# Patient Record
Sex: MALE | Race: WHITE | NOT HISPANIC OR LATINO | Employment: OTHER | URBAN - METROPOLITAN AREA
[De-identification: names, ages, dates, MRNs, and addresses within clinical notes are randomized per-mention and may not be internally consistent; named-entity substitution may affect disease eponyms.]

---

## 2023-10-09 ENCOUNTER — APPOINTMENT (OUTPATIENT)
Dept: RADIOLOGY | Facility: MEDICAL CENTER | Age: 83
DRG: 871 | End: 2023-10-09
Attending: EMERGENCY MEDICINE
Payer: COMMERCIAL

## 2023-10-09 ENCOUNTER — HOSPITAL ENCOUNTER (INPATIENT)
Facility: MEDICAL CENTER | Age: 83
LOS: 28 days | DRG: 871 | End: 2023-11-06
Attending: EMERGENCY MEDICINE | Admitting: STUDENT IN AN ORGANIZED HEALTH CARE EDUCATION/TRAINING PROGRAM
Payer: COMMERCIAL

## 2023-10-09 DIAGNOSIS — E86.0 DEHYDRATION: ICD-10-CM

## 2023-10-09 DIAGNOSIS — E87.0 HYPERNATREMIA: ICD-10-CM

## 2023-10-09 DIAGNOSIS — N17.9 ACUTE KIDNEY INJURY (HCC): ICD-10-CM

## 2023-10-09 DIAGNOSIS — I48.91 ATRIAL FIBRILLATION WITH RVR (HCC): ICD-10-CM

## 2023-10-09 DIAGNOSIS — I48.91 ATRIAL FIBRILLATION WITH RAPID VENTRICULAR RESPONSE (HCC): ICD-10-CM

## 2023-10-09 DIAGNOSIS — J96.01 ACUTE RESPIRATORY FAILURE WITH HYPOXIA (HCC): ICD-10-CM

## 2023-10-09 DIAGNOSIS — G93.41 METABOLIC ENCEPHALOPATHY: ICD-10-CM

## 2023-10-09 DIAGNOSIS — Z71.89 ADVANCE CARE PLANNING: ICD-10-CM

## 2023-10-09 DIAGNOSIS — N19 UREMIA: ICD-10-CM

## 2023-10-09 PROBLEM — T17.908A ASPIRATION INTO AIRWAY: Status: RESOLVED | Noted: 2023-10-09 | Resolved: 2023-10-09

## 2023-10-09 PROBLEM — K02.9 DENTAL CARIES: Status: ACTIVE | Noted: 2023-10-09

## 2023-10-09 PROBLEM — R09.02 HYPOXIA: Status: ACTIVE | Noted: 2023-10-09

## 2023-10-09 PROBLEM — D75.1 ERYTHROCYTOSIS: Status: ACTIVE | Noted: 2023-10-09

## 2023-10-09 PROBLEM — R41.82 AMS (ALTERED MENTAL STATUS): Status: ACTIVE | Noted: 2023-10-09

## 2023-10-09 PROBLEM — M62.82 RHABDOMYOLYSIS: Status: ACTIVE | Noted: 2023-10-09

## 2023-10-09 PROBLEM — T17.908A ASPIRATION INTO AIRWAY: Status: ACTIVE | Noted: 2023-10-09

## 2023-10-09 LAB
ALBUMIN SERPL BCP-MCNC: 3.5 G/DL (ref 3.2–4.9)
ALBUMIN/GLOB SERPL: 1.2 G/DL
ALP SERPL-CCNC: 64 U/L (ref 30–99)
ALT SERPL-CCNC: 15 U/L (ref 2–50)
AMMONIA PLAS-SCNC: 18 UMOL/L (ref 11–45)
ANION GAP SERPL CALC-SCNC: 16 MMOL/L (ref 7–16)
APTT PPP: 32.9 SEC (ref 24.7–36)
AST SERPL-CCNC: 33 U/L (ref 12–45)
BASOPHILS # BLD AUTO: 0 % (ref 0–1.8)
BASOPHILS # BLD: 0 K/UL (ref 0–0.12)
BILIRUB SERPL-MCNC: 2.5 MG/DL (ref 0.1–1.5)
BUN SERPL-MCNC: 104 MG/DL (ref 8–22)
BURR CELLS BLD QL SMEAR: ABNORMAL
CALCIUM ALBUM COR SERPL-MCNC: 8.5 MG/DL (ref 8.5–10.5)
CALCIUM SERPL-MCNC: 8.1 MG/DL (ref 8.5–10.5)
CHLORIDE SERPL-SCNC: 112 MMOL/L (ref 96–112)
CK SERPL-CCNC: 1030 U/L (ref 0–154)
CO2 SERPL-SCNC: 23 MMOL/L (ref 20–33)
CREAT SERPL-MCNC: 1.67 MG/DL (ref 0.5–1.4)
EKG IMPRESSION: NORMAL
EKG IMPRESSION: NORMAL
EOSINOPHIL # BLD AUTO: 0 K/UL (ref 0–0.51)
EOSINOPHIL NFR BLD: 0 % (ref 0–6.9)
ERYTHROCYTE [DISTWIDTH] IN BLOOD BY AUTOMATED COUNT: 49.1 FL (ref 35.9–50)
GFR SERPLBLD CREATININE-BSD FMLA CKD-EPI: 41 ML/MIN/1.73 M 2
GLOBULIN SER CALC-MCNC: 3 G/DL (ref 1.9–3.5)
GLUCOSE BLD STRIP.AUTO-MCNC: 297 MG/DL (ref 65–99)
GLUCOSE SERPL-MCNC: 351 MG/DL (ref 65–99)
HCT VFR BLD AUTO: 58.2 % (ref 42–52)
HGB BLD-MCNC: 18.9 G/DL (ref 14–18)
INR PPP: 1.33 (ref 0.87–1.13)
LACTATE SERPL-SCNC: 2.3 MMOL/L (ref 0.5–2)
LACTATE SERPL-SCNC: 3 MMOL/L (ref 0.5–2)
LACTATE SERPL-SCNC: 3 MMOL/L (ref 0.5–2)
LIPASE SERPL-CCNC: 84 U/L (ref 11–82)
LYMPHOCYTES # BLD AUTO: 0.81 K/UL (ref 1–4.8)
LYMPHOCYTES NFR BLD: 4.4 % (ref 22–41)
MANUAL DIFF BLD: ABNORMAL
MCH RBC QN AUTO: 30.6 PG (ref 27–33)
MCHC RBC AUTO-ENTMCNC: 32.5 G/DL (ref 32.3–36.5)
MCV RBC AUTO: 94.2 FL (ref 81.4–97.8)
MICROCYTES BLD QL SMEAR: ABNORMAL
MONOCYTES # BLD AUTO: 0.95 K/UL (ref 0–0.85)
MONOCYTES NFR BLD AUTO: 5.2 % (ref 0–13.4)
MORPHOLOGY BLD-IMP: NORMAL
NEUTROPHILS # BLD AUTO: 16.54 K/UL (ref 1.82–7.42)
NEUTROPHILS NFR BLD: 90.4 % (ref 44–72)
NRBC # BLD AUTO: 0 K/UL
NRBC BLD-RTO: 0 /100 WBC (ref 0–0.2)
PLATELET # BLD AUTO: 171 K/UL (ref 164–446)
PLATELET BLD QL SMEAR: NORMAL
PMV BLD AUTO: 11 FL (ref 9–12.9)
POIKILOCYTOSIS BLD QL SMEAR: ABNORMAL
POLYCHROMASIA BLD QL SMEAR: ABNORMAL
POTASSIUM SERPL-SCNC: 4 MMOL/L (ref 3.6–5.5)
PROCALCITONIN SERPL-MCNC: 0.23 NG/ML
PROT SERPL-MCNC: 6.5 G/DL (ref 6–8.2)
PROTHROMBIN TIME: 16.7 SEC (ref 12–14.6)
RBC # BLD AUTO: 6.18 M/UL (ref 4.7–6.1)
RBC BLD AUTO: PRESENT
SODIUM SERPL-SCNC: 151 MMOL/L (ref 135–145)
TROPONIN T SERPL-MCNC: 39 NG/L (ref 6–19)
WBC # BLD AUTO: 18.3 K/UL (ref 4.8–10.8)

## 2023-10-09 PROCEDURE — 71045 X-RAY EXAM CHEST 1 VIEW: CPT

## 2023-10-09 PROCEDURE — 700102 HCHG RX REV CODE 250 W/ 637 OVERRIDE(OP)

## 2023-10-09 PROCEDURE — 93005 ELECTROCARDIOGRAM TRACING: CPT | Performed by: EMERGENCY MEDICINE

## 2023-10-09 PROCEDURE — 87040 BLOOD CULTURE FOR BACTERIA: CPT

## 2023-10-09 PROCEDURE — 83605 ASSAY OF LACTIC ACID: CPT | Mod: 91

## 2023-10-09 PROCEDURE — 96374 THER/PROPH/DIAG INJ IV PUSH: CPT

## 2023-10-09 PROCEDURE — 85610 PROTHROMBIN TIME: CPT

## 2023-10-09 PROCEDURE — 85730 THROMBOPLASTIN TIME PARTIAL: CPT

## 2023-10-09 PROCEDURE — 99223 1ST HOSP IP/OBS HIGH 75: CPT | Mod: GC,AI

## 2023-10-09 PROCEDURE — 70450 CT HEAD/BRAIN W/O DYE: CPT

## 2023-10-09 PROCEDURE — 770020 HCHG ROOM/CARE - TELE (206)

## 2023-10-09 PROCEDURE — 80053 COMPREHEN METABOLIC PANEL: CPT

## 2023-10-09 PROCEDURE — 700101 HCHG RX REV CODE 250

## 2023-10-09 PROCEDURE — 85025 COMPLETE CBC W/AUTO DIFF WBC: CPT

## 2023-10-09 PROCEDURE — 84484 ASSAY OF TROPONIN QUANT: CPT

## 2023-10-09 PROCEDURE — 94640 AIRWAY INHALATION TREATMENT: CPT

## 2023-10-09 PROCEDURE — 99285 EMERGENCY DEPT VISIT HI MDM: CPT

## 2023-10-09 PROCEDURE — 700105 HCHG RX REV CODE 258: Performed by: EMERGENCY MEDICINE

## 2023-10-09 PROCEDURE — 82140 ASSAY OF AMMONIA: CPT

## 2023-10-09 PROCEDURE — 72125 CT NECK SPINE W/O DYE: CPT

## 2023-10-09 PROCEDURE — 700111 HCHG RX REV CODE 636 W/ 250 OVERRIDE (IP): Mod: JZ

## 2023-10-09 PROCEDURE — 84145 PROCALCITONIN (PCT): CPT

## 2023-10-09 PROCEDURE — 93005 ELECTROCARDIOGRAM TRACING: CPT

## 2023-10-09 PROCEDURE — 82550 ASSAY OF CK (CPK): CPT

## 2023-10-09 PROCEDURE — 700111 HCHG RX REV CODE 636 W/ 250 OVERRIDE (IP): Mod: JZ | Performed by: EMERGENCY MEDICINE

## 2023-10-09 PROCEDURE — 85007 BL SMEAR W/DIFF WBC COUNT: CPT

## 2023-10-09 PROCEDURE — 81001 URINALYSIS AUTO W/SCOPE: CPT

## 2023-10-09 PROCEDURE — 31720 CLEARANCE OF AIRWAYS: CPT

## 2023-10-09 PROCEDURE — 96375 TX/PRO/DX INJ NEW DRUG ADDON: CPT

## 2023-10-09 PROCEDURE — 36415 COLL VENOUS BLD VENIPUNCTURE: CPT

## 2023-10-09 PROCEDURE — 82962 GLUCOSE BLOOD TEST: CPT

## 2023-10-09 PROCEDURE — 83690 ASSAY OF LIPASE: CPT

## 2023-10-09 PROCEDURE — 700105 HCHG RX REV CODE 258

## 2023-10-09 RX ORDER — SODIUM CHLORIDE, SODIUM LACTATE, POTASSIUM CHLORIDE, CALCIUM CHLORIDE 600; 310; 30; 20 MG/100ML; MG/100ML; MG/100ML; MG/100ML
INJECTION, SOLUTION INTRAVENOUS CONTINUOUS
Status: DISCONTINUED | OUTPATIENT
Start: 2023-10-09 | End: 2023-10-10

## 2023-10-09 RX ORDER — IPRATROPIUM BROMIDE AND ALBUTEROL SULFATE 2.5; .5 MG/3ML; MG/3ML
3 SOLUTION RESPIRATORY (INHALATION)
Status: DISCONTINUED | OUTPATIENT
Start: 2023-10-09 | End: 2023-10-11 | Stop reason: ALTCHOICE

## 2023-10-09 RX ORDER — DEXTROSE MONOHYDRATE 25 G/50ML
25 INJECTION, SOLUTION INTRAVENOUS
Status: DISCONTINUED | OUTPATIENT
Start: 2023-10-09 | End: 2023-10-11

## 2023-10-09 RX ORDER — SODIUM CHLORIDE 9 MG/ML
1000 INJECTION, SOLUTION INTRAVENOUS ONCE
Status: COMPLETED | OUTPATIENT
Start: 2023-10-09 | End: 2023-10-09

## 2023-10-09 RX ORDER — CEFTRIAXONE 2 G/1
2000 INJECTION, POWDER, FOR SOLUTION INTRAMUSCULAR; INTRAVENOUS ONCE
Status: COMPLETED | OUTPATIENT
Start: 2023-10-09 | End: 2023-10-09

## 2023-10-09 RX ORDER — SODIUM CHLORIDE, SODIUM LACTATE, POTASSIUM CHLORIDE, AND CALCIUM CHLORIDE .6; .31; .03; .02 G/100ML; G/100ML; G/100ML; G/100ML
1000 INJECTION, SOLUTION INTRAVENOUS ONCE
Status: ACTIVE | OUTPATIENT
Start: 2023-10-10 | End: 2023-10-11

## 2023-10-09 RX ORDER — DILTIAZEM HYDROCHLORIDE 5 MG/ML
INJECTION INTRAVENOUS
Status: DISPENSED
Start: 2023-10-09 | End: 2023-10-10

## 2023-10-09 RX ORDER — INSULIN LISPRO 100 [IU]/ML
1-6 INJECTION, SOLUTION INTRAVENOUS; SUBCUTANEOUS EVERY 6 HOURS
Status: DISCONTINUED | OUTPATIENT
Start: 2023-10-09 | End: 2023-10-11

## 2023-10-09 RX ORDER — AMOXICILLIN 250 MG
2 CAPSULE ORAL 2 TIMES DAILY
Status: DISCONTINUED | OUTPATIENT
Start: 2023-10-09 | End: 2023-10-09

## 2023-10-09 RX ORDER — BISACODYL 10 MG
10 SUPPOSITORY, RECTAL RECTAL
Status: DISCONTINUED | OUTPATIENT
Start: 2023-10-09 | End: 2023-10-09

## 2023-10-09 RX ORDER — IPRATROPIUM BROMIDE AND ALBUTEROL SULFATE 2.5; .5 MG/3ML; MG/3ML
SOLUTION RESPIRATORY (INHALATION)
Status: COMPLETED
Start: 2023-10-09 | End: 2023-10-09

## 2023-10-09 RX ORDER — DILTIAZEM HYDROCHLORIDE 5 MG/ML
10 INJECTION INTRAVENOUS ONCE
Status: COMPLETED | OUTPATIENT
Start: 2023-10-09 | End: 2023-10-09

## 2023-10-09 RX ORDER — POLYETHYLENE GLYCOL 3350 17 G/17G
1 POWDER, FOR SOLUTION ORAL
Status: DISCONTINUED | OUTPATIENT
Start: 2023-10-09 | End: 2023-10-09

## 2023-10-09 RX ORDER — AZITHROMYCIN 500 MG/5ML
500 INJECTION, POWDER, LYOPHILIZED, FOR SOLUTION INTRAVENOUS EVERY 24 HOURS
Status: DISCONTINUED | OUTPATIENT
Start: 2023-10-09 | End: 2023-10-10

## 2023-10-09 RX ADMIN — SODIUM CHLORIDE, POTASSIUM CHLORIDE, SODIUM LACTATE AND CALCIUM CHLORIDE: 600; 310; 30; 20 INJECTION, SOLUTION INTRAVENOUS at 21:41

## 2023-10-09 RX ADMIN — INSULIN LISPRO 3 UNITS: 100 INJECTION, SOLUTION INTRAVENOUS; SUBCUTANEOUS at 21:31

## 2023-10-09 RX ADMIN — CEFTRIAXONE SODIUM 2000 MG: 2 INJECTION, POWDER, FOR SOLUTION INTRAMUSCULAR; INTRAVENOUS at 18:22

## 2023-10-09 RX ADMIN — DILTIAZEM HYDROCHLORIDE 10 MG: 5 INJECTION INTRAVENOUS at 15:51

## 2023-10-09 RX ADMIN — INSULIN GLARGINE-YFGN 24 UNITS: 100 INJECTION, SOLUTION SUBCUTANEOUS at 22:06

## 2023-10-09 RX ADMIN — AZITHROMYCIN 500 MG: 500 INJECTION, POWDER, LYOPHILIZED, FOR SOLUTION INTRAVENOUS at 22:02

## 2023-10-09 RX ADMIN — IPRATROPIUM BROMIDE AND ALBUTEROL SULFATE 3 ML: 2.5; .5 SOLUTION RESPIRATORY (INHALATION) at 21:18

## 2023-10-09 RX ADMIN — SODIUM CHLORIDE 1000 ML: 9 INJECTION, SOLUTION INTRAVENOUS at 18:21

## 2023-10-09 RX ADMIN — SODIUM CHLORIDE 1000 ML: 9 INJECTION, SOLUTION INTRAVENOUS at 15:50

## 2023-10-09 ASSESSMENT — PAIN DESCRIPTION - PAIN TYPE: TYPE: ACUTE PAIN;CHRONIC PAIN

## 2023-10-09 NOTE — ED TRIAGE NOTES
Chief Complaint   Patient presents with    T-5000 GLF     Pt found down.  LKW 2130 on 10/8     Pt BIB EMS for above complaint. On arrival pt has a full brief with urine and diarrhea spilling onto pt's legs. Pt responds to verbal stimuli.  PIV placed, blood drawn and sent to lab.   Pt cleaned and new linens placed.

## 2023-10-09 NOTE — ED NOTES
Lab called, they need another blue top to run coags due to high Hct level. They will be sending phlebotomist to collect sample

## 2023-10-09 NOTE — ED PROVIDER NOTES
ED Provider Note    CHIEF COMPLAINT  Chief Complaint   Patient presents with    T-5000 GLF     Pt found down.  LKW 2130 on 10/8       HPI  Perry Marrufo is a 83 y.o. male who presents from home after being found down.  Initial story is unclear in terms of who called EMS, unclear whether the patient was at home or in an assisted living.  Patient  was found by EMS to be altered and was covered in stool.  He had a pressure ulcer on his sacrum as well as on the right side of both knees.  He complained of feeling thirsty but was not able to remember any specific information about why he fell or how long he was there.    EXTERNAL RECORDS REVIEWED  Review of last admission note to this facility was in 2015 when patient was admitted for C. difficile diarrhea and sepsis.  He had had a recent hip fracture as well.  He has a history of type 2 diabetes and vitamin D deficiency.  Also was found to be hyponatremic at that time.  [facility]  Patient has a history of basal cell carcinoma of the face, BPH, stage II chronic kidney disease, type 2 diabetes, esophageal mass, hypertension.  His last medication list appeared to have alogliptin, 25 mg once a day, cyanocobalamin, 1000 mcg tablet once a day, and tamsulosin 0.4 mg daily  ROS  Unable to obtain due to clinical condition        LIMITATION TO HISTORY   Select: Altered mental status / Confusion  OUTSIDE HISTORIAN(S):  EMS none        PAST FAM HISTORY  No family history on file.    PAST MEDICAL HISTORY   has a past medical history of Diabetes mellitus, type 2 (HCC) and Prostate disease.    SOCIAL HISTORY  Social History     Tobacco Use    Smoking status: Never    Smokeless tobacco: Not on file   Substance and Sexual Activity    Alcohol use: No    Drug use: No    Sexual activity: Not on file       SURGICAL HISTORY   has a past surgical history that includes other orthopedic surgery; pin insertion (1/2/2015); and hip cannulated screw (1/2/2015).    CURRENT  "MEDICATIONS  Home Medications       Reviewed by January Tay (Pharmacy Tech) on 10/09/23 at 1840  Med List Status: Unable to Obtain     Medication Last Dose Status   aspirin (ASA) 325 MG TABS  Active   cyanocobalamin (VITAMIN B12) 500 MCG tablet  Active   enoxaparin (LOVENOX) 40 MG/0.4ML SOLN inj  Active   famotidine (PEPCID) 20 MG TABS  Active   hydrocodone-acetaminophen (NORCO) 5-325 MG TABS per tablet  Active   insulin lispro (HUMALOG) 100 UNIT/ML SOLN  Active   lactobacillus rhamnosus, GG, (CULTURELLE) CAPS  Active   Omega-3 Fatty Acids (DOCOSAHEXANOIC ACID) 1000 MG CAPS capsule  Active   potassium chloride SA (K-DUR) 20 MEQ TBCR  Active   tamsulosin (FLOMAX) 0.4 MG capsule  Active   vitamin D (CHOLECALCIFEROL) 1000 UNIT TABS  Active                     ALLERGIES  Allergies   Allergen Reactions    Nicotine        PHYSICAL EXAM  VITAL SIGNS: /66   Pulse 98   Temp 36.4 °C (97.6 °F) (Temporal)   Resp (!) 28   Ht 1.803 m (5' 11\")   Wt 122 kg (270 lb)   SpO2 90%   BMI 37.66 kg/m²    Gen: Disheveled, unkempt, appears delirious  HEENT: Dry oral mucous membranes.  No signs of trauma, Bilateral external ears normal, Nose normal. Conjunctiva normal, Non-icteric.   Neck:  No tenderness, Supple, No masses  Lymphatic: No cervical lymphadenopathy noted.   Cardiovascular: Tachycardia with irregularly irregular rhythm,.  Capillary refill less than 3 seconds to all extremities, 2+ distal pulses.  Thorax & Lungs: Normal breath sounds, No respiratory distress, No wheezing bilateral chest rise  Abdomen: Bowel sounds normal, Soft, No tenderness, No masses, No pulsatile masses. No Guarding or rebound  Skin: Warm, Dry, No erythema, No rash noted to exposed areas.   Back: No bony tenderness, No CVA tenderness.   Extremities: Intact distal pulses, No edema  Neurologic: Alert , no facial droop, grossly normal coordination and strength  Psychiatric: Affect pleasant    INITIAL IMPRESSION  Patient arrives for evaluation " appearing extremely dehydrated and altered.  The story is quite unclear as there is nobody to corroborate what happened.  There was no indication from EMS whether the patient lives alone or who called 911.  Patient himself cannot answer.  He does open his eyes to voice and slowly look at whoever is speaking.  He mumbles incoherently.  He is markedly tachycardic and appears to be in atrial fibrillation with rapid ventricular rate.  He also was in some amount of respiratory distress and is tachypneic.  He does appear to be moving air well and does not have any abnormal breath sounds however.  He is on small amount of supplemental oxygen based on apparent hypoxia in the field.  His blood pressure appears stable and he is not febrile.  He has no significant findings to suggest trauma but does have appears to be the start of pressure ulcers to both knees and the low back/sacral region.  We will attempt to get a hold of anybody in his hometown or family that knows him.  There is no emergency contact in the system.    ED observation? No  LABS  Results for orders placed or performed during the hospital encounter of 10/09/23   LACTIC ACID   Result Value Ref Range    Lactic Acid 3.0 (H) 0.5 - 2.0 mmol/L   Prothrombin Time   Result Value Ref Range    PT 16.7 (H) 12.0 - 14.6 sec    INR 1.33 (H) 0.87 - 1.13   APTT   Result Value Ref Range    APTT 32.9 24.7 - 36.0 sec   DIFFERENTIAL MANUAL   Result Value Ref Range    Neutrophils-Polys 90.40 (H) 44.00 - 72.00 %    Lymphocytes 4.40 (L) 22.00 - 41.00 %    Monocytes 5.20 0.00 - 13.40 %    Eosinophils 0.00 0.00 - 6.90 %    Basophils 0.00 0.00 - 1.80 %    Neutrophils (Absolute) 16.54 (H) 1.82 - 7.42 K/uL    Lymphs (Absolute) 0.81 (L) 1.00 - 4.80 K/uL    Monos (Absolute) 0.95 (H) 0.00 - 0.85 K/uL    Eos (Absolute) 0.00 0.00 - 0.51 K/uL    Baso (Absolute) 0.00 0.00 - 0.12 K/uL    Nucleated RBC 0.00 0.00 - 0.20 /100 WBC    Manual Diff Status PERFORMED    PERIPHERAL SMEAR REVIEW   Result  Value Ref Range    Peripheral Smear Review see below    PLATELET ESTIMATE   Result Value Ref Range    Plt Estimation Normal    MORPHOLOGY   Result Value Ref Range    RBC Morphology Present     Polychromia 1+     Microcytosis 2+ (A)     Poikilocytosis 1+     Echinocytes 1+    CBC WITH DIFFERENTIAL   Result Value Ref Range    WBC 18.3 (H) 4.8 - 10.8 K/uL    RBC 6.18 (H) 4.70 - 6.10 M/uL    Hemoglobin 18.9 (H) 14.0 - 18.0 g/dL    Hematocrit 58.2 (H) 42.0 - 52.0 %    MCV 94.2 81.4 - 97.8 fL    MCH 30.6 27.0 - 33.0 pg    MCHC 32.5 32.3 - 36.5 g/dL    RDW 49.1 35.9 - 50.0 fL    Platelet Count 171 164 - 446 K/uL    MPV 11.0 9.0 - 12.9 fL    NRBC (Absolute) 0.00 K/uL   Comp Metabolic Panel   Result Value Ref Range    Sodium 151 (H) 135 - 145 mmol/L    Potassium 4.0 3.6 - 5.5 mmol/L    Chloride 112 96 - 112 mmol/L    Co2 23 20 - 33 mmol/L    Anion Gap 16.0 7.0 - 16.0    Glucose 351 (H) 65 - 99 mg/dL    Bun 104 (H) 8 - 22 mg/dL    Creatinine 1.67 (H) 0.50 - 1.40 mg/dL    Calcium 8.1 (L) 8.5 - 10.5 mg/dL    Correct Calcium 8.5 8.5 - 10.5 mg/dL    AST(SGOT) 33 12 - 45 U/L    ALT(SGPT) 15 2 - 50 U/L    Alkaline Phosphatase 64 30 - 99 U/L    Total Bilirubin 2.5 (H) 0.1 - 1.5 mg/dL    Albumin 3.5 3.2 - 4.9 g/dL    Total Protein 6.5 6.0 - 8.2 g/dL    Globulin 3.0 1.9 - 3.5 g/dL    A-G Ratio 1.2 g/dL   CREATINE KINASE   Result Value Ref Range    CPK Total 1030 (H) 0 - 154 U/L   LACTIC ACID   Result Value Ref Range    Lactic Acid 3.0 (H) 0.5 - 2.0 mmol/L   LIPASE   Result Value Ref Range    Lipase 84 (H) 11 - 82 U/L   TROPONIN   Result Value Ref Range    Troponin T 39 (H) 6 - 19 ng/L   ESTIMATED GFR   Result Value Ref Range    GFR (CKD-EPI) 41 (A) >60 mL/min/1.73 m 2   AMMONIA   Result Value Ref Range    Ammonia 18 11 - 45 umol/L   LACTIC ACID   Result Value Ref Range    Lactic Acid 2.3 (H) 0.5 - 2.0 mmol/L   PROCALCITONIN   Result Value Ref Range    Procalcitonin 0.23 <0.25 ng/mL   EKG (NOW)   Result Value Ref Range    Report        [facility] Emergency [facility]    Test Date:  2023-10-09  Pt Name:    LINNEA WATERS               Department: ER  MRN:        [id number]                      Room:       BL 19  Gender:     Male                         Technician:  :        1940                   Requested By:ER TRIAGE PROTOCOL  Order #:    [id number]                    Reading MD: Judith Benitez MD    Measurements  Intervals                                Axis  Rate:       143                          P:          0  RI:         0                            QRS:        -83  QRSD:       92                           T:          80  QT:         301  QTc:        464    Interpretive Statements  Atrial fibrillation with rapid V-rate  Inferior infarct, old  Anterior infarct, old  Compared to ECG 2015 19:19:50  Sinus rhythm no longer present  Myocardial infarct finding still present  Electronically Signed On 10- 18:26:37 PDT by Judith Benitez MD     EKG (NOW)   Result Value Ref Range    Report       [facility] Emergency Dept.    Test Date:  2023-10-09  Pt Name:    LINNEA WATERS               Department: ER  MRN:        [id number]                      Room:       BL 19  Gender:     Male                         Technician: [id number]  :        1940                   Requested By:JUDITH BENITEZ  Order #:    [id number]                    Reading MD: Judith Benitez MD    Measurements  Intervals                                Axis  Rate:       83                           P:          -29  RI:         170                          QRS:        -79  QRSD:       99                           T:          71  QT:         371  QTc:        436    Interpretive Statements  Sinus rhythm  Multiple ventricular premature complexes  Inferior infarct, old  Anterior infarct, old  Compared to ECG 10/09/2023 15:10:06  Ventricular premature complex(es) now present  Atrial fibrillation no longer present  Myocardial infarct finding  still present  Electronically Signed On 10- 18:26: 34 PDT by Pritesh Fuentes MD     POCT glucose device results   Result Value Ref Range    POC Glucose, Blood 297 (H) 65 - 99 mg/dL     I have independently interpreted this EKG  Atrial fibrillation with rapid ventricular rate of 143.  GA interval is unassessable.  Intervals appear to be within normal limits.  There do not appear to be any convincing ST or T wave changes to suggest ischemia.  There does not appear to be ectopy.  Most recent EKG was from 2015 and was a sinus rhythm at that time.    Second EKG performed after conversion to sinus rhythm.  Rate of 83.  Multiple PVCs noted.  There appear to be Q waves in the anterior and inferior region.  Compared to most recent EKG performed on arrival, A-fib no longer present.    I have independently interpreted the diagnostic imaging associated with this visit and am waiting the final reading from the radiologist.   My preliminary interpretation is a follows: Chest x-ray: There are no pulmonary opacities to suggest infiltrates or effusions.  Cardiomediastinal silhouette appears to be within normal limits, there are no bony abnormalities.  RADIOLOGY  CT-CSPINE WITHOUT PLUS RECONS   Final Result         1. No acute fracture from C1 through T1 is visualized.         CT-HEAD W/O   Final Result         1. No acute intracranial abnormality. No evidence of acute intracranial hemorrhage or mass lesion.                     DX-CHEST-PORTABLE (1 VIEW)   Final Result      1.  Low lung volumes without definite acute cardiopulmonary abnormality.      EC-ECHOCARDIOGRAM COMPLETE W/O CONT    (Results Pending)   [id number]    (Results Pending)         HYDRATION: Based on the patient's presentation of Acute Kidney Injury, Dehydration, Eldery ALOC, Inability to take oral fluids, and Tachycardia the patient was given IV fluids. IV Hydration was used because oral hydration was not adequate alone. Upon recheck following  hydration, the patient was slowly improving.    Critical Care Note  Upon my evaluation, this patient had high probability of imminent and life-threatening deterioration due to metabolic encephalopathy severe dehydration, hypernatremia, acute kidney injury with uremia, hemoconcentration, leukocytosis, which required my direct attention, intervention, and personal management. I personally provided 80 minutes of critical care time exclusive of time spent on separately billable procedures. Time includes review of laboratory data, radiology results, discussion with consultants, and monitoring for potential decompensation.      COURSE & MEDICAL DECISION MAKING  Pertinent Labs & Imaging studies reviewed. (See chart for details)  6:23 PM  Patient's nephew, Yuriy Marrufo, called and was able to give more information.  Apparently the patient lives alone but only about a block from Yuriy himself.  2 days ago a neighbor came over to borrow the patient's truck and he felt the patient looked a bit sick at that time.  Patient's neighbor brought back the truck today and found the patient on the floor.  The patient was able to state that he was very thirsty but no other information was given at that time.  They called EMS and EMS transported him from [place] to this facility.  Yuriy give us his phone number, [phone], and he will be the point of contact in terms of family members who can help with decision-making.    Patient reevaluated at bedside.  He is still altered but slightly more alert now.  His labs are extremely concerning for severe dehydration.  Given the possibility of sepsis, empiric antibiotics were given in the form of Rocephin after cultures were drawn.  Unclear what the source may be at this point.  Patient is not febrile and does not appear to have neck stiffness arguing against meningitis.  I suspect that he is altered due to the his extreme dehydration state and uremia and that once this resolves he  should have resolution of this encephalopathy.  The case was discussed with the hospitalist who will evaluate the patient in the emergency department for admission for his metabolic abnormalities.  At this point there does not appear to be any significant trauma and he does not have any abdominal tenderness to suggest the need for advanced imaging of the chest or abdomen.        I have discussed management of the patient with the following physicians and CARLOZ's: None    Escalation of care considered, and ultimately not performed: CT imaging of the chest abdomen pelvis    Barriers to care at this time, including but not limited to: Patient is encephalopathic.     Decision tools and Rx drugs considered including, but not limited to :Antibiotics Rocephin      Discussion of management with other QHP or appropriate source(s): None        FINAL IMPRESSION  1. Dehydration    2. Acute kidney injury (HCC)    3. Hypernatremia    4. Metabolic encephalopathy    5. Atrial fibrillation with rapid ventricular response (HCC)    6. Uremia        Electronically signed by: Pritesh Fuentes M.D., 10/9/2023 3:26 PM

## 2023-10-10 ENCOUNTER — APPOINTMENT (OUTPATIENT)
Dept: RADIOLOGY | Facility: MEDICAL CENTER | Age: 83
DRG: 871 | End: 2023-10-10
Attending: INTERNAL MEDICINE
Payer: COMMERCIAL

## 2023-10-10 ENCOUNTER — APPOINTMENT (OUTPATIENT)
Dept: RADIOLOGY | Facility: MEDICAL CENTER | Age: 83
DRG: 871 | End: 2023-10-10
Payer: COMMERCIAL

## 2023-10-10 ENCOUNTER — APPOINTMENT (OUTPATIENT)
Dept: CARDIOLOGY | Facility: MEDICAL CENTER | Age: 83
DRG: 871 | End: 2023-10-10
Payer: COMMERCIAL

## 2023-10-10 LAB
ALBUMIN SERPL BCP-MCNC: 3.1 G/DL (ref 3.2–4.9)
ALBUMIN/GLOB SERPL: 1 G/DL
ALP SERPL-CCNC: 60 U/L (ref 30–99)
ALT SERPL-CCNC: 19 U/L (ref 2–50)
AMPHET UR QL SCN: NEGATIVE
ANION GAP SERPL CALC-SCNC: 12 MMOL/L (ref 7–16)
ANION GAP SERPL CALC-SCNC: 13 MMOL/L (ref 7–16)
APPEARANCE UR: ABNORMAL
AST SERPL-CCNC: 36 U/L (ref 12–45)
B PARAP IS1001 DNA NPH QL NAA+NON-PROBE: NOT DETECTED
B PERT.PT PRMT NPH QL NAA+NON-PROBE: NOT DETECTED
BACTERIA #/AREA URNS HPF: ABNORMAL /HPF
BARBITURATES UR QL SCN: NEGATIVE
BASE EXCESS BLDA CALC-SCNC: -3 MMOL/L (ref -4–3)
BASOPHILS # BLD AUTO: 0.7 % (ref 0–1.8)
BASOPHILS # BLD: 0.12 K/UL (ref 0–0.12)
BENZODIAZ UR QL SCN: POSITIVE
BILIRUB SERPL-MCNC: 1.9 MG/DL (ref 0.1–1.5)
BILIRUB UR QL STRIP.AUTO: ABNORMAL
BODY TEMPERATURE: 36.1 CENTIGRADE
BUN SERPL-MCNC: 103 MG/DL (ref 8–22)
BUN SERPL-MCNC: 107 MG/DL (ref 8–22)
BZE UR QL SCN: NEGATIVE
C PNEUM DNA NPH QL NAA+NON-PROBE: NOT DETECTED
CALCIUM ALBUM COR SERPL-MCNC: 9 MG/DL (ref 8.5–10.5)
CALCIUM SERPL-MCNC: 8.3 MG/DL (ref 8.5–10.5)
CALCIUM SERPL-MCNC: 8.4 MG/DL (ref 8.5–10.5)
CANNABINOIDS UR QL SCN: NEGATIVE
CHLORIDE SERPL-SCNC: 116 MMOL/L (ref 96–112)
CHLORIDE SERPL-SCNC: 121 MMOL/L (ref 96–112)
CK SERPL-CCNC: 1104 U/L (ref 0–154)
CO2 SERPL-SCNC: 20 MMOL/L (ref 20–33)
CO2 SERPL-SCNC: 21 MMOL/L (ref 20–33)
COLOR UR: ABNORMAL
CREAT SERPL-MCNC: 1.4 MG/DL (ref 0.5–1.4)
CREAT SERPL-MCNC: 1.9 MG/DL (ref 0.5–1.4)
EOSINOPHIL # BLD AUTO: 0.04 K/UL (ref 0–0.51)
EOSINOPHIL NFR BLD: 0.2 % (ref 0–6.9)
EPI CELLS #/AREA URNS HPF: ABNORMAL /HPF
ERYTHROCYTE [DISTWIDTH] IN BLOOD BY AUTOMATED COUNT: 50.1 FL (ref 35.9–50)
EST. AVERAGE GLUCOSE BLD GHB EST-MCNC: 183 MG/DL
FENTANYL UR QL: NEGATIVE
FLUAV RNA NPH QL NAA+NON-PROBE: NOT DETECTED
FLUBV RNA NPH QL NAA+NON-PROBE: NOT DETECTED
GFR SERPLBLD CREATININE-BSD FMLA CKD-EPI: 35 ML/MIN/1.73 M 2
GFR SERPLBLD CREATININE-BSD FMLA CKD-EPI: 50 ML/MIN/1.73 M 2
GLOBULIN SER CALC-MCNC: 3.1 G/DL (ref 1.9–3.5)
GLUCOSE BLD STRIP.AUTO-MCNC: 242 MG/DL (ref 65–99)
GLUCOSE BLD STRIP.AUTO-MCNC: 258 MG/DL (ref 65–99)
GLUCOSE BLD STRIP.AUTO-MCNC: 266 MG/DL (ref 65–99)
GLUCOSE BLD STRIP.AUTO-MCNC: 269 MG/DL (ref 65–99)
GLUCOSE BLD STRIP.AUTO-MCNC: 293 MG/DL (ref 65–99)
GLUCOSE SERPL-MCNC: 313 MG/DL (ref 65–99)
GLUCOSE SERPL-MCNC: 342 MG/DL (ref 65–99)
GLUCOSE UR STRIP.AUTO-MCNC: NEGATIVE MG/DL
GRAN CASTS #/AREA URNS LPF: ABNORMAL /LPF
HADV DNA NPH QL NAA+NON-PROBE: NOT DETECTED
HBA1C MFR BLD: 8 % (ref 4–5.6)
HCO3 BLDA-SCNC: 20 MMOL/L (ref 17–25)
HCOV 229E RNA NPH QL NAA+NON-PROBE: NOT DETECTED
HCOV HKU1 RNA NPH QL NAA+NON-PROBE: NOT DETECTED
HCOV NL63 RNA NPH QL NAA+NON-PROBE: NOT DETECTED
HCOV OC43 RNA NPH QL NAA+NON-PROBE: NOT DETECTED
HCT VFR BLD AUTO: 55.3 % (ref 42–52)
HGB BLD-MCNC: 18.2 G/DL (ref 14–18)
HMPV RNA NPH QL NAA+NON-PROBE: NOT DETECTED
HPIV1 RNA NPH QL NAA+NON-PROBE: NOT DETECTED
HPIV2 RNA NPH QL NAA+NON-PROBE: NOT DETECTED
HPIV3 RNA NPH QL NAA+NON-PROBE: NOT DETECTED
HPIV4 RNA NPH QL NAA+NON-PROBE: NOT DETECTED
HYALINE CASTS #/AREA URNS LPF: ABNORMAL /LPF
IMM GRANULOCYTES # BLD AUTO: 0.38 K/UL (ref 0–0.11)
IMM GRANULOCYTES NFR BLD AUTO: 2.2 % (ref 0–0.9)
INHALED O2 FLOW RATE: NORMAL L/MIN
INR PPP: 1.35 (ref 0.87–1.13)
KETONES UR STRIP.AUTO-MCNC: NEGATIVE MG/DL
LACTATE SERPL-SCNC: 1.8 MMOL/L (ref 0.5–2)
LACTATE SERPL-SCNC: 2.3 MMOL/L (ref 0.5–2)
LACTATE SERPL-SCNC: 2.8 MMOL/L (ref 0.5–2)
LACTATE SERPL-SCNC: 2.8 MMOL/L (ref 0.5–2)
LACTATE SERPL-SCNC: 3.5 MMOL/L (ref 0.5–2)
LEUKOCYTE ESTERASE UR QL STRIP.AUTO: ABNORMAL
LV EJECT FRACT  99904: 73
LV EJECT FRACT MOD 2C 99903: 70.2
LV EJECT FRACT MOD 4C 99902: 73.7
LV EJECT FRACT MOD BP 99901: 72.84
LYMPHOCYTES # BLD AUTO: 0.68 K/UL (ref 1–4.8)
LYMPHOCYTES NFR BLD: 4 % (ref 22–41)
M PNEUMO DNA NPH QL NAA+NON-PROBE: NOT DETECTED
MCH RBC QN AUTO: 31 PG (ref 27–33)
MCHC RBC AUTO-ENTMCNC: 32.9 G/DL (ref 32.3–36.5)
MCV RBC AUTO: 94.2 FL (ref 81.4–97.8)
METHADONE UR QL SCN: NEGATIVE
MICRO URNS: ABNORMAL
MONOCYTES # BLD AUTO: 1.42 K/UL (ref 0–0.85)
MONOCYTES NFR BLD AUTO: 8.3 % (ref 0–13.4)
NEUTROPHILS # BLD AUTO: 14.45 K/UL (ref 1.82–7.42)
NEUTROPHILS NFR BLD: 84.6 % (ref 44–72)
NITRITE UR QL STRIP.AUTO: NEGATIVE
NRBC # BLD AUTO: 0 K/UL
NRBC BLD-RTO: 0 /100 WBC (ref 0–0.2)
OPIATES UR QL SCN: NEGATIVE
OSMOLALITY SERPL: 368 MOSM/KG H2O (ref 278–298)
OXYCODONE UR QL SCN: NEGATIVE
PCO2 BLDA: 31.8 MMHG (ref 26–37)
PCO2 TEMP ADJ BLDA: 30.6 MMHG (ref 26–37)
PCP UR QL SCN: NEGATIVE
PH BLDA: 7.41 [PH] (ref 7.4–7.5)
PH TEMP ADJ BLDA: 7.43 [PH] (ref 7.4–7.5)
PH UR STRIP.AUTO: 5 [PH] (ref 5–8)
PLATELET # BLD AUTO: 154 K/UL (ref 164–446)
PMV BLD AUTO: 10.9 FL (ref 9–12.9)
PO2 BLDA: 83.4 MMHG (ref 64–87)
PO2 TEMP ADJ BLDA: 78.7 MMHG (ref 64–87)
POTASSIUM SERPL-SCNC: 4 MMOL/L (ref 3.6–5.5)
POTASSIUM SERPL-SCNC: 4.1 MMOL/L (ref 3.6–5.5)
PROPOXYPH UR QL SCN: NEGATIVE
PROT SERPL-MCNC: 6.2 G/DL (ref 6–8.2)
PROT UR QL STRIP: NEGATIVE MG/DL
PROTHROMBIN TIME: 16.9 SEC (ref 12–14.6)
RBC # BLD AUTO: 5.87 M/UL (ref 4.7–6.1)
RBC # URNS HPF: ABNORMAL /HPF
RBC UR QL AUTO: ABNORMAL
RSV RNA NPH QL NAA+NON-PROBE: NOT DETECTED
RV+EV RNA NPH QL NAA+NON-PROBE: NOT DETECTED
SAO2 % BLDA: 95.5 % (ref 93–99)
SARS-COV-2 RNA NPH QL NAA+NON-PROBE: NOTDETECTED
SCCMEC + MECA PNL NOSE NAA+PROBE: NEGATIVE
SODIUM SERPL-SCNC: 149 MMOL/L (ref 135–145)
SODIUM SERPL-SCNC: 154 MMOL/L (ref 135–145)
SP GR UR STRIP.AUTO: 1.02
UROBILINOGEN UR STRIP.AUTO-MCNC: 1 MG/DL
VIT B12 SERPL-MCNC: 1753 PG/ML (ref 211–911)
WBC # BLD AUTO: 17.1 K/UL (ref 4.8–10.8)
WBC #/AREA URNS HPF: ABNORMAL /HPF

## 2023-10-10 PROCEDURE — 94640 AIRWAY INHALATION TREATMENT: CPT

## 2023-10-10 PROCEDURE — 82962 GLUCOSE BLOOD TEST: CPT | Mod: 91

## 2023-10-10 PROCEDURE — 700105 HCHG RX REV CODE 258: Performed by: INTERNAL MEDICINE

## 2023-10-10 PROCEDURE — 700102 HCHG RX REV CODE 250 W/ 637 OVERRIDE(OP)

## 2023-10-10 PROCEDURE — 82803 BLOOD GASES ANY COMBINATION: CPT

## 2023-10-10 PROCEDURE — 700101 HCHG RX REV CODE 250: Performed by: STUDENT IN AN ORGANIZED HEALTH CARE EDUCATION/TRAINING PROGRAM

## 2023-10-10 PROCEDURE — 83036 HEMOGLOBIN GLYCOSYLATED A1C: CPT

## 2023-10-10 PROCEDURE — 700117 HCHG RX CONTRAST REV CODE 255

## 2023-10-10 PROCEDURE — 83930 ASSAY OF BLOOD OSMOLALITY: CPT

## 2023-10-10 PROCEDURE — 80048 BASIC METABOLIC PNL TOTAL CA: CPT

## 2023-10-10 PROCEDURE — 82607 VITAMIN B-12: CPT

## 2023-10-10 PROCEDURE — 700111 HCHG RX REV CODE 636 W/ 250 OVERRIDE (IP): Mod: JZ | Performed by: INTERNAL MEDICINE

## 2023-10-10 PROCEDURE — 83605 ASSAY OF LACTIC ACID: CPT | Mod: 91

## 2023-10-10 PROCEDURE — 87798 DETECT AGENT NOS DNA AMP: CPT | Mod: 91

## 2023-10-10 PROCEDURE — 700111 HCHG RX REV CODE 636 W/ 250 OVERRIDE (IP): Mod: JZ

## 2023-10-10 PROCEDURE — 85025 COMPLETE CBC W/AUTO DIFF WBC: CPT

## 2023-10-10 PROCEDURE — 770020 HCHG ROOM/CARE - TELE (206)

## 2023-10-10 PROCEDURE — 87486 CHLMYD PNEUM DNA AMP PROBE: CPT

## 2023-10-10 PROCEDURE — 87633 RESP VIRUS 12-25 TARGETS: CPT

## 2023-10-10 PROCEDURE — 82550 ASSAY OF CK (CPK): CPT

## 2023-10-10 PROCEDURE — 76705 ECHO EXAM OF ABDOMEN: CPT

## 2023-10-10 PROCEDURE — 87581 M.PNEUMON DNA AMP PROBE: CPT

## 2023-10-10 PROCEDURE — 97602 WOUND(S) CARE NON-SELECTIVE: CPT

## 2023-10-10 PROCEDURE — 99233 SBSQ HOSP IP/OBS HIGH 50: CPT | Performed by: INTERNAL MEDICINE

## 2023-10-10 PROCEDURE — 80053 COMPREHEN METABOLIC PANEL: CPT

## 2023-10-10 PROCEDURE — 85610 PROTHROMBIN TIME: CPT

## 2023-10-10 PROCEDURE — 93306 TTE W/DOPPLER COMPLETE: CPT | Mod: 26 | Performed by: INTERNAL MEDICINE

## 2023-10-10 PROCEDURE — 31720 CLEARANCE OF AIRWAYS: CPT

## 2023-10-10 PROCEDURE — 700105 HCHG RX REV CODE 258

## 2023-10-10 PROCEDURE — 87641 MR-STAPH DNA AMP PROBE: CPT

## 2023-10-10 PROCEDURE — 71045 X-RAY EXAM CHEST 1 VIEW: CPT

## 2023-10-10 PROCEDURE — 80307 DRUG TEST PRSMV CHEM ANLYZR: CPT

## 2023-10-10 PROCEDURE — 93306 TTE W/DOPPLER COMPLETE: CPT

## 2023-10-10 RX ORDER — DEXTROSE AND SODIUM CHLORIDE 5; .45 G/100ML; G/100ML
INJECTION, SOLUTION INTRAVENOUS CONTINUOUS
Status: DISCONTINUED | OUTPATIENT
Start: 2023-10-10 | End: 2023-10-10

## 2023-10-10 RX ORDER — DEXTROSE MONOHYDRATE 50 MG/ML
INJECTION, SOLUTION INTRAVENOUS CONTINUOUS
Status: ACTIVE | OUTPATIENT
Start: 2023-10-10 | End: 2023-10-10

## 2023-10-10 RX ORDER — ENOXAPARIN SODIUM 100 MG/ML
40 INJECTION SUBCUTANEOUS DAILY
Status: DISCONTINUED | OUTPATIENT
Start: 2023-10-10 | End: 2023-10-11

## 2023-10-10 RX ADMIN — SODIUM CHLORIDE, POTASSIUM CHLORIDE, SODIUM LACTATE AND CALCIUM CHLORIDE: 600; 310; 30; 20 INJECTION, SOLUTION INTRAVENOUS at 15:35

## 2023-10-10 RX ADMIN — IPRATROPIUM BROMIDE AND ALBUTEROL SULFATE 3 ML: 2.5; .5 SOLUTION RESPIRATORY (INHALATION) at 07:13

## 2023-10-10 RX ADMIN — INSULIN GLARGINE-YFGN 24 UNITS: 100 INJECTION, SOLUTION SUBCUTANEOUS at 17:55

## 2023-10-10 RX ADMIN — AMPICILLIN AND SULBACTAM 3 G: 1; 2 INJECTION, POWDER, FOR SOLUTION INTRAMUSCULAR; INTRAVENOUS at 12:57

## 2023-10-10 RX ADMIN — AMPICILLIN AND SULBACTAM 3 G: 1; 2 INJECTION, POWDER, FOR SOLUTION INTRAMUSCULAR; INTRAVENOUS at 17:47

## 2023-10-10 RX ADMIN — IPRATROPIUM BROMIDE AND ALBUTEROL SULFATE 3 ML: 2.5; .5 SOLUTION RESPIRATORY (INHALATION) at 02:13

## 2023-10-10 RX ADMIN — INSULIN LISPRO 3 UNITS: 100 INJECTION, SOLUTION INTRAVENOUS; SUBCUTANEOUS at 01:38

## 2023-10-10 RX ADMIN — DEXTROSE AND SODIUM CHLORIDE: 5; 450 INJECTION, SOLUTION INTRAVENOUS at 18:38

## 2023-10-10 RX ADMIN — AMPICILLIN AND SULBACTAM 3 G: 1; 2 INJECTION, POWDER, FOR SOLUTION INTRAMUSCULAR; INTRAVENOUS at 05:38

## 2023-10-10 RX ADMIN — ENOXAPARIN SODIUM 40 MG: 100 INJECTION SUBCUTANEOUS at 18:35

## 2023-10-10 RX ADMIN — IPRATROPIUM BROMIDE AND ALBUTEROL SULFATE 3 ML: 2.5; .5 SOLUTION RESPIRATORY (INHALATION) at 23:33

## 2023-10-10 RX ADMIN — INSULIN LISPRO 3 UNITS: 100 INJECTION, SOLUTION INTRAVENOUS; SUBCUTANEOUS at 11:55

## 2023-10-10 RX ADMIN — HUMAN ALBUMIN MICROSPHERES AND PERFLUTREN 3 ML: 10; .22 INJECTION, SOLUTION INTRAVENOUS at 15:15

## 2023-10-10 RX ADMIN — IPRATROPIUM BROMIDE AND ALBUTEROL SULFATE 3 ML: 2.5; .5 SOLUTION RESPIRATORY (INHALATION) at 15:03

## 2023-10-10 RX ADMIN — IPRATROPIUM BROMIDE AND ALBUTEROL SULFATE 3 ML: 2.5; .5 SOLUTION RESPIRATORY (INHALATION) at 11:43

## 2023-10-10 RX ADMIN — INSULIN LISPRO 3 UNITS: 100 INJECTION, SOLUTION INTRAVENOUS; SUBCUTANEOUS at 15:28

## 2023-10-10 RX ADMIN — INSULIN LISPRO 2 UNITS: 100 INJECTION, SOLUTION INTRAVENOUS; SUBCUTANEOUS at 17:54

## 2023-10-10 ASSESSMENT — PAIN DESCRIPTION - PAIN TYPE: TYPE: ACUTE PAIN

## 2023-10-10 ASSESSMENT — FIBROSIS 4 INDEX: FIB4 SCORE: 4.4

## 2023-10-10 NOTE — ASSESSMENT & PLAN NOTE
Hypovolemic hypernatremia  Free water via NG  Intermittent D5W, avoid overcorrection  Most recent Na 146, hold fluids for now

## 2023-10-10 NOTE — DISCHARGE PLANNING
LMSW followed up with [facility] contact list. 3 of 5 had the same number which was disconnected. LMSW left  for Janina [phone] and Maureen [phone] to give LMSW a call back if they are at all in relation to pt.

## 2023-10-10 NOTE — ASSESSMENT & PLAN NOTE
Likely infectious etiology  Neg imaging for fracture/acute bleed  Concern for aspiration  Reports of diarrhea  S/p rocephin in ED  Blood cultures, oral cultures, stool culture/lytes, urinalysis  procal  Empiric unasyn, azithro  Lactic acid trend  R/p cpk  abg  UDS  Ammonia  Aspiration precautions  Fall precautions  Seizure precautions  Npo with speech eval  Viral panel  mrsa nares

## 2023-10-10 NOTE — ASSESSMENT & PLAN NOTE
Glargine 30 units qHS, 5 units lispro with meals  Monitor accuchecks and cover with SSI  Hypoglycemic protocol  A1c 8 in past week

## 2023-10-10 NOTE — PROGRESS NOTES
4 Eyes Skin Assessment Completed by ALVARADO Butler and ALVARADO Vogel.    Head Scab, lesions      Ears Redness and Blanching  Nose Redness and Blanching  Mouth Redness and Discoloration, scab, crack, dry              Neck Redness, Blanching, and Scab        Breast/Chest Redness and Blanching    Shoulder Blades Redness and Blanching, abrasion      Spine Redness and Blanching  (R) Arm/Elbow/Hand Redness, Blanching, Abrasion, and Scab      (L) Arm/Elbow/Hand Redness, Blanching, Bruising, Scab, and Discoloration        Abdomen Redness, Blanching, Scab, and Abrasion        Groin Redness, Blanching, Excoriation, and Abrasion          Scrotum/Coccyx/Buttocks Redness, Non-Blanching, Excoriation, Moisture Fissure, and Discoloration          (R) Leg Redness, Non-Blanching, Scab, and Abrasion      (L) Leg Redness, Blanching, and Scab      (R) Heel/Foot/Toe Boggy and Scab      (L) Heel/Foot/Toe Redness, Blanching, Discoloration, and Boggy            Devices In Places Tele Box, Blood Pressure Cuff, Pulse Ox, Condom Cath, and Oxy Mask      Interventions In Place Heel Mepilex, Waffle Overlay, TAP System, Elbow Mepilex, Q2 Turns, Barrier Cream, and Pressure Redistribution Mattress    Possible Skin Injury Yes    Pictures Uploaded Into Epic Yes  Wound Consult Placed Yes  RN Wound Prevention Protocol Ordered Yes

## 2023-10-10 NOTE — ED NOTES
Unable to complete med rec at this time.  Pt is unable to participate in an interview.   No other information available in demographics at this time.

## 2023-10-10 NOTE — ASSESSMENT & PLAN NOTE
Question of aspiration  Extubated 10/17  Pulmonary hygiene  Aspiration precautions  Pulmonary following   -change to prednisone x3 days  -abx end date 10/28   Chest x-ray has cleared significantly, continue current regimen  Wean O2 as able  SLP following, continue npo   Ongoing case management support, /palliative care

## 2023-10-10 NOTE — PROGRESS NOTES
Bedside report received, assumed pt care@8779. Pt A&O to self only. Pt arousable but lethargic. POC discussed. Bed in lowest position with bed alarm on. Call light within reach.

## 2023-10-10 NOTE — PROGRESS NOTES
Patient is responsive to voice and his name. At this time patient is non verbal. Patient is not alert enough for imaging procedure.    Patient has extensive skin issues which has been documented.  RN wound protocol is in place: TAPS, waffle overlay, elbow and heel protectors, moon boots.

## 2023-10-10 NOTE — DISCHARGE PLANNING
LMSW spoke with nurse that a family friend of pt has called her and stated that his name is Young Delgado and is a long life friend and that pt also has a daughter that lives in [place] ([place]). Number was not received but Young informed nurse that he will give her a call back.  Nurse will be waiting for a call back from Young to gather the contact information.

## 2023-10-10 NOTE — H&P
[facility] Internal Medicine History & Physical Note    Date of Service  10/9/2023    R Team: HUSSAIN   Attending: Dr. Holguin  Senior Resident: Dr. Taylor  Contact Number: [phone]    Primary Care Physician  Pcp Pt States None    Consultants    Code Status  Full Code    Chief Complaint  Chief Complaint   Patient presents with    T-5000 GLF     Pt found down.  LKW 2130 on 10/8       History of Presenting Illness (HPI):  Perry Marrufo is a 83 y.o. male pmh t2dm, prostate disease who presented 10/9/2023 from [facility] with his nephew Yuriy after being found down by his neighbors who were trying to return a truck for about 2 days. When neighbors borrowed the truck 2 days prior they reported patient looked a bit sick but didn't clarify exactly how he was then. They found him on the ground thirsty and completely confused. Patient at baseline is alert/orientedx3.    In the ED patient was very dehydrated, uremic, altered, and had a new onset afib with rvr that resolved/converted after some dilt pushes. Imaging included cxr w/o cardiopulmonary abnormality, ct head neg for acute process, ct c spine without acute fractures. Pertinent labs include elevated wbc 18.3, rbc 6.18, hg 18.9, hct 58.2. cmp showing na 151, glucose 351, bun 104, cr 1.67, lipase 84, cpk 1030, lactic acid 3.0, trop 39, pt 16.7, inr 1.33. He was provided a dose of rocephin and 1L NS bolus.    nephews number Yuriy marrufo [phone]    I discussed the plan of care with patient.    Review of Systems  Review of Systems   Unable to perform ROS: Mental status change       Past Medical History   has a past medical history of Diabetes mellitus, type 2 (HCC) and Prostate disease.    Surgical History   has a past surgical history that includes other orthopedic surgery; pin insertion (1/2/2015); and hip cannulated screw (1/2/2015).     Family History  family history is not on file.   Family history reviewed with patient.     Social History  Tobacco: patient unable to  answer  Alcohol: patient unable to answer  Recreational drugs (illegal or prescription): patient unable to answer  Employment: patient unable to answer  Living Situation: patient unable to answer  Recent Travel: patient unable to answer  Primary Care Provider: Not Reviewed  Other (stressors, spirituality, exposures): patient unable to answer    Allergies  Allergies   Allergen Reactions    Nicotine        Medications  Prior to Admission Medications   Prescriptions Last Dose Informant Patient Reported? Taking?   Omega-3 Fatty Acids (DOCOSAHEXANOIC ACID) 1000 MG CAPS capsule   No No   Sig: Take 1 Cap by mouth 3 times a day, with meals.   aspirin (ASA) 325 MG TABS   No No   Sig: Take 1 Tab by mouth every day.   cyanocobalamin (VITAMIN B12) 500 MCG tablet   No No   Sig: Take 1 Tab by mouth every day.   enoxaparin (LOVENOX) 40 MG/0.4ML SOLN inj   No No   Sig: Inject 40 mg as instructed every day.   famotidine (PEPCID) 20 MG TABS   Yes No   Sig: Take 20 mg by mouth every day.   hydrocodone-acetaminophen (NORCO) 5-325 MG TABS per tablet   Yes No   Sig: Take 1-2 Tabs by mouth every 6 hours as needed. Indications: Moderate to Moderately Severe Pain   insulin lispro (HUMALOG) 100 UNIT/ML SOLN   Yes No   Sig: Inject 2-9 Units as instructed 4 Times a Day,Before Meals and at Bedtime. 151-200 -= 2 units  201-250 = 3 units  251-300 = 5 units  301-350 = 6 units  351-400 = 8 units  401> 9 units call md   lactobacillus rhamnosus, GG, (CULTURELLE) CAPS   Yes No   Sig: Take 1 Cap by mouth every day.   potassium chloride SA (K-DUR) 20 MEQ TBCR   No No   Sig: Take 1 Tab by mouth every day.   tamsulosin (FLOMAX) 0.4 MG capsule   No No   Sig: Take 1 Cap by mouth ONE-HALF HOUR AFTER BREAKFAST.   vitamin D (CHOLECALCIFEROL) 1000 UNIT TABS   Yes No   Sig: Take 2,000 Units by mouth every day.      Facility-Administered Medications: None       Physical Exam  Temp:  [36.4 °C (97.6 °F)] 36.4 °C (97.6 °F)  Pulse:  [] 84  Resp:  [30-33]  "30  BP: ()/(63-70) 113/66  SpO2:  [92 %-96 %] 94 %  Blood Pressure : 120/69   Temperature: 36.4 °C (97.6 °F)   Pulse: (!) 144   Respiration: (!) 33   Pulse Oximetry: 92 %       Physical Exam  Constitutional:       Appearance: He is obese. He is ill-appearing and toxic-appearing.   HENT:      Head: Normocephalic and atraumatic.      Right Ear: Tympanic membrane, ear canal and external ear normal.      Left Ear: Tympanic membrane, ear canal and external ear normal.      Nose: Nose normal.      Mouth/Throat:      Mouth: Mucous membranes are dry.   Eyes:      Extraocular Movements: Extraocular movements intact.      Conjunctiva/sclera: Conjunctivae normal.      Pupils: Pupils are equal, round, and reactive to light.   Cardiovascular:      Rate and Rhythm: Normal rate and regular rhythm.      Pulses: Normal pulses.      Heart sounds: Normal heart sounds.   Pulmonary:      Effort: Respiratory distress present.   Abdominal:      General: Bowel sounds are normal. There is distension.      Palpations: Abdomen is soft.   Musculoskeletal:         General: Swelling present.      Cervical back: Normal range of motion.   Skin:     General: Skin is dry.      Coloration: Skin is pale.   Neurological:      Mental Status: He is disoriented.         Laboratory:  Recent Labs     10/09/23  1526   WBC 18.3*   RBC 6.18*   HEMOGLOBIN 18.9*   HEMATOCRIT 58.2*   MCV 94.2   MCH 30.6   MCHC 32.5   RDW 49.1   PLATELETCT 171   MPV 11.0     Recent Labs     10/09/23  1526   SODIUM 151*   POTASSIUM 4.0   CHLORIDE 112   CO2 23   GLUCOSE 351*   *   CREATININE 1.67*   CALCIUM 8.1*     Recent Labs     10/09/23  1526   ALTSGPT 15   ASTSGOT 33   ALKPHOSPHAT 64   TBILIRUBIN 2.5*   LIPASE 84*   GLUCOSE 351*     Recent Labs     10/09/23  1733   APTT 32.9   INR 1.33*     No results for input(s): \"NTPROBNP\" in the last 72 hours.      Recent Labs     10/09/23  1526   TROPONINT 39*       Imaging:  CT-CSPINE WITHOUT PLUS RECONS   Final Result       "   1. No acute fracture from C1 through T1 is visualized.         CT-HEAD W/O   Final Result         1. No acute intracranial abnormality. No evidence of acute intracranial hemorrhage or mass lesion.                     DX-CHEST-PORTABLE (1 VIEW)   Final Result      1.  Low lung volumes without definite acute cardiopulmonary abnormality.      EC-ECHOCARDIOGRAM COMPLETE W/O CONT    (Results Pending)   [id number]    (Results Pending)       X-Ray:     EKG:       Assessment/Plan:  Problem Representation:   I anticipate this patient will require at least two midnights for appropriate medical management, necessitating inpatient admission because of altered mental status    Patient will need a Telemetry bed on EMERGENCY service .  The need is secondary to new onset afib.    * AMS (altered mental status)- (present on admission)  Assessment & Plan  Likely infectious etiology  Neg imaging for fracture/acute bleed  Concern for aspiration  Reports of diarrhea  S/p rocephin in ED  Blood cultures, oral cultures, stool culture/lytes, urinalysis  procal  Empiric unasyn, azithro  Lactic acid trend  R/p cpk  abg  UDS  Ammonia  Aspiration precautions  Fall precautions  Seizure precautions  Npo with speech eval  Viral panel  mrsa nares    Rhabdomyolysis  Assessment & Plan  Mild  Fluid bolus received in ED  Maintenance fluid  Trend cpk  Trend lactic acid    A-fib (HCC)  Assessment & Plan  New onset. Resolved in ED after dilt push  Likely from infection  Defer anticoagulation to day team  tsh w/ t4  Echo    Dehydration  Assessment & Plan  Found down for 2 days likely  S/p IV fluids  Lactic acid trend  R/p cpk    Erythrocytosis  Assessment & Plan  Likely secondary  abg  epo  Urinalysis    Hypoxia  Assessment & Plan  5L NC  cxr neg  Rt protocol  D dimer  abg  procal  Empiric unasyn, azithro      Hypernatremia  Assessment & Plan  Na 151 on admission  received NS bolus in ED  Urine osm  ctm    Dental caries  Assessment &  Plan  Oral/maxillary xrays  Cultures w/ gram stain  Empiric unasyn    ADELAIDE (acute kidney injury) ([facility])  Assessment & Plan  Likely from dehydration  Urinalysis pending  S/p fluid bolus in ED  IV lactated ringers    Diarrhea- (present on admission)  Assessment & Plan  cdiff panel  Stool culture/lytes  Viral panel    DM2 (diabetes mellitus, type 2) ([facility])- (present on admission)  Assessment & Plan  Hemoglobin a1c  Long acting + Sliding scale        VTE prophylaxis: SCDs/TEDs and heparin ppx

## 2023-10-10 NOTE — THERAPY
Speech Language Therapy Contact Note    Patient Name: Perry Marrufo  Age:  83 y.o., Sex:  male  Medical Record #: [id number]  Today's Date: 10/10/2023    Discussed missed therapy with RN       10/10/23 0929   Treatment Variance   Reason For Missed Therapy Medical - Patient on Hold from Therapy;Medical - Patient not Able To Participate   Total Treatment Time   SLP Time Spent Yes   SLP Missed Visit (Mins) 4   Initial Contact Note    Initial Contact Note  Order Received and Verified, Speech Therapy Evaluation in Progress with Full Report to Follow.   Interdisciplinary Plan of Care Collaboration   IDT Collaboration with  Nursing   Patient Position at End of Therapy In Bed;Bed Alarm On;Call Light within Reach   Collaboration Comments Orders received for clinical swallow evaluation. Pt too lethargic to participate in evaluation, unable to maintain wakefulness or follow commands. Will attempt as able/appropriate.

## 2023-10-10 NOTE — PROGRESS NOTES
Monitor Summary:  Rhythm: SR Rate: 85-92  Ectopies: rare PVC, rare couplet  Measurement: .16/.08/.37

## 2023-10-10 NOTE — WOUND TEAM
[facility]  Inpatient Services  Initial Wound and Skin Care Evaluation    Admission Date: 10/9/2023     Last order of IP CONSULT TO WOUND CARE was found on 10/10/2023 from Hospital Encounter on 10/9/2023     HPI, PMH, SH: Reviewed    Past Surgical History:   Procedure Laterality Date    PIN INSERTION  1/2/2015    Performed by Alon Finn M.D. at SURGERY [facility] ORS    HIP CANNULATED SCREW  1/2/2015    Performed by Alon Finn M.D. at SURGERY [facility] ORS    OTHER ORTHOPEDIC SURGERY      FRACTURE LEFT HIP     Social History     Tobacco Use    Smoking status: Never    Smokeless tobacco: Not on file   Substance Use Topics    Alcohol use: No     Chief Complaint   Patient presents with    T-5000 GLF     Pt found down.  LKW 2130 on 10/8     Diagnosis: AMS (altered mental status) [R41.82]    Unit where seen by Wound Team: T820/00     WOUND CONSULT RELATED TO:  sacrum, bilateral LEs    WOUND TEAM PLAN OF CARE - Frequency of Follow-up:   Nursing to follow dressing orders written for wound care. Contact wound team if area fails to progress, deteriorates or with any questions/concerns if something comes up before next scheduled follow up (See below as to whether wound is following and frequency of wound follow up)   Bi-Monthly - sacrum  Not following, consult as needed  - bilateral LEs    WOUND HISTORY:   Perry Marrufo is a 83 y.o. male pmh t2dm, prostate disease who presented 10/9/2023 from [facility] with his nephew Yuriy after being found down by his neighbors who were trying to return a truck for about 2 days. When neighbors borrowed the truck 2 days prior they reported patient looked a bit sick but didn't clarify exactly how he was then. They found him on the ground thirsty and completely confused.        WOUND ASSESSMENT/LDA  Wound 10/09/23 Pressure Injury Buttocks;Coccyx;Ischium Left;Right POA evolving DTI opening up (Active)   Date First Assessed/Time First Assessed: 10/09/23  2100   Present on Original Admission: Yes  Hand Hygiene Completed: Yes  Primary Wound Type: Pressure Injury  Location: Buttocks;Coccyx;Ischium  Laterality: Left;Right  Wound Description (Comments): POA...      Assessments 10/10/2023  2:00 PM   Wound Image     Site Assessment Purple;Red   Periwound Assessment Denuded   Margins Undefined edges   Closure Open to air   Drainage Amount Small   Drainage Description Serosanguineous   Treatments Cleansed;Nonselective debridement;Site care;Offloading   Wound Cleansing Foam Cleanser/Washcloth   Periwound Protectant Barrier Paste   Dressing Status Open to Air   NEXT Weekly Photo (Inpatient Only) 10/18/23   Wound Team Following Bi-Monthly   Pressure Injury Stage Deep Tissue   Shape circular   Wound Odor Mild   WOUND NURSE ONLY - Time Spent with Patient (mins) 30       Wound 10/09/23 Pressure Injury Knee Lateral Right POA unstageable (Active)   Date First Assessed/Time First Assessed: 10/09/23 2100   Present on Original Admission: Yes  Hand Hygiene Completed: Yes  Primary Wound Type: Pressure Injury  Location: Knee  Wound Orientation: Lateral  Laterality: Right  Wound Description (Comments):...      Assessments 10/10/2023  2:00 PM   Wound Image     Site Assessment Eschar   Periwound Assessment Clean;Dry;Intact   Margins Defined edges   Closure Open to air   Drainage Amount None   Treatments Site care   Wound Cleansing Approved Wound Cleanser   Dressing Status Open to Air   Wound Team Following Not following   Pressure Injury Stage Unstageable   Wound Length (cm) 3 cm   Wound Width (cm) 3 cm   Wound Surface Area (cm^2) 9 cm^2   Shape circular   Wound Odor None       Wound 10/09/23 Pressure Injury Foot;MTH 1 Medial Left POA DTI (Active)   Date First Assessed/Time First Assessed: 10/09/23 2100   Present on Original Admission: Yes  Hand Hygiene Completed: Yes  Primary Wound Type: Pressure Injury  Location: Foot;MTH 1  Wound Orientation: Medial  Laterality: Left  Wound Description  (Commen...      Assessments 10/10/2023  2:00 PM   Wound Image     Site Assessment Purple;Red   Periwound Assessment Clean;Dry;Intact   Margins Defined edges   Closure Open to air   Drainage Amount None   Treatments Offloading   Wound Cleansing Soap and Water   Periwound Protectant Not Applicable   Dressing Status Open to Air   NEXT Weekly Photo (Inpatient Only) 10/18/23   Wound Team Following Not following   Pressure Injury Stage Deep Tissue   Wound Length (cm) 0.3 cm   Wound Width (cm) 0.3 cm   Wound Surface Area (cm^2) 0.09 cm^2   Shape circular       Wound 10/09/23 Pressure Injury Hip;Pelvis POA DTI (Active)   Date First Assessed/Time First Assessed: 10/09/23 2100   Present on Original Admission: Yes  Hand Hygiene Completed: Yes  Primary Wound Type: Pressure Injury  Location: Hip;Pelvis  Wound Description (Comments): POA DTI      Assessments 10/10/2023  2:00 PM   Wound Image     Site Assessment Purple;Red   Periwound Assessment Clean;Dry;Intact   Margins Defined edges   Closure Open to air   Drainage Amount None   Treatments Site care;Nonselective debridement   Wound Cleansing Soap and Water   Periwound Protectant Not Applicable   Dressing Status Open to Air   NEXT Weekly Photo (Inpatient Only) 10/18/23   Wound Team Following Not following   Pressure Injury Stage Deep Tissue   Wound Length (cm) 1 cm   Wound Width (cm) 2.5 cm   Wound Surface Area (cm^2) 2.5 cm^2   Shape linear   Wound Odor None        Vascular:    LENI:   No results found.    Lab Values:    Lab Results   Component Value Date/Time    WBC 17.1 (H) 10/10/2023 12:28 AM    RBC 5.87 10/10/2023 12:28 AM    HEMOGLOBIN 18.2 (H) 10/10/2023 12:28 AM    HEMATOCRIT 55.3 (H) 10/10/2023 12:28 AM    HBA1C 8.0 (H) 10/10/2023 12:28 AM         Culture Results show:  No results found for this or any previous visit (from the past 720 hour(s)).    Pain Level/Medicated:  None, Tolerated without pain medication       INTERVENTIONS BY WOUND TEAM:  Chart and images  reviewed. Discussed with bedside RN. All areas of concern (based on picture review, LDA review and discussion with bedside RN) have been thoroughly assessed. Documentation of areas based on significant findings. This RN in to assess patient. Performed standard wound care which includes appropriate positioning, dressing removal and non-selective debridement. Pictures and measurements obtained weekly if/when required.    Wound:  Sacrum  Preparation for Dressing removal: Open to air  Cleansed/Non-selectively Debrided with:  Moist warm washcloth  Latesha wound: Cleansed with Moist warm washcloth, Prepped with Barrier paste  Primary Dressing:  barrier paste MIRIAM     Wound:  Left lateral knee  Preparation for Dressing removal: Open to air  Cleansed/Non-selectively Debrided with:  Moist warm washcloth  Latesha wound: Cleansed with Moist warm washcloth, Prepped with No Sting  Primary Dressing:  offloading sacral foam  Secondary (Outer) Dressing: offloading measures     Wound:  Right foot 1st MTH medial  Preparation for Dressing removal: Open to air  Cleansed/Non-selectively Debrided with:  Moist warm washcloth  Latesha wound: Cleansed with Moist warm washcloth, Prepped with Open to Air  Primary Dressing:  MIRIAM in prevalon boot    Advanced Wound Care Discharge Planning  Number of Clinicians necessary to complete wound care: 1  Is patient requiring IV pain medications for dressing changes:  No   Length of time for dressing change 30 min. (This does not include chart review, pre-medication time, set up, clean up or time spent charting.)    Interdisciplinary consultation: Patient, Bedside RN (Christianne), Viv ROGERS (Wound RN).  Pressure injury and staging reviewed with Viv ROGERS (Wound RN).    EVALUATION / RATIONALE FOR TREATMENT:     Date:  10/10/23  Wound Status:  Initial evaluation    Patient found down, possible down for 2 days altered.  Patient has areas of discoloration to left knee, right foot, right hip and sacrum that are pressure  related at this time.  There could be other areas that are discolored that have not evolved or shown up at this time.  Offloading measures  and moisture management to help resolve pressure related injuries.          Goals: Steady decrease in wound area and depth weekly.    NURSING PLAN OF CARE ORDERS:  Dressing changes: See Dressing Care orders  Skin care: See Skin Care orders    NUTRITION RECOMMENDATIONS   Wound Team Recommendations:  N/A    DIET ORDERS (From admission to next 24h)       Start     Ordered    10/09/23 1921  Diet NPO Restrict to: Sips with Medications  ALL MEALS        Question:  Diet NPO Restrict to:  Answer:  Sips with Medications    10/09/23 1921                    PREVENTATIVE INTERVENTIONS:    Q shift Steven - performed per nursing policy  Q shift pressure point assessments - performed per nursing policy    Surface/Positioning  Standard/trauma mattress - Currently in Place  Reposition q 2 hours - Currently in Place  TAPs Turning system - Currently in Place  Waffle overlay  - Currently in Place    Offloading/Redistribution  Heel offloading dressing (Silicone dressing) - Currently in Place  Heel float boots (Prevalon boot) - Currently in Place  Heels floated with waffle overlay - Currently in Place      Respiratory  Gray Foam Ear protectors - Currently in Place    Containment/Moisture Prevention    Dri-tiffanie pad - Currently in Place  Barrier paste - Currently in Place    Mobilization      Unable to assess     Anticipated discharge plans:  Home Health Care and Skilled Nursing        Vac Discharge Needs:  Vac Discharge plan is purely a recommendation from wound team and not a requirement for discharge unless otherwise stated by physician.  Not Applicable Pt not on a wound vac

## 2023-10-10 NOTE — RESPIRATORY CARE
Sputum collected via NT suction technique. Sent to lab via tube station. Patient currently has an NPA in left nare.

## 2023-10-10 NOTE — ASSESSMENT & PLAN NOTE
No known history of Afib, RVR with hypotension attempted electrical cardioversion 10/11 without success, put on amio GTT  10/22 on oral amiodarone  Echo EF 65%, no valvular abnormalities  Rate Controlled  Continuous tele monitoring  Anticoagulation with Eliquis  Rhythm control with amiodarone, continue loading  Optimize electrolytes

## 2023-10-11 ENCOUNTER — APPOINTMENT (OUTPATIENT)
Dept: RADIOLOGY | Facility: MEDICAL CENTER | Age: 83
DRG: 871 | End: 2023-10-11
Attending: INTERNAL MEDICINE
Payer: COMMERCIAL

## 2023-10-11 PROBLEM — Z91.89 ASPIRATION PRECAUTIONS: Status: ACTIVE | Noted: 2023-10-11

## 2023-10-11 PROBLEM — R57.9 SHOCK (HCC): Status: ACTIVE | Noted: 2023-10-11

## 2023-10-11 LAB
ALBUMIN SERPL BCP-MCNC: 2.6 G/DL (ref 3.2–4.9)
ALBUMIN/GLOB SERPL: 0.8 G/DL
ALP SERPL-CCNC: 63 U/L (ref 30–99)
ALT SERPL-CCNC: 16 U/L (ref 2–50)
ANION GAP SERPL CALC-SCNC: 11 MMOL/L (ref 7–16)
ANION GAP SERPL CALC-SCNC: 13 MMOL/L (ref 7–16)
ARTERIAL PATENCY WRIST A: ABNORMAL
AST SERPL-CCNC: 35 U/L (ref 12–45)
BASE EXCESS BLDA CALC-SCNC: -1 MMOL/L (ref -4–3)
BASE EXCESS BLDA CALC-SCNC: 0 MMOL/L (ref -4–3)
BASE EXCESS BLDV CALC-SCNC: -1 MMOL/L
BASOPHILS # BLD AUTO: 0 % (ref 0–1.8)
BASOPHILS # BLD AUTO: 0.9 % (ref 0–1.8)
BASOPHILS # BLD: 0 K/UL (ref 0–0.12)
BASOPHILS # BLD: 0.17 K/UL (ref 0–0.12)
BILIRUB SERPL-MCNC: 3 MG/DL (ref 0.1–1.5)
BODY TEMPERATURE: 36.6 CENTIGRADE
BODY TEMPERATURE: 36.6 CENTIGRADE
BODY TEMPERATURE: ABNORMAL DEGREES
BREATHS SETTING VENT: 20
BUN SERPL-MCNC: 107 MG/DL (ref 8–22)
BUN SERPL-MCNC: 109 MG/DL (ref 8–22)
BURR CELLS BLD QL SMEAR: NORMAL
BURR CELLS BLD QL SMEAR: NORMAL
CALCIUM ALBUM COR SERPL-MCNC: 9.7 MG/DL (ref 8.5–10.5)
CALCIUM SERPL-MCNC: 8.3 MG/DL (ref 8.5–10.5)
CALCIUM SERPL-MCNC: 8.6 MG/DL (ref 8.5–10.5)
CHLORIDE SERPL-SCNC: 117 MMOL/L (ref 96–112)
CHLORIDE SERPL-SCNC: 121 MMOL/L (ref 96–112)
CK SERPL-CCNC: 560 U/L (ref 0–154)
CO2 BLDA-SCNC: 25 MMOL/L (ref 20–33)
CO2 SERPL-SCNC: 22 MMOL/L (ref 20–33)
CO2 SERPL-SCNC: 23 MMOL/L (ref 20–33)
CREAT SERPL-MCNC: 1.62 MG/DL (ref 0.5–1.4)
CREAT SERPL-MCNC: 1.76 MG/DL (ref 0.5–1.4)
CRP SERPL HS-MCNC: 34.62 MG/DL (ref 0–0.75)
DELSYS IDSYS: ABNORMAL
END TIDAL CARBON DIOXIDE IECO2: 34 MMHG
EOSINOPHIL # BLD AUTO: 0 K/UL (ref 0–0.51)
EOSINOPHIL # BLD AUTO: 0.16 K/UL (ref 0–0.51)
EOSINOPHIL NFR BLD: 0 % (ref 0–6.9)
EOSINOPHIL NFR BLD: 0.9 % (ref 0–6.9)
ERYTHROCYTE [DISTWIDTH] IN BLOOD BY AUTOMATED COUNT: 51 FL (ref 35.9–50)
ERYTHROCYTE [DISTWIDTH] IN BLOOD BY AUTOMATED COUNT: 51.2 FL (ref 35.9–50)
GFR SERPLBLD CREATININE-BSD FMLA CKD-EPI: 38 ML/MIN/1.73 M 2
GFR SERPLBLD CREATININE-BSD FMLA CKD-EPI: 42 ML/MIN/1.73 M 2
GLOBULIN SER CALC-MCNC: 3.4 G/DL (ref 1.9–3.5)
GLUCOSE BLD STRIP.AUTO-MCNC: 175 MG/DL (ref 65–99)
GLUCOSE BLD STRIP.AUTO-MCNC: 204 MG/DL (ref 65–99)
GLUCOSE BLD STRIP.AUTO-MCNC: 209 MG/DL (ref 65–99)
GLUCOSE BLD STRIP.AUTO-MCNC: 262 MG/DL (ref 65–99)
GLUCOSE BLD STRIP.AUTO-MCNC: 264 MG/DL (ref 65–99)
GLUCOSE SERPL-MCNC: 244 MG/DL (ref 65–99)
GLUCOSE SERPL-MCNC: 245 MG/DL (ref 65–99)
HCO3 BLDA-SCNC: 22 MMOL/L (ref 17–25)
HCO3 BLDA-SCNC: 23.9 MMOL/L (ref 17–25)
HCO3 BLDV-SCNC: 24 MMOL/L (ref 24–28)
HCT VFR BLD AUTO: 48.8 % (ref 42–52)
HCT VFR BLD AUTO: 52.5 % (ref 42–52)
HGB BLD-MCNC: 15.9 G/DL (ref 14–18)
HGB BLD-MCNC: 16.9 G/DL (ref 14–18)
HOROWITZ INDEX BLDA+IHG-RTO: 80 MM[HG]
INHALED O2 FLOW RATE: ABNORMAL L/MIN
INHALED O2 FLOW RATE: ABNORMAL L/MIN
LACTATE SERPL-SCNC: 1.9 MMOL/L (ref 0.5–2)
LYMPHOCYTES # BLD AUTO: 0.79 K/UL (ref 1–4.8)
LYMPHOCYTES # BLD AUTO: 1.69 K/UL (ref 1–4.8)
LYMPHOCYTES NFR BLD: 4.3 % (ref 22–41)
LYMPHOCYTES NFR BLD: 9.5 % (ref 22–41)
MANUAL DIFF BLD: NORMAL
MANUAL DIFF BLD: NORMAL
MCH RBC QN AUTO: 30.8 PG (ref 27–33)
MCH RBC QN AUTO: 30.8 PG (ref 27–33)
MCHC RBC AUTO-ENTMCNC: 32.2 G/DL (ref 32.3–36.5)
MCHC RBC AUTO-ENTMCNC: 32.6 G/DL (ref 32.3–36.5)
MCV RBC AUTO: 94.4 FL (ref 81.4–97.8)
MCV RBC AUTO: 95.8 FL (ref 81.4–97.8)
MICROCYTES BLD QL SMEAR: ABNORMAL
MICROCYTES BLD QL SMEAR: ABNORMAL
MODE IMODE: ABNORMAL
MONOCYTES # BLD AUTO: 0.96 K/UL (ref 0–0.85)
MONOCYTES # BLD AUTO: 1.71 K/UL (ref 0–0.85)
MONOCYTES NFR BLD AUTO: 5.2 % (ref 0–13.4)
MONOCYTES NFR BLD AUTO: 9.6 % (ref 0–13.4)
MORPHOLOGY BLD-IMP: NORMAL
MORPHOLOGY BLD-IMP: NORMAL
NEUTROPHILS # BLD AUTO: 14.24 K/UL (ref 1.82–7.42)
NEUTROPHILS # BLD AUTO: 16.49 K/UL (ref 1.82–7.42)
NEUTROPHILS NFR BLD: 80 % (ref 44–72)
NEUTROPHILS NFR BLD: 89.6 % (ref 44–72)
NRBC # BLD AUTO: 0 K/UL
NRBC # BLD AUTO: 0 K/UL
NRBC BLD-RTO: 0 /100 WBC (ref 0–0.2)
NRBC BLD-RTO: 0 /100 WBC (ref 0–0.2)
NT-PROBNP SERPL IA-MCNC: 551 PG/ML (ref 0–125)
O2/TOTAL GAS SETTING VFR VENT: 100 %
PCO2 BLDA: 30 MMHG (ref 26–37)
PCO2 BLDA: 41.1 MMHG (ref 26–37)
PCO2 BLDV: 38.2 MMHG (ref 41–51)
PCO2 TEMP ADJ BLDA: 43.7 MMHG (ref 26–37)
PCO2 TEMP ADJ BLDV: 37.5 MMHG (ref 41–51)
PEEP END EXPIRATORY PRESSURE IPEEP: 8 CMH20
PH BLDA: 7.37 [PH] (ref 7.4–7.5)
PH BLDA: 7.49 [PH] (ref 7.4–7.5)
PH BLDV: 7.41 [PH] (ref 7.31–7.45)
PH TEMP ADJ BLDA: 7.35 [PH] (ref 7.4–7.5)
PH TEMP ADJ BLDV: 7.41 [PH] (ref 7.31–7.45)
PLATELET # BLD AUTO: 127 K/UL (ref 164–446)
PLATELET # BLD AUTO: 127 K/UL (ref 164–446)
PLATELET BLD QL SMEAR: NORMAL
PLATELET BLD QL SMEAR: NORMAL
PMV BLD AUTO: 11.4 FL (ref 9–12.9)
PMV BLD AUTO: 11.7 FL (ref 9–12.9)
PO2 BLDA: 56.6 MMHG (ref 64–87)
PO2 BLDA: 80 MMHG (ref 64–87)
PO2 BLDV: 37.4 MMHG (ref 25–40)
PO2 TEMP ADJ BLDA: 87 MMHG (ref 64–87)
PO2 TEMP ADJ BLDV: 36.4 MMHG (ref 25–40)
POIKILOCYTOSIS BLD QL SMEAR: NORMAL
POIKILOCYTOSIS BLD QL SMEAR: NORMAL
POTASSIUM SERPL-SCNC: 4 MMOL/L (ref 3.6–5.5)
POTASSIUM SERPL-SCNC: 4.3 MMOL/L (ref 3.6–5.5)
PROT SERPL-MCNC: 6 G/DL (ref 6–8.2)
RBC # BLD AUTO: 5.17 M/UL (ref 4.7–6.1)
RBC # BLD AUTO: 5.48 M/UL (ref 4.7–6.1)
RBC BLD AUTO: PRESENT
RBC BLD AUTO: PRESENT
SAO2 % BLDA: 90.2 % (ref 93–99)
SAO2 % BLDA: 95 % (ref 93–99)
SAO2 % BLDV: 65.1 %
SODIUM SERPL-SCNC: 153 MMOL/L (ref 135–145)
SODIUM SERPL-SCNC: 154 MMOL/L (ref 135–145)
SODIUM SERPL-SCNC: 158 MMOL/L (ref 135–145)
SPECIMEN DRAWN FROM PATIENT: ABNORMAL
TIDAL VOLUME IVT: 450 ML
WBC # BLD AUTO: 17.8 K/UL (ref 4.8–10.8)
WBC # BLD AUTO: 18.4 K/UL (ref 4.8–10.8)

## 2023-10-11 PROCEDURE — 95816 EEG AWAKE AND DROWSY: CPT | Performed by: PSYCHIATRY & NEUROLOGY

## 2023-10-11 PROCEDURE — 82962 GLUCOSE BLOOD TEST: CPT

## 2023-10-11 PROCEDURE — 80053 COMPREHEN METABOLIC PANEL: CPT

## 2023-10-11 PROCEDURE — 700105 HCHG RX REV CODE 258: Performed by: INTERNAL MEDICINE

## 2023-10-11 PROCEDURE — 700102 HCHG RX REV CODE 250 W/ 637 OVERRIDE(OP): Performed by: INTERNAL MEDICINE

## 2023-10-11 PROCEDURE — 93005 ELECTROCARDIOGRAM TRACING: CPT | Performed by: INTERNAL MEDICINE

## 2023-10-11 PROCEDURE — 99222 1ST HOSP IP/OBS MODERATE 55: CPT | Performed by: STUDENT IN AN ORGANIZED HEALTH CARE EDUCATION/TRAINING PROGRAM

## 2023-10-11 PROCEDURE — 99152 MOD SED SAME PHYS/QHP 5/>YRS: CPT

## 2023-10-11 PROCEDURE — 85025 COMPLETE CBC W/AUTO DIFF WBC: CPT

## 2023-10-11 PROCEDURE — 5A2204Z RESTORATION OF CARDIAC RHYTHM, SINGLE: ICD-10-PCS | Performed by: INTERNAL MEDICINE

## 2023-10-11 PROCEDURE — 94002 VENT MGMT INPAT INIT DAY: CPT

## 2023-10-11 PROCEDURE — 94003 VENT MGMT INPAT SUBQ DAY: CPT

## 2023-10-11 PROCEDURE — A9270 NON-COVERED ITEM OR SERVICE: HCPCS | Performed by: STUDENT IN AN ORGANIZED HEALTH CARE EDUCATION/TRAINING PROGRAM

## 2023-10-11 PROCEDURE — 95816 EEG AWAKE AND DROWSY: CPT | Mod: 26 | Performed by: PSYCHIATRY & NEUROLOGY

## 2023-10-11 PROCEDURE — 700105 HCHG RX REV CODE 258

## 2023-10-11 PROCEDURE — 85007 BL SMEAR W/DIFF WBC COUNT: CPT

## 2023-10-11 PROCEDURE — 99233 SBSQ HOSP IP/OBS HIGH 50: CPT | Performed by: INTERNAL MEDICINE

## 2023-10-11 PROCEDURE — 700111 HCHG RX REV CODE 636 W/ 250 OVERRIDE (IP): Mod: JZ | Performed by: INTERNAL MEDICINE

## 2023-10-11 PROCEDURE — 31500 INSERT EMERGENCY AIRWAY: CPT

## 2023-10-11 PROCEDURE — 94799 UNLISTED PULMONARY SVC/PX: CPT

## 2023-10-11 PROCEDURE — 83880 ASSAY OF NATRIURETIC PEPTIDE: CPT

## 2023-10-11 PROCEDURE — 92610 EVALUATE SWALLOWING FUNCTION: CPT

## 2023-10-11 PROCEDURE — 5A1945Z RESPIRATORY VENTILATION, 24-96 CONSECUTIVE HOURS: ICD-10-PCS | Performed by: STUDENT IN AN ORGANIZED HEALTH CARE EDUCATION/TRAINING PROGRAM

## 2023-10-11 PROCEDURE — 80048 BASIC METABOLIC PNL TOTAL CA: CPT

## 2023-10-11 PROCEDURE — 770022 HCHG ROOM/CARE - ICU (200)

## 2023-10-11 PROCEDURE — 82803 BLOOD GASES ANY COMBINATION: CPT

## 2023-10-11 PROCEDURE — 36556 INSERT NON-TUNNEL CV CATH: CPT

## 2023-10-11 PROCEDURE — 84295 ASSAY OF SERUM SODIUM: CPT

## 2023-10-11 PROCEDURE — 99292 CRITICAL CARE ADDL 30 MIN: CPT | Mod: 25 | Performed by: INTERNAL MEDICINE

## 2023-10-11 PROCEDURE — 02HV33Z INSERTION OF INFUSION DEVICE INTO SUPERIOR VENA CAVA, PERCUTANEOUS APPROACH: ICD-10-PCS | Performed by: INTERNAL MEDICINE

## 2023-10-11 PROCEDURE — 99291 CRITICAL CARE FIRST HOUR: CPT | Mod: 25 | Performed by: INTERNAL MEDICINE

## 2023-10-11 PROCEDURE — 700111 HCHG RX REV CODE 636 W/ 250 OVERRIDE (IP): Performed by: INTERNAL MEDICINE

## 2023-10-11 PROCEDURE — 36556 INSERT NON-TUNNEL CV CATH: CPT | Performed by: INTERNAL MEDICINE

## 2023-10-11 PROCEDURE — 700101 HCHG RX REV CODE 250: Performed by: INTERNAL MEDICINE

## 2023-10-11 PROCEDURE — 86140 C-REACTIVE PROTEIN: CPT

## 2023-10-11 PROCEDURE — 700111 HCHG RX REV CODE 636 W/ 250 OVERRIDE (IP): Mod: JZ

## 2023-10-11 PROCEDURE — 302146: Performed by: INTERNAL MEDICINE

## 2023-10-11 PROCEDURE — C1751 CATH, INF, PER/CENT/MIDLINE: HCPCS

## 2023-10-11 PROCEDURE — 92960 CARDIOVERSION ELECTRIC EXT: CPT

## 2023-10-11 PROCEDURE — 92960 CARDIOVERSION ELECTRIC EXT: CPT | Performed by: INTERNAL MEDICINE

## 2023-10-11 PROCEDURE — 36600 WITHDRAWAL OF ARTERIAL BLOOD: CPT

## 2023-10-11 PROCEDURE — 82550 ASSAY OF CK (CPK): CPT

## 2023-10-11 PROCEDURE — 700102 HCHG RX REV CODE 250 W/ 637 OVERRIDE(OP)

## 2023-10-11 PROCEDURE — 83605 ASSAY OF LACTIC ACID: CPT

## 2023-10-11 PROCEDURE — 31500 INSERT EMERGENCY AIRWAY: CPT | Performed by: INTERNAL MEDICINE

## 2023-10-11 PROCEDURE — 700102 HCHG RX REV CODE 250 W/ 637 OVERRIDE(OP): Performed by: STUDENT IN AN ORGANIZED HEALTH CARE EDUCATION/TRAINING PROGRAM

## 2023-10-11 PROCEDURE — 0BH17EZ INSERTION OF ENDOTRACHEAL AIRWAY INTO TRACHEA, VIA NATURAL OR ARTIFICIAL OPENING: ICD-10-PCS | Performed by: INTERNAL MEDICINE

## 2023-10-11 PROCEDURE — 700111 HCHG RX REV CODE 636 W/ 250 OVERRIDE (IP)

## 2023-10-11 PROCEDURE — 99223 1ST HOSP IP/OBS HIGH 75: CPT | Performed by: INTERNAL MEDICINE

## 2023-10-11 PROCEDURE — 71045 X-RAY EXAM CHEST 1 VIEW: CPT

## 2023-10-11 PROCEDURE — 31720 CLEARANCE OF AIRWAYS: CPT

## 2023-10-11 RX ORDER — DEXTROSE MONOHYDRATE 50 MG/ML
INJECTION, SOLUTION INTRAVENOUS CONTINUOUS
Status: DISCONTINUED | OUTPATIENT
Start: 2023-10-11 | End: 2023-10-11

## 2023-10-11 RX ORDER — FAMOTIDINE 20 MG/1
20 TABLET, FILM COATED ORAL DAILY
Status: DISCONTINUED | OUTPATIENT
Start: 2023-10-11 | End: 2023-10-14

## 2023-10-11 RX ORDER — HEPARIN SODIUM 5000 [USP'U]/100ML
0-30 INJECTION, SOLUTION INTRAVENOUS CONTINUOUS
Status: DISCONTINUED | OUTPATIENT
Start: 2023-10-11 | End: 2023-10-14

## 2023-10-11 RX ORDER — ETOMIDATE 2 MG/ML
20 INJECTION INTRAVENOUS ONCE
Status: COMPLETED | OUTPATIENT
Start: 2023-10-11 | End: 2023-10-11

## 2023-10-11 RX ORDER — SODIUM CHLORIDE 450 MG/100ML
INJECTION, SOLUTION INTRAVENOUS ONCE
Status: COMPLETED | OUTPATIENT
Start: 2023-10-11 | End: 2023-10-11

## 2023-10-11 RX ORDER — DEXTROSE MONOHYDRATE 50 MG/ML
INJECTION, SOLUTION INTRAVENOUS CONTINUOUS
Status: DISCONTINUED | OUTPATIENT
Start: 2023-10-11 | End: 2023-10-12

## 2023-10-11 RX ORDER — ACETAMINOPHEN 325 MG/1
650 TABLET ORAL EVERY 6 HOURS PRN
Status: DISCONTINUED | OUTPATIENT
Start: 2023-10-11 | End: 2023-10-20

## 2023-10-11 RX ORDER — DEXMEDETOMIDINE HYDROCHLORIDE 4 UG/ML
0-1.5 INJECTION, SOLUTION INTRAVENOUS CONTINUOUS
Status: DISCONTINUED | OUTPATIENT
Start: 2023-10-11 | End: 2023-10-12

## 2023-10-11 RX ORDER — ETOMIDATE 2 MG/ML
20 INJECTION INTRAVENOUS ONCE
Status: DISCONTINUED | OUTPATIENT
Start: 2023-10-11 | End: 2023-10-11

## 2023-10-11 RX ORDER — ROCURONIUM BROMIDE 10 MG/ML
100 INJECTION, SOLUTION INTRAVENOUS ONCE
Status: DISCONTINUED | OUTPATIENT
Start: 2023-10-11 | End: 2023-10-11

## 2023-10-11 RX ORDER — PHENYLEPHRINE HCL IN 0.9% NACL 0.5 MG/5ML
300 SYRINGE (ML) INTRAVENOUS
Status: ACTIVE | OUTPATIENT
Start: 2023-10-11 | End: 2023-10-11

## 2023-10-11 RX ORDER — DEXTROSE AND SODIUM CHLORIDE 5; .45 G/100ML; G/100ML
INJECTION, SOLUTION INTRAVENOUS CONTINUOUS
Status: DISCONTINUED | OUTPATIENT
Start: 2023-10-11 | End: 2023-10-11

## 2023-10-11 RX ORDER — INSULIN LISPRO 100 [IU]/ML
2-9 INJECTION, SOLUTION INTRAVENOUS; SUBCUTANEOUS EVERY 6 HOURS
Status: DISCONTINUED | OUTPATIENT
Start: 2023-10-11 | End: 2023-10-14

## 2023-10-11 RX ORDER — PHENYLEPHRINE HCL IN 0.9% NACL 0.5 MG/5ML
200 SYRINGE (ML) INTRAVENOUS ONCE
Status: COMPLETED | OUTPATIENT
Start: 2023-10-11 | End: 2023-10-11

## 2023-10-11 RX ORDER — ROCURONIUM BROMIDE 10 MG/ML
100 INJECTION, SOLUTION INTRAVENOUS ONCE
Status: COMPLETED | OUTPATIENT
Start: 2023-10-11 | End: 2023-10-11

## 2023-10-11 RX ORDER — DEXTROSE MONOHYDRATE 25 G/50ML
25 INJECTION, SOLUTION INTRAVENOUS
Status: DISCONTINUED | OUTPATIENT
Start: 2023-10-11 | End: 2023-10-14

## 2023-10-11 RX ORDER — HEPARIN SODIUM 1000 [USP'U]/ML
40 INJECTION, SOLUTION INTRAVENOUS; SUBCUTANEOUS PRN
Status: DISCONTINUED | OUTPATIENT
Start: 2023-10-11 | End: 2023-10-14

## 2023-10-11 RX ORDER — NOREPINEPHRINE BITARTRATE 0.03 MG/ML
0-1 INJECTION, SOLUTION INTRAVENOUS CONTINUOUS
Status: DISCONTINUED | OUTPATIENT
Start: 2023-10-11 | End: 2023-10-14

## 2023-10-11 RX ADMIN — INSULIN LISPRO 5 UNITS: 100 INJECTION, SOLUTION INTRAVENOUS; SUBCUTANEOUS at 19:02

## 2023-10-11 RX ADMIN — DEXTROSE MONOHYDRATE: 50 INJECTION, SOLUTION INTRAVENOUS at 12:40

## 2023-10-11 RX ADMIN — DEXTROSE MONOHYDRATE 50 ML: 50 INJECTION, SOLUTION INTRAVENOUS at 14:30

## 2023-10-11 RX ADMIN — Medication 200 MCG: at 15:05

## 2023-10-11 RX ADMIN — AMPICILLIN AND SULBACTAM 3 G: 1; 2 INJECTION, POWDER, FOR SOLUTION INTRAMUSCULAR; INTRAVENOUS at 01:21

## 2023-10-11 RX ADMIN — AMIODARONE HYDROCHLORIDE 150 MG: 1.5 INJECTION, SOLUTION INTRAVENOUS at 14:45

## 2023-10-11 RX ADMIN — SODIUM CHLORIDE 1000 ML: 4.5 INJECTION, SOLUTION INTRAVENOUS at 14:30

## 2023-10-11 RX ADMIN — INSULIN LISPRO 3 UNITS: 100 INJECTION, SOLUTION INTRAVENOUS; SUBCUTANEOUS at 00:24

## 2023-10-11 RX ADMIN — AMIODARONE HYDROCHLORIDE 0.5 MG/MIN: 1.8 INJECTION, SOLUTION INTRAVENOUS at 22:30

## 2023-10-11 RX ADMIN — AMPICILLIN AND SULBACTAM 3 G: 1; 2 INJECTION, POWDER, FOR SOLUTION INTRAMUSCULAR; INTRAVENOUS at 05:14

## 2023-10-11 RX ADMIN — DEXTROSE MONOHYDRATE: 50 INJECTION, SOLUTION INTRAVENOUS at 16:48

## 2023-10-11 RX ADMIN — AMIODARONE HYDROCHLORIDE 1 MG/MIN: 1.8 INJECTION, SOLUTION INTRAVENOUS at 16:44

## 2023-10-11 RX ADMIN — AMPICILLIN AND SULBACTAM 3 G: 1; 2 INJECTION, POWDER, FOR SOLUTION INTRAMUSCULAR; INTRAVENOUS at 19:31

## 2023-10-11 RX ADMIN — ROCURONIUM BROMIDE 100 MG: 50 INJECTION, SOLUTION INTRAVENOUS at 15:02

## 2023-10-11 RX ADMIN — DEXTROSE MONOHYDRATE 250 ML: 50 INJECTION, SOLUTION INTRAVENOUS at 00:16

## 2023-10-11 RX ADMIN — DEXTROSE AND SODIUM CHLORIDE: 5; 450 INJECTION, SOLUTION INTRAVENOUS at 09:06

## 2023-10-11 RX ADMIN — AMPICILLIN AND SULBACTAM 3 G: 1; 2 INJECTION, POWDER, FOR SOLUTION INTRAMUSCULAR; INTRAVENOUS at 12:37

## 2023-10-11 RX ADMIN — ETOMIDATE 20 MG: 2 INJECTION, SOLUTION INTRAVENOUS at 15:01

## 2023-10-11 RX ADMIN — NOREPINEPHRINE BITARTRATE 0.1 MCG/KG/MIN: 1 INJECTION, SOLUTION, CONCENTRATE INTRAVENOUS at 15:02

## 2023-10-11 RX ADMIN — Medication 300 MCG: at 14:30

## 2023-10-11 RX ADMIN — ACETAMINOPHEN 650 MG: 325 TABLET, FILM COATED ORAL at 22:32

## 2023-10-11 RX ADMIN — INSULIN GLARGINE-YFGN 24 UNITS: 100 INJECTION, SOLUTION SUBCUTANEOUS at 19:04

## 2023-10-11 RX ADMIN — ENOXAPARIN SODIUM 40 MG: 100 INJECTION SUBCUTANEOUS at 18:53

## 2023-10-11 RX ADMIN — INSULIN LISPRO 2 UNITS: 100 INJECTION, SOLUTION INTRAVENOUS; SUBCUTANEOUS at 12:03

## 2023-10-11 RX ADMIN — DEXTROSE AND SODIUM CHLORIDE: 5; 450 INJECTION, SOLUTION INTRAVENOUS at 07:45

## 2023-10-11 ASSESSMENT — COGNITIVE AND FUNCTIONAL STATUS - GENERAL
STANDING UP FROM CHAIR USING ARMS: TOTAL
DRESSING REGULAR LOWER BODY CLOTHING: TOTAL
SUGGESTED CMS G CODE MODIFIER MOBILITY: CM
MOVING FROM LYING ON BACK TO SITTING ON SIDE OF FLAT BED: UNABLE
HELP NEEDED FOR BATHING: TOTAL
WALKING IN HOSPITAL ROOM: TOTAL
CLIMB 3 TO 5 STEPS WITH RAILING: TOTAL
TOILETING: TOTAL
TURNING FROM BACK TO SIDE WHILE IN FLAT BAD: A LOT
MOVING TO AND FROM BED TO CHAIR: UNABLE
DRESSING REGULAR UPPER BODY CLOTHING: TOTAL
SUGGESTED CMS G CODE MODIFIER DAILY ACTIVITY: CL
DAILY ACTIVITIY SCORE: 9
MOBILITY SCORE: 7
PERSONAL GROOMING: TOTAL

## 2023-10-11 ASSESSMENT — PAIN DESCRIPTION - PAIN TYPE
TYPE: ACUTE PAIN
TYPE: ACUTE PAIN

## 2023-10-11 ASSESSMENT — FIBROSIS 4 INDEX: FIB4 SCORE: 4.4

## 2023-10-11 NOTE — ASSESSMENT & PLAN NOTE
Fluctuating mental status  Avoid offending agents  The patient apparently with significant insomnia  Daughter reports hx bipolar     Start seroquel

## 2023-10-11 NOTE — THERAPY
Speech Language Pathology   Clinical Swallow Evaluation     Patient Name: Perry Marrufo  AGE:  83 y.o., SEX:  male  Medical Record #: [id number]  Date of Service: 10/11/2023      History of Present Illness  83 y.o. male who presented on 10/9/2023 after being found down by neighbors. PT was found confused, thirsty.     PMHx DM type II, prostate disease  No previous h/o SLP services at [facility]    CMHx AMS, Rhabdomyolysis, Afib, dehydration, hypoxia, dental caries, diarrhea    CXR 10/10/2023  Bibasilar underinflation atelectasis. Superimposed pneumonia not excluded.    CT-Head 10/9/2023  No acute intracranial abnormality. No evidence of acute intracranial hemorrhage or mass lesion.    MRI pending    General Information:  Vitals  O2 (LPM): 12  O2 Delivery Device: Oxymask  Level of Consciousness: Awake, Lethargic  Patient Behaviors: Confused  Orientation: Self (Via head nod)  Follows Directives: Inconsistent      Prior Living Situation & Level of Function:  Housing / Facility: Unable To Determine At This Time  Lives with - Patient's Self Care Capacity: Unable To Determine At This Time  Swallowing: Unable to determine at this time       Oral Mechanism Evaluation:  Dentition: Pt did not follow commands to assess      Labial Observations: Open mouth posture (Dried blood, cheilitis)   Lingual Observations: Pt did not follow commands to assess       Laryngeal Function:  Secretion Management: Adequate  Voice Quality: Pt did not follow commands to assess (Per RN, whisper quality)  Cough: Perceptually weak       Subjective  RN cleared patient for clinical swallow evaluation, reported pt oriented to self and birthday verbally but at a whisper. Pt wearing oxymask, briefly removed and replaced for PO trials by this clinician with no observed desaturation. Pt answering yes/no questions via head nod with good accuracy. Endorsed being thirsty via head nod. Lethargy noted throughout session as evidenced by pt difficulty with maintaining eye  opening.       Assessment  Current Method of Nutrition: NPO until cleared by speech pathology  Positioning: Mon's (60-90 degrees)  Bolus Administration: SLP  O2 (LPM): 12 O2 Delivery Device: Oxymask  Tracheostomy : No       Swallowing Trials:  Swallowing Trials  Ice: WFL  Thin Liquid (TN0): Impaired      Comments: PO trials of ice chips and thin liquids via tsp/straw presented. Pt required max cues for labial assimilation, no anterior bolus loss with ice chips appreciated; however anterior loss from left oral cavity with thin liquids observed. Pt required moderate cues to swallow. Straw trial presented, pt unable to coordinate despite verbal and tactile assistance. No cough/throat clear appreciated with trials. Increased lethargy and eye closing noted throughout session despite cues to maintain wakefulness; therefore further trials deferred this date.       Clinical Impressions  Patient presents with elevated risk for oropharyngeal dysphagia given AMS and acute respiratory failure with hypoxia. Due to current AMS, limited participation in PO trials, and increased lethargy, unable to recommend safe initiation of an oral diet at this junction. Recommend continue NPO. Pt may benefit from alternate source of nutrition in the interim. Service to follow to determine appropriateness for diet initiation vs. Swallow diagnostic pending clinical progress. To mitigate risk for development of aspiration pneumonia, please continue complete diligent oral care.       Recommendations  Diet Consistency: NPO (Consider alternative source of nutrition)  Instrumentation: Instrumental swallow study pending clinical progress  Medication: Non Oral   Oral Care: Q4h         SLP Treatment Plan  Treatment Plan: Dysphagia Treatment  SLP Frequency: 4x Per Week  Estimated Duration: Until Therapy Goals Met      Anticipated Discharge Needs  Discharge Recommendations: Recommend post-acute placement for additional speech therapy services prior to  discharge home   Therapy Recommendations Upon DC: Dysphagia Training, Community Re-Integration, Patient / Family / Caregiver Education        Patient / Family Goals  Patient / Family Goal #1: None provided this date  Short Term Goals  Short Term Goal # 1: Pt will consume prefeeding trials with SLP with no overt s/sx of aspiration      Nany Traylor, SLP

## 2023-10-11 NOTE — PROGRESS NOTES
Monitor Summary     Rhythm: SR  Heart Rate: 87-93  Ectopy: R PVC  Measurement: .17/.08/.35

## 2023-10-11 NOTE — PROGRESS NOTES
[facility] Medicine Daily Progress Note    Date of Service  10/11/2023    Chief Complaint  Perry Marrufo is a 83 y.o. male admitted 10/9/2023 with altered mental status    Hospital Course  83-year-old male with history of diabetes and prostate disease who presented 10/9 from [place] with his nephew after found down by neighbors for 2 days.  Patient was not oriented and not able to provide history.  Patient was found on the floor covered with feces.  On admission CT head and CT spine was negative for any fracture, labs showed leukocytosis with lactic acidosis, blood sugar more than 300.  Her sodium was 151 urine drug screen was positive for benzos likely from the hospital.  Patient received IV fluid and Rocephin.  Chest x-ray did not show any infiltration and procalcitonin was negative.      On admission EKG showed A-fib with RVR, patient received 1 dose of metoprolol, patient was admitted to telemetry, echo did not show any acute finding.    On 10/11 patient developed hypotension with A-fib and RVR, rapid response was called, cardiologist and intensivist were consulted, patient received cardiac shock however not responding, amiodarone with nor epi was initiated and patient was transferred to the ICU.          Interval Problem Update  -Evaluating examined the patient at bedside, no significant changes on his mentation and patient still altered, patient saying his name, possible left-sided weakness, will order MRI to rule out stroke, EEG showed encephalopathy.  -Worsening hypernatremia, last night D5 and half NS was discontinued by the night shift.  Nephrology was consulted and planning for NG tube with free water, D5 and monitoring his sodium every 4 hours  -Not able to find any family member.  -Increased oxygen requirement, chest x-ray showed infiltration possible pneumonia, continue antibiotics, patient is on high flow oxygen, waiting for CT scan for his mass to rule out any traction obstruction  -Patient  developed A-fib with RVR and hypotension, cardiac shock was given with no improvement, amiodarone and nor epi was initiated and patient was transferred to the ICU.  -Patient has significant comorbidities including acute respiratory failure, acute kidney injury and hypernatremia with altered mental status.  Patient is on high risk of deterioration, not able to discuss the CODE STATUS with family since there is no 1 around.       I have discussed this patient's plan of care and discharge plan at IDT rounds today with Case Management, Nursing, Nursing leadership, and other members of the IDT team.    Consultants/Specialty  Cardiologist, intensivist and nephrologist    Code Status  Full Code    Disposition  The patient is not medically cleared for discharge to home or a post-acute facility.  Anticipate discharge to: skilled nursing facility    I have placed the appropriate orders for post-discharge needs.    Review of Systems  Review of Systems   Unable to perform ROS: Mental status change        Physical Exam  Temp:  [36.5 °C (97.7 °F)-36.8 °C (98.3 °F)] 36.5 °C (97.7 °F)  Pulse:  [] 129  Resp:  [15-36] 25  BP: ()/(37-97) 135/90  SpO2:  [88 %-99 %] 97 %    Physical Exam  Nursing note reviewed. Exam conducted with a chaperone present.   Constitutional:       General: He is in acute distress.      Appearance: He is ill-appearing and toxic-appearing.      Comments: Patient is not cooperative with exam   HENT:      Mouth/Throat:      Mouth: Mucous membranes are dry.      Comments: Dry blood on his mouth  Eyes:      General: No scleral icterus.  Cardiovascular:      Rate and Rhythm: Tachycardia present.      Heart sounds: No murmur heard.  Pulmonary:      Effort: Respiratory distress present.      Breath sounds: No wheezing or rales.   Abdominal:      General: There is no distension.      Palpations: Abdomen is soft.      Tenderness: There is no abdominal tenderness.   Musculoskeletal:      Right lower leg: No  edema.      Left lower leg: No edema.   Lymphadenopathy:      Cervical: No cervical adenopathy.   Skin:     General: Skin is dry.      Coloration: Skin is not jaundiced.      Findings: No bruising, lesion or rash.   Neurological:      General: No focal deficit present.      Mental Status: He is alert. Mental status is at baseline. He is disoriented.      Cranial Nerves: No cranial nerve deficit.      Sensory: No sensory deficit.      Motor: No weakness.      Gait: Gait normal.      Comments: Patient is always looking at the left side  No significant moving all left side however patient is not cooperative with exam               Fluids    Intake/Output Summary (Last 24 hours) at 10/11/2023 1727  Last data filed at 10/10/2023 1900  Gross per 24 hour   Intake --   Output 250 ml   Net -250 ml       Laboratory  Recent Labs     10/10/23  0028 10/11/23  0455 10/11/23  1426   WBC 17.1* 17.8* 18.4*   RBC 5.87 5.48 5.17   HEMOGLOBIN 18.2* 16.9 15.9   HEMATOCRIT 55.3* 52.5* 48.8   MCV 94.2 95.8 94.4   MCH 31.0 30.8 30.8   MCHC 32.9 32.2* 32.6   RDW 50.1* 51.2* 51.0*   PLATELETCT 154* 127* 127*   MPV 10.9 11.4 11.7     Recent Labs     10/10/23  2114 10/11/23  0455 10/11/23  1125 10/11/23  1426   SODIUM 154* 153* 158* 154*   POTASSIUM 4.0 4.3  --  4.0   CHLORIDE 121* 117*  --  121*   CO2 21 23  --  22   GLUCOSE 313* 245*  --  244*   * 109*  --  107*   CREATININE 1.90* 1.76*  --  1.62*   CALCIUM 8.4* 8.6  --  8.3*     Recent Labs     10/09/23  1733 10/10/23  0755   APTT 32.9  --    INR 1.33* 1.35*               Imaging  DX-CHEST-LIMITED (1 VIEW)   Final Result      1.  Bilateral basilar atelectasis and/or consolidation. Underlying infection is possible.   2.  Stable enlargement of the cardiomediastinal silhouette.   3.  Interval insertion of a central venous catheter which terminates with the tip projecting over the expected region of the mid superior vena cava.   4.  Interval placement of an endotracheal tube which  terminates in satisfactory position at the level of the aortic arch.   5.  Interval placement of an enteric feeding tube which terminates in the left upper quadrant projecting over the expected location of the stomach.      DX-CHEST-PORTABLE (1 VIEW)   Final Result      No significant change from prior exam.      DX-CHEST-PORTABLE (1 VIEW)   Final Result      Bibasilar underinflation atelectasis. Superimposed pneumonia not excluded.      US-RUQ   Final Result      1.  Gallbladder sludge   2.  RIGHT pleural effusion      EC-ECHOCARDIOGRAM COMPLETE W/ CONT   Final Result      CT-CSPINE WITHOUT PLUS RECONS   Final Result         1. No acute fracture from C1 through T1 is visualized.         CT-HEAD W/O   Final Result         1. No acute intracranial abnormality. No evidence of acute intracranial hemorrhage or mass lesion.                     DX-CHEST-PORTABLE (1 VIEW)   Final Result      1.  Low lung volumes without definite acute cardiopulmonary abnormality.      MR-BRAIN-W/O    (Results Pending)        Assessment/Plan  * Hypernatremia- (present on admission)  Assessment & Plan  Worsening today, Na 151 on admission  Nephrology was consulted  NG tube for free water and continue half NS  High osmolality  Nephrology was consulted    Acute respiratory failure with hypoxia (HCC)- (present on admission)  Assessment & Plan  Chest x-ray did not show any infiltration  Possible related to mentation  Continue weaning her from oxygen  No significant crackles or wheezing  Chest x-ray no significant changes      Altered mental status- (present on admission)  Assessment & Plan  Likely metabolic encephalopathy  Urine drug screen was positive for benzo, likely related from the hospital  CT scan on admission for head did not show any acute finding  Ammonia is negative  EEG showed encephalopathy  Continue IV fluid and antibiotics for now  MRI for brain to rule out stroke    Sepsis (HCC)- (present on admission)  Assessment & Plan  This  is Sepsis Present on admission  SIRS criteria identified on my evaluation include: Tachycardia, with heart rate greater than 90 BPM, Leukocytosis, with WBC greater than 12,000 and Bandemia, greater than 10% bands  Clinical indicators of end organ dysfunction include Lactic Acid greater than 2  Source is mouth versus aspiration pneumonia  Sepsis protocol initiated  Crystalloid Fluid Administration: Fluid resuscitation ordered per standard protocol - 30 mL/kg per current or ideal body weight  IV antibiotics as appropriate for source of sepsis  Reassessment: I have reassessed the patient's hemodynamic status    No source of infection at this time  Waiting for CT scan for mouth to rule out abscess  Blood culture and urine culture are negative to now      Aspiration precautions  Assessment & Plan  Increasing oxygen requirement and chest x-ray showed infiltration possible pneumonia  Patient came with altered mental status  Continue Unasyn and waiting for cultures  Continue oxygen therapy    Rhabdomyolysis- (present on admission)  Assessment & Plan  Mild  Continue IV fluid  Monitoring with CPK  With ADELAIDE    A-fib (HCC)- (present on admission)  Assessment & Plan  New onset.  Converted to sinus  Echo was normal  TSH is normal  Developed A-fib with RVR and hypotension on 10/11, received cardiac shock with no improvement, amiodarone and nor epi was initiated and patient was transferred to the ICU, cardiology was consulted.    Dehydration- (present on admission)  Assessment & Plan  With lactic acidosis  Managing his of fluid depends on her sodium    Erythrocytosis- (present on admission)  Assessment & Plan  Labs daily    Dental caries- (present on admission)  Assessment & Plan  Oral/maxillary xrays  Cultures w/ gram stain  Empiric unasyn    ADELAIDE (acute kidney injury) (HCC)- (present on admission)  Assessment & Plan  Improving  Continue IV fluid and avoid nephrotoxic medication  Nephrology was consulted    Diarrhea- (present on  admission)  Assessment & Plan  Resolved since admission  Stool culture/lytes  Viral panel  Check C. difficile    BPH (benign prostatic hyperplasia)- (present on admission)  Assessment & Plan  Bladder scan as needed  Continue Flomax    DM2 (diabetes mellitus, type 2) (HCC)- (present on admission)  Assessment & Plan  A1c 8  Continue sliding scale and Lantus 24 units daily    Diabetes mellitus-Type 2, not on treatment- (present on admission)  Assessment & Plan  Continue Lantus 24 units daily  A1c 8         VTE prophylaxis:   SCDs/TEDs   enoxaparin ppx      I have performed a physical exam and reviewed and updated ROS and Plan today (10/11/2023). In review of yesterday's note (10/10/2023), there are no changes except as documented above.    Patient is critically ill.   The patient continues to have: Acute respiratory failure and altered mental status  The vital organ system that is affected is the: Lungs and brain  If untreated there is a high chance of deterioration into:  eventually death.   The critical care that I am providing today is: High flow oxygen, IV antibiotics, IV fluid with close monitoring sodium, nephrology and critical care consult  The critical that has been undertaken is medically complex.   There has been no overlap in critical care time.   Critical Care Time not including procedures: 45 minutes

## 2023-10-11 NOTE — PROGRESS NOTES
NOC HOSPITALIST CROSS COVER    Notified by RN regarding patient being very lethargic and difficult to arouse. She was concerned as the patient is also slightly tachypneic. Patient was seen and examined at bedside. He was lethargic, but arousable to mild verbal stimuli and responded to some questions. The patient followed commands appropriately. He has diminished breath sounds throughout with poor airway movement. His abd was soft, tender to palpation in the RUQ, with active bowel sounds. His sodium trended up from 149-->154.      Vitals:    10/11/23 0410   BP: 126/62   Pulse: 90   Resp: (!) 32   Temp: 36.6 °C (97.9 °F)   SpO2: 89%          Plan:  -Fingerstick glucose 262  -CXR showing bibasilar underinflation atelectasis  -Continue ABX  -RUQ ultrasound pending  -Vitamin B12 ordered and pending  -Repeat ABG  -Discontinue D5 1/2 NS  -D5 250 mL bolus         -----------------------------------------------------------------------------------------------------------    Electronically signed by:  Dmitri Vidales, DIXIE, JEFF, KARLA-BC  Hospitalist Services

## 2023-10-11 NOTE — PROCEDURES
INPATIENT ROUTINE VIDEO ELECTROENCEPHALOGRAM REPORT    REFERRING PROVIDER: MD Raymundo .  Patient Name:Perry Marrufo   MRN:  [id number]   DOS: 10/11/2023  ROOM: T820/00  TOTAL RECORDING TIME:  24 minutes of total recording time    INDICATION:  Perry Marrufo 83 y.o. male with changes in mental status. EEG ordered to rule out seizures and epileptiform changes.    RELEVANT TREATMENTS/MEDICATIONS:    Current Facility-Administered Medications:     D5 1/2 NS    enoxaparin (LOVENOX) injection    insulin lispro **AND** POC blood glucose manual result **AND** NOTIFY MD and PharmD **AND** Administer 20 grams of glucose (approximately 8 ounces of fruit juice) every 15 minutes PRN FSBG less than 70 mg/dL **AND** dextrose bolus    [CANCELED] Special Contact Isolation **AND** C Diff by PCR rflx Toxin **AND** Pharmacy    ampicillin-sulbactam (UNASYN) IV    insulin GLARGINE     TECHNIQUE:   Routine VEEG was set up by a Neurodiagnostic technologist who performed education to the patient and staff. A minimum of 23 electrodes and 23 channel recording was setup and performed by Neurodiagnostic technologist, in accordance with the international 10-20 system. The study was reviewed in bipolar and referential montages. The recording examined the patient in the awake state.     DESCRIPTION OF THE RECORD:  The entire background activity consisted of diffuse 3-6 Hz, less than 20 uV activity without a posterior dominant rhythm.  State changes or features of stage II sleep were not seen.       ICTAL AND INTERICTAL FINDINGS:   There were frequent but intermittent runs of frontally predominant 2-3 Hz, sharper contour delta activity, some with triphasic morphology and argenis- posterior gradient.  No organized seizure activity.     ACTIVATION PROCEDURES:   Hyperventilation was not attempted. Photic stimulation did not show a significant driving response    EKG: No significant dysrhythmias from a single lead EKG tracing     EVENTS:   None    INTERPRETATION:  Abnormal EEG in awake state, due to the presence of:  1) Frequent bilateral frontal sharper contour delta activity with argenis- posterior gradient, and triphasic morphology  2) Diffuse delta and theta slowing of the background.  No lateralizing changes or organized electrographic seizure activity was noted during this recording.    The above changes are most suggestive of a metabolic encephalopathy and presence of triphasic waves raise concern for a hepatic encephalopathy in the appropriate clinical setting.   Clinical correlation is advised.     Pritesh Murray MD, S  Department of Neurology at [facility]  Phone: [phone]  Fax: [phone]

## 2023-10-11 NOTE — PROCEDURES
Procedure Note    Date: 10/11/2023  Time: 1500    Procedure: Central Venous Line placement  Site: L subclavian vein    Indication: shock  Consent: Informed consent obtained from patient or designated decision maker after explaining the benefits/risks of the procedure including but not limited to bleeding, infection, nerve or other deep structure injury, pneumothorax/hemothorax, arrythmia, or death. Patient or surrogate expressed understanding and agreement.    Time-out: Verbal consent was obtained. Immediately prior to procedure, a time out was called to verify the correct patient, procedure, equipment, support staff and site/side marked as required. Pre-procedure pain reported as n/a and post-procedure was n/a.    Procedure: After obtaining consent, a time-out was performed. Appropriate site confirmed with ultrasound and patient positioned, prepped, and draped in sterile fashion. All those present wearing cap and mask and those physically participating remained adhering to sterile fashion with cap, mask, gloves, and gown. 5 mL of local anesthetic injected (1% lidocaine without epinephrine) achieving appropriate comfort level for patient. Vein localized and accessed using continuous ultrasound guidance and a 7 Fr triple lumen catheter placed using Seldinger technique. Able to aspirate dark, non-pulsatile blood through each lumen and sterile saline flushed easily before capping. Line secured and dressed in sterile fashion. Wire removal: After insertion of the catheter via Seldinger technique the guidewire was removed intact and confirmed by the assistant in the room.    Patient tolerated procedure well without any difficulties and remains in care of bedside nurse. CXR will be performed to confirm appropriate placement for internal jugular or subclavian CVLs.    EBL: minimal  Complications: none  CXR: Appropriately positioned tip of central venous catheter without pneumothorax    Jeremy Gonda, MD  Critical Care  Medicine

## 2023-10-11 NOTE — CONSULTS
Critical Care Consultation    Date of consult: 10/11/2023    Referring Physician  Elmira Griffin M.D.    Reason for Consultation  Respiratory failure, AMS, Afib with RVR    History of Presenting Illness  83 y.o. male who presented 10/9/2023 with a PMHx of DM and prostate disease admitted 10/9 from [facility] after being found down x 2 days. Found to be in Afib with RVR which converted with metoprolol and altered with hypernatremia and uremia and mild rhabo. Admitted to telemetry under hospitalist services. This afternoon developed worsening hypoxia requiring escalation to maximum HFNC. Also went back into Afib with RVR but this time was hemodynamically significant with a BP that dropped to 50/30 requiring attempted electrical cardioversion. Cardiology consulted. Na increasing despite IVF resuscitation. Nephrology consulted by hospitalist.    Code Status  Full Code    Review of Systems  Review of Systems   Unable to perform ROS: Mental status change       Past Medical History   has a past medical history of Diabetes mellitus, type 2 (HCC) and Prostate disease.    Surgical History   has a past surgical history that includes other orthopedic surgery; pin insertion (1/2/2015); and hip cannulated screw (1/2/2015).    Family History  family history is not on file. - unknown    Social History   reports that he has never smoked. He does not have any smokeless tobacco history on file. He reports that he does not drink alcohol and does not use drugs.    Medications  Home Medications       Reviewed by January Tay (Pharmacy Tech) on 10/09/23 at 1840  Med List Status: Unable to Obtain     Medication Last Dose Status   aspirin (ASA) 325 MG TABS  Active   cyanocobalamin (VITAMIN B12) 500 MCG tablet  Active   enoxaparin (LOVENOX) 40 MG/0.4ML SOLN inj  Active   famotidine (PEPCID) 20 MG TABS  Active   hydrocodone-acetaminophen (NORCO) 5-325 MG TABS per tablet  Active   insulin lispro (HUMALOG) 100 UNIT/ML SOLN  Active    lactobacillus rhamnosus, GG, (CULTURELLE) CAPS  Active   Omega-3 Fatty Acids (DOCOSAHEXANOIC ACID) 1000 MG CAPS capsule  Active   potassium chloride SA (K-DUR) 20 MEQ TBCR  Active   tamsulosin (FLOMAX) 0.4 MG capsule  Active   vitamin D (CHOLECALCIFEROL) 1000 UNIT TABS  Active                  Current Facility-Administered Medications   Medication Dose Route Frequency Provider Last Rate Last Admin    dextrose 5% infusion   Intravenous Continuous Elmira Griffin M.D. 100 mL/hr at 10/11/23 1240 New Bag at 10/11/23 1240    insulin lispro (HumaLOG,AdmeLOG) injection  2-9 Units Subcutaneous Q6HRS Elmira Griffin M.D.        And    dextrose 50% (D50W) injection 25 g  25 g Intravenous Q15 MIN PRN Elmira Griffin M.D.        FENTANYL CITRATE (PF) 0.05 MG/ML INJ SOLN (WRAPPED)             fentaNYL (Sublimaze) injection 50 mcg  50 mcg Intravenous Once Jeremy M Gonda, M.D.        amiodarone (Nexterone) IVPB 150 mg  150 mg Intravenous Once Jeremy M Gonda, M.D.        dextrose 5% infusion   Intravenous Continuous Jeremy M Gonda, M.D.        amiodarone (Nexterone) IVPB 150 mg  150 mg Intravenous Once Jeremy M Gonda, M.D.        amiodarone (Nexterone) 360 mg/200 mL infusion  1 mg/min Intravenous Once Jeremy M Gonda, M.D.        Followed by    amiodarone (Nexterone) 360 mg/200 mL infusion  0.5 mg/min Intravenous Continuous Jeremy M Gonda, M.D.        norepinephrine (Levophed) 8 mg in 250 mL NS infusion (premix)  0-1 mcg/kg/min (Ideal) Intravenous Continuous Jeremy M Gonda, M.D.        1/2 NS infusion   Intravenous Once Jeremy M Gonda, M.D.        AMIODARONE HCL IN DEXTROSE 150-4.21 MG/100ML-% IV SOLN             phenylephrine (Raudel-Synephrine) 100 mcg/mL inj (IV Push Syringe) 300 mcg  300 mcg Intravenous Q5 MIN PRN Jeremy M Gonda, M.D.        Respiratory Therapy Consult   Nebulization Continuous RT Jeremy M Gonda, M.D.        famotidine (Pepcid) tablet 20 mg  20 mg Enteral Tube Q12HRS Jeremy M Gonda, M.D.        Or     famotidine (Pepcid) injection 20 mg  20 mg Intravenous Q12HRS Jeremy M Gonda, M.D. MD Alert...ICU Electrolyte Replacement per Pharmacy   Other PHARMACY TO DOSE Jeremy M Gonda, M.D.        lidocaine (Xylocaine) 1 % injection 2 mL  2 mL Tracheal Tube Q30 MIN PRN Jeremy M Gonda, M.D.        Pharmacy Consult: Enteral tube insertion - review meds/change route/product selection  1 Each Other PHARMACY TO DOSE Jeremy M Gonda, M.D.        fentaNYL (Sublimaze) injection 100 mcg  100 mcg Intravenous Q15 MIN PRN Jeremy M Gonda, M.D.        And    fentaNYL (Sublimaze) injection 200 mcg  200 mcg Intravenous Q15 MIN PRN Jeremy M Gonda, M.D.        And    fentaNYL (SUBLIMAZE) 50 mcg/mL in 50mL (Continuous Infusion)   Intravenous Continuous Jeremy M Gonda, M.D.        And    dexmedetomidine (PRECEDEX) 400 mcg/100mL NS premix infusion  0-1.5 mcg/kg/hr (Ideal) Intravenous Continuous Jeremy M Gonda, M.D.        etomidate (Amidate) injection 20 mg  20 mg Intravenous Once Jeremy M Gonda, M.D.        rocuronium (Zemuron) injection 100 mg  100 mg Intravenous Once Jeremy M Gonda, M.D.        enoxaparin (Lovenox) inj 40 mg  40 mg Sub[facility] DAILY AT [facility]   40 mg at 10/10/23 1835    Pharmacy Consult Request - C. difficile consult  1 Each Other PHARMACY TO DOSE Liz Taylor M.D.        ampicillin/sulbactam (Unasyn) 3 g in  mL IVPB  3 g Intravenous Q6HRS Liz Taylor M.D. 200 mL/hr at 10/11/23 1237 3 g at 10/11/23 1237    insulin GLARGINE (Lantus,Semglee) injection  0.2 Units/kg/day Subcutaneous Q EVENING Liz Taylor M.D.   24 Units at 10/10/23 1755       Allergies  Allergies   Allergen Reactions    Nicotine        Vital Signs last 24 hours  Temp:  [36.5 °C (97.7 °F)-37.1 °C (98.8 °F)] 36.5 °C (97.7 °F)  Pulse:  [] 150  Resp:  [20-36] 20  BP: ()/(37-62) 102/60  SpO2:  [88 %-97 %] 97 %    Physical Exam  Physical Exam  Vitals and nursing note reviewed.   Constitutional:       General: He  is in acute distress.      Appearance: He is well-developed and overweight. He is ill-appearing.      Interventions: Nasal cannula in place.   HENT:      Head: Normocephalic and atraumatic.      Nose: Nose normal. No congestion.      Mouth/Throat:      Mouth: Mucous membranes are dry.      Pharynx: Oropharynx is clear. No oropharyngeal exudate.   Eyes:      General: No scleral icterus.     Conjunctiva/sclera: Conjunctivae normal.      Pupils: Pupils are equal, round, and reactive to light.   Neck:      Vascular: No JVD.   Cardiovascular:      Rate and Rhythm: Tachycardia present. Rhythm irregularly irregular. Frequent Extrasystoles are present.     Chest Wall: PMI is displaced.      Pulses: Decreased pulses.      Heart sounds: Normal heart sounds. No murmur heard.  Pulmonary:      Effort: Tachypnea, accessory muscle usage and respiratory distress present. No retractions.      Breath sounds: Transmitted upper airway sounds present. No stridor. Examination of the right-middle field reveals rhonchi. Examination of the left-middle field reveals rhonchi. Examination of the right-lower field reveals rhonchi and rales. Examination of the left-lower field reveals rhonchi and rales. Rhonchi and rales present. No wheezing.      Comments: Not protecting airway, on maximum HFNC  Abdominal:      General: Bowel sounds are normal. There is no distension.      Palpations: Abdomen is soft.      Tenderness: There is no abdominal tenderness. There is no guarding or rebound.   Musculoskeletal:         General: No tenderness.      Cervical back: Neck supple. No tenderness.      Right lower leg: No edema.      Left lower leg: No edema.   Skin:     General: Skin is warm and dry.      Capillary Refill: Capillary refill takes 2 to 3 seconds.      Coloration: Skin is pale.   Neurological:      General: No focal deficit present.      Mental Status: He is lethargic.      Cranial Nerves: No cranial nerve deficit.      Sensory: No sensory  deficit.      Comments: Withdraws to deep pain and moans intermittently, no facial droop, drowsy   Psychiatric:         Behavior: Behavior is uncooperative.         Fluids    Intake/Output Summary (Last 24 hours) at 10/11/2023 1458  Last data filed at 10/10/2023 1900  Gross per 24 hour   Intake --   Output 250 ml   Net -250 ml       Laboratory  Recent Results (from the past 48 hour(s))   EKG (NOW)    Collection Time: 10/09/23  3:10 PM   Result Value Ref Range    Report       [facility] Emergency Dept.    Test Date:  2023-10-09  Pt Name:    LINNEA WATERS               Department: ER  MRN:        [id number]                      Room:       [facility]  Gender:     Male                         Technician:  :        1940                   Requested By:ER TRIAGE PROTOCOL  Order #:    [id number]                    Reading MD: Pritesh Fuentes MD    Measurements  Intervals                                Axis  Rate:       143                          P:          0  WI:         0                            QRS:        -83  QRSD:       92                           T:          80  QT:         301  QTc:        464    Interpretive Statements  Atrial fibrillation with rapid V-rate  Inferior infarct, old  Anterior infarct, old  Compared to ECG 2015 19:19:50  Sinus rhythm no longer present  Myocardial infarct finding still present  Electronically Signed On 10- 18:26:37 PDT by Pritesh Fuentes MD     LACTIC ACID    Collection Time: 10/09/23  3:26 PM   Result Value Ref Range    Lactic Acid 3.0 (H) 0.5 - 2.0 mmol/L   DIFFERENTIAL MANUAL    Collection Time: 10/09/23  3:26 PM   Result Value Ref Range    Neutrophils-Polys 90.40 (H) 44.00 - 72.00 %    Lymphocytes 4.40 (L) 22.00 - 41.00 %    Monocytes 5.20 0.00 - 13.40 %    Eosinophils 0.00 0.00 - 6.90 %    Basophils 0.00 0.00 - 1.80 %    Neutrophils (Absolute) 16.54 (H) 1.82 - 7.42 K/uL    Lymphs (Absolute) 0.81 (L) 1.00 - 4.80 K/uL    Monos (Absolute) 0.95  (H) 0.00 - 0.85 K/uL    Eos (Absolute) 0.00 0.00 - 0.51 K/uL    Baso (Absolute) 0.00 0.00 - 0.12 K/uL    Nucleated RBC 0.00 0.00 - 0.20 /100 WBC    Manual Diff Status PERFORMED    PERIPHERAL SMEAR REVIEW    Collection Time: 10/09/23  3:26 PM   Result Value Ref Range    Peripheral Smear Review see below    PLATELET ESTIMATE    Collection Time: 10/09/23  3:26 PM   Result Value Ref Range    Plt Estimation Normal    MORPHOLOGY    Collection Time: 10/09/23  3:26 PM   Result Value Ref Range    RBC Morphology Present     Polychromia 1+     Microcytosis 2+ (A)     Poikilocytosis 1+     Echinocytes 1+    CBC WITH DIFFERENTIAL    Collection Time: 10/09/23  3:26 PM   Result Value Ref Range    WBC 18.3 (H) 4.8 - 10.8 K/uL    RBC 6.18 (H) 4.70 - 6.10 M/uL    Hemoglobin 18.9 (H) 14.0 - 18.0 g/dL    Hematocrit 58.2 (H) 42.0 - 52.0 %    MCV 94.2 81.4 - 97.8 fL    MCH 30.6 27.0 - 33.0 pg    MCHC 32.5 32.3 - 36.5 g/dL    RDW 49.1 35.9 - 50.0 fL    Platelet Count 171 164 - 446 K/uL    MPV 11.0 9.0 - 12.9 fL    NRBC (Absolute) 0.00 K/uL   Comp Metabolic Panel    Collection Time: 10/09/23  3:26 PM   Result Value Ref Range    Sodium 151 (H) 135 - 145 mmol/L    Potassium 4.0 3.6 - 5.5 mmol/L    Chloride 112 96 - 112 mmol/L    Co2 23 20 - 33 mmol/L    Anion Gap 16.0 7.0 - 16.0    Glucose 351 (H) 65 - 99 mg/dL    Bun 104 (H) 8 - 22 mg/dL    Creatinine 1.67 (H) 0.50 - 1.40 mg/dL    Calcium 8.1 (L) 8.5 - 10.5 mg/dL    Correct Calcium 8.5 8.5 - 10.5 mg/dL    AST(SGOT) 33 12 - 45 U/L    ALT(SGPT) 15 2 - 50 U/L    Alkaline Phosphatase 64 30 - 99 U/L    Total Bilirubin 2.5 (H) 0.1 - 1.5 mg/dL    Albumin 3.5 3.2 - 4.9 g/dL    Total Protein 6.5 6.0 - 8.2 g/dL    Globulin 3.0 1.9 - 3.5 g/dL    A-G Ratio 1.2 g/dL   CREATINE KINASE    Collection Time: 10/09/23  3:26 PM   Result Value Ref Range    CPK Total 1030 (H) 0 - 154 U/L   LACTIC ACID    Collection Time: 10/09/23  3:26 PM   Result Value Ref Range    Lactic Acid 3.0 (H) 0.5 - 2.0 mmol/L   LIPASE     Collection Time: 10/09/23  3:26 PM   Result Value Ref Range    Lipase 84 (H) 11 - 82 U/L   TROPONIN    Collection Time: 10/09/23  3:26 PM   Result Value Ref Range    Troponin T 39 (H) 6 - 19 ng/L   ESTIMATED GFR    Collection Time: 10/09/23  3:26 PM   Result Value Ref Range    GFR (CKD-EPI) 41 (A) >60 mL/min/1.73 m 2   BLOOD CULTURE    Collection Time: 10/09/23  4:33 PM    Specimen: Peripheral; Blood   Result Value Ref Range    Significant Indicator NEG     Source BLD     Site PERIPHERAL     Culture Result       No Growth  Note: Blood cultures are incubated for 5 days and  are monitored continuously.Positive blood cultures  are called to the RN and reported as soon as  they are identified.     BLOOD CULTURE    Collection Time: 10/09/23  5:33 PM    Specimen: Peripheral; Blood   Result Value Ref Range    Significant Indicator NEG     Source BLD     Site PERIPHERAL     Culture Result       No Growth  Note: Blood cultures are incubated for 5 days and  are monitored continuously.Positive blood cultures  are called to the RN and reported as soon as  they are identified.     Prothrombin Time    Collection Time: 10/09/23  5:33 PM   Result Value Ref Range    PT 16.7 (H) 12.0 - 14.6 sec    INR 1.33 (H) 0.87 - 1.13   APTT    Collection Time: 10/09/23  5:33 PM   Result Value Ref Range    APTT 32.9 24.7 - 36.0 sec   EKG (NOW)    Collection Time: 10/09/23  5:34 PM   Result Value Ref Range    Report       [facility] Emergency Dept.    Test Date:  2023-10-09  Pt Name:    LINNEA WATERS               Department: ER  MRN:        [id number]                      Room:       [facility]  Gender:     Male                         Technician: [id number]  :        1940                   Requested By:JUDITH BENITEZ  Order #:    [id number]                    Reading MD: Judith Benitez MD    Measurements  Intervals                                Axis  Rate:       83                           P:          -29  ND:         170                           QRS:        -79  QRSD:       99                           T:          71  QT:         371  QTc:        436    Interpretive Statements  Sinus rhythm  Multiple ventricular premature complexes  Inferior infarct, old  Anterior infarct, old  Compared to ECG 10/09/2023 15:10:06  Ventricular premature complex(es) now present  Atrial fibrillation no longer present  Myocardial infarct finding still present  Electronically Signed On 10- 18:26: 34 PDT by Pritesh Fuentes MD     POCT glucose device results    Collection Time: 10/09/23  9:27 PM   Result Value Ref Range    POC Glucose, Blood 297 (H) 65 - 99 mg/dL   AMMONIA    Collection Time: 10/09/23  9:53 PM   Result Value Ref Range    Ammonia 18 11 - 45 umol/L   LACTIC ACID    Collection Time: 10/09/23  9:53 PM   Result Value Ref Range    Lactic Acid 2.3 (H) 0.5 - 2.0 mmol/L   PROCALCITONIN    Collection Time: 10/09/23  9:53 PM   Result Value Ref Range    Procalcitonin 0.23 <0.25 ng/mL   CBC with Differential    Collection Time: 10/10/23 12:28 AM   Result Value Ref Range    WBC 17.1 (H) 4.8 - 10.8 K/uL    RBC 5.87 4.70 - 6.10 M/uL    Hemoglobin 18.2 (H) 14.0 - 18.0 g/dL    Hematocrit 55.3 (H) 42.0 - 52.0 %    MCV 94.2 81.4 - 97.8 fL    MCH 31.0 27.0 - 33.0 pg    MCHC 32.9 32.3 - 36.5 g/dL    RDW 50.1 (H) 35.9 - 50.0 fL    Platelet Count 154 (L) 164 - 446 K/uL    MPV 10.9 9.0 - 12.9 fL    Neutrophils-Polys 84.60 (H) 44.00 - 72.00 %    Lymphocytes 4.00 (L) 22.00 - 41.00 %    Monocytes 8.30 0.00 - 13.40 %    Eosinophils 0.20 0.00 - 6.90 %    Basophils 0.70 0.00 - 1.80 %    Immature Granulocytes 2.20 (H) 0.00 - 0.90 %    Nucleated RBC 0.00 0.00 - 0.20 /100 WBC    Neutrophils (Absolute) 14.45 (H) 1.82 - 7.42 K/uL    Lymphs (Absolute) 0.68 (L) 1.00 - 4.80 K/uL    Monos (Absolute) 1.42 (H) 0.00 - 0.85 K/uL    Eos (Absolute) 0.04 0.00 - 0.51 K/uL    Baso (Absolute) 0.12 0.00 - 0.12 K/uL    Immature Granulocytes (abs) 0.38 (H) 0.00 - 0.11 K/uL    NRBC  (Absolute) 0.00 K/uL   Comp Metabolic Panel (CMP)    Collection Time: 10/10/23 12:28 AM   Result Value Ref Range    Sodium 149 (H) 135 - 145 mmol/L    Potassium 4.1 3.6 - 5.5 mmol/L    Chloride 116 (H) 96 - 112 mmol/L    Co2 20 20 - 33 mmol/L    Anion Gap 13.0 7.0 - 16.0    Glucose 342 (H) 65 - 99 mg/dL    Bun 103 (H) 8 - 22 mg/dL    Creatinine 1.40 0.50 - 1.40 mg/dL    Calcium 8.3 (L) 8.5 - 10.5 mg/dL    Correct Calcium 9.0 8.5 - 10.5 mg/dL    AST(SGOT) 36 12 - 45 U/L    ALT(SGPT) 19 2 - 50 U/L    Alkaline Phosphatase 60 30 - 99 U/L    Total Bilirubin 1.9 (H) 0.1 - 1.5 mg/dL    Albumin 3.1 (L) 3.2 - 4.9 g/dL    Total Protein 6.2 6.0 - 8.2 g/dL    Globulin 3.1 1.9 - 3.5 g/dL    A-G Ratio 1.0 g/dL   CREATINE KINASE    Collection Time: 10/10/23 12:28 AM   Result Value Ref Range    CPK Total 1104 (H) 0 - 154 U/L   HEMOGLOBIN A1C    Collection Time: 10/10/23 12:28 AM   Result Value Ref Range    Glycohemoglobin 8.0 (H) 4.0 - 5.6 %    Est Avg Glucose 183 mg/dL   LACTIC ACID    Collection Time: 10/10/23 12:28 AM   Result Value Ref Range    Lactic Acid 2.3 (H) 0.5 - 2.0 mmol/L   ABG - LAB    Collection Time: 10/10/23 12:28 AM   Result Value Ref Range    Ph 7.41 7.40 - 7.50    Pco2 31.8 26.0 - 37.0 mmHg    Po2 83.4 64.0 - 87.0 mmHg    O2 Saturation 95.5 93.0 - 99.0 %    Hco3 20 17 - 25 mmol/L    Base Excess -3 -4 - 3 mmol/L    Body Temp 36.1 Centigrade    O2 Therapy 10 L     Ph -TC 7.43 7.40 - 7.50    Pco2 -TC 30.6 26.0 - 37.0 mmHg    Po2 -TC 78.7 64.0 - 87.0 mmHg   ESTIMATED GFR    Collection Time: 10/10/23 12:28 AM   Result Value Ref Range    GFR (CKD-EPI) 50 (A) >60 mL/min/1.73 m 2   POCT glucose device results    Collection Time: 10/10/23  1:36 AM   Result Value Ref Range    POC Glucose, Blood 293 (H) 65 - 99 mg/dL   Respiratory Panel by PCR (Inpatient ONLY)    Collection Time: 10/10/23  2:48 AM    Specimen: Nasopharyngeal; Respirate   Result Value Ref Range    Adenovirus, PCR Not Detected     SARS-CoV-2 (COVID-19) RNA  by NATASHA NotDetected     Coronavirus 229E, PCR Not Detected     Coronavirus HKU1, PCR Not Detected     Coronavirus NL63, PCR Not Detected     Coronavirus OC43, PCR Not Detected     Human Metapneumovirus, PCR Not Detected     Rhino/Enterovirus, PCR Not Detected     Influenza A, PCR Not Detected     Influenza B, PCR Not Detected     Parainfluenza 1, PCR Not Detected     Parainfluenza 2, PCR Not Detected     Parainfluenza 3, PCR Not Detected     Parainfluenza 4, PCR Not Detected     RSV (Respiratory Syncytial Virus), PCR Not Detected     Bordetella parapertussis (FQ7730), PCR Not Detected     Bordetella pertussis (ptxP), PCR Not Detected     Mycoplasma pneumoniae, PCR Not Detected     Chlamydia pneumoniae, PCR Not Detected    MRSA By PCR (Amp)    Collection Time: 10/10/23  6:30 AM    Specimen: Nares; Respirate   Result Value Ref Range    MRSA by PCR Negative Negative   Lactic Acid -STAT Once    Collection Time: 10/10/23  7:55 AM   Result Value Ref Range    Lactic Acid 3.5 (H) 0.5 - 2.0 mmol/L   Prothrombin time (INR)    Collection Time: 10/10/23  7:55 AM   Result Value Ref Range    PT 16.9 (H) 12.0 - 14.6 sec    INR 1.35 (H) 0.87 - 1.13   POCT glucose device results    Collection Time: 10/10/23 10:43 AM   Result Value Ref Range    POC Glucose, Blood 266 (H) 65 - 99 mg/dL   Lactic Acid Every four hours after STAT order    Collection Time: 10/10/23 11:52 AM   Result Value Ref Range    Lactic Acid 2.8 (H) 0.5 - 2.0 mmol/L   URINE DRUG SCREEN    Collection Time: 10/10/23 12:00 PM   Result Value Ref Range    Amphetamines Urine Negative Negative    Barbiturates Negative Negative    Benzodiazepines Positive (A) Negative    Cocaine Metabolite Negative Negative    Fentanyl, Urine Negative Negative    Methadone Negative Negative    Opiates Negative Negative    Oxycodone Negative Negative    Phencyclidine -Pcp Negative Negative    Propoxyphene Negative Negative    Cannabinoid Metab Negative Negative   EC-ECHOCARDIOGRAM COMPLETE W/  CONT    Collection Time: 10/10/23  2:43 PM   Result Value Ref Range    Eject.Frac. MOD BP 72.84     Eject.Frac. MOD 4C 73.7     Eject.Frac. MOD 2C 70.2     Left Ventrical Ejection Fraction 73    Lactic Acid Every four hours after STAT order    Collection Time: 10/10/23  3:05 PM   Result Value Ref Range    Lactic Acid 2.8 (H) 0.5 - 2.0 mmol/L   POCT glucose device results    Collection Time: 10/10/23  3:11 PM   Result Value Ref Range    POC Glucose, Blood 258 (H) 65 - 99 mg/dL   POCT glucose device results    Collection Time: 10/10/23  5:53 PM   Result Value Ref Range    POC Glucose, Blood 242 (H) 65 - 99 mg/dL   Lactic Acid Every four hours after STAT order    Collection Time: 10/10/23  9:14 PM   Result Value Ref Range    Lactic Acid 1.8 0.5 - 2.0 mmol/L   Basic Metabolic Panel    Collection Time: 10/10/23  9:14 PM   Result Value Ref Range    Sodium 154 (H) 135 - 145 mmol/L    Potassium 4.0 3.6 - 5.5 mmol/L    Chloride 121 (H) 96 - 112 mmol/L    Co2 21 20 - 33 mmol/L    Glucose 313 (H) 65 - 99 mg/dL    Bun 107 (H) 8 - 22 mg/dL    Creatinine 1.90 (H) 0.50 - 1.40 mg/dL    Calcium 8.4 (L) 8.5 - 10.5 mg/dL    Anion Gap 12.0 7.0 - 16.0   OSMOLALITY SERUM    Collection Time: 10/10/23  9:14 PM   Result Value Ref Range    Osmolality Serum 368 (H) 278 - 298 mOsm/kg H2O   ESTIMATED GFR    Collection Time: 10/10/23  9:14 PM   Result Value Ref Range    GFR (CKD-EPI) 35 (A) >60 mL/min/1.73 m 2   VITAMIN B12    Collection Time: 10/10/23  9:14 PM   Result Value Ref Range    Vitamin B12 -True Cobalamin 1753 (H) 211 - 911 pg/mL   POCT glucose device results    Collection Time: 10/10/23 10:14 PM   Result Value Ref Range    POC Glucose, Blood 269 (H) 65 - 99 mg/dL   VENOUS BLOOD GAS    Collection Time: 10/11/23 12:22 AM   Result Value Ref Range    Venous Bg Ph 7.41 7.31 - 7.45    Venous Bg Ph Temp Corrected 7.41 7.31 - 7.45    Venous Bg Pco2 38.2 (L) 41.0 - 51.0 mmHg    Venous Bg Pco2 Temp Corrected 37.5 (L) 41.0 - 51.0 mmHg    Venous  Bg Po2 37.4 25.0 - 40.0 mmHg    Venous Bg Po2 Temp Corrected 36.4 25.0 - 40.0 mmHg    Venous Bg O2 Saturation 65.1 %    Venous Bg Hco3 24 24 - 28 mmol/L    Venous Bg Base Excess -1 mmol/L    Body Temp 36.6 Centigrade    O2 Therapy 10 lts.    POCT glucose device results    Collection Time: 10/11/23 12:23 AM   Result Value Ref Range    POC Glucose, Blood 262 (H) 65 - 99 mg/dL   CBC WITH DIFFERENTIAL    Collection Time: 10/11/23  4:55 AM   Result Value Ref Range    WBC 17.8 (H) 4.8 - 10.8 K/uL    RBC 5.48 4.70 - 6.10 M/uL    Hemoglobin 16.9 14.0 - 18.0 g/dL    Hematocrit 52.5 (H) 42.0 - 52.0 %    MCV 95.8 81.4 - 97.8 fL    MCH 30.8 27.0 - 33.0 pg    MCHC 32.2 (L) 32.3 - 36.5 g/dL    RDW 51.2 (H) 35.9 - 50.0 fL    Platelet Count 127 (L) 164 - 446 K/uL    MPV 11.4 9.0 - 12.9 fL    Neutrophils-Polys 80.00 (H) 44.00 - 72.00 %    Lymphocytes 9.50 (L) 22.00 - 41.00 %    Monocytes 9.60 0.00 - 13.40 %    Eosinophils 0.90 0.00 - 6.90 %    Basophils 0.00 0.00 - 1.80 %    Nucleated RBC 0.00 0.00 - 0.20 /100 WBC    Neutrophils (Absolute) 14.24 (H) 1.82 - 7.42 K/uL    Lymphs (Absolute) 1.69 1.00 - 4.80 K/uL    Monos (Absolute) 1.71 (H) 0.00 - 0.85 K/uL    Eos (Absolute) 0.16 0.00 - 0.51 K/uL    Baso (Absolute) 0.00 0.00 - 0.12 K/uL    NRBC (Absolute) 0.00 K/uL    Microcytosis 1+    Comp Metabolic Panel    Collection Time: 10/11/23  4:55 AM   Result Value Ref Range    Sodium 153 (H) 135 - 145 mmol/L    Potassium 4.3 3.6 - 5.5 mmol/L    Chloride 117 (H) 96 - 112 mmol/L    Co2 23 20 - 33 mmol/L    Anion Gap 13.0 7.0 - 16.0    Glucose 245 (H) 65 - 99 mg/dL    Bun 109 (H) 8 - 22 mg/dL    Creatinine 1.76 (H) 0.50 - 1.40 mg/dL    Calcium 8.6 8.5 - 10.5 mg/dL    Correct Calcium 9.7 8.5 - 10.5 mg/dL    AST(SGOT) 35 12 - 45 U/L    ALT(SGPT) 16 2 - 50 U/L    Alkaline Phosphatase 63 30 - 99 U/L    Total Bilirubin 3.0 (H) 0.1 - 1.5 mg/dL    Albumin 2.6 (L) 3.2 - 4.9 g/dL    Total Protein 6.0 6.0 - 8.2 g/dL    Globulin 3.4 1.9 - 3.5 g/dL    A-G  Ratio 0.8 g/dL   CRP QUANTITIVE (NON-CARDIAC)    Collection Time: 10/11/23  4:55 AM   Result Value Ref Range    Stat C-Reactive Protein 34.62 (H) 0.00 - 0.75 mg/dL   CREATINE KINASE    Collection Time: 10/11/23  4:55 AM   Result Value Ref Range    CPK Total 560 (H) 0 - 154 U/L   proBrain Natriuretic Peptide, NT    Collection Time: 10/11/23  4:55 AM   Result Value Ref Range    NT-proBNP 551 (H) 0 - 125 pg/mL   DIFFERENTIAL MANUAL    Collection Time: 10/11/23  4:55 AM   Result Value Ref Range    Manual Diff Status PERFORMED    PERIPHERAL SMEAR REVIEW    Collection Time: 10/11/23  4:55 AM   Result Value Ref Range    Peripheral Smear Review see below    PLATELET ESTIMATE    Collection Time: 10/11/23  4:55 AM   Result Value Ref Range    Plt Estimation Decreased    MORPHOLOGY    Collection Time: 10/11/23  4:55 AM   Result Value Ref Range    RBC Morphology Present     Poikilocytosis 1+     Echinocytes 1+    ESTIMATED GFR    Collection Time: 10/11/23  4:55 AM   Result Value Ref Range    GFR (CKD-EPI) 38 (A) >60 mL/min/1.73 m 2   POCT glucose device results    Collection Time: 10/11/23  5:19 AM   Result Value Ref Range    POC Glucose, Blood 175 (H) 65 - 99 mg/dL   SODIUM SERUM (NA)    Collection Time: 10/11/23 11:25 AM   Result Value Ref Range    Sodium 158 (HH) 135 - 145 mmol/L   EKG (IP)    Collection Time: 10/11/23 12:40 PM   Result Value Ref Range    Report       [facility]    Test Date:  2023-10-11  Pt Name:    LINNEA WATERS               Department: 183  MRN:        [id number]                      Room:       T820  Gender:     Male                         Technician: FGJ  :        1940                   Requested By:REBECCA BRADSHAW  Order #:    [id number]                    Reading MD:    Measurements  Intervals                                Axis  Rate:       90                           P:          -50  AL:         163                          QRS:        -71  QRSD:       96                            T:          78  QT:         348  QTc:        426    Interpretive Statements  Sinus or ectopic atrial rhythm  Atrial premature complex  Inferior infarct, old  Anterior infarct, old  Compared to ECG 10/09/2023 17:34:55  Ectopic atrial rhythm now present  Atrial premature complex(es) now present  Sinus rhythm no longer present  Ventricular premature complex(es) no longer present  Myocardial infarct finding still present     EKG (IP)    Collection Time: 10/11/23  2:18 PM   Result Value Ref Range    Report       [facility]    Test Date:  2023-10-11  Pt Name:    LINNEA WATERS               Department: 183  MRN:        [id number]                      Room:       T820  Gender:     Male                         Technician: FGJ  :        1940                   Requested By:REBECCA BRADSHAW  Order #:    [id number]                    Reading MD:    Measurements  Intervals                                Axis  Rate:       149                          P:          0  ME:         0                            QRS:        -71  QRSD:       96                           T:          78  QT:         298  QTc:        469    Interpretive Statements  Atrial fibrillation with rapid V-rate  Inferior infarct, acute (RCA)  Probable anteroseptal infarct, old  Lateral leads are also involved  Probable RV involvement, suggest recording right precordial leads  Artifact in lead(s) I,III,aVL  Compared to ECG 10/11/2023 12:40:03  Ectopic atrial rhythm no longer present  Atrial premature complex(es) no longer pre sent  Myocardial infarct finding still present     POCT glucose device results    Collection Time: 10/11/23  2:20 PM   Result Value Ref Range    POC Glucose, Blood 204 (H) 65 - 99 mg/dL   ABG - LAB    Collection Time: 10/11/23  2:26 PM   Result Value Ref Range    Ph 7.49 7.40 - 7.50    Pco2 30.0 26.0 - 37.0 mmHg    Po2 56.6 (L) 64.0 - 87.0 mmHg    O2 Saturation 90.2 (L) 93.0 - 99.0 %    Hco3 22 17 - 25 mmol/L    Base Excess 0 -4 -  3 mmol/L    Body Temp 36.6 Centigrade    O2 Therapy 60L 100%        Imaging  DX-CHEST-LIMITED (1 VIEW)   Final Result      1.  Bilateral basilar atelectasis and/or consolidation. Underlying infection is possible.   2.  Stable enlargement of the cardiomediastinal silhouette.   3.  Interval insertion of a central venous catheter which terminates with the tip projecting over the expected region of the mid superior vena cava.   4.  Interval placement of an endotracheal tube which terminates in satisfactory position at the level of the aortic arch.   5.  Interval placement of an enteric feeding tube which terminates in the left upper quadrant projecting over the expected location of the stomach.      DX-CHEST-PORTABLE (1 VIEW)   Final Result      No significant change from prior exam.      DX-CHEST-PORTABLE (1 VIEW)   Final Result      Bibasilar underinflation atelectasis. Superimposed pneumonia not excluded.      US-RUQ   Final Result      1.  Gallbladder sludge   2.  RIGHT pleural effusion      EC-ECHOCARDIOGRAM COMPLETE W/ CONT   Final Result      CT-CSPINE WITHOUT PLUS RECONS   Final Result         1. No acute fracture from C1 through T1 is visualized.         CT-HEAD W/O   Final Result         1. No acute intracranial abnormality. No evidence of acute intracranial hemorrhage or mass lesion.                     DX-CHEST-PORTABLE (1 VIEW)   Final Result      1.  Low lung volumes without definite acute cardiopulmonary abnormality.      MR-BRAIN-W/O    (Results Pending)   DX-CHEST-PORTABLE (1 VIEW)    (Results Pending)    * personally reviewed CXR and EKG    Assessment/Plan  * Hypernatremia- (present on admission)  Assessment & Plan  Hypovolemic hypernatremia  Nephrology consulted  Continue to correct fluid status while closely monitoring Na  Begin free water via OGT, D5W MIVFs    Shock (HCC)  Assessment & Plan  Undifferentiated - cardiac due to arrhythmia vs hypovolemia vs sepsis  Titrate NE gtt to keep MAP >65  Fluid  resuscitation  Monitor UOP, LA, VS closely for adequacy of resuscitation      Rhabdomyolysis- (present on admission)  Assessment & Plan  Continue IVF resuscitation, monitor CPK    Atrial fibrillation with RVR (HCC)- (present on admission)  Assessment & Plan  No known history of Afib, RVR with hypotension attempted electrical cardioversion 10/11  Continue amiodarone gtt  Cardiology consulted  Heparin gtt  Optimize electrolytes, continuous tele monitoring    Dehydration- (present on admission)  Assessment & Plan  Continue fluid resuscitation intravenously and enterally for now  Monitor UOP closely    Acute respiratory failure with hypoxia (HCC)- (present on admission)  Assessment & Plan  Patient is not protecting airway adequately and is requiring maximum HFNC --> intubate  Start full mech vent support  Post-intubation ABG/CXR and titrate vent accordingly  Sedation lite with precedex/fent  RT/O2 protocols  ABCDEF bundle    Altered mental status- (present on admission)  Assessment & Plan  Acute metabolic encephalopathy due to uremia, hypernatremia, sepsis, hypoxia  Limit sedatives  Close neuro monitoring  Consider MRI brain once stabilized  CT head and EEG unrevealing  Continue correcting underlying causes    Sepsis (HCC)- (present on admission)  Assessment & Plan  This is Septic shock Present on admission  SIRS criteria identified on my evaluation include: Tachycardia, with heart rate greater than 90 BPM, Tachypnea, with respirations greater than 20 per minute and Leukocytosis, with WBC greater than 12,000  Clinical indicators of end organ dysfunction include Hypotension with systolic blood pressure less than 90 or MAP less than 65, Lactic Acid greater than 2, Toxic Metabolic Encephalopathy and Acute Respiratory Failure - (mechanical ventilation or BiPap or PaO2/FiO2 ratio < 300)  Indicators of septic shock include: Sepsis present and persistent hypotension despite fluid resuscitation   Sources is: pulmonary vs  GI  Sepsis protocol initiated  Crystalloid Fluid Administration: Resuscitation volume of 10 ml/kg ordered. Reason that resuscitation volume of less than 30ml/kg was ordered hypernatremia and desire to not overcorrect too quickly  IV antibiotics as appropriate for source of sepsis  Reassessment: I have reassessed the patient's hemodynamic status  Begin NE gtt to keep MAP >65    ADELAIDE (acute kidney injury) (HCC)- (present on admission)  Assessment & Plan  Secondary to ATN, pre-renal and rhabdo  Nephrology consulted  Avoid nephrotoxins  Monitor creatinine, UOP, electrolytes closely  Maintain perfusion to kidneys with vasopressor support    Diabetes mellitus-Type 2, not on treatment- (present on admission)  Assessment & Plan  ISS    Diarrhea- (present on admission)  Assessment & Plan  c diff pending    BPH (benign prostatic hyperplasia)- (present on admission)  Assessment & Plan  Triplett catheter to be placed        Discussed patient condition and risk of morbidity and/or mortality with Hospitalist, RN, RT, Pharmacy, Charge nurse / hot rounds, Patient, and cardiology and nephrology.    The patient remains critically ill.  Critical care time = 110 minutes in directly providing and coordinating critical care and extensive data review.  No time overlap and excludes procedures.    Please note that this dictation was created using voice recognition software. I have made every reasonable attempt to correct obvious errors, but there may be errors of grammar and possibly content that I did not discover before finalizing the note.

## 2023-10-11 NOTE — DOCUMENTATION QUERY
[facility]                                                                       Query Response Note      PATIENT:               LINNEA WATERS  ACCT #:                  [id number]  MRN:                     [id number]  :                      1940  ADMIT DATE:       10/9/2023 2:54 PM  DISCH DATE:          RESPONDING  PROVIDER #:        [id number]           QUERY TEXT:    Metabolic encephalopathy is documented by the ED Provider.  Can you clarify if this is a relevant diagnosis?    The patient's clinical indicators include:  H&P: Altered mental status: likely infectious etiology   Risk Factors: severe sepsis, acute respiratory failure, presumed aspiration pneumonitis vs pna  Treatment: supplemental O2, Unasyn, Zithromax, Rocephin, IVF    Thank you,  Galina Shore RN, BSN, CCDS  Clinical   Connect via [employer]  Options provided:   -- Metabolic encephalopathy ruled in   -- Other explanation for AMS, please specify   -- Unable to determine      Query created by: Galina Shore on 10/10/2023 10:06 AM    RESPONSE TEXT:    Metabolic encephalopathy ruled in          Electronically signed by:  YIN ARGUETA MD 10/10/2023 10:45 PM

## 2023-10-11 NOTE — CONSULTS
Reason for Consult:  Asked by Dr Jeremy M Gonda, M.D. to see this patient with A-fib RVR  Patient's PCP: Pcp Pt States None    CC:   Chief Complaint   Patient presents with    T-5000 GLF     Pt found down.  LKW 2130 on 10/8       HPI:  Perry Marrufo is an 83-year-old man with history of diabetes and no prior cardiac history who was admitted for altered mental status.  In the ER, the patient was in A-fib RVR, which is since converted back to sinus rhythm.  This afternoon, the patient went back into A-fib RVR with hypotension, and cardiology was consulted.    The hospitalist, intensivist, and myself were at bedside.  The patient was shocked x1 but reverted back to his A-fib.  The patient was started on pressors, IV fluid, and transferred to ICU.    The patient continues to have altered mental status, and review of system was not able to be obtained.  History is obtained from chart review as well as speaking with other providers.    Medications / Drug list prior to admission:  No current facility-administered medications on file prior to encounter.     Current Outpatient Medications on File Prior to Encounter   Medication Sig Dispense Refill    potassium chloride SA (K-DUR) 20 MEQ TBCR Take 1 Tab by mouth every day.      insulin lispro (HUMALOG) 100 UNIT/ML SOLN Inject 2-9 Units as instructed 4 Times a Day,Before Meals and at Bedtime. 151-200 -= 2 units  201-250 = 3 units  251-300 = 5 units  301-350 = 6 units  351-400 = 8 units  401> 9 units call md      lactobacillus rhamnosus, GG, (CULTURELLE) CAPS Take 1 Cap by mouth every day.      vitamin D (CHOLECALCIFEROL) 1000 UNIT TABS Take 2,000 Units by mouth every day.      hydrocodone-acetaminophen (NORCO) 5-325 MG TABS per tablet Take 1-2 Tabs by mouth every 6 hours as needed. Indications: Moderate to Moderately Severe Pain      famotidine (PEPCID) 20 MG TABS Take 20 mg by mouth every day.      aspirin (ASA) 325 MG TABS Take 1 Tab by mouth every day. 100 Tab 3     enoxaparin (LOVENOX) 40 MG/0.4ML SOLN inj Inject 40 mg as instructed every day.      tamsulosin (FLOMAX) 0.4 MG capsule Take 1 Cap by mouth ONE-HALF HOUR AFTER BREAKFAST. 30 Cap     Omega-3 Fatty Acids (DOCOSAHEXANOIC ACID) 1000 MG CAPS capsule Take 1 Cap by mouth 3 times a day, with meals. 90 Cap     cyanocobalamin (VITAMIN B12) 500 MCG tablet Take 1 Tab by mouth every day. 30 Tab 3       Current list of administered Medications:    Current Facility-Administered Medications:     dextrose 5% infusion, , Intravenous, Continuous, Elmira Griffin M.D., Last Rate: 100 mL/hr at 10/11/23 1240, New Bag at 10/11/23 1240    insulin lispro (HumaLOG,AdmeLOG) injection, 2-9 Units, Subcutaneous, Q6HRS **AND** POC blood glucose manual result, , , Q6H **AND** NOTIFY MD and PharmD, , , Once **AND** Administer 20 grams of glucose (approximately 8 ounces of fruit juice) every 15 minutes PRN FSBG less than 70 mg/dL, , , PRN **AND** dextrose 50% (D50W) injection 25 g, 25 g, Intravenous, Q15 MIN PRN, Elmira Griffin M.D.    FENTANYL CITRATE (PF) 0.05 MG/ML INJ SOLN (WRAPPED), , , ,     fentaNYL (Sublimaze) injection 50 mcg, 50 mcg, Intravenous, Once, Jeremy M Gonda, M.D.    amiodarone (Nexterone) IVPB 150 mg, 150 mg, Intravenous, Once, Jeremy M Gonda, M.D.    dextrose 5% infusion, , Intravenous, Continuous, Jeremy M Gonda, M.D.    amiodarone (Nexterone) IVPB 150 mg, 150 mg, Intravenous, Once, Jeremy M Gonda, M.D.    amiodarone (Nexterone) 360 mg/200 mL infusion, 1 mg/min, Intravenous, Once **FOLLOWED BY** amiodarone (Nexterone) 360 mg/200 mL infusion, 0.5 mg/min, Intravenous, Continuous, Jeremy M Gonda, M.D.    norepinephrine (Levophed) 8 mg in 250 mL NS infusion (premix), 0-1 mcg/kg/min (Ideal), Intravenous, Continuous, Jeremy M Gonda, M.D., Last Rate: 14.1 mL/hr at 10/11/23 1502, 0.1 mcg/kg/min at 10/11/23 1502    AMIODARONE HCL IN DEXTROSE 150-4.21 MG/100ML-% IV SOLN, , , ,     phenylephrine (Raudel-Synephrine) 100 mcg/mL inj (IV  Push Syringe) 300 mcg, 300 mcg, Intravenous, Q5 MIN PRN, Jeremy M Gonda, M.D., 300 mcg at 10/11/23 1430    Respiratory Therapy Consult, , Nebulization, Continuous RT, Jeremy M Gonda, M.D.    famotidine (Pepcid) tablet 20 mg, 20 mg, Enteral Tube, DAILY **OR** famotidine (Pepcid) injection 20 mg, 20 mg, Intravenous, DAILY, Jeremy M Gonda, M.D. MD Alert...ICU Electrolyte Replacement per Pharmacy, , Other, PHARMACY TO DOSE, Jeremy M Gonda, M.D.    lidocaine (Xylocaine) 1 % injection 2 mL, 2 mL, Tracheal Tube, Q30 MIN PRN, Jeremy M Gonda, M.D.    Pharmacy Consult: Enteral tube insertion - review meds/change route/product selection, 1 Each, Other, PHARMACY TO DOSE, Jeremy M Gonda, M.D.    fentaNYL (Sublimaze) injection 100 mcg, 100 mcg, Intravenous, Q15 MIN PRN **AND** fentaNYL (Sublimaze) injection 200 mcg, 200 mcg, Intravenous, Q15 MIN PRN **AND** fentaNYL (SUBLIMAZE) 50 mcg/mL in 50mL (Continuous Infusion), , Intravenous, Continuous **AND** dexmedetomidine (PRECEDEX) 400 mcg/100mL NS premix infusion, 0-1.5 mcg/kg/hr (Ideal), Intravenous, Continuous, Jeremy M Gonda, M.D.    enoxaparin (Lovenox) inj 40 mg, 40 mg, Subcutaneous, DAILY AT 1800, Elmira Griffin M.D., 40 mg at 10/10/23 1835    [CANCELED] Special Contact Isolation, , , CONTINUOUS **AND** C Diff by PCR rflx Toxin, , , Once **AND** Pharmacy Consult Request - C. difficile consult, 1 Each, Other, PHARMACY TO DOSE, Liz Taylor M.D.    ampicillin/sulbactam (Unasyn) 3 g in  mL IVPB, 3 g, Intravenous, Q6HRS, Liz Taylor M.D., Stopped at 10/11/23 1307    insulin GLARGINE (Lantus,Semglee) injection, 0.2 Units/kg/day, Subcutaneous, Q EVENING, Liz Taylor M.D., 24 Units at 10/10/23 8259    Past Medical History:   Diagnosis Date    Diabetes mellitus, type 2 (HCC)     Prostate disease        Past Surgical History:   Procedure Laterality Date    PIN INSERTION  1/2/2015    Performed by Alon Finn M.D. at SURGERY [facility]  1/2/2015    Performed by Alon Finn M.D. at SURGERY [facility]    OTHER ORTHOPEDIC SURGERY      FRACTURE LEFT HIP       No family history on file.  Patient family history was personally reviewed, no pertinent family history to current presentation    Social History     Tobacco Use    Smoking status: Never   Substance Use Topics    Alcohol use: No    Drug use: No       ALLERGIES:  Allergies   Allergen Reactions    Nicotine        Review of systems:  Unable to obtain    Physical exam:  Patient Vitals for the past 24 hrs:   BP Temp Temp src Pulse Resp SpO2 Weight   10/11/23 1503 -- -- -- (!) 141 (!) 36 97 % --   10/11/23 1440 102/60 -- -- -- -- 97 % --   10/11/23 1435 93/56 -- -- -- -- 97 % --   10/11/23 1433 93/56 36.5 °C (97.7 °F) -- -- -- 97 % --   10/11/23 1430 (!) 67/37 -- -- -- -- 94 % --   10/11/23 1425 (!) 79/44 -- -- -- -- 94 % --   10/11/23 1420 (!) 75/44 -- -- -- -- 88 % --   10/11/23 1418 (!) 88/43 -- -- (!) 150 -- 88 % --   10/11/23 1416 (!) 77/38 -- -- -- -- 92 % --   10/11/23 1415 (!) 77/38 -- -- (!) 144 -- 92 % --   10/11/23 1413 (!) 68/48 36.6 °C (97.9 °F) -- (!) 140 20 92 % --   10/11/23 1334 -- -- -- 77 (!) 36 94 % --   10/11/23 1329 -- -- -- 92 (!) 36 94 % --   10/11/23 1300 116/46 36.8 °C (98.3 °F) Temporal 79 20 95 % --   10/11/23 0410 126/62 36.6 °C (97.9 °F) Temporal 90 (!) 32 89 % 99.2 kg (218 lb 11.1 oz)   10/11/23 0243 -- -- -- -- -- 93 % --   10/11/23 0025 123/55 36.6 °C (97.9 °F) Temporal 91 (!) 30 95 % --   10/10/23 2334 -- -- -- -- -- 95 % --   10/10/23 1950 105/53 36.6 °C (97.9 °F) Temporal 88 (!) 28 93 % --     General: Lethargic.  Not oriented.  HEENT: No scleral icterus  CVS: Tachycardia rate, irregular rhythm  Resp: No crackles  Abdomen: Soft, NT, ND,  Skin: Grossly nothing acute no obvious rashes  Neurological: Lethargic  Extremities: Pulse 2+ in b/l LE.  No edema. No cyanosis.       Data:  Laboratory studies personally reviewed by me:  Recent Results (from the  past 24 hour(s))   POCT glucose device results    Collection Time: 10/10/23  5:53 PM   Result Value Ref Range    POC Glucose, Blood 242 (H) 65 - 99 mg/dL   Lactic Acid Every four hours after STAT order    Collection Time: 10/10/23  9:14 PM   Result Value Ref Range    Lactic Acid 1.8 0.5 - 2.0 mmol/L   Basic Metabolic Panel    Collection Time: 10/10/23  9:14 PM   Result Value Ref Range    Sodium 154 (H) 135 - 145 mmol/L    Potassium 4.0 3.6 - 5.5 mmol/L    Chloride 121 (H) 96 - 112 mmol/L    Co2 21 20 - 33 mmol/L    Glucose 313 (H) 65 - 99 mg/dL    Bun 107 (H) 8 - 22 mg/dL    Creatinine 1.90 (H) 0.50 - 1.40 mg/dL    Calcium 8.4 (L) 8.5 - 10.5 mg/dL    Anion Gap 12.0 7.0 - 16.0   OSMOLALITY SERUM    Collection Time: 10/10/23  9:14 PM   Result Value Ref Range    Osmolality Serum 368 (H) 278 - 298 mOsm/kg H2O   ESTIMATED GFR    Collection Time: 10/10/23  9:14 PM   Result Value Ref Range    GFR (CKD-EPI) 35 (A) >60 mL/min/1.73 m 2   VITAMIN B12    Collection Time: 10/10/23  9:14 PM   Result Value Ref Range    Vitamin B12 -True Cobalamin 1753 (H) 211 - 911 pg/mL   POCT glucose device results    Collection Time: 10/10/23 10:14 PM   Result Value Ref Range    POC Glucose, Blood 269 (H) 65 - 99 mg/dL   VENOUS BLOOD GAS    Collection Time: 10/11/23 12:22 AM   Result Value Ref Range    Venous Bg Ph 7.41 7.31 - 7.45    Venous Bg Ph Temp Corrected 7.41 7.31 - 7.45    Venous Bg Pco2 38.2 (L) 41.0 - 51.0 mmHg    Venous Bg Pco2 Temp Corrected 37.5 (L) 41.0 - 51.0 mmHg    Venous Bg Po2 37.4 25.0 - 40.0 mmHg    Venous Bg Po2 Temp Corrected 36.4 25.0 - 40.0 mmHg    Venous Bg O2 Saturation 65.1 %    Venous Bg Hco3 24 24 - 28 mmol/L    Venous Bg Base Excess -1 mmol/L    Body Temp 36.6 Centigrade    O2 Therapy 10 lts.    POCT glucose device results    Collection Time: 10/11/23 12:23 AM   Result Value Ref Range    POC Glucose, Blood 262 (H) 65 - 99 mg/dL   CBC WITH DIFFERENTIAL    Collection Time: 10/11/23  4:55 AM   Result Value Ref Range     WBC 17.8 (H) 4.8 - 10.8 K/uL    RBC 5.48 4.70 - 6.10 M/uL    Hemoglobin 16.9 14.0 - 18.0 g/dL    Hematocrit 52.5 (H) 42.0 - 52.0 %    MCV 95.8 81.4 - 97.8 fL    MCH 30.8 27.0 - 33.0 pg    MCHC 32.2 (L) 32.3 - 36.5 g/dL    RDW 51.2 (H) 35.9 - 50.0 fL    Platelet Count 127 (L) 164 - 446 K/uL    MPV 11.4 9.0 - 12.9 fL    Neutrophils-Polys 80.00 (H) 44.00 - 72.00 %    Lymphocytes 9.50 (L) 22.00 - 41.00 %    Monocytes 9.60 0.00 - 13.40 %    Eosinophils 0.90 0.00 - 6.90 %    Basophils 0.00 0.00 - 1.80 %    Nucleated RBC 0.00 0.00 - 0.20 /100 WBC    Neutrophils (Absolute) 14.24 (H) 1.82 - 7.42 K/uL    Lymphs (Absolute) 1.69 1.00 - 4.80 K/uL    Monos (Absolute) 1.71 (H) 0.00 - 0.85 K/uL    Eos (Absolute) 0.16 0.00 - 0.51 K/uL    Baso (Absolute) 0.00 0.00 - 0.12 K/uL    NRBC (Absolute) 0.00 K/uL    Microcytosis 1+    Comp Metabolic Panel    Collection Time: 10/11/23  4:55 AM   Result Value Ref Range    Sodium 153 (H) 135 - 145 mmol/L    Potassium 4.3 3.6 - 5.5 mmol/L    Chloride 117 (H) 96 - 112 mmol/L    Co2 23 20 - 33 mmol/L    Anion Gap 13.0 7.0 - 16.0    Glucose 245 (H) 65 - 99 mg/dL    Bun 109 (H) 8 - 22 mg/dL    Creatinine 1.76 (H) 0.50 - 1.40 mg/dL    Calcium 8.6 8.5 - 10.5 mg/dL    Correct Calcium 9.7 8.5 - 10.5 mg/dL    AST(SGOT) 35 12 - 45 U/L    ALT(SGPT) 16 2 - 50 U/L    Alkaline Phosphatase 63 30 - 99 U/L    Total Bilirubin 3.0 (H) 0.1 - 1.5 mg/dL    Albumin 2.6 (L) 3.2 - 4.9 g/dL    Total Protein 6.0 6.0 - 8.2 g/dL    Globulin 3.4 1.9 - 3.5 g/dL    A-G Ratio 0.8 g/dL   CRP QUANTITIVE (NON-CARDIAC)    Collection Time: 10/11/23  4:55 AM   Result Value Ref Range    Stat C-Reactive Protein 34.62 (H) 0.00 - 0.75 mg/dL   CREATINE KINASE    Collection Time: 10/11/23  4:55 AM   Result Value Ref Range    CPK Total 560 (H) 0 - 154 U/L   proBrain Natriuretic Peptide, NT    Collection Time: 10/11/23  4:55 AM   Result Value Ref Range    NT-proBNP 551 (H) 0 - 125 pg/mL   DIFFERENTIAL MANUAL    Collection Time: 10/11/23   4:55 AM   Result Value Ref Range    Manual Diff Status PERFORMED    PERIPHERAL SMEAR REVIEW    Collection Time: 10/11/23  4:55 AM   Result Value Ref Range    Peripheral Smear Review see below    PLATELET ESTIMATE    Collection Time: 10/11/23  4:55 AM   Result Value Ref Range    Plt Estimation Decreased    MORPHOLOGY    Collection Time: 10/11/23  4:55 AM   Result Value Ref Range    RBC Morphology Present     Poikilocytosis 1+     Echinocytes 1+    ESTIMATED GFR    Collection Time: 10/11/23  4:55 AM   Result Value Ref Range    GFR (CKD-EPI) 38 (A) >60 mL/min/1.73 m 2   POCT glucose device results    Collection Time: 10/11/23  5:19 AM   Result Value Ref Range    POC Glucose, Blood 175 (H) 65 - 99 mg/dL   SODIUM SERUM (NA)    Collection Time: 10/11/23 11:25 AM   Result Value Ref Range    Sodium 158 (HH) 135 - 145 mmol/L   POCT glucose device results    Collection Time: 10/11/23 11:59 AM   Result Value Ref Range    POC Glucose, Blood 209 (H) 65 - 99 mg/dL   EKG (IP)    Collection Time: 10/11/23 12:40 PM   Result Value Ref Range    Report       [facility]    Test Date:  2023-10-11  Pt Name:    LINNEA WATERS               Department: 183  MRN:        [id number]                      Room:       T820  Gender:     Male                         Technician: YOANDY  :        1940                   Requested By:REBECCA BRADSHAW  Order #:    [id number]                    Reading MD:    Measurements  Intervals                                Axis  Rate:       90                           P:          -50  RI:         163                          QRS:        -71  QRSD:       96                           T:          78  QT:         348  QTc:        426    Interpretive Statements  Sinus or ectopic atrial rhythm  Atrial premature complex  Inferior infarct, old  Anterior infarct, old  Compared to ECG 10/09/2023 17:34:55  Ectopic atrial rhythm now present  Atrial premature complex(es) now present  Sinus rhythm no longer  present  Ventricular premature complex(es) no longer present  Myocardial infarct finding still present     EKG (IP)    Collection Time: 10/11/23  2:18 PM   Result Value Ref Range    Report       [facility]    Test Date:  2023-10-11  Pt Name:    LINNEA WATERS               Department: 183  MRN:        [id number]                      Room:       T820  Gender:     Male                         Technician: YOANDY  :        1940                   Requested By:REBECCA BRADSHAW  Order #:    [id number]                    Reading MD:    Measurements  Intervals                                Axis  Rate:       149                          P:          0  KS:         0                            QRS:        -71  QRSD:       96                           T:          78  QT:         298  QTc:        469    Interpretive Statements  Atrial fibrillation with rapid V-rate  Inferior infarct, acute (RCA)  Probable anteroseptal infarct, old  Lateral leads are also involved  Probable RV involvement, suggest recording right precordial leads  Artifact in lead(s) I,III,aVL  Compared to ECG 10/11/2023 12:40:03  Ectopic atrial rhythm no longer present  Atrial premature complex(es) no longer pre sent  Myocardial infarct finding still present     POCT glucose device results    Collection Time: 10/11/23  2:20 PM   Result Value Ref Range    POC Glucose, Blood 204 (H) 65 - 99 mg/dL   ABG - LAB    Collection Time: 10/11/23  2:26 PM   Result Value Ref Range    Ph 7.49 7.40 - 7.50    Pco2 30.0 26.0 - 37.0 mmHg    Po2 56.6 (L) 64.0 - 87.0 mmHg    O2 Saturation 90.2 (L) 93.0 - 99.0 %    Hco3 22 17 - 25 mmol/L    Base Excess 0 -4 - 3 mmol/L    Body Temp 36.6 Centigrade    O2 Therapy 60L 100%    Basic Metabolic Panel    Collection Time: 10/11/23  2:26 PM   Result Value Ref Range    Sodium 154 (H) 135 - 145 mmol/L    Potassium 4.0 3.6 - 5.5 mmol/L    Chloride 121 (H) 96 - 112 mmol/L    Co2 22 20 - 33 mmol/L    Glucose 244 (H) 65 - 99 mg/dL    Bun  107 (H) 8 - 22 mg/dL    Creatinine 1.62 (H) 0.50 - 1.40 mg/dL    Calcium 8.3 (L) 8.5 - 10.5 mg/dL    Anion Gap 11.0 7.0 - 16.0   CBC WITH DIFFERENTIAL    Collection Time: 10/11/23  2:26 PM   Result Value Ref Range    WBC 18.4 (H) 4.8 - 10.8 K/uL    RBC 5.17 4.70 - 6.10 M/uL    Hemoglobin 15.9 14.0 - 18.0 g/dL    Hematocrit 48.8 42.0 - 52.0 %    MCV 94.4 81.4 - 97.8 fL    MCH 30.8 27.0 - 33.0 pg    MCHC 32.6 32.3 - 36.5 g/dL    RDW 51.0 (H) 35.9 - 50.0 fL    Platelet Count 127 (L) 164 - 446 K/uL    MPV 11.7 9.0 - 12.9 fL    Neutrophils-Polys 89.60 (H) 44.00 - 72.00 %    Lymphocytes 4.30 (L) 22.00 - 41.00 %    Monocytes 5.20 0.00 - 13.40 %    Eosinophils 0.00 0.00 - 6.90 %    Basophils 0.90 0.00 - 1.80 %    Nucleated RBC 0.00 0.00 - 0.20 /100 WBC    Neutrophils (Absolute) 16.49 (H) 1.82 - 7.42 K/uL    Lymphs (Absolute) 0.79 (L) 1.00 - 4.80 K/uL    Monos (Absolute) 0.96 (H) 0.00 - 0.85 K/uL    Eos (Absolute) 0.00 0.00 - 0.51 K/uL    Baso (Absolute) 0.17 (H) 0.00 - 0.12 K/uL    NRBC (Absolute) 0.00 K/uL    Microcytosis 2+ (A)    LACTIC ACID    Collection Time: 10/11/23  2:26 PM   Result Value Ref Range    Lactic Acid 1.9 0.5 - 2.0 mmol/L   ESTIMATED GFR    Collection Time: 10/11/23  2:26 PM   Result Value Ref Range    GFR (CKD-EPI) 42 (A) >60 mL/min/1.73 m 2   DIFFERENTIAL MANUAL    Collection Time: 10/11/23  2:26 PM   Result Value Ref Range    Manual Diff Status PERFORMED    PERIPHERAL SMEAR REVIEW    Collection Time: 10/11/23  2:26 PM   Result Value Ref Range    Peripheral Smear Review see below    PLATELET ESTIMATE    Collection Time: 10/11/23  2:26 PM   Result Value Ref Range    Plt Estimation Decreased    MORPHOLOGY    Collection Time: 10/11/23  2:26 PM   Result Value Ref Range    RBC Morphology Present     Poikilocytosis 3+     Echinocytes 2+        Imaging:  DX-CHEST-PORTABLE (1 VIEW)   Final Result      No significant change from prior exam.      DX-CHEST-PORTABLE (1 VIEW)   Final Result      Bibasilar  underinflation atelectasis. Superimposed pneumonia not excluded.      US-RUQ   Final Result      1.  Gallbladder sludge   2.  RIGHT pleural effusion      EC-ECHOCARDIOGRAM COMPLETE W/ CONT   Final Result      CT-CSPINE WITHOUT PLUS RECONS   Final Result         1. No acute fracture from C1 through T1 is visualized.         CT-HEAD W/O   Final Result         1. No acute intracranial abnormality. No evidence of acute intracranial hemorrhage or mass lesion.                     DX-CHEST-PORTABLE (1 VIEW)   Final Result      1.  Low lung volumes without definite acute cardiopulmonary abnormality.      MR-BRAIN-W/O    (Results Pending)   DX-CHEST-LIMITED (1 VIEW)    (Results Pending)           EKG 10/11/2023: personally reviewed by me A-fib RVR, rate 150, poor R wave progression    Echocardiogram 10/10/2023  CONCLUSIONS  No prior study is available for comparison.   Normal left ventricular systolic function.   Mild aortic insufficiency.  No pericardial effusion.  Normal aortic root for body surface area. The ascending aorta is not   well visualized.    All pertinent features of laboratory and imaging reviewed including primary images where applicable      Principal Problem:    Hypernatremia (POA: Yes)  Active Problems:    BPH (benign prostatic hyperplasia) (POA: Yes)    Diabetes mellitus-Type 2, not on treatment (POA: Yes)    Sepsis (HCC) (POA: Yes)    DM2 (diabetes mellitus, type 2) (HCC) (POA: Yes)    Diarrhea (POA: Yes)    Altered mental status (POA: Yes)    ADELAIDE (acute kidney injury) (HCC) (POA: Yes)    Dental caries (POA: Yes)    Acute respiratory failure with hypoxia (HCC) (POA: Yes)    Erythrocytosis (POA: Yes)    Dehydration (POA: Yes)    A-fib (HCC) (POA: Yes)    Rhabdomyolysis (POA: Yes)    Aspiration precautions (POA: Unknown)  Resolved Problems:    Aspiration into airway (POA: Unknown)      Assessment / Plan:  A-fib RVR  Altered mental status  Hyponatremia  Acute respiratory failure with hypoxia  Shock  A-fib  likely triggered by acute illness.  Status post DCCV x1 without success.  Shock likely secondary to noncardiac etiology with most recent echo showing normal LVEF.  -Appreciate critical care team assistance  -Agree with pressor support and hydration  -Continue antibiotics as per primary team  -Start amiodarone gtt.  -[id number] at least 2 age x2) -recommend anticoagulation if there are no contraindications.  -Reasonable to repeat limited echo to reassess LVEF and pericardial effusion.    I personally discussed his case with  Dr Jeremy M Gonda, M.D.      It is my pleasure to participate in the care of Mr. Marrufo.  Please do not hesitate to contact me with questions or concerns.    Weston Ansari MD   Cardiologist [facility]    10/11/2023    Please note that this dictation was created using voice recognition software. There may be errors I did not discover before finalizing the note.

## 2023-10-11 NOTE — DISCHARGE PLANNING
LMSW spoke with Perlita  [phone] at [facility] to [facility] on eligibility with possible [facility] after [facility]. 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 If not [facility] is also an option. Perlita informed LMSW to reach out to her for any assistance with placement.

## 2023-10-11 NOTE — CODE DOCUMENTATION
Pt cardioverted at 120 J by Dr. Gonda.  Scooby Villegas, Elver ERIC, RT, Myriam DUFFY RN, Rupa GANDHI RN and Sofia CARRILLO At bedside.

## 2023-10-11 NOTE — ASSESSMENT & PLAN NOTE
Increasing oxygen requirement and chest x-ray showed infiltration possible pneumonia  Failed 10/20 FEEs  Aspiration precautions.  Continue monitor closely.

## 2023-10-11 NOTE — CARE PLAN
The patient is Watcher - Medium risk of patient condition declining or worsening    Shift Goals  Clinical Goals: monitor labs, Q4 neuro checks  Patient Goals: BA  Family Goals: BA      Problem: Respiratory  Goal: Patient will achieve/maintain optimum respiratory ventilation and gas exchange  Description: Target End Date:  Prior to discharge or change in level of care    Document on Assessment flowsheet    1.  Assess and monitor rate, rhythm, depth and effort of respiration  2.  Breath sounds assessed qshift and/or as needed  3.  Assess O2 saturation, administer/titrate oxygen as ordered  4.  Position patient for maximum ventilatory efficiency  5.  Turn, cough, and deep breath with splinting to improve effectiveness  6.  Collaborate with RT to administer medication/treatments per order  7.  Encourage use of incentive spirometer and encourage patient to cough after use and utilize splinting techniques if applicable  8.  Airway suctioning  9.  Monitor sputum production for changes in color, consistency and frequency  10. Perform frequent oral hygiene  11. Alternate physical activity with rest periods  Outcome: Progressing  Note: Pt is on 10L oxymask. Pt has shallow, tachpneic work of breathing. Pt RUL and KIM have breath sounds with crackles. Breath sounds diminished in RML, LLL, and RLL. Oral suctioning continuously done as needed. Nasal suctioning through nasal airway continuously done as needed.       Patient is not progressing towards the following goals:      Problem: Urinary - Renal Perfusion  Goal: Ability to achieve and maintain adequate renal perfusion and functioning will improve  Description: Target End Date:  Prior to discharge or change in level of care    Document on I/O and Assessment flowsheet    1.  Urine output will remain greater than 0.5ml/Kg/HR  2.  Monitor amount and/or characteristics of urine per order/policy. Specific gravity per order/policy  3.  Assess signs and symptoms of renal  dysfunction  Outcome: Not Progressing  Note: Pt Is and Os being continuously monitored. Urine is isidro color. Labs show increasing BUN and creatinine.

## 2023-10-11 NOTE — PROGRESS NOTES
Dr. Gonda at bedside to place central line.    1515:  Pt prepped for central line placement  1518:  Time out called  1520:  Procedure started  1524: Guidewire removed.    Left subclavian central line placed.

## 2023-10-11 NOTE — PROCEDURES
Procedure Note     Date: 10/11/23    Time: 1500     Procedure: Intubation     Indication: Acute Hypoxic Respiratory Failure, Altered Mental Status     Consent: Emergency/implied     Time-out: Verbal consent was obtained. Immediately prior to procedure, a time out was called to verify the correct patient, procedure, equipment, support staff and site/side marked as required.     Procedure: A time-out was performed. Airway assessed and patient found to have Mallampati class 4.  Equipment prepared and RT/RN at bedside. Patient pre-oxygenated with 100% FiO2.  After medication delivery, a video laryngoscope blade used to acheive direct visualization and a grade 1 view. A 8.0 ETT was placed with 1 attempt(s) into the trachea directly through the vocal cords. Appropriate placement confirmed by direct visualization, bilateral chest and epigastric auscultation, misting in the ETT, and ETCO2 detector. ETT secured in place at 21 cm at the teeth and patient connected to ventilator. Sedation/analgesia continued as appropriate. Patient tolerated procedure well without any difficulties and remains in care of bedside nurse and respiratory therapy. CXR will be performed to confirm appropriate placement of ETT.     Medications: Etomidate 20 mg, Rocuronium 100 mg, 2 cc phenylephrine     Complications: None     CXR: Interval placement of an endotracheal tube which terminates in satisfactory position at the level of the aortic arch.       Ming Farah DO   PGY-3 Internal Medicine Resident

## 2023-10-11 NOTE — PROGRESS NOTES
[facility] Medicine Daily Progress Note    Date of Service  10/10/2023    Chief Complaint  Perry Marrufo is a 83 y.o. male admitted 10/9/2023 with altered mental status    Hospital Course  83-year-old male with history of diabetes and prostate disease who presented 10/9 from [place] with his nephew after found down by neighbors for 2 days.  Patient was not oriented and not able to provide history.  Patient was found on the floor covered with feces.  On admission CT head and CT spine was negative for any fracture, labs showed leukocytosis with lactic acidosis, blood sugar more than 300.  Her sodium was 151 urine drug screen was positive for benzos likely from the hospital.  Patient received IV fluid and Rocephin.  Chest x-ray did not show any infiltration and procalcitonin was negative.      On admission EKG showed A-fib with RVR, patient received 1 dose of metoprolol, patient was admitted to telemetry, echo did not show any acute finding.      Interval Problem Update  -Evaluating examined the patient at bedside, patient still altered and delirious, not responding.  CT scan of her head was reviewed and no acute finding, will order EEG  -Continue IV fluid due to lactic acidosis and antibiotics at this time  -Labs daily  -No family was listed on his discharge, patient does not have ACP  -Echo did not show any acute finding  -Still having hyponatremia, switch fluids to D5 and half NS for now, monitoring sodium every 6 hours and switch to NS after improving.  Avoid aggressive correction.    I have discussed this patient's plan of care and discharge plan at IDT rounds today with Case Management, Nursing, Nursing leadership, and other members of the IDT team.    Consultants/Specialty  None    Code Status  Full Code    Disposition  The patient is not medically cleared for discharge to home or a post-acute facility.  Anticipate discharge to: skilled nursing facility    I have placed the appropriate orders for  post-discharge needs.    Review of Systems  Review of Systems   Unable to perform ROS: Mental status change        Physical Exam  Temp:  [36.6 °C (97.9 °F)-37.1 °C (98.8 °F)] 37.1 °C (98.8 °F)  Pulse:  [82-98] 92  Resp:  [22-32] 22  BP: (107-121)/(44-67) 115/55  SpO2:  [88 %-97 %] 93 %    Physical Exam  Constitutional:       Appearance: He is ill-appearing. He is not toxic-appearing.   Eyes:      General: No scleral icterus.  Cardiovascular:      Rate and Rhythm: Tachycardia present.      Heart sounds: No murmur heard.  Pulmonary:      Effort: No respiratory distress.      Breath sounds: No wheezing or rales.   Abdominal:      General: There is no distension.      Tenderness: There is no abdominal tenderness. There is no right CVA tenderness, left CVA tenderness or guarding.   Musculoskeletal:      Right lower leg: No edema.      Left lower leg: No edema.   Lymphadenopathy:      Cervical: No cervical adenopathy.   Skin:     General: Skin is dry.      Coloration: Skin is not jaundiced.      Findings: No bruising, lesion or rash.   Neurological:      General: No focal deficit present.      Mental Status: He is alert. Mental status is at baseline. He is disoriented.      Cranial Nerves: No cranial nerve deficit.      Sensory: No sensory deficit.      Motor: No weakness.      Gait: Gait normal.         Fluids  No intake or output data in the 24 hours ending 10/10/23 1801    Laboratory  Recent Labs     10/09/23  1526 10/10/23  0028   WBC 18.3* 17.1*   RBC 6.18* 5.87   HEMOGLOBIN 18.9* 18.2*   HEMATOCRIT 58.2* 55.3*   MCV 94.2 94.2   MCH 30.6 31.0   MCHC 32.5 32.9   RDW 49.1 50.1*   PLATELETCT 171 154*   MPV 11.0 10.9     Recent Labs     10/09/23  1526 10/10/23  0028   SODIUM 151* 149*   POTASSIUM 4.0 4.1   CHLORIDE 112 116*   CO2 23 20   GLUCOSE 351* 342*   * 103*   CREATININE 1.67* 1.40   CALCIUM 8.1* 8.3*     Recent Labs     10/09/23  1733 10/10/23  0755   APTT 32.9  --    INR 1.33* 1.35*                Imaging  EC-ECHOCARDIOGRAM COMPLETE W/ CONT   Final Result      CT-CSPINE WITHOUT PLUS RECONS   Final Result         1. No acute fracture from C1 through T1 is visualized.         CT-HEAD W/O   Final Result         1. No acute intracranial abnormality. No evidence of acute intracranial hemorrhage or mass lesion.                     DX-CHEST-PORTABLE (1 VIEW)   Final Result      1.  Low lung volumes without definite acute cardiopulmonary abnormality.      CT-MAXILLOFACIAL WITH PLUS RECONS    (Results Pending)   US-RUQ    (Results Pending)        Assessment/Plan  * SIRS (systemic inflammatory response syndrome) (HCC)- (present on admission)  Assessment & Plan  This is Sepsis Present on admission  SIRS criteria identified on my evaluation include: Tachycardia, with heart rate greater than 90 BPM, Leukocytosis, with WBC greater than 12,000 and Bandemia, greater than 10% bands  Clinical indicators of end organ dysfunction include Lactic Acid greater than 2  Source is mouth  Sepsis protocol initiated  Crystalloid Fluid Administration: Fluid resuscitation ordered per standard protocol - 30 mL/kg per current or ideal body weight  IV antibiotics as appropriate for source of sepsis  Reassessment: I have reassessed the patient's hemodynamic status    No source of infection at this time  Waiting for CT scan for mouth to rule out abscess  Blood culture and urine culture      Acute respiratory failure with hypoxia (HCC)- (present on admission)  Assessment & Plan  Chest x-ray did not show any infiltration  Possible related to mentation  Continue weaning her from oxygen  No significant crackles or wheezing  Chest x-ray daily      Hypernatremia- (present on admission)  Assessment & Plan  Na 151 on admission  received NS bolus in ED  Check serum osmolality  Switch fluid to D5 half NS and switch to NS after improving  Monitor sodium every 6 hours    Altered mental status- (present on admission)  Assessment & Plan  Likely metabolic  encephalopathy  Urine drug screen was positive for benzo, likely related from the hospital  Check ammonia  Continue IV fluid and antibiotics for now  Order EEG    Rhabdomyolysis- (present on admission)  Assessment & Plan  Mild  Continue IV fluid  Monitoring with CPK  With ADELAIDE    A-fib ([facility])- (present on admission)  Assessment & Plan  New onset.  Converted to sinus  Echo was normal  Ordering TSH  Likely related to sickness however continue monitoring in telemetry and consider long anticoagulation after improving on his mentation.    Dehydration- (present on admission)  Assessment & Plan  Found down for 2 days likely  S/p IV fluids  Lactic acid trend  R/p cpk    Erythrocytosis- (present on admission)  Assessment & Plan  Likely secondary  abg  epo  Urinalysis    Dental caries- (present on admission)  Assessment & Plan  Oral/maxillary xrays  Cultures w/ gram stain  Empiric unasyn    ADELAIDE (acute kidney injury) ([facility])- (present on admission)  Assessment & Plan  Improving  Continue IV fluid and avoid nephrotoxic medication    Diarrhea- (present on admission)  Assessment & Plan  cdiff panel is pending  Resolved since admission  Stool culture/lytes  Viral panel    BPH (benign prostatic hyperplasia)- (present on admission)  Assessment & Plan  Bladder scan as needed  Continue Flomax    DM2 (diabetes mellitus, type 2) ([facility])- (present on admission)  Assessment & Plan  A1c 8  Continue sliding scale and Lantus 24 units daily         VTE prophylaxis:   SCDs/TEDs   enoxaparin ppx      I have performed a physical exam and reviewed and updated ROS and Plan today (10/10/2023). In review of yesterday's note (10/9/2023), there are no changes except as documented above.    \  Greater than 51 minutes spent prepping to see patient (e.g. review of tests) obtaining and/or reviewing separately obtained history. Performing a medically appropriate examination and/ evaluation.  Counseling and educating the patient/family/caregiver.  Ordering  medications, tests, or procedures.  Referring and communicating with other health care professionals.  Documenting clinical information in EPIC.  Independently interpreting results and communicating results to patient/family/caregiver.  Care coordination

## 2023-10-11 NOTE — DOCUMENTATION QUERY
[facility]                                                                       Query Response Note      PATIENT:               LINNEA WATERS  ACCT #:                  [id number]  MRN:                     [id number]  :                      1940  ADMIT DATE:       10/9/2023 2:54 PM  DISCH DATE:          RESPONDING  PROVIDER #:        [id number]           QUERY TEXT:    The Wound Eval notes the following pressure injuries:     1) Deep tissue pressure injury left foot MTH POA  2) Deep tissue pressure injury hip; pelvis POA    The patient's Clinical Indicators include:  10/10 Wound Note: Patient has areas of discoloration of left knee, right foot, right hip and sacrum that are pressure related at this time.   ED Provider Note: Pressure ulcer on his sacrum as well as the right side of both knees   Risk Factors: found down, AMS  Treatment: wound team eval, reposition q 2h, TAPs turning system, waffle overlay      Thank you,  Galina Shore RN, BSN, CCDS  Clinical   Connect via Sendmybag  Options provided:   -- POA deep tissue pressure injuries to left foot MTH, hip/ pelvis ruled in   -- Other explanation of clinical findings, please specify   -- Findings of no clinical significance      Query created by: Galina Shore on 10/11/2023 7:17 AM    RESPONSE TEXT:    POA deep tissue pressure injuries to left foot MTH, hip/ pelvis ruled in          Electronically signed by:  BALJEET BRADSHAW MD 10/11/2023 7:19 AM

## 2023-10-11 NOTE — DISCHARGE PLANNING
"MALA spoke with Perlita [phone] at [facility] for family contact info.    Possible Family   Yuriy nephew: [phone](disconnected)  Nolvia daughter:[phone]    **Perlita stated that she had came across that the pt might have an AD filed somewhere in her system. She will call MALA when she has more time to see if she can find pt AD and the contact info.     **MALA spoke with Perlita for AD contact information.    1415 AD contact info:  FADI PINA- [phone] (disconnected)   or [phone] (voicemail was a different name and in Japanese)    Son \"Will\" info: [phone] (left voicemail)      3371**Called Perlita to see if it was possible to fax over AD packet. She is out of the office now. Left VM.  "

## 2023-10-11 NOTE — PROGRESS NOTES
Monitor room notified me that patient was sustaining in the 170'180's. MD notified. RN and MD beside. EKG ordered. Rapid response team notified by charge RN. Rapid team bedside.

## 2023-10-11 NOTE — PROGRESS NOTES
Dr. Gonda at bedside for intubation.  Zina SADLER at bedside.    1500:  Time out called  1501:  20 mg etomidate given  1502:  100 mg rocuronium given  1503:  Pt intubated.  ETT # 8.0 # 21 cm at the gums    + color change  Bilateral lung sounds auscultated    1505:  2cc phenylephrine given per MD order    1508:  NGT placed

## 2023-10-11 NOTE — PROGRESS NOTES
Bedside report received from day RN, pt care assumed, assessment completed. Pt is A&O to self only, slurred speech, responds to pain, SR on the monitor. Updated on POC, questions answered. Bed in lowest, locked position, treaded socks on, call light and belongings within reach.

## 2023-10-11 NOTE — PROGRESS NOTES
Assumed care of patient at bedside report from night shift RN. Updated on POC. Patient currently A & O x 2; on 12 L via oxy mask. Patient denies any pain. Call light within reach. Whiteboard updated. Fall precautions in place. Bed locked and in lowest position. All questions answered. No other needs indicated at this time.

## 2023-10-11 NOTE — HOSPITAL COURSE
83-year-old male with history of diabetes and prostate disease who presented 10/9 from [place] with his nephew after found down by neighbors for 2 days.  Patient was not oriented and not able to provide history.  Patient was found on the floor covered with feces.  On admission CT head and CT spine was negative for any fracture, labs showed leukocytosis with lactic acidosis, blood sugar more than 300.  Urine drug screen was positive for benzos likely from the hospital.  Patient received IV fluid and Rocephin.  Chest x-ray did not show any infiltration and procalcitonin was negative.      On admission EKG showed A-fib with RVR, patient received 1 dose of metoprolol, patient was admitted to telemetry, echo did not show any acute finding.

## 2023-10-12 ENCOUNTER — APPOINTMENT (OUTPATIENT)
Dept: CARDIOLOGY | Facility: MEDICAL CENTER | Age: 83
DRG: 871 | End: 2023-10-12
Attending: NURSE PRACTITIONER
Payer: COMMERCIAL

## 2023-10-12 ENCOUNTER — HOSPITAL ENCOUNTER (OUTPATIENT)
Dept: RADIOLOGY | Facility: MEDICAL CENTER | Age: 83
End: 2023-10-12
Attending: INTERNAL MEDICINE
Payer: COMMERCIAL

## 2023-10-12 LAB
ANION GAP SERPL CALC-SCNC: 11 MMOL/L (ref 7–16)
APTT PPP: 39.5 SEC (ref 24.7–36)
BASE EXCESS BLDA CALC-SCNC: 0 MMOL/L (ref -4–3)
BASOPHILS # BLD AUTO: 0 % (ref 0–1.8)
BASOPHILS # BLD: 0 K/UL (ref 0–0.12)
BODY TEMPERATURE: NORMAL DEGREES
BREATHS SETTING VENT: 20
BUN SERPL-MCNC: 100 MG/DL (ref 8–22)
BURR CELLS BLD QL SMEAR: NORMAL
CALCIUM SERPL-MCNC: 8.4 MG/DL (ref 8.5–10.5)
CHLORIDE SERPL-SCNC: 119 MMOL/L (ref 96–112)
CO2 BLDA-SCNC: 23 MMOL/L (ref 20–33)
CO2 SERPL-SCNC: 25 MMOL/L (ref 20–33)
CREAT SERPL-MCNC: 1.89 MG/DL (ref 0.5–1.4)
CRP SERPL HS-MCNC: 40.97 MG/DL (ref 0–0.75)
DELSYS IDSYS: NORMAL
EKG IMPRESSION: NORMAL
EKG IMPRESSION: NORMAL
END TIDAL CARBON DIOXIDE IECO2: 28 MMHG
EOSINOPHIL # BLD AUTO: 0 K/UL (ref 0–0.51)
EOSINOPHIL NFR BLD: 0 % (ref 0–6.9)
ERYTHROCYTE [DISTWIDTH] IN BLOOD BY AUTOMATED COUNT: 52.2 FL (ref 35.9–50)
GFR SERPLBLD CREATININE-BSD FMLA CKD-EPI: 35 ML/MIN/1.73 M 2
GLUCOSE BLD STRIP.AUTO-MCNC: 154 MG/DL (ref 65–99)
GLUCOSE BLD STRIP.AUTO-MCNC: 177 MG/DL (ref 65–99)
GLUCOSE BLD STRIP.AUTO-MCNC: 215 MG/DL (ref 65–99)
GLUCOSE BLD STRIP.AUTO-MCNC: 240 MG/DL (ref 65–99)
GLUCOSE SERPL-MCNC: 236 MG/DL (ref 65–99)
HCO3 BLDA-SCNC: 22.5 MMOL/L (ref 17–25)
HCT VFR BLD AUTO: 49 % (ref 42–52)
HGB BLD-MCNC: 15.8 G/DL (ref 14–18)
HOROWITZ INDEX BLDA+IHG-RTO: 100 MM[HG]
INR PPP: 1.79 (ref 0.87–1.13)
LACTATE BLD-SCNC: 1.5 MMOL/L (ref 0.5–2)
LYMPHOCYTES # BLD AUTO: 1.64 K/UL (ref 1–4.8)
LYMPHOCYTES NFR BLD: 7 % (ref 22–41)
MAGNESIUM SERPL-MCNC: 3.1 MG/DL (ref 1.5–2.5)
MANUAL DIFF BLD: NORMAL
MCH RBC QN AUTO: 30.6 PG (ref 27–33)
MCHC RBC AUTO-ENTMCNC: 32.2 G/DL (ref 32.3–36.5)
MCV RBC AUTO: 95 FL (ref 81.4–97.8)
MODE IMODE: NORMAL
MONOCYTES # BLD AUTO: 0.42 K/UL (ref 0–0.85)
MONOCYTES NFR BLD AUTO: 1.8 % (ref 0–13.4)
MORPHOLOGY BLD-IMP: NORMAL
NEUTROPHILS # BLD AUTO: 21.34 K/UL (ref 1.82–7.42)
NEUTROPHILS NFR BLD: 91.2 % (ref 44–72)
NRBC # BLD AUTO: 0.02 K/UL
NRBC BLD-RTO: 0.1 /100 WBC (ref 0–0.2)
O2/TOTAL GAS SETTING VFR VENT: 80 %
PCO2 BLDA: 31 MMHG (ref 26–37)
PCO2 TEMP ADJ BLDA: 32.2 MMHG (ref 26–37)
PEEP END EXPIRATORY PRESSURE IPEEP: 10 CMH20
PH BLDA: 7.47 [PH] (ref 7.4–7.5)
PH TEMP ADJ BLDA: 7.46 [PH] (ref 7.4–7.5)
PHOSPHATE SERPL-MCNC: 3.2 MG/DL (ref 2.5–4.5)
PLATELET # BLD AUTO: 145 K/UL (ref 164–446)
PLATELET BLD QL SMEAR: NORMAL
PMV BLD AUTO: 11.3 FL (ref 9–12.9)
PO2 BLDA: 80 MMHG (ref 64–87)
PO2 TEMP ADJ BLDA: 85 MMHG (ref 64–87)
POIKILOCYTOSIS BLD QL SMEAR: NORMAL
POTASSIUM SERPL-SCNC: 3.7 MMOL/L (ref 3.6–5.5)
PREALB SERPL-MCNC: 4.4 MG/DL (ref 18–38)
PROTHROMBIN TIME: 21 SEC (ref 12–14.6)
RBC # BLD AUTO: 5.16 M/UL (ref 4.7–6.1)
RBC BLD AUTO: PRESENT
SAO2 % BLDA: 97 % (ref 93–99)
SODIUM SERPL-SCNC: 152 MMOL/L (ref 135–145)
SODIUM SERPL-SCNC: 153 MMOL/L (ref 135–145)
SODIUM SERPL-SCNC: 154 MMOL/L (ref 135–145)
SODIUM SERPL-SCNC: 155 MMOL/L (ref 135–145)
SODIUM SERPL-SCNC: 155 MMOL/L (ref 135–145)
SPECIMEN DRAWN FROM PATIENT: NORMAL
TIDAL VOLUME IVT: 450 ML
UFH PPP CHRO-ACNC: 0.11 IU/ML
UFH PPP CHRO-ACNC: 0.18 IU/ML
UFH PPP CHRO-ACNC: 0.44 IU/ML
UFH PPP CHRO-ACNC: 0.63 IU/ML
WBC # BLD AUTO: 23.4 K/UL (ref 4.8–10.8)

## 2023-10-12 PROCEDURE — 84100 ASSAY OF PHOSPHORUS: CPT

## 2023-10-12 PROCEDURE — 85007 BL SMEAR W/DIFF WBC COUNT: CPT

## 2023-10-12 PROCEDURE — 80048 BASIC METABOLIC PNL TOTAL CA: CPT

## 2023-10-12 PROCEDURE — 86140 C-REACTIVE PROTEIN: CPT

## 2023-10-12 PROCEDURE — 71045 X-RAY EXAM CHEST 1 VIEW: CPT

## 2023-10-12 PROCEDURE — 94799 UNLISTED PULMONARY SVC/PX: CPT

## 2023-10-12 PROCEDURE — 700102 HCHG RX REV CODE 250 W/ 637 OVERRIDE(OP): Performed by: INTERNAL MEDICINE

## 2023-10-12 PROCEDURE — 85027 COMPLETE CBC AUTOMATED: CPT

## 2023-10-12 PROCEDURE — 700111 HCHG RX REV CODE 636 W/ 250 OVERRIDE (IP): Mod: JZ | Performed by: INTERNAL MEDICINE

## 2023-10-12 PROCEDURE — 82803 BLOOD GASES ANY COMBINATION: CPT

## 2023-10-12 PROCEDURE — 82962 GLUCOSE BLOOD TEST: CPT | Mod: 91

## 2023-10-12 PROCEDURE — 83605 ASSAY OF LACTIC ACID: CPT

## 2023-10-12 PROCEDURE — 700102 HCHG RX REV CODE 250 W/ 637 OVERRIDE(OP): Performed by: STUDENT IN AN ORGANIZED HEALTH CARE EDUCATION/TRAINING PROGRAM

## 2023-10-12 PROCEDURE — 84134 ASSAY OF PREALBUMIN: CPT

## 2023-10-12 PROCEDURE — 99233 SBSQ HOSP IP/OBS HIGH 50: CPT | Performed by: INTERNAL MEDICINE

## 2023-10-12 PROCEDURE — 85520 HEPARIN ASSAY: CPT

## 2023-10-12 PROCEDURE — 94003 VENT MGMT INPAT SUBQ DAY: CPT

## 2023-10-12 PROCEDURE — 99291 CRITICAL CARE FIRST HOUR: CPT | Mod: GC | Performed by: INTERNAL MEDICINE

## 2023-10-12 PROCEDURE — A9270 NON-COVERED ITEM OR SERVICE: HCPCS | Performed by: NURSE PRACTITIONER

## 2023-10-12 PROCEDURE — 770022 HCHG ROOM/CARE - ICU (200)

## 2023-10-12 PROCEDURE — 84295 ASSAY OF SERUM SODIUM: CPT

## 2023-10-12 PROCEDURE — 36600 WITHDRAWAL OF ARTERIAL BLOOD: CPT

## 2023-10-12 PROCEDURE — 700111 HCHG RX REV CODE 636 W/ 250 OVERRIDE (IP): Mod: JZ

## 2023-10-12 PROCEDURE — 99232 SBSQ HOSP IP/OBS MODERATE 35: CPT | Mod: FS | Performed by: STUDENT IN AN ORGANIZED HEALTH CARE EDUCATION/TRAINING PROGRAM

## 2023-10-12 PROCEDURE — 85730 THROMBOPLASTIN TIME PARTIAL: CPT

## 2023-10-12 PROCEDURE — 83735 ASSAY OF MAGNESIUM: CPT

## 2023-10-12 PROCEDURE — A9270 NON-COVERED ITEM OR SERVICE: HCPCS | Performed by: STUDENT IN AN ORGANIZED HEALTH CARE EDUCATION/TRAINING PROGRAM

## 2023-10-12 PROCEDURE — 700105 HCHG RX REV CODE 258: Performed by: INTERNAL MEDICINE

## 2023-10-12 PROCEDURE — A9270 NON-COVERED ITEM OR SERVICE: HCPCS | Performed by: INTERNAL MEDICINE

## 2023-10-12 PROCEDURE — 700102 HCHG RX REV CODE 250 W/ 637 OVERRIDE(OP): Performed by: NURSE PRACTITIONER

## 2023-10-12 PROCEDURE — 93010 ELECTROCARDIOGRAM REPORT: CPT | Performed by: INTERNAL MEDICINE

## 2023-10-12 PROCEDURE — 700105 HCHG RX REV CODE 258

## 2023-10-12 PROCEDURE — 85610 PROTHROMBIN TIME: CPT

## 2023-10-12 RX ORDER — SODIUM CHLORIDE 450 MG/100ML
INJECTION, SOLUTION INTRAVENOUS CONTINUOUS
Status: DISCONTINUED | OUTPATIENT
Start: 2023-10-12 | End: 2023-10-16

## 2023-10-12 RX ORDER — AMIODARONE HYDROCHLORIDE 200 MG/1
200 TABLET ORAL DAILY
Status: DISCONTINUED | OUTPATIENT
Start: 2023-10-12 | End: 2023-10-20

## 2023-10-12 RX ADMIN — HEPARIN SODIUM 18 UNITS/KG/HR: 5000 INJECTION, SOLUTION INTRAVENOUS at 00:10

## 2023-10-12 RX ADMIN — INSULIN LISPRO 2 UNITS: 100 INJECTION, SOLUTION INTRAVENOUS; SUBCUTANEOUS at 17:10

## 2023-10-12 RX ADMIN — AMIODARONE HYDROCHLORIDE 200 MG: 200 TABLET ORAL at 15:54

## 2023-10-12 RX ADMIN — AMIODARONE HYDROCHLORIDE 0.5 MG/MIN: 1.8 INJECTION, SOLUTION INTRAVENOUS at 08:27

## 2023-10-12 RX ADMIN — INSULIN GLARGINE-YFGN 24 UNITS: 100 INJECTION, SOLUTION SUBCUTANEOUS at 17:10

## 2023-10-12 RX ADMIN — FAMOTIDINE 20 MG: 10 INJECTION, SOLUTION INTRAVENOUS at 05:40

## 2023-10-12 RX ADMIN — AMPICILLIN AND SULBACTAM 3 G: 1; 2 INJECTION, POWDER, FOR SOLUTION INTRAMUSCULAR; INTRAVENOUS at 05:39

## 2023-10-12 RX ADMIN — INSULIN LISPRO 2 UNITS: 100 INJECTION, SOLUTION INTRAVENOUS; SUBCUTANEOUS at 11:42

## 2023-10-12 RX ADMIN — AMPICILLIN AND SULBACTAM 3 G: 1; 2 INJECTION, POWDER, FOR SOLUTION INTRAMUSCULAR; INTRAVENOUS at 11:51

## 2023-10-12 RX ADMIN — HEPARIN SODIUM 20 UNITS/KG/HR: 5000 INJECTION, SOLUTION INTRAVENOUS at 16:03

## 2023-10-12 RX ADMIN — ACETAMINOPHEN 650 MG: 325 TABLET, FILM COATED ORAL at 05:40

## 2023-10-12 RX ADMIN — AMPICILLIN AND SULBACTAM 3 G: 1; 2 INJECTION, POWDER, FOR SOLUTION INTRAMUSCULAR; INTRAVENOUS at 17:16

## 2023-10-12 RX ADMIN — INSULIN LISPRO 2 UNITS: 100 INJECTION, SOLUTION INTRAVENOUS; SUBCUTANEOUS at 23:55

## 2023-10-12 RX ADMIN — HEPARIN SODIUM 3400 UNITS: 1000 INJECTION, SOLUTION INTRAVENOUS; SUBCUTANEOUS at 07:10

## 2023-10-12 RX ADMIN — FENTANYL CITRATE 100 MCG: 50 INJECTION, SOLUTION INTRAMUSCULAR; INTRAVENOUS at 21:56

## 2023-10-12 RX ADMIN — POTASSIUM BICARBONATE 25 MEQ: 978 TABLET, EFFERVESCENT ORAL at 11:49

## 2023-10-12 RX ADMIN — INSULIN LISPRO 3 UNITS: 100 INJECTION, SOLUTION INTRAVENOUS; SUBCUTANEOUS at 05:50

## 2023-10-12 RX ADMIN — INSULIN LISPRO 3 UNITS: 100 INJECTION, SOLUTION INTRAVENOUS; SUBCUTANEOUS at 00:33

## 2023-10-12 RX ADMIN — AMPICILLIN AND SULBACTAM 3 G: 1; 2 INJECTION, POWDER, FOR SOLUTION INTRAMUSCULAR; INTRAVENOUS at 00:26

## 2023-10-12 RX ADMIN — FENTANYL CITRATE 100 MCG: 50 INJECTION, SOLUTION INTRAMUSCULAR; INTRAVENOUS at 10:41

## 2023-10-12 RX ADMIN — SODIUM CHLORIDE: 4.5 INJECTION, SOLUTION INTRAVENOUS at 22:44

## 2023-10-12 ASSESSMENT — FIBROSIS 4 INDEX: FIB4 SCORE: 5.65

## 2023-10-12 ASSESSMENT — PAIN DESCRIPTION - PAIN TYPE
TYPE: ACUTE PAIN

## 2023-10-12 NOTE — DIETARY
"Nutrition Support Assessment:  Day 3 of admit.  Perry Marrufo is a 83 y.o. male with admitting DX of AMS (altered mental status).     Current problem list:  Pt brought in after being found down @ home 10/9. Wounds noted. +Dehydration, ADELAIDE, hypernatremia, and metabolic encephalopathy.  Pt moved to ICU 10/11. +Cardioversion, intubation, NGT and central line placed.  Nephrology on board.  PMH includes Type 2 DM (HA1c 8.0%), CKD, and esophageal mass.     Assessment:  Estimated Nutritional Needs based on:   Height: 180.3 cm (5' 11\")  Weight: 102 kg (225 lb 15.5 oz)  Weight to Use in Calculations: 96.2 kg (212 lb 1.3 oz) (Initial measured weight 10/10, BMI 29.58. Currently +3.4 L per I/O, although noted to be dehydrated on admit 10/9.)  Ideal Body Weight: 78 kg (172 lb)  Body mass index is 31.52 kg/m².    Calculation/Equation: REE per MSJ = 1686 kcal/day; RMR per PSU (vent L/min 8.9, T max/24 hours 38.5) = 2110 kcal/day  Total Calories / day: 1700 - 2100 (Calories / k - 22)  Total Grams Protein / day: 120 - 144 (Grams Protein / k.25 - 1.5)     Evaluation:   Pt is intubated and unable to take PO diet. Consult received for TF.  BMI on the cusp of obesity. RD observed pt from bedside. Plan to feed REE/RMR vs. strict obesity guidelines. High-protein needs are based on multiple wounds (see below).  Per Wound Team evaluation 10/9:  Pressure Injury Buttocks;Coccyx;Ischium Left;Right POA evolving DTI opening up (Active)  Pressure Injury Knee Lateral Right POA unstageable (Active)  Pressure Injury Foot;MTH 1 Medial Left POA DTI (Active)  Pressure Injury Hip;Pelvis POA DTI (Active)  Labs reviewed.   Meds include Abx, insulin, electrolyte replacement, and levophed @ 0.05 mcg/kg/min.  BM PTA.  Impact Peptide 1.5 will fit desired nutrient parameters with modified CHO-content and additional would healing properties.     Recommendations/Plan:  Impact Peptide 1.5 @ goal rate 55 ml/hr to provide 1980 kcal, 124 grams protein, and " 1016 ml free water per day.  Fluids per MD. [place] following.

## 2023-10-12 NOTE — PROGRESS NOTES
Nephrology Daily Progress Note    Date of Service  10/12/2023    Chief Complaint  83 y.o. male admitted 10/9/2023 with encephalopathy, hyponatremia, developed hypotension, was intubated yesterday.    Interval Problem Update  Patient still VDRF, no acute events overnight    Review of Systems  Review of Systems   Unable to perform ROS: Intubated        Physical Exam  Temp:  [37.8 °C (100 °F)-38.3 °C (100.9 °F)] 37.8 °C (100 °F)  Pulse:  [] 69  Resp:  [19-46] 24  BP: ()/(51-99) 118/67  SpO2:  [90 %-99 %] 93 %    Physical Exam  Vitals and nursing note reviewed.   Constitutional:       Appearance: He is ill-appearing.      Interventions: He is sedated and intubated.   HENT:      Head: Normocephalic and atraumatic.      Right Ear: External ear normal.      Left Ear: External ear normal.      Nose: Nose normal.      Mouth/Throat:      Pharynx: No oropharyngeal exudate or posterior oropharyngeal erythema.      Comments: ETT  Eyes:      General:         Right eye: No discharge.         Left eye: No discharge.      Conjunctiva/sclera: Conjunctivae normal.   Cardiovascular:      Rate and Rhythm: Normal rate and regular rhythm.   Pulmonary:      Effort: Respiratory distress present. He is intubated.      Breath sounds: No wheezing.      Comments: Coarse BS  Abdominal:      General: Abdomen is flat. Bowel sounds are normal.   Musculoskeletal:      Cervical back: No rigidity. No muscular tenderness.   Skin:     General: Skin is warm and dry.      Coloration: Skin is not jaundiced.   Neurological:      Mental Status: He is unresponsive.   Psychiatric:         Behavior: Behavior normal.         Fluids    Intake/Output Summary (Last 24 hours) at 10/12/2023 1552  Last data filed at 10/12/2023 1500  Gross per 24 hour   Intake 5206.71 ml   Output 1155 ml   Net 4051.71 ml       Laboratory  Recent Labs     10/11/23  0455 10/11/23  1426 10/12/23  0410   WBC 17.8* 18.4* 23.4*   RBC 5.48 5.17 5.16   HEMOGLOBIN 16.9 15.9 15.8    HEMATOCRIT 52.5* 48.8 49.0   MCV 95.8 94.4 95.0   MCH 30.8 30.8 30.6   MCHC 32.2* 32.6 32.2*   RDW 51.2* 51.0* 52.2*   PLATELETCT 127* 127* 145*   MPV 11.4 11.7 11.3     Recent Labs     10/11/23  0455 10/11/23  1125 10/11/23  1426 10/12/23  0000 10/12/23  0410 10/12/23  0548 10/12/23  1225   SODIUM 153*   < > 154*   < > 155* 154* 153*   POTASSIUM 4.3  --  4.0  --  3.7  --   --    CHLORIDE 117*  --  121*  --  119*  --   --    CO2 23  --  22  --  25  --   --    GLUCOSE 245*  --  244*  --  236*  --   --    *  --  107*  --  100*  --   --    CREATININE 1.76*  --  1.62*  --  1.89*  --   --    CALCIUM 8.6  --  8.3*  --  8.4*  --   --     < > = values in this interval not displayed.     Recent Labs     10/09/23  1733 10/10/23  0755 10/12/23  0000   APTT 32.9  --  39.5*   INR 1.33* 1.35* 1.79*     Recent Labs     10/11/23  0455   NTPROBNP 551*           Imaging  DX-CHEST-PORTABLE (1 VIEW)   Final Result      1.  Mild interstitial pulmonary edema   2.  Bibasilar and perihilar underinflation atelectasis which could obscure an additional process.   3.  Small LEFT pleural effusion      DX-CHEST-LIMITED (1 VIEW)   Final Result      1.  Bilateral basilar atelectasis and/or consolidation. Underlying infection is possible.   2.  Stable enlargement of the cardiomediastinal silhouette.   3.  Interval insertion of a central venous catheter which terminates with the tip projecting over the expected region of the mid superior vena cava.   4.  Interval placement of an endotracheal tube which terminates in satisfactory position at the level of the aortic arch.   5.  Interval placement of an enteric feeding tube which terminates in the left upper quadrant projecting over the expected location of the stomach.      DX-CHEST-PORTABLE (1 VIEW)   Final Result      No significant change from prior exam.      DX-CHEST-PORTABLE (1 VIEW)   Final Result      Bibasilar underinflation atelectasis. Superimposed pneumonia not excluded.       US-RUQ   Final Result      1.  Gallbladder sludge   2.  RIGHT pleural effusion      EC-ECHOCARDIOGRAM COMPLETE W/ CONT   Final Result      CT-CSPINE WITHOUT PLUS RECONS   Final Result         1. No acute fracture from C1 through T1 is visualized.         CT-HEAD W/O   Final Result         1. No acute intracranial abnormality. No evidence of acute intracranial hemorrhage or mass lesion.                     DX-CHEST-PORTABLE (1 VIEW)   Final Result      1.  Low lung volumes without definite acute cardiopulmonary abnormality.      EC-ECHOCARDIOGRAM LTD W/O CONT    (Results Pending)         Assessment/Plan  1 hyponatremia: Secondary to volume depletion Free water deficit  2 high BUN to creatinine ratio: Most likely secondary to volume depletion  3 encephalopathy  4 hypotension  5 VDRF  6 leukocytosis  no acute need for HD  Fluid resuscitation, can use normal saline as a fluid replacement as normal saline still hyperosmolar compared to his serum with hyponatremia, also normal saline will help with blood pressure support  Serial sodium level check  Daily BMP, CBC.  Renal dose all meds  Avoid nephrotoxins like NSAIDs.  Prognosis guarded.  D/W Dr Rodrigues

## 2023-10-12 NOTE — ASSESSMENT & PLAN NOTE
Acute metabolic encephalopathy likely due to uremia, hypernatremia, possible sepsis, hypoxia  Weaning sedation  Improving.  Follows commands in all 4

## 2023-10-12 NOTE — PROGRESS NOTES
4 Eyes Skin Assessment Completed by ALVARADO Renee and ALVARADO Melgar.    Head Scab, Non-Blanching, and Redness  Ears Redness and Blanching  Nose WDL  Mouth Discoloration black wound on mouth, pictures in epic  Neck Scab  Breast/Chest Redness, Non-Blanching, and Discoloration  Shoulder Blades Redness  Spine Redness  (R) Arm/Elbow/Hand Redness and Blanching  (L) Arm/Elbow/Hand Redness and Blanching  Abdomen Scab  Groin Redness, Non-Blanching, Excoriation, Rash, and Swelling  Scrotum/Coccyx/Buttocks Redness, Non-Blanching, Excoriation, and Discoloration  (R) Leg Redness, Non-Blanching, Scab, and Abrasion  (L) Leg Redness, Non-Blanching, and Scab  (R) Heel/Foot/Toe Redness and Blanching  (L) Heel/Foot/Toe Redness, Non-Blanching, and Discoloration          Devices In Places ECG, Blood Pressure Cuff, Pulse Ox, and Triplett      Interventions In Place InterDry, Heel Mepilex, Heel Float Boots, Waffle Overlay, Pillows, Elbow Mepilex, Q2 Turns, Low Air Loss Mattress, and Heels Loaded W/Pillows    Possible Skin Injury Yes    Pictures Uploaded Into Epic Yes  Wound Consult Placed Yes  RN Wound Prevention Protocol Ordered Yes

## 2023-10-12 NOTE — THERAPY
Speech Language Therapy Contact Note    Patient Name: Perry Marrufo  Age:  83 y.o., Sex:  male  Medical Record #: [id number]  Today's Date: 10/12/2023       10/12/23 0803   Treatment Variance   Reason For Missed Therapy Medical - Patient on Hold from Therapy;Medical - Patient not Able To Participate   Interdisciplinary Plan of Care Collaboration   IDT Collaboration with  Other (See Comments)  (EMR)   Collaboration Comments Per EMR, pt is now intubated. Service will monitor for extubation and see pt as able/ medically appropriate.

## 2023-10-12 NOTE — PROCEDURES
Procedure Note    Date: 10/11/2023  Time: 1245    Procedure: Emergent electrical cardioversion    Indication: Afib with RVR with hypotension/resp failure  Consent: Informed consent obtained from patient or designated decision maker after explaining the benefits/risks of the procedure including but not limited to pain, loss of airway protection, hypoxemia, burn, arrythmia, or death. Patient or surrogate expressed understanding and agreement.    Procedure: After obtaining consent, a time-out was performed. Pads were placed on patient's chest in the anterior-posterior location. All back-up airway, suction, and code cart supplies ready at bedside. Patient provided conscious sedation with medications reaching a moderate sedation level. At that point, in synchronized fashion, 120J of electricity delivered with transiently conversion to NSR. Patient loaded with amiodarone and started on a NE gtt with improvement in hypotension and HR started to slow so no further shocks administered. Patient left in care of RN and RT without complications.    Medications: amioadrone 150 mg, phenyephrine 500 mcg, NE gtt  Complications: none    Jeremy Gonda, MD  Critical Care Medicine

## 2023-10-12 NOTE — ASSESSMENT & PLAN NOTE
Secondary to ATN, pre-renal and rhabdo  Improving  Nephrology consulted  Avoid nephrotoxins  Monitor creatinine, UOP, electrolytes closely  Maintain perfusion to kidneys with vasopressor support

## 2023-10-12 NOTE — PROGRESS NOTES
Cardiology Follow Up Progress Note    Date of Service  10/12/2023    Attending Physician  Cullen Rodrigues Jr., D.O.    Chief Complaint     A-fib RVR, no prior cardiac history, no prior A-fib, found to be in A-fib in ER reverted back to NSR spontaneously but went back into A-fib on 10/11/2022 with hemodynamic instability underwent emergent DCCV 10/11/2023.    HPI  Perry Marrufo is a 83 y.o. male transferred from [place] 10/9/2023 after being found down ( ~ 2 days ) at home with metabolic encephalopathy, found rhabdomyolysis, A-fib RVR (new findings), ADELAIDE, dehydration, hypoxia, Na 151.    PMH: Poorly controlled diabetes most recent A1c this admission 8      Interim Events    No overnight cardiac events  Telemetry-SR this morning, overnight heart rate 140s A-fib  Remains on low-dose Levophed  Limited echo pending    Review of Systems  Review of Systems   Unable to perform ROS: Intubated       Vital signs in last 24 hours  Temp:  [36.5 °C (97.7 °F)-38.3 °C (100.9 °F)] 37.8 °C (100 °F)  Pulse:  [] 65  Resp:  [15-46] 20  BP: ()/(37-99) 119/64  SpO2:  [88 %-99 %] 97 %    Physical Exam  Physical Exam  Cardiovascular:      Rate and Rhythm: Normal rate and regular rhythm.      Pulses: Normal pulses.   Skin:     General: Skin is warm.         Lab Review  Lab Results   Component Value Date/Time    WBC 23.4 (H) 10/12/2023 04:10 AM    RBC 5.16 10/12/2023 04:10 AM    HEMOGLOBIN 15.8 10/12/2023 04:10 AM    HEMATOCRIT 49.0 10/12/2023 04:10 AM    MCV 95.0 10/12/2023 04:10 AM    MCH 30.6 10/12/2023 04:10 AM    MCHC 32.2 (L) 10/12/2023 04:10 AM    MPV 11.3 10/12/2023 04:10 AM      Lab Results   Component Value Date/Time    SODIUM 153 (H) 10/12/2023 12:25 PM    POTASSIUM 3.7 10/12/2023 04:10 AM    CHLORIDE 119 (H) 10/12/2023 04:10 AM    CO2 25 10/12/2023 04:10 AM    GLUCOSE 236 (H) 10/12/2023 04:10 AM     (H) 10/12/2023 04:10 AM    CREATININE 1.89 (H) 10/12/2023 04:10 AM      Lab Results   Component Value  Date/Time    ASTSGOT 35 10/11/2023 04:55 AM    ALTSGPT 16 10/11/2023 04:55 AM     Lab Results   Component Value Date/Time    CHOLSTRLTOT 88 (L) 01/04/2015 06:41 AM    LDL 40 01/04/2015 06:41 AM    HDL 17 (A) 01/04/2015 06:41 AM    TRIGLYCERIDE 157 (H) 01/04/2015 06:41 AM    TROPONINT 39 (H) 10/09/2023 03:26 PM       Recent Labs     10/11/23  0455   NTPROBNP 551*           Assessment/Plan     Recurrent A-fib RVR with hemodynamic instability/hypotension 10/11/23   New afib on presentation 10/9 ( reverted to SR spontaneously in ER).  Suspect septic shock.  -Transferred to intensive care unit 10/11/2023 s/p emergent DCCV.  -Required intubation 10/11.  - IV heparin initiated 10/11 transition to DOAC when not contraindicated.  -Required Levophed administration 10/11  -LVEF 70% on echo 10/10/2023  -Repeat limited echo pending  -Currently NSR off of IV amiodarone, recommend 200 mg of amiodarone p.o. daily.    No further cardiac work-up cardiology will sign off        I personally spent a total of 15  minutes which includes face-to-face time and non-face-to-face time spent on preparing to see the patient, reviewing hospital notes and tests, obtaining history from the patient, performing a medically appropriate exam, counseling and educating the patient, ordering medications/tests/procedures/referrals as clinically indicated, and documenting information in the electronic medical record.       Please contact me with any questions.    TERRENCE Zavaleta.   Cardiologist, [facility]  [phone]

## 2023-10-12 NOTE — ASSESSMENT & PLAN NOTE
Hypovolemic hypernatremia  Nephrology consulted  Continue to correct fluid status while closely monitoring Na  Correcting appropriately. Decrease free water flushes today and continue checks

## 2023-10-12 NOTE — NON-PROVIDER
.Medical Student SEVEN ICU Progress Note  For teaching purposes only      Admit Date: 10/9/2023  ICU Day: 2      Student: Chato Allen, Student  Attending: SEVEN LEDESMA/ Dr. Rodrigues    Date & Time:   10/12/2023   12:54 PM       Patient ID:    Name:             Perry Marrufo     YOB: 1940  Age:                 83 y.o.  male   MRN:               [id number]    HPI:  84 y/o M presented 10/9 from [place] after being found down for 2 days. Pt found to be in a-fib with RVR which initially converted with metoprolol. Noted to be altered with hypernatremia and uremia and mild rhabdo. Transferred to the ICU after going back into a-fib with RVR and hypotension.     Consultants:  Nephrology  Cardiology  Neurology    Procedures:  Intubation  Electrical cardioversion     Imaging:  .  DX-CHEST-PORTABLE (1 VIEW)   Final Result      1.  Mild interstitial pulmonary edema   2.  Bibasilar and perihilar underinflation atelectasis which could obscure an additional process.   3.  Small LEFT pleural effusion      DX-CHEST-LIMITED (1 VIEW)   Final Result      1.  Bilateral basilar atelectasis and/or consolidation. Underlying infection is possible.   2.  Stable enlargement of the cardiomediastinal silhouette.   3.  Interval insertion of a central venous catheter which terminates with the tip projecting over the expected region of the mid superior vena cava.   4.  Interval placement of an endotracheal tube which terminates in satisfactory position at the level of the aortic arch.   5.  Interval placement of an enteric feeding tube which terminates in the left upper quadrant projecting over the expected location of the stomach.      DX-CHEST-PORTABLE (1 VIEW)   Final Result      No significant change from prior exam.      DX-CHEST-PORTABLE (1 VIEW)   Final Result      Bibasilar underinflation atelectasis. Superimposed pneumonia not excluded.      US-RUQ   Final Result      1.  Gallbladder sludge   2.  RIGHT pleural effusion       EC-ECHOCARDIOGRAM COMPLETE W/ CONT   Final Result      CT-CSPINE WITHOUT PLUS RECONS   Final Result         1. No acute fracture from C1 through T1 is visualized.         CT-HEAD W/O   Final Result         1. No acute intracranial abnormality. No evidence of acute intracranial hemorrhage or mass lesion.                     DX-CHEST-PORTABLE (1 VIEW)   Final Result      1.  Low lung volumes without definite acute cardiopulmonary abnormality.      EC-ECHOCARDIOGRAM LTD W/O CONT    (Results Pending)       PHYSICAL EXAM  Gen: NAD, intubated  HEENT: NC/AT,  Cardiac: S1S2, no m/r/g  Respiratory: Intubated with mechanical ventilation, symmetrical expansion  Ext: No edema  Skin: Warm, dry. No rashes or erythema.     Respiratory:  Vent Mode: APVCMV  Respiration: 20, Pulse Oximetry: 97 %    Chest Tube Drains:    Recent Labs     10/10/23  0028 10/11/23  0022 10/11/23  1426 10/11/23  1632 10/12/23  0502   PMXEF74Z 7.41  --  7.49  --   --    HVBRUK610O 31.8  --  30.0  --   --    DCLPG753J 83.4  --  56.6*  --   --    LAAH9UUV 95.5  --  90.2*  --   --    ARTHCO3 20  --  22  --   --    F0GLAAKHE 10 L 10 lts. 60L 100%  --   --    ARTBE -3  --  0  --   --    ISTATAPH  --   --   --  7.372* 7.468   ISTATAPCO2  --   --   --  41.1* 31.0   ISTATAPO2  --   --   --  80 80   ISTATATCO2  --   --   --  25 23   IHAAXHC6FWN  --   --   --  95 97   ISTATARTHCO3  --   --   --  23.9 22.5   ISTATARTBE  --   --   --  -1 0   ISTATTEMP  --   --   --  38.4 C 37.9 C   ISTATFIO2  --   --   --  100 80   ISTATSPEC  --   --   --  Arterial Arterial   ISTATAPHTC  --   --   --  7.352* 7.455   PUQHNIKQ3YI  --   --   --  87 85       HemoDynamics:  Pulse: 61 Blood Pressure : 102/57 CVP (mm Hg): (!) 10 MM HG    Neuro:      Fluids:  Date 10/12/23 0700 - 10/13/23 0659   Shift 3910-6928 4448-6341 1759-1267 24 Hour Total   INTAKE   I.V. 614.3   614.3     Amiodarone Volume 89   89     Norepinephrine Volume 59.3   59.3     Heparin Volume 199.7   199.7     Volume (mL)  (dextrose 5% infusion) 266.4   266.4   IV Piggyback 40.6   40.6     Volume (mL) (ampicillin/sulbactam (Unasyn) 3 g in  mL IVPB) 40.6   40.6   Enteral 300   300     Free Water / Tube Flush 300   300   Shift Total 954.9   954.9   OUTPUT   Urine 100   100     Output (mL) (Urethral Catheter Temperature probe) 100   100   Shift Total 100   100   .9   854.9        Intake/Output Summary (Last 24 hours) at 10/12/2023 1254  Last data filed at 10/12/2023 1200  Gross per 24 hour   Intake 4680.56 ml   Output 1080 ml   Net 3600.56 ml       Weight: 102 kg (225 lb 15.5 oz)  Body mass index is 31.52 kg/m².    Recent Labs     10/11/23  0455 10/11/23  1125 10/11/23  1426 10/12/23  0000 10/12/23  0410 10/12/23  0548   SODIUM 153*   < > 154* 155* 155* 154*   POTASSIUM 4.3  --  4.0  --  3.7  --    CHLORIDE 117*  --  121*  --  119*  --    CO2 23  --  22  --  25  --    *  --  107*  --  100*  --    CREATININE 1.76*  --  1.62*  --  1.89*  --    MAGNESIUM  --   --   --   --  3.1*  --    PHOSPHORUS  --   --   --   --  3.2  --    CALCIUM 8.6  --  8.3*  --  8.4*  --     < > = values in this interval not displayed.       GI/Nutrition:  Recent Labs     10/09/23  1526 10/10/23  0028 10/10/23  2114 10/11/23  0455 10/11/23  1426 10/12/23  0410   ALTSGPT 15 19  --  16  --   --    ASTSGOT 33 36  --  35  --   --    ALKPHOSPHAT 64 60  --  63  --   --    TBILIRUBIN 2.5* 1.9*  --  3.0*  --   --    LIPASE 84*  --   --   --   --   --    PREALBUMIN  --   --   --   --   --  4.4*   GLUCOSE 351* 342*   < > 245* 244* 236*    < > = values in this interval not displayed.       Heme:  Recent Labs     10/09/23  1733 10/10/23  0028 10/10/23  0755 10/11/23  0455 10/11/23  1426 10/12/23  0000 10/12/23  0410   RBC  --    < >  --  5.48 5.17  --  5.16   HEMOGLOBIN  --    < >  --  16.9 15.9  --  15.8   HEMATOCRIT  --    < >  --  52.5* 48.8  --  49.0   PLATELETCT  --    < >  --  127* 127*  --  145*   PROTHROMBTM 16.7*  --  16.9*  --   --  21.0*  --     APTT 32.9  --   --   --   --  39.5*  --    INR 1.33*  --  1.35*  --   --  1.79*  --     < > = values in this interval not displayed.       Infectious Disease:  Monitored Temp 2  Av.8 °C (100 °F)  Min: 36.6 °C (97.9 °F)  Max: 38.5 °C (101.3 °F)  Temp  Av.4 °C (99.3 °F)  Min: 36.5 °C (97.7 °F)  Max: 38.3 °C (100.9 °F)  Recent Labs     10/09/23  1526 10/10/23  0028 10/11/23  0455 10/11/23  1426 10/12/23  0410   WBC 18.3* 17.1* 17.8* 18.4* 23.4*   NEUTSPOLYS 90.40* 84.60* 80.00* 89.60* 91.20*   LYMPHOCYTES 4.40* 4.00* 9.50* 4.30* 7.00*   MONOCYTES 5.20 8.30 9.60 5.20 1.80   EOSINOPHILS 0.00 0.20 0.90 0.00 0.00   BASOPHILS 0.00 0.70 0.00 0.90 0.00   ASTSGOT 33 36 35  --   --    ALTSGPT 15 19 16  --   --    ALKPHOSPHAT 64 60 63  --   --    TBILIRUBIN 2.5* 1.9* 3.0*  --   --        Meds:   insulin lispro  2-9 Units      And    dextrose bolus  25 g      NORepinephrine  0-1 mcg/kg/min (Ideal) 0.07 mcg/kg/min (10/12/23 1200)    Respiratory Therapy Consult        famotidine  20 mg      Or    famotidine  20 mg      MD Alert...Adult ICU Electrolyte Replacement per Pharmacy        lidocaine  2 mL      Pharmacy  1 Each      fentaNYL  100 mcg      And    fentaNYL  200 mcg      acetaminophen  650 mg      heparin  0-30 Units/kg/hr (Adjusted) 20 Units/kg/hr (10/12/23 0708)    heparin  40 Units/kg (Adjusted)      ampicillin-sulbactam (UNASYN) IV  3 g Stopped (10/12/23 1221)    insulin GLARGINE  0.2 Units/kg/day            Assessment and Plan:  84 y/o M presented 10/9 from [place] after being found down for 2 days. Pt found to be in a-fib with RVR which initially converted with metoprolol.     #Acute hypoxic respiratory failure  Pt intubated yesterday. Required maximum HFNC and was not protecting his airway. ABG from yesterday reveals a respiratory acidosis that has resolved on follow up ABG this morning.     -Light sedation in setting of encephalopathy   -Proceed with SBT as appropriate     #Atrial fibrillation with  RVR  Likely triggered by underlying infection. Pt is s/p unsuccessful electric cardioversion x1. Pt does not have an underlying history of a-fib, so it is likely to resolve following resolution of the underlying trigger.     -Discontinue amiodarone with improvement of underlying infection.   -Continue antibiotics with telemetry monitoring.  -Heparin started    #Shock  Most likely etiology is septic vs hypovolemic. Not likely cardiogenic in origin given good LVEF. Potential risk for aspiration PNA given chest x-ray findings of bibasilar consolidations vs atelectasis.    -Continue norepinephrine as needed to maintain MAP >65, but wean as tolerated.   -Continue fluids  -Continue chest x-rays to assess for developing PNA  -Continue Unasyn 3g q 6 hrs last dose 10/17 at 0000    #Hypernatremia  Nephrology states the hypernatremia is most likely related to a free water deficit. They recommend gradual correction of 6-8 mmol/L over 24 hours    -Serial Na checks  -Gradual correction with fluids    #ADELAIDE  High BUN/creatinine ratio points to pre-renal azotemia in the setting of dehydration as the most likely etiology    -follow Bun/creatinine  -renal dose all medications  -avoid nephrotoxins wherever possible  -Continue fluids           CODE STATUS: FULL  DISPO: Continued ICU admission

## 2023-10-12 NOTE — ASSESSMENT & PLAN NOTE
No known history of Afib, RVR with hypotension attempted electrical cardioversion 10/11  PO amiodarone  Cardiology consulted  Heparin gtt-->apixaban given improved renal function  Optimize electrolytes, continuous tele monitoring

## 2023-10-12 NOTE — CARE PLAN
The patient is     Shift Goals  Clinical Goals: oxygenation stability, increase neuro condition  Patient Goals: layla  Family Goals: layla    Progress made toward(s) clinical / shift goals:      Patient is not progressing towards the following goals:      Problem: Knowledge Deficit - Standard  Goal: Patient and family/care givers will demonstrate understanding of plan of care, disease process/condition, diagnostic tests and medications  Outcome: Progressing     Problem: Pain - Standard  Goal: Alleviation of pain or a reduction in pain to the patient’s comfort goal  Outcome: Progressing     Problem: Hemodynamics  Goal: Patient's hemodynamics, fluid balance and neurologic status will be stable or improve  Outcome: Progressing     Problem: Fluid Volume  Goal: Fluid volume balance will be maintained  Outcome: Progressing     Problem: Urinary - Renal Perfusion  Goal: Ability to achieve and maintain adequate renal perfusion and functioning will improve  Outcome: Progressing     Problem: Respiratory  Goal: Patient will achieve/maintain optimum respiratory ventilation and gas exchange  Outcome: Progressing     Problem: Mechanical Ventilation  Goal: Safe management of artificial airway and ventilation  Outcome: Progressing  Goal: Successful weaning off mechanical ventilator, spontaneously maintains adequate gas exchange  Outcome: Progressing  Goal: Patient will be able to express needs and understand communication  Outcome: Progressing     Problem: Physical Regulation  Goal: Diagnostic test results will improve  Outcome: Progressing  Goal: Signs and symptoms of infection will decrease  Outcome: Progressing     Problem: Skin Integrity  Goal: Skin integrity is maintained or improved  Outcome: Progressing     Problem: Fall Risk  Goal: Patient will remain free from falls  Outcome: Progressing     Problem: Safety - Medical Restraint  Goal: Remains free of injury from restraints (Restraint for Interference with Medical  Device)  Outcome: Progressing  Goal: Free from restraint(s) (Restraint for Interference with Medical Device)  Outcome: Progressing

## 2023-10-12 NOTE — WOUND TEAM
Wound team has seen patient 2 days prior and has no new wounds from skin assessment.  Continue with skin tear protocol to bilateral arms and legs.  Offloading measures to the sacrum, right hip, right knee, and bilateral feet and heels. Wound care RN has seen patient and spoke with bedside RN regarding patient's offloading care.  No new orders at this time and nursing to continue with offloading and moisture management.

## 2023-10-12 NOTE — CONSULTS
DATE OF SERVICE:  10/11/2023     REQUESTING PHYSICIAN:  Elmira Griffin MD     REASON FOR CONSULTATION:  Hypernatremia.     Unfortunately, the patient is intubated, unresponsive, unable to provide any   history.  Most of the story was reviewing record, discussing the case with Dr. Griffin and Dr. Gonda.     HISTORY OF PRESENT ILLNESS:  Briefly, the patient is an 83-year-old gentleman   with past medical history significant for diabetes mellitus, was found by his   nephew, found down on the floor for about 2 days.  The patient upon evaluation   in the emergency room was encephalopathic and hypernatremic.  His sodium on   admission was 151, which was on 10/09/2023.  Unfortunately, we do not have the   prior sodium level.  The patient was treated with some IV fluid over the last   24 hours.  However, earlier today, he developed rapid ventricular response,   atrial fibrillation.  He became hypotensive, had to be transferred to the   intensive care unit and intubated, again the patient continued to be   encephalopathic.     PAST MEDICAL HISTORY:  Unobtainable.  Please refer to the chart.     SOCIAL HISTORY:  Unobtainable.  Please refer to the chart.     FAMILY HISTORY:  Unobtainable.  Please refer to the chart.     MEDICATIONS:  Unobtainable.  Please refer to the chart.     REVIEW OF SYSTEMS:  Unobtainable.  Please refer to the chart.     ALLERGIES:  Unobtainable.  Please refer to the chart.     PHYSICAL EXAMINATION:  GENERAL:  The patient appears ill, disheveled.  He is intubated.  VITAL SIGNS:  Show blood pressure of 135/90, heart rate was 129, respiratory   rate was 25.  HEENT:  Normocephalic, atraumatic.  Sclerae are anicteric.  Pupils are   reactive.  Nose normal.  Mucous membranes dry.  The patient has endotracheal   tube.  NECK:  No lymphadenopathy, no JVD, no thyroid mass.  CHEST:  Normal.  LUNGS:  Coarse breath sounds.  HEART:  S1, S2.  ABDOMEN:  Soft, nontender.  No hepatosplenomegaly.  There is no  inguinal   lymphadenopathy.  EXTREMITIES:  There is no lower extremity edema.  SKIN:  Multiple ecchymoses.  NEUROLOGIC:  The patient is sedated.     LABORATORY DATA:  His recent labs from today were reviewed.     DIAGNOSTIC DATA:  The patient had a chest x-ray done earlier today.  I   reviewed the image myself, which showed no pulmonary edema.     ASSESSMENT:  1.  Hypernatremia.  The etiology is most likely secondary to volume depletion   and free water deficit.  2.  Acute kidney injury with high BUN to creatinine ratio, again suggesting   volume depletion.  3.  Encephalopathy.  4.  Rapid ventricular response, atrial fibrillation.  5.  Leukocytosis.  6.  Hypoalbuminemia.     PLAN:  1.  There is no acute need for renal replacement therapy.  2.  Fluid resuscitation.  3.  For the sodium correction, I would recommend to avoid fast correction as   the patient has hypernatremia for over 48 hours.  I would recommend to maybe   try to correct hypernatremia for about 6-8 millimoles per liter over the next   24 hours.  4.  Serial sodium level check.  5.  Followup BUN and creatinine.  6.  Renal diet when the patient is able to take oral food.  7.  Renal dose all medications.  8.  Avoid nephrotoxins like NSAIDs.  9.  Prognosis is guarded.     Plan discussed in detail with Dr. Griffin as well as Dr. Gonda.        ______________________________  FADI NAJJAR, MD FN/EKATERINA    DD:  10/11/2023 16:52  DT:  10/11/2023 18:09    Job#:  [id number]

## 2023-10-12 NOTE — HOSPITAL COURSE
"\"83 y.o. male who presented 10/9/2023 with a PMHx of DM and prostate disease admitted 10/9 from [place] after being found down x 2 days. Found to be in Afib with RVR which converted with metoprolol and altered with hypernatremia and uremia and mild rhabo. Admitted to telemetry under hospitalist services. This afternoon developed worsening hypoxia requiring escalation to maximum HFNC. Also went back into Afib with RVR but this time was hemodynamically significant with a BP that dropped to 50/30 requiring attempted electrical cardioversion. Cardiology consulted. Na increasing despite IVF resuscitation. Nephrology consulted by hospitalist.\"      10/14: improving Na, extubated  "

## 2023-10-12 NOTE — DISCHARGE PLANNING
Care Transition Team Assessment     RNCM completed assessment with information obtained from chart review as patient is intubated and does not have any NOK listed. CM has attempted to locate NOK. Patient has friend, Young Delgado, listed as DPOA however CM did not have any luck reaching him. Patient also has son, Will listed as DPOA but CM never received a return phone call. HCM will continue to follow to locate family and assist with any DC planning needs. [facility]. Patient could potentially [facility] or [facility].     Information Source  Orientation Level: Unable to assess  Information Given By:  (EMR)  Who is responsible for making decisions for patient? : Patient    Readmission Evaluation  Is this a readmission?: No    Elopement Risk  Legal Hold: No  Ambulatory or Self Mobile in Wheelchair: No-Not an Elopement Risk  Elopement Risk: Not at Risk for Elopement    Interdisciplinary Discharge Planning  Lives with - Patient's Self Care Capacity: Unable To Determine At This Time  Housing / Facility: Unable To Determine At This Time    Discharge Preparedness  What is your plan after discharge?: Uncertain - pending medical team collaboration, Skilled nursing facility  What are your discharge supports?: Other (comment)  Prior Functional Level: Ambulatory, Independent with Activities of Daily Living, Independent with Medication Management, Needs Assist with Activities of Daily Living, Needs Assist with Medication Management  Difficulity with ADLs: Walking, Bathing  Difficulity with IADLs: None    Functional Assesment  Prior Functional Level: Ambulatory, Independent with Activities of Daily Living, Independent with Medication Management, Needs Assist with Activities of Daily Living, Needs Assist with Medication Management    Finances  Financial Barriers to Discharge: No  Prescription Coverage: Yes              Advance Directive  Advance Directive?: DPOA for Health  Care    Domestic Abuse  Have you ever been the victim of abuse or violence?: No    Psychological Assessment  History of Substance Abuse: None  History of Psychiatric Problems: No  Non-compliant with Treatment: No  Newly Diagnosed Illness: No    Discharge Risks or Barriers  Discharge risks or barriers?: Post-acute placement / services, Lives alone, no community support, Complex medical needs  Patient risk factors: Complex medical needs, Lack of outside supports, Lives alone and no community support, Vulnerable adult    Anticipated Discharge Information  Discharge Disposition: D/T to SNF with Medicare cert in anticipation of skilled care (03)

## 2023-10-12 NOTE — PROGRESS NOTES
[facility] Progress Note    Admit Date: 10/9/2023    Resident(s): Ming Farah D.O.   Attending:  SEVEN LEDESMA/ Dr. Rodrigues    Patient ID:    Name:  Perry Marrufo     YOB: 1940  Age:  83 y.o.  male   MRN:  [id number]    Reason for Consultation  Respiratory failure, AMS, Afib with RVR    Hospital Course (carried forward and updated):    83 y.o. male who presented 10/9/2023 with a PMHx of DM and prostate disease admitted 10/9 from [place] after being found down x 2 days. Found to be in Afib with RVR which converted with metoprolol and altered with hypernatremia and uremia and mild rhabo. Admitted to telemetry under hospitalist services. This afternoon developed worsening hypoxia requiring escalation to maximum HFNC. Also went back into Afib with RVR but this time was hemodynamically significant with a BP that dropped to 50/30 requiring attempted electrical cardioversion. Cardiology consulted. Na increasing despite IVF resuscitation. Nephrology consulted by hospitalist.    Consultants:  Cardiology  Nephrology     Interval Events:    Seen by cards, Afib w/ RVR likely noncardiac in etiology, start amiodarone GTT, recommend anticoagulation  Seen by nephrology, correct hypernatremia 6-8 mmol per 24 hours    Reviewed last 24 hour events:  Neuro: RASS -1  HR: Afib, ST, 60-140s   SBP: 100-140s  Tmax: AF  GI: NPO, TF, last BM PTA, admit 10/11  Endo: Glu 200s  I/O: +3725/-980/+2745  Lines: CVC, arceo  Mobility: Level 1,q2 turns  Resp: VD #2, APV-CMV, 20/80%/10/450  AB.468/31/80/22.5  CXR:Bibasilar and perihilar underinflation atelectasis, small left pleural effusion, mild interstitial pulmonary edema  Vte: None  PPI/H2: H2  Antibx: Unasyn (10/10-)  GTT: Amiodarone 0.5 mg/min, dextrose 50 ml/hr, heparin GTT, norepi 0.06 mcg/kg/min    WBC 18.4->23.4, HgB 15.8, MCV ~95, Plt 145 (baseline 150s)  Na 155 (151 on admit on 10/9), K 3.7, Cr 1.89 (Baseline unknown)    Bcx (10/9/23): NGTD, expanded viral panel  negative    -Wean vent as tolerated  -Continue Unasyn for 5 day course  -Discontinue C.diff testing and isolation precautions with no history of diarrhea  -Start TF  -Potassium bicarb replacement  -Free water flushes 300 mL q4h, free water deficit -5.1 L, goal of decreasing sodium between 6-8 mmol in 24 hour period  -Transition to oral amiodarone  -Nephrology following for hypernatremia, appreciate recommendations  -Cardiology following for atrial fibrillation, appreciate recommendations, repeat echocardiogram pending      Review of Systems  Review of Systems   Unable to perform ROS: Intubated       Vital Signs for the last 24 hours  Temp:  [37.8 °C (100 °F)-38.3 °C (100.9 °F)] 37.8 °C (100 °F)  Pulse:  [] 69  Resp:  [19-46] 22  BP: ()/(51-99) 105/58  SpO2:  [90 %-99 %] 95 %    Hemodynamic parameters for the last 24 hours  CVP:  [7 MM HG-14 MM HG] 10 MM HG    Vent Settings for the last 24 hours  Vent Mode: APVCMV  Rate (breaths/min): 20  Vt Target (mL): 450  PEEP/CPAP: 12  MAP: 17  Control VTE (exp VT): 444    Physical Exam  Physical Exam  Vitals and nursing note reviewed.   Constitutional:       Appearance: Normal appearance.   HENT:      Head: Normocephalic and atraumatic.      Right Ear: External ear normal.      Left Ear: External ear normal.      Nose: Nose normal.      Mouth/Throat:      Mouth: Mucous membranes are moist.      Pharynx: Oropharynx is clear.      Comments: ETT in place  Eyes:      Extraocular Movements: Extraocular movements intact.      Pupils: Pupils are equal, round, and reactive to light.   Cardiovascular:      Rate and Rhythm: Normal rate and regular rhythm.      Pulses: Normal pulses.      Heart sounds: Normal heart sounds.   Abdominal:      General: There is no distension.      Palpations: Abdomen is soft.      Tenderness: There is no abdominal tenderness. There is no guarding or rebound.   Genitourinary:     Comments: Triplett in place  Musculoskeletal:         General: Normal  range of motion.      Cervical back: Normal range of motion.   Skin:     General: Skin is warm.      Capillary Refill: Capillary refill takes less than 2 seconds.   Neurological:      Mental Status: He is alert.      Comments: RASS -1   Psychiatric:      Comments: Unable to assess         Medications  Current Facility-Administered Medications   Medication Dose Route Frequency Provider Last Rate Last Admin    amiodarone (Cordarone) tablet 200 mg  200 mg Enteral Tube DAILY Pilar Small, A.P.N.   200 mg at 10/12/23 1554    insulin lispro (HumaLOG,AdmeLOG) injection  2-9 Units Subcutaneous Q6HRS Elmira Griffin M.D.   2 Units at 10/12/23 1142    And    dextrose 50% (D50W) injection 25 g  25 g Intravenous Q15 MIN PRN Elmira Griffin M.D.        norepinephrine (Levophed) 8 mg in 250 mL NS infusion (premix)  0-1 mcg/kg/min (Ideal) Intravenous Continuous Jeremy M Gonda, M.D. 1.4 mL/hr at 10/12/23 1531 0.01 mcg/kg/min at 10/12/23 1531    Respiratory Therapy Consult   Nebulization Continuous RT Jeremy M Gonda, M.D.        famotidine (Pepcid) tablet 20 mg  20 mg Enteral Tube DAILY Jeremy M Gonda, M.D.        Or    famotidine (Pepcid) injection 20 mg  20 mg Intravenous DAILY Jeremy M Gonda, M.D.   20 mg at 10/12/23 0540    MD Alert...ICU Electrolyte Replacement per Pharmacy   Other PHARMACY TO DOSE Jeremy M Gonda, M.D.        lidocaine (Xylocaine) 1 % injection 2 mL  2 mL Tracheal Tube Q30 MIN PRN Jeremy M Gonda, M.D.        Pharmacy Consult: Enteral tube insertion - review meds/change route/product selection  1 Each Other PHARMACY TO DOSE Jeremy M Gonda, M.D.        fentaNYL (Sublimaze) injection 100 mcg  100 mcg Intravenous Q15 MIN PRN Jeremy M Gonda, M.D.   100 mcg at 10/12/23 1041    And    fentaNYL (Sublimaze) injection 200 mcg  200 mcg Intravenous Q15 MIN PRN Jeremy M Gonda, M.D.        acetaminophen (Tylenol) tablet 650 mg  650 mg Enteral Tube Q6HRS PRN Sarina Jones M.D.   650 mg at 10/12/23 0540    heparin  infusion 25,000 units in 500 mL 0.45% NACL  0-30 Units/kg/hr (Adjusted) Intravenous Continuous Jeremy M Gonda, M.D. 34 mL/hr at 10/12/23 1603 20 Units/kg/hr at 10/12/23 1603    heparin injection 3,400 Units  40 Units/kg (Adjusted) Intravenous PRN Jeremy M Gonda, M.D.   3,400 Units at 10/12/23 0710    ampicillin/sulbactam (Unasyn) 3 g in  mL IVPB  3 g Intravenous Q6HRS Liz Taylor M.D.   Stopped at 10/12/23 1221    insulin GLARGINE (Lantus,Semglee) injection  0.2 Units/kg/day Subcutaneous Q EVENING Liz Taylor M.D.   24 Units at 10/11/23 1904       Fluids    Intake/Output Summary (Last 24 hours) at 10/12/2023 1701  Last data filed at 10/12/2023 1600  Gross per 24 hour   Intake 5250.45 ml   Output 1155 ml   Net 4095.45 ml       Laboratory  Recent Labs     10/10/23  0028 10/11/23  0022 10/11/23  1426 10/11/23  1632 10/12/23  0502   GNLVE71R 7.41  --  7.49  --   --    [id number] 31.8  --  30.0  --   --    TQBYC033Z 83.4  --  56.6*  --   --    PMNH8ADB 95.5  --  90.2*  --   --    ARTHCO3 20  --  22  --   --    [id number] 10 L 10 lts. 60L 100%  --   --    ARTBE -3  --  0  --   --    ISTATAPH  --   --   --  7.372* 7.468   ISTATAPCO2  --   --   --  41.1* 31.0   ISTATAPO2  --   --   --  80 80   ISTATATCO2  --   --   --  25 23   [id number]  --   --   --  95 97   ISTATARTHCO3  --   --   --  23.9 22.5   ISTATARTBE  --   --   --  -1 0   ISTATTEMP  --   --   --  38.4 C 37.9 C   ISTATFIO2  --   --   --  100 80   ISTATSPEC  --   --   --  Arterial Arterial   ISTATAPHTC  --   --   --  7.352* 7.455   [id number]  --   --   --  87 85     Recent Labs     10/11/23  0455 10/11/23  1125 10/11/23  1426 10/12/23  0000 10/12/23  0410 10/12/23  0548 10/12/23  1225   SODIUM 153*   < > 154*   < > 155* 154* 153*   POTASSIUM 4.3  --  4.0  --  3.7  --   --    CHLORIDE 117*  --  121*  --  119*  --   --    CO2 23  --  22  --  25  --   --    *  --  107*  --  100*  --   --    CREATININE 1.76*  --  1.62*  --  1.89*  --   --     MAGNESIUM  --   --   --   --  3.1*  --   --    PHOSPHORUS  --   --   --   --  3.2  --   --    CALCIUM 8.6  --  8.3*  --  8.4*  --   --     < > = values in this interval not displayed.     Recent Labs     10/10/23  0028 10/10/23  2114 10/11/23  0455 10/11/23  1426 10/12/23  0410   ALTSGPT 19  --  16  --   --    ASTSGOT 36  --  35  --   --    ALKPHOSPHAT 60  --  63  --   --    TBILIRUBIN 1.9*  --  3.0*  --   --    PREALBUMIN  --   --   --   --  4.4*   GLUCOSE 342*   < > 245* 244* 236*    < > = values in this interval not displayed.     Recent Labs     10/10/23  0028 10/11/23  0455 10/11/23  1426 10/12/23  0410   WBC 17.1* 17.8* 18.4* 23.4*   NEUTSPOLYS 84.60* 80.00* 89.60* 91.20*   LYMPHOCYTES 4.00* 9.50* 4.30* 7.00*   MONOCYTES 8.30 9.60 5.20 1.80   EOSINOPHILS 0.20 0.90 0.00 0.00   BASOPHILS 0.70 0.00 0.90 0.00   ASTSGOT 36 35  --   --    ALTSGPT 19 16  --   --    ALKPHOSPHAT 60 63  --   --    TBILIRUBIN 1.9* 3.0*  --   --      Recent Labs     10/09/23  1733 10/10/23  0028 10/10/23  0755 10/11/23  0455 10/11/23  1426 10/12/23  0000 10/12/23  0410   RBC  --    < >  --  5.48 5.17  --  5.16   HEMOGLOBIN  --    < >  --  16.9 15.9  --  15.8   HEMATOCRIT  --    < >  --  52.5* 48.8  --  49.0   PLATELETCT  --    < >  --  127* 127*  --  145*   PROTHROMBTM 16.7*  --  16.9*  --   --  21.0*  --    APTT 32.9  --   --   --   --  39.5*  --    INR 1.33*  --  1.35*  --   --  1.79*  --     < > = values in this interval not displayed.       Imaging  X-Ray:  I have personally reviewed the images and compared with prior images.  EKG:  I have personally reviewed the images and compared with prior images.  CT:    Reviewed  Echo:   Reviewed  Ultrasound:  Reviewed    ASSESSEMENT and PLAN:    * Hypernatremia- (present on admission)  Assessment & Plan  Hypovolemic hypernatremia  Nephrology consulted  Continue to correct fluid status while closely monitoring Na  Free water flushes 300 mL q4h, free water deficit -5.1 L, goal of decreasing  sodium between 6-8 mmol in 24 hour period  D5W MIVFs    Acute respiratory failure with hypoxia (HCC)- (present on admission)  Assessment & Plan  Patient is not protecting airway adequately and is requiring maximum HFNC --> intubate 10/11/23  Start full mech vent support  Post-intubation ABG/CXR and titrate vent accordingly  Sedation lite with precedex/fent  RT/O2 protocols  ABCDEF bundle    Altered mental status- (present on admission)  Assessment & Plan  Acute metabolic encephalopathy due to uremia, hypernatremia, sepsis, hypoxia  Limit sedatives  Close neuro monitoring  Consider MRI brain once stabilized  CT head and EEG unrevealing  Continue correcting underlying causes    Sepsis (HCC)- (present on admission)  Assessment & Plan  This is Septic shock Present on admission  SIRS criteria identified on my evaluation include: Tachycardia, with heart rate greater than 90 BPM, Tachypnea, with respirations greater than 20 per minute and Leukocytosis, with WBC greater than 12,000  Clinical indicators of end organ dysfunction include Hypotension with systolic blood pressure less than 90 or MAP less than 65, Lactic Acid greater than 2, Toxic Metabolic Encephalopathy and Acute Respiratory Failure - (mechanical ventilation or BiPap or PaO2/FiO2 ratio < 300)  Indicators of septic shock include: Sepsis present and persistent hypotension despite fluid resuscitation   Sources is: pulmonary vs GI  Sepsis protocol initiated  Crystalloid Fluid Administration: Resuscitation volume of 10 ml/kg ordered. Reason that resuscitation volume of less than 30ml/kg was ordered hypernatremia and desire to not overcorrect too quickly  IV antibiotics as appropriate for source of sepsis  Reassessment: I have reassessed the patient's hemodynamic status  Begin NE gtt to keep MAP >65    ADELAIDE (acute kidney injury) (HCC)- (present on admission)  Assessment & Plan  Secondary to ATN, pre-renal and rhabdo  Nephrology consulted  Avoid nephrotoxins  Monitor  creatinine, UOP, electrolytes closely  Maintain perfusion to kidneys with vasopressor support    Shock (HCC)  Assessment & Plan  Undifferentiated - cardiac due to arrhythmia vs hypovolemia vs sepsis  Titrate NE gtt to keep MAP >65  Fluid resuscitation  Monitor UOP, LA, VS closely for adequacy of resuscitation      Rhabdomyolysis- (present on admission)  Assessment & Plan  Continue IVF resuscitation, monitor CPK    Atrial fibrillation with RVR ([facility])- (present on admission)  Assessment & Plan  No known history of Afib, RVR with hypotension attempted electrical cardioversion 10/11  Continue amiodarone gtt  Cardiology consulted  Heparin gtt  Optimize electrolytes, continuous tele monitoring    Dehydration- (present on admission)  Assessment & Plan  Continue fluid resuscitation intravenously and enterally for now  Monitor UOP closely    Diabetes mellitus-Type 2, not on treatment- (present on admission)  Assessment & Plan  SSI, hypoglycemia protocol, diabetic tube feeding    BPH (benign prostatic hyperplasia)- (present on admission)  Assessment & Plan  Triplett catheter in place, remove when clinically appropriate        VTE:   Heparin GTT  Ulcer: H2 Antagonist  Lines: Central Line  Ongoing indication addressed and Triplett Catheter  Ongoing indication addressed    I have performed a physical exam and reviewed and updated ROS and Plan today (10/12/2023). In review of yesterday's note (10/11/2023), there are no changes except as documented above.     Discussed patient condition and risk of morbidity and/or mortality with Hospitalist, Family, RN, RT, Therapies, Pharmacy, , and     Ming Farah D.O.  PGY-3 Internal Medicine Resident

## 2023-10-12 NOTE — CARE PLAN
The patient is Watcher - Medium risk of patient condition declining or worsening    Shift Goals  Clinical Goals: oxygenation stability, increase neuro condition  Patient Goals: layla  Family Goals: layla    Progress made toward(s) clinical / shift goals:    Problem: Pain - Standard  Goal: Alleviation of pain or a reduction in pain to the patient’s comfort goal  Outcome: Progressing  Note: Rass -3, requires no pain medication at this time     Problem: Hemodynamics  Goal: Patient's hemodynamics, fluid balance and neurologic status will be stable or improve  Outcome: Progressing  Note: Titrating down slightly on levophed     Problem: Safety - Medical Restraint  Goal: Remains free of injury from restraints (Restraint for Interference with Medical Device)  Outcome: Progressing

## 2023-10-12 NOTE — ASSESSMENT & PLAN NOTE
Continue IVF resuscitation, monitor CPK  Relatively mild elevation likely does not explain his renal dysfunction

## 2023-10-13 ENCOUNTER — APPOINTMENT (OUTPATIENT)
Dept: CARDIOLOGY | Facility: MEDICAL CENTER | Age: 83
DRG: 871 | End: 2023-10-13
Attending: NURSE PRACTITIONER
Payer: COMMERCIAL

## 2023-10-13 ENCOUNTER — APPOINTMENT (OUTPATIENT)
Dept: RADIOLOGY | Facility: MEDICAL CENTER | Age: 83
DRG: 871 | End: 2023-10-13
Attending: INTERNAL MEDICINE
Payer: COMMERCIAL

## 2023-10-13 LAB
ANION GAP SERPL CALC-SCNC: 10 MMOL/L (ref 7–16)
BASE EXCESS BLDA CALC-SCNC: 0 MMOL/L (ref -4–3)
BASOPHILS # BLD AUTO: 0.9 % (ref 0–1.8)
BASOPHILS # BLD: 0.18 K/UL (ref 0–0.12)
BODY TEMPERATURE: ABNORMAL DEGREES
BREATHS SETTING VENT: 20
BUN SERPL-MCNC: 98 MG/DL (ref 8–22)
BURR CELLS BLD QL SMEAR: NORMAL
CALCIUM SERPL-MCNC: 7.9 MG/DL (ref 8.5–10.5)
CHLORIDE SERPL-SCNC: 117 MMOL/L (ref 96–112)
CO2 BLDA-SCNC: 25 MMOL/L (ref 20–33)
CO2 SERPL-SCNC: 24 MMOL/L (ref 20–33)
CREAT SERPL-MCNC: 1.73 MG/DL (ref 0.5–1.4)
DELSYS IDSYS: ABNORMAL
END TIDAL CARBON DIOXIDE IECO2: 32 MMHG
EOSINOPHIL # BLD AUTO: 0.18 K/UL (ref 0–0.51)
EOSINOPHIL NFR BLD: 0.9 % (ref 0–6.9)
ERYTHROCYTE [DISTWIDTH] IN BLOOD BY AUTOMATED COUNT: 53.2 FL (ref 35.9–50)
GFR SERPLBLD CREATININE-BSD FMLA CKD-EPI: 39 ML/MIN/1.73 M 2
GLUCOSE BLD STRIP.AUTO-MCNC: 152 MG/DL (ref 65–99)
GLUCOSE BLD STRIP.AUTO-MCNC: 182 MG/DL (ref 65–99)
GLUCOSE BLD STRIP.AUTO-MCNC: 217 MG/DL (ref 65–99)
GLUCOSE BLD STRIP.AUTO-MCNC: 227 MG/DL (ref 65–99)
GLUCOSE SERPL-MCNC: 174 MG/DL (ref 65–99)
HAV IGM SERPL QL IA: NORMAL
HBV CORE IGM SER QL: NORMAL
HBV SURFACE AG SER QL: NORMAL
HCO3 BLDA-SCNC: 24 MMOL/L (ref 17–25)
HCT VFR BLD AUTO: 42.2 % (ref 42–52)
HCV AB SER QL: NORMAL
HGB BLD-MCNC: 13.6 G/DL (ref 14–18)
HOROWITZ INDEX BLDA+IHG-RTO: 183 MM[HG]
LV EJECT FRACT  99904: 65
LYMPHOCYTES # BLD AUTO: 1.59 K/UL (ref 1–4.8)
LYMPHOCYTES NFR BLD: 7.8 % (ref 22–41)
MAGNESIUM SERPL-MCNC: 2.8 MG/DL (ref 1.5–2.5)
MANUAL DIFF BLD: NORMAL
MCH RBC QN AUTO: 31 PG (ref 27–33)
MCHC RBC AUTO-ENTMCNC: 32.2 G/DL (ref 32.3–36.5)
MCV RBC AUTO: 96.1 FL (ref 81.4–97.8)
MODE IMODE: ABNORMAL
MONOCYTES # BLD AUTO: 0.18 K/UL (ref 0–0.85)
MONOCYTES NFR BLD AUTO: 0.9 % (ref 0–13.4)
MORPHOLOGY BLD-IMP: NORMAL
NEUTROPHILS # BLD AUTO: 18.26 K/UL (ref 1.82–7.42)
NEUTROPHILS NFR BLD: 89.5 % (ref 44–72)
NRBC # BLD AUTO: 0 K/UL
NRBC BLD-RTO: 0 /100 WBC (ref 0–0.2)
O2/TOTAL GAS SETTING VFR VENT: 60 %
PCO2 BLDA: 37.9 MMHG (ref 26–37)
PCO2 TEMP ADJ BLDA: 38.2 MMHG (ref 26–37)
PEEP END EXPIRATORY PRESSURE IPEEP: 12 CMH20
PH BLDA: 7.41 [PH] (ref 7.4–7.5)
PH TEMP ADJ BLDA: 7.41 [PH] (ref 7.4–7.5)
PHOSPHATE SERPL-MCNC: 3.8 MG/DL (ref 2.5–4.5)
PLATELET # BLD AUTO: 123 K/UL (ref 164–446)
PLATELET BLD QL SMEAR: NORMAL
PMV BLD AUTO: 11.6 FL (ref 9–12.9)
PO2 BLDA: 110 MMHG (ref 64–87)
PO2 TEMP ADJ BLDA: 111 MMHG (ref 64–87)
POIKILOCYTOSIS BLD QL SMEAR: NORMAL
POTASSIUM SERPL-SCNC: 3.7 MMOL/L (ref 3.6–5.5)
RBC # BLD AUTO: 4.39 M/UL (ref 4.7–6.1)
RBC BLD AUTO: PRESENT
SAO2 % BLDA: 98 % (ref 93–99)
SODIUM SERPL-SCNC: 147 MMOL/L (ref 135–145)
SODIUM SERPL-SCNC: 149 MMOL/L (ref 135–145)
SODIUM SERPL-SCNC: 149 MMOL/L (ref 135–145)
SODIUM SERPL-SCNC: 151 MMOL/L (ref 135–145)
SODIUM SERPL-SCNC: 152 MMOL/L (ref 135–145)
SPECIMEN DRAWN FROM PATIENT: ABNORMAL
TIDAL VOLUME IVT: 450 ML
UFH PPP CHRO-ACNC: 0.13 IU/ML
UFH PPP CHRO-ACNC: 0.26 IU/ML
UFH PPP CHRO-ACNC: 0.34 IU/ML
UFH PPP CHRO-ACNC: 0.38 IU/ML
WBC # BLD AUTO: 20.4 K/UL (ref 4.8–10.8)

## 2023-10-13 PROCEDURE — 71045 X-RAY EXAM CHEST 1 VIEW: CPT

## 2023-10-13 PROCEDURE — 85520 HEPARIN ASSAY: CPT | Mod: 91

## 2023-10-13 PROCEDURE — 700105 HCHG RX REV CODE 258

## 2023-10-13 PROCEDURE — 83735 ASSAY OF MAGNESIUM: CPT

## 2023-10-13 PROCEDURE — 700102 HCHG RX REV CODE 250 W/ 637 OVERRIDE(OP): Performed by: STUDENT IN AN ORGANIZED HEALTH CARE EDUCATION/TRAINING PROGRAM

## 2023-10-13 PROCEDURE — 94799 UNLISTED PULMONARY SVC/PX: CPT

## 2023-10-13 PROCEDURE — 85007 BL SMEAR W/DIFF WBC COUNT: CPT

## 2023-10-13 PROCEDURE — 93308 TTE F-UP OR LMTD: CPT | Mod: 26 | Performed by: INTERNAL MEDICINE

## 2023-10-13 PROCEDURE — 36600 WITHDRAWAL OF ARTERIAL BLOOD: CPT

## 2023-10-13 PROCEDURE — 84100 ASSAY OF PHOSPHORUS: CPT

## 2023-10-13 PROCEDURE — 700102 HCHG RX REV CODE 250 W/ 637 OVERRIDE(OP): Performed by: INTERNAL MEDICINE

## 2023-10-13 PROCEDURE — 700111 HCHG RX REV CODE 636 W/ 250 OVERRIDE (IP): Mod: JZ | Performed by: INTERNAL MEDICINE

## 2023-10-13 PROCEDURE — A9270 NON-COVERED ITEM OR SERVICE: HCPCS | Performed by: STUDENT IN AN ORGANIZED HEALTH CARE EDUCATION/TRAINING PROGRAM

## 2023-10-13 PROCEDURE — 700105 HCHG RX REV CODE 258: Performed by: STUDENT IN AN ORGANIZED HEALTH CARE EDUCATION/TRAINING PROGRAM

## 2023-10-13 PROCEDURE — 80048 BASIC METABOLIC PNL TOTAL CA: CPT

## 2023-10-13 PROCEDURE — 84295 ASSAY OF SERUM SODIUM: CPT | Mod: 91

## 2023-10-13 PROCEDURE — A9270 NON-COVERED ITEM OR SERVICE: HCPCS | Performed by: NURSE PRACTITIONER

## 2023-10-13 PROCEDURE — 93325 DOPPLER ECHO COLOR FLOW MAPG: CPT

## 2023-10-13 PROCEDURE — 700105 HCHG RX REV CODE 258: Performed by: INTERNAL MEDICINE

## 2023-10-13 PROCEDURE — A9270 NON-COVERED ITEM OR SERVICE: HCPCS | Performed by: INTERNAL MEDICINE

## 2023-10-13 PROCEDURE — 770022 HCHG ROOM/CARE - ICU (200)

## 2023-10-13 PROCEDURE — 85027 COMPLETE CBC AUTOMATED: CPT

## 2023-10-13 PROCEDURE — 82962 GLUCOSE BLOOD TEST: CPT | Mod: 91

## 2023-10-13 PROCEDURE — 700111 HCHG RX REV CODE 636 W/ 250 OVERRIDE (IP): Mod: JZ

## 2023-10-13 PROCEDURE — 82803 BLOOD GASES ANY COMBINATION: CPT

## 2023-10-13 PROCEDURE — 80074 ACUTE HEPATITIS PANEL: CPT

## 2023-10-13 PROCEDURE — 99291 CRITICAL CARE FIRST HOUR: CPT | Mod: GC | Performed by: INTERNAL MEDICINE

## 2023-10-13 PROCEDURE — 94003 VENT MGMT INPAT SUBQ DAY: CPT

## 2023-10-13 PROCEDURE — 99232 SBSQ HOSP IP/OBS MODERATE 35: CPT | Performed by: INTERNAL MEDICINE

## 2023-10-13 PROCEDURE — 700102 HCHG RX REV CODE 250 W/ 637 OVERRIDE(OP): Performed by: NURSE PRACTITIONER

## 2023-10-13 RX ORDER — POLYETHYLENE GLYCOL 3350 17 G/17G
1 POWDER, FOR SOLUTION ORAL
Status: DISCONTINUED | OUTPATIENT
Start: 2023-10-13 | End: 2023-10-17

## 2023-10-13 RX ORDER — OXYCODONE HYDROCHLORIDE 5 MG/1
5 TABLET ORAL EVERY 6 HOURS PRN
Status: DISCONTINUED | OUTPATIENT
Start: 2023-10-13 | End: 2023-10-14

## 2023-10-13 RX ORDER — SODIUM CHLORIDE, SODIUM LACTATE, POTASSIUM CHLORIDE, AND CALCIUM CHLORIDE .6; .31; .03; .02 G/100ML; G/100ML; G/100ML; G/100ML
1000 INJECTION, SOLUTION INTRAVENOUS ONCE
Status: COMPLETED | OUTPATIENT
Start: 2023-10-13 | End: 2023-10-13

## 2023-10-13 RX ORDER — MIDAZOLAM HYDROCHLORIDE 1 MG/ML
1 INJECTION INTRAMUSCULAR; INTRAVENOUS ONCE
Status: DISCONTINUED | OUTPATIENT
Start: 2023-10-13 | End: 2023-10-13

## 2023-10-13 RX ORDER — AMOXICILLIN AND CLAVULANATE POTASSIUM 875; 125 MG/1; MG/1
1 TABLET, FILM COATED ORAL EVERY 12 HOURS
Status: DISCONTINUED | OUTPATIENT
Start: 2023-10-13 | End: 2023-10-14

## 2023-10-13 RX ORDER — AMOXICILLIN 250 MG
2 CAPSULE ORAL 2 TIMES DAILY
Status: DISCONTINUED | OUTPATIENT
Start: 2023-10-13 | End: 2023-10-17

## 2023-10-13 RX ORDER — BISACODYL 10 MG
10 SUPPOSITORY, RECTAL RECTAL
Status: DISCONTINUED | OUTPATIENT
Start: 2023-10-13 | End: 2023-10-17

## 2023-10-13 RX ADMIN — SENNOSIDES AND DOCUSATE SODIUM 2 TABLET: 50; 8.6 TABLET ORAL at 17:44

## 2023-10-13 RX ADMIN — SODIUM CHLORIDE: 4.5 INJECTION, SOLUTION INTRAVENOUS at 10:42

## 2023-10-13 RX ADMIN — FENTANYL CITRATE 100 MCG: 50 INJECTION, SOLUTION INTRAMUSCULAR; INTRAVENOUS at 20:24

## 2023-10-13 RX ADMIN — FAMOTIDINE 20 MG: 10 INJECTION, SOLUTION INTRAVENOUS at 05:36

## 2023-10-13 RX ADMIN — INSULIN LISPRO 3 UNITS: 100 INJECTION, SOLUTION INTRAVENOUS; SUBCUTANEOUS at 23:59

## 2023-10-13 RX ADMIN — HEPARIN SODIUM 3400 UNITS: 1000 INJECTION, SOLUTION INTRAVENOUS; SUBCUTANEOUS at 08:31

## 2023-10-13 RX ADMIN — INSULIN LISPRO 3 UNITS: 100 INJECTION, SOLUTION INTRAVENOUS; SUBCUTANEOUS at 11:44

## 2023-10-13 RX ADMIN — AMOXICILLIN AND CLAVULANATE POTASSIUM 1 TABLET: 875; 125 TABLET, FILM COATED ORAL at 11:43

## 2023-10-13 RX ADMIN — HEPARIN SODIUM 3400 UNITS: 1000 INJECTION, SOLUTION INTRAVENOUS; SUBCUTANEOUS at 01:44

## 2023-10-13 RX ADMIN — AMOXICILLIN AND CLAVULANATE POTASSIUM 1 TABLET: 875; 125 TABLET, FILM COATED ORAL at 17:44

## 2023-10-13 RX ADMIN — OXYCODONE 5 MG: 5 TABLET ORAL at 05:36

## 2023-10-13 RX ADMIN — SODIUM CHLORIDE, POTASSIUM CHLORIDE, SODIUM LACTATE AND CALCIUM CHLORIDE 1000 ML: 600; 310; 30; 20 INJECTION, SOLUTION INTRAVENOUS at 17:49

## 2023-10-13 RX ADMIN — POTASSIUM BICARBONATE 25 MEQ: 978 TABLET, EFFERVESCENT ORAL at 08:37

## 2023-10-13 RX ADMIN — SENNOSIDES AND DOCUSATE SODIUM 2 TABLET: 50; 8.6 TABLET ORAL at 11:43

## 2023-10-13 RX ADMIN — AMIODARONE HYDROCHLORIDE 200 MG: 200 TABLET ORAL at 05:36

## 2023-10-13 RX ADMIN — FENTANYL CITRATE 100 MCG: 50 INJECTION, SOLUTION INTRAMUSCULAR; INTRAVENOUS at 23:06

## 2023-10-13 RX ADMIN — FENTANYL CITRATE 100 MCG: 50 INJECTION, SOLUTION INTRAMUSCULAR; INTRAVENOUS at 09:13

## 2023-10-13 RX ADMIN — AMPICILLIN AND SULBACTAM 3 G: 1; 2 INJECTION, POWDER, FOR SOLUTION INTRAMUSCULAR; INTRAVENOUS at 00:05

## 2023-10-13 RX ADMIN — HEPARIN SODIUM 26 UNITS/KG/HR: 5000 INJECTION, SOLUTION INTRAVENOUS at 17:57

## 2023-10-13 RX ADMIN — AMPICILLIN AND SULBACTAM 3 G: 1; 2 INJECTION, POWDER, FOR SOLUTION INTRAMUSCULAR; INTRAVENOUS at 05:40

## 2023-10-13 RX ADMIN — INSULIN LISPRO 3 UNITS: 100 INJECTION, SOLUTION INTRAVENOUS; SUBCUTANEOUS at 17:51

## 2023-10-13 RX ADMIN — SODIUM CHLORIDE: 4.5 INJECTION, SOLUTION INTRAVENOUS at 22:01

## 2023-10-13 RX ADMIN — INSULIN LISPRO 2 UNITS: 100 INJECTION, SOLUTION INTRAVENOUS; SUBCUTANEOUS at 06:45

## 2023-10-13 RX ADMIN — INSULIN GLARGINE-YFGN 24 UNITS: 100 INJECTION, SOLUTION SUBCUTANEOUS at 17:51

## 2023-10-13 ASSESSMENT — PAIN DESCRIPTION - PAIN TYPE
TYPE: ACUTE PAIN

## 2023-10-13 NOTE — CARE PLAN
Problem: Ventilation  Goal: Ability to achieve and maintain unassisted ventilation or tolerate decreased levels of ventilator support  Description: Target End Date:  4 days     Document on Vent flowsheet    1.  Support and monitor invasive and noninvasive mechanical ventilation  2.  Monitor ventilator weaning response  3.  Perform ventilator associated pneumonia prevention interventions  4.  Manage ventilation therapy by monitoring diagnostic test results  Outcome: Not Met         Ventilator Daily Summary    Vent Day #3    Airway: 8@24    Ventilator settings: 20/450/+8/50%    Weaning trials: No    Respiratory Procedures: No    Plan: Continue current ventilator settings and wean mechanical ventilation as tolerated per physician orders.

## 2023-10-13 NOTE — PROGRESS NOTES
4 Eyes Skin Assessment Completed by ALVARADO Woods and ALVARADO Lara.    Head {Head:62399}  Ears {Ears:69773}  Nose {Nose:26219}  Mouth {Mouth:91488}  Neck {Neck:96687}  Breast/Chest {Breast/Chest:19820}  Shoulder Blades {Shoulder Blades:84452}  Spine {Spine:51135}  (R) Arm/Elbow/Hand {(R) Arm/Elbow/Hand:81616}  (L) Arm/Elbow/Hand {(L) Arm/Elbow/Hand:75855}  Abdomen {Abdomen:12707}  Groin {Groin:59401}  Scrotum/Coccyx/Buttocks {Scrotum/Coccyx/Buttocks:96056}  (R) Leg {(R) Le}  (L) Leg {(L) Le}  (R) Heel/Foot/Toe {(R) Heel/Foot/Toe:67269}  (L) Heel/Foot/Toe {(L) Heel/Foot/Toe:68508}          Devices In Places {Devices In Place:89384}      Interventions In Place {Interventions In Place:11279}    Possible Skin Injury {Possible Skin Injury:70322}    Pictures Uploaded Into Epic {Pictures Uploaded Into Epic:81703}  Wound Consult Placed {Wound Consult Placed:96525}  RN Wound Prevention Protocol Ordered {YES/NO:}

## 2023-10-13 NOTE — CARE PLAN
The patient is Watcher - Medium risk of patient condition declining or worsening    Shift Goals  Clinical Goals: oxygenation stability  Patient Goals: layla  Family Goals: layla    Progress made toward(s) clinical / shift goals:    Problem: Pain - Standard  Goal: Alleviation of pain or a reduction in pain to the patient’s comfort goal  Outcome: Progressing  Note: Enteral pain medication ordered for longer-lasting pain management.     Problem: Safety - Medical Restraint  Goal: Remains free of injury from restraints (Restraint for Interference with Medical Device)  Outcome: Progressing  Note: Free from restraint

## 2023-10-13 NOTE — CARE PLAN
Problem: Ventilation  Goal: Ability to achieve and maintain unassisted ventilation or tolerate decreased levels of ventilator support  Description: Target End Date:  4 days     Document on Vent flowsheet    1.  Support and monitor invasive and noninvasive mechanical ventilation  2.  Monitor ventilator weaning response  3.  Perform ventilator associated pneumonia prevention interventions  4.  Manage ventilation therapy by monitoring diagnostic test results  Outcome: Not Met       Respiratory Update    Ventilator Daily Summary    Vent Day #3    Airway: 8@24    Ventilator settings: 20/450/+12/60%    Weaning trials: No    Respiratory Procedures: No    Plan: Continue current ventilator settings and wean mechanical ventilation as tolerated per physician orders.

## 2023-10-13 NOTE — PROGRESS NOTES
Nephrology Daily Progress Note    Date of Service  10/13/2023    Chief Complaint  83 y.o. male admitted 10/9/2023 with encephalopathy, hyponatremia, developed hypotension, was intubated yesterday.    Interval Problem Update  Patient still VDRF, no acute events overnight,  10/13 patient still VDRF, off of IV pressors  Review of Systems  Review of Systems   Unable to perform ROS: Intubated        Physical Exam  Temp:  [37 °C (98.6 °F)-37.5 °C (99.5 °F)] 37.1 °C (98.8 °F)  Pulse:  [58-75] 66  Resp:  [11-33] 20  BP: ()/(49-83) 96/53  SpO2:  [89 %-98 %] 97 %    Physical Exam  Vitals and nursing note reviewed.   Constitutional:       Appearance: He is ill-appearing.      Interventions: He is sedated and intubated.   HENT:      Head: Normocephalic and atraumatic.      Right Ear: External ear normal.      Left Ear: External ear normal.      Nose: Nose normal.      Mouth/Throat:      Pharynx: No oropharyngeal exudate or posterior oropharyngeal erythema.      Comments: ETT  Eyes:      General:         Right eye: No discharge.         Left eye: No discharge.      Conjunctiva/sclera: Conjunctivae normal.   Cardiovascular:      Rate and Rhythm: Normal rate and regular rhythm.   Pulmonary:      Effort: Respiratory distress present. He is intubated.      Breath sounds: No wheezing.      Comments: Coarse BS  Abdominal:      General: Abdomen is flat. Bowel sounds are normal.   Musculoskeletal:      Cervical back: No rigidity. No muscular tenderness.   Skin:     General: Skin is warm and dry.      Coloration: Skin is not jaundiced.   Neurological:      Mental Status: He is unresponsive.   Psychiatric:         Behavior: Behavior normal.         Fluids    Intake/Output Summary (Last 24 hours) at 10/13/2023 1356  Last data filed at 10/13/2023 1200  Gross per 24 hour   Intake 5804.57 ml   Output 1785 ml   Net 4019.57 ml         Laboratory  Recent Labs     10/11/23  1426 10/12/23  0410 10/13/23  0230   WBC 18.4* 23.4* 20.4*   RBC  5.17 5.16 4.39*   HEMOGLOBIN 15.9 15.8 13.6*   HEMATOCRIT 48.8 49.0 42.2   MCV 94.4 95.0 96.1   MCH 30.8 30.6 31.0   MCHC 32.6 32.2* 32.2*   RDW 51.0* 52.2* 53.2*   PLATELETCT 127* 145* 123*   MPV 11.7 11.3 11.6       Recent Labs     10/11/23  1426 10/12/23  0000 10/12/23  0410 10/12/23  0548 10/13/23  0230 10/13/23  0639 10/13/23  1150   SODIUM 154*   < > 155*   < > 151* 149* 149*   POTASSIUM 4.0  --  3.7  --  3.7  --   --    CHLORIDE 121*  --  119*  --  117*  --   --    CO2 22  --  25  --  24  --   --    GLUCOSE 244*  --  236*  --  174*  --   --    *  --  100*  --  98*  --   --    CREATININE 1.62*  --  1.89*  --  1.73*  --   --    CALCIUM 8.3*  --  8.4*  --  7.9*  --   --     < > = values in this interval not displayed.       Recent Labs     10/12/23  0000   APTT 39.5*   INR 1.79*       Recent Labs     10/11/23  0455   NTPROBNP 551*             Imaging  EC-ECHOCARDIOGRAM LTD W/O CONT         DX-CHEST-PORTABLE (1 VIEW)   Final Result      1.  Mild interstitial pulmonary edema, similar to prior   2.  Bibasilar and perihilar underinflation atelectasis which could obscure an additional process. This is unchanged.      DX-CHEST-PORTABLE (1 VIEW)   Final Result      1.  Mild interstitial pulmonary edema   2.  Bibasilar and perihilar underinflation atelectasis which could obscure an additional process.   3.  Small LEFT pleural effusion      DX-CHEST-LIMITED (1 VIEW)   Final Result      1.  Bilateral basilar atelectasis and/or consolidation. Underlying infection is possible.   2.  Stable enlargement of the cardiomediastinal silhouette.   3.  Interval insertion of a central venous catheter which terminates with the tip projecting over the expected region of the mid superior vena cava.   4.  Interval placement of an endotracheal tube which terminates in satisfactory position at the level of the aortic arch.   5.  Interval placement of an enteric feeding tube which terminates in the left upper quadrant projecting over  the expected location of the stomach.      DX-CHEST-PORTABLE (1 VIEW)   Final Result      No significant change from prior exam.      DX-CHEST-PORTABLE (1 VIEW)   Final Result      Bibasilar underinflation atelectasis. Superimposed pneumonia not excluded.      US-RUQ   Final Result      1.  Gallbladder sludge   2.  RIGHT pleural effusion      EC-ECHOCARDIOGRAM COMPLETE W/ CONT   Final Result      CT-CSPINE WITHOUT PLUS RECONS   Final Result         1. No acute fracture from C1 through T1 is visualized.         CT-HEAD W/O   Final Result         1. No acute intracranial abnormality. No evidence of acute intracranial hemorrhage or mass lesion.                     DX-CHEST-PORTABLE (1 VIEW)   Final Result      1.  Low lung volumes without definite acute cardiopulmonary abnormality.            Assessment/Plan  1 hyponatremia: Secondary to volume depletion Free water deficit, sodium level is improving slowly  2 high BUN to creatinine ratio: Most likely secondary to volume depletion  3 encephalopathy  4 hypotension  5 VDRF  6 leukocytosis  no acute need for HD  Continue fluid resuscitation  Serial sodium level check  Free water flushes through NG tube  Renal diet  Daily BMP, CBC.  Renal dose all meds  Avoid nephrotoxins like NSAIDs.  Prognosis guarded.

## 2023-10-13 NOTE — DIETARY
Nutrition Services Brief Update:    Problem: Nutritional:  Goal: Nutrition support tolerated and meeting greater than 85% of estimated needs  Outcome: MET. TF @ 50 ml/hr, which is 91% of goal of 55 ml/hr.

## 2023-10-13 NOTE — PROGRESS NOTES
[facility] Progress Note    Admit Date: 10/9/2023    Resident(s): Ming Farah D.O.   Attending:  SEVEN LEDESMA/ Dr. Rodrigues    Patient ID:    Name:  Perry Marrufo     YOB: 1940  Age:  83 y.o.  male   MRN:  [id number]    Reason for Consultation  Respiratory failure, AMS, Afib with RVR    Hospital Course (carried forward and updated):    83 y.o. male who presented 10/9/2023 with a PMHx of DM and prostate disease admitted 10/9 from [place] after being found down x 2 days. Found to be in Afib with RVR which converted with metoprolol and altered with hypernatremia and uremia and mild rhabo. Admitted to telemetry under hospitalist services. This afternoon developed worsening hypoxia requiring escalation to maximum HFNC. Also went back into Afib with RVR but this time was hemodynamically significant with a BP that dropped to 50/30 requiring attempted electrical cardioversion. Cardiology consulted. Na increasing despite IVF resuscitation. Nephrology consulted by hospitalist.    10/12 - VD #2, transition to oral amiodarone, free water flushes 300 mL q4h, free water deficit -5.1 L    Consultants:  Cardiology  Nephrology     Interval Events:      Seen by cards, Afib w/ RVR likely noncardiac in etiology, start amiodarone GTT, recommend anticoagulation  Seen by nephrology, correct hypernatremia 6-8 mmol per 24 hours    Reviewed last 24 hour events:  Neuro: RASS -1  HR: NSR 60s  SBP: 80-150s  Tmax: AF  GI: NPO, TF at goal, last BM PTA, admit 10/11  Endo: Glu 180-200s, Glarg 24u, Lispro 12u  I/O: +4584/-1335/+3249, net +5745 since admit  Lines: CVC, arceo  Mobility: Level 1,q2 turns  Resp: VD #3, APV-CMV, 20/80%->60%/10->12/450  AB.468/31/80/22.5 -> 7.411/37.9/110/24  CXR: Mild interstitial pulmonary edema  Vte: Heparin GTT  PPI/H2: H2  Antibx: Unasyn (10/10-)  GTT: 1/2 NS 83 ml/hr, heparin GTT, norepi 0.06->0.07 mcg/kg/min    WBC 18.4->23.4->20.4, HgB 15.8->13.6, MCV ~95, Plt 145->123 (baseline 150s)  Na 155->151  (151 on admit on 10/9), K 3.7, Cr 1.89 (Baseline unknown), Mg 2.8  Bcx (10/9/23): NGTD, expanded viral panel negative    -Wean vent as tolerated  -Change Unasyn to Augmentin x 5 day (Stop date 10/14) course for aspiratoin pneumonia  -Restart bowel protocol  -Free water flushes 300->400 mL q4h->q2h, free water deficit -4.0 L  -Nephrology following for hypernatremia, appreciate recommendations  -Cardiology following for atrial fibrillation, appreciate recommendations, repeat echocardiogram pending        YST    Seen by cards, Afib w/ RVR likely noncardiac in etiology, start amiodarone GTT, recommend anticoagulation  Seen by nephrology, correct hypernatremia 6-8 mmol per 24 hours    Reviewed last 24 hour events:  Neuro: RASS -1  HR: Afib, ST, 60-140s   SBP: 100-140s  Tmax: AF  GI: NPO, TF, last BM PTA, admit 10/11  Endo: Glu 200s  I/O: +3725/-980/+2745  Lines: CVC, arceo  Mobility: Level 1,q2 turns  Resp: VD #2, APV-CMV, 20/80%/10/450  AB.468/31/80/22.5  CXR:Bibasilar and perihilar underinflation atelectasis, small left pleural effusion, mild interstitial pulmonary edema  Vte: Heparin GTT  PPI/H2: H2  Antibx: Unasyn (10/10-)  GTT: Amiodarone 0.5 mg/min, dextrose 50 ml/hr, heparin GTT, norepi 0.06 mcg/kg/min    WBC 18.4->23.4, HgB 15.8, MCV ~95, Plt 145 (baseline 150s)  Na 155 (151 on admit on 10/9), K 3.7, Cr 1.89 (Baseline unknown)    Bcx (10/9/23): NGTD, expanded viral panel negative    -Wean vent as tolerated  -Continue Unasyn for 5 day course  -Discontinue C.diff testing and isolation precautions with no history of diarrhea  -Start TF  -Potassium bicarb replacement  -Free water flushes 300 mL q4h, free water deficit -5.1 L, goal of decreasing sodium between 6-8 mmol in 24 hour period  -Transition to oral amiodarone  -Nephrology following for hypernatremia, appreciate recommendations  -Cardiology following for atrial fibrillation, appreciate recommendations, repeat echocardiogram pending      Review of  Systems  Review of Systems   Unable to perform ROS: Intubated       Vital Signs for the last 24 hours  Temp:  [37 °C (98.6 °F)-37.5 °C (99.5 °F)] 37.2 °C (99 °F)  Pulse:  [61-75] 63  Resp:  [11-31] 21  BP: ()/(51-83) 87/53  SpO2:  [89 %-98 %] 96 %    Hemodynamic parameters for the last 24 hours  CVP:  [6 MM HG-12 MM HG] 7 MM HG    Vent Settings for the last 24 hours  Vent Mode: APVCMV  Rate (breaths/min): 20  Vt Target (mL): 450  PEEP/CPAP: 10  MAP: 13  Control VTE (exp VT): 437    Physical Exam  Physical Exam  Vitals and nursing note reviewed.   Constitutional:       Appearance: Normal appearance.   HENT:      Head: Normocephalic and atraumatic.      Right Ear: External ear normal.      Left Ear: External ear normal.      Nose: Nose normal.      Mouth/Throat:      Mouth: Mucous membranes are moist.      Pharynx: Oropharynx is clear.      Comments: ETT in place  Eyes:      Extraocular Movements: Extraocular movements intact.      Pupils: Pupils are equal, round, and reactive to light.   Cardiovascular:      Rate and Rhythm: Normal rate and regular rhythm.      Pulses: Normal pulses.      Heart sounds: Normal heart sounds.   Pulmonary:      Comments: Mechanical breath sounds  Abdominal:      General: There is no distension.      Palpations: Abdomen is soft.      Tenderness: There is no abdominal tenderness. There is no guarding or rebound.   Genitourinary:     Comments: Triplett in place  Musculoskeletal:         General: Normal range of motion.      Cervical back: Normal range of motion.   Skin:     General: Skin is warm.      Capillary Refill: Capillary refill takes less than 2 seconds.   Neurological:      Mental Status: He is alert.      Comments: RASS -1   Psychiatric:      Comments: Unable to assess         Medications  Current Facility-Administered Medications   Medication Dose Route Frequency Provider Last Rate Last Admin    oxyCODONE immediate-release (Roxicodone) tablet 5 mg  5 mg Enteral Tube Q6HRS PRN  Sarina Jones M.D.   5 mg at 10/13/23 0536    senna-docusate (Pericolace Or Senokot S) 8.6-50 MG per tablet 2 Tablet  2 Tablet Enteral Tube BID Cullen Rodrigues Jr. D.O.   2 Tablet at 10/13/23 1143    And    polyethylene glycol/lytes (Miralax) PACKET 1 Packet  1 Packet Enteral Tube QDAY PRN Cullen Rodrigues Jr. D.OSumit        And    magnesium hydroxide (Milk Of Magnesia) suspension 30 mL  30 mL Enteral Tube QDAY PRN Cullen Rodrigues Jr., D.OSumit        And    bisacodyl (Dulcolax) suppository 10 mg  10 mg Rectal QDAY PRN KASSI Jarvis Jr..O.        amoxicillin-clavulanate (Augmentin) 875-125 MG per tablet 1 Tablet  1 Tablet Enteral Tube Q12HRS KASSI Jarvis Jr..O.   1 Tablet at 10/13/23 1143    amiodarone (Cordarone) tablet 200 mg  200 mg Enteral Tube DAILY Pilar Small A.P.N.   200 mg at 10/13/23 0536    1/2 NS infusion   Intravenous Continuous KASSI Jarvis Jr..OSumit 83 mL/hr at 10/13/23 1042 New Bag at 10/13/23 1042    insulin lispro (HumaLOG,AdmeLOG) injection  2-9 Units Subcutaneous Q6HRS Elmira Griffin M.D.   3 Units at 10/13/23 1144    And    dextrose 50% (D50W) injection 25 g  25 g Intravenous Q15 MIN PRN Elmira Griffin M.D.        norepinephrine (Levophed) 8 mg in 250 mL NS infusion (premix)  0-1 mcg/kg/min (Ideal) Intravenous Continuous Jeremy M Gonda, M.D. 9.9 mL/hr at 10/13/23 0000 0.07 mcg/kg/min at 10/13/23 0000    Respiratory Therapy Consult   Nebulization Continuous RT Jeremy M Gonda, M.D.        famotidine (Pepcid) tablet 20 mg  20 mg Enteral Tube DAILY Jeremy M Gonda, M.D.        Or    famotidine (Pepcid) injection 20 mg  20 mg Intravenous DAILY Jeremy M Gonda, M.D.   20 mg at 10/13/23 0536    MD Alert...ICU Electrolyte Replacement per Pharmacy   Other PHARMACY TO DOSE Jeremy M Gonda, M.D.        lidocaine (Xylocaine) 1 % injection 2 mL  2 mL Tracheal Tube Q30 MIN PRN Jeremy M Gonda, M.D.        Pharmacy Consult: Enteral tube insertion - review meds/change route/product  selection  1 Each Other PHARMACY TO DOSE Jeremy M Gonda, M.D.        fentaNYL (Sublimaze) injection 100 mcg  100 mcg Intravenous Q15 MIN PRN Jeremy M Gonda, M.D.   100 mcg at 10/13/23 0913    And    fentaNYL (Sublimaze) injection 200 mcg  200 mcg Intravenous Q15 MIN PRN Jeremy M Gonda, M.D.        acetaminophen (Tylenol) tablet 650 mg  650 mg Enteral Tube Q6HRS PRN Sarina Jones M.D.   650 mg at 10/12/23 0540    heparin infusion 25,000 units in 500 mL 0.45% NACL  0-30 Units/kg/hr (Adjusted) Intravenous Continuous Jeremy M Gonda, M.D. 44.1 mL/hr at 10/13/23 0830 26 Units/kg/hr at 10/13/23 0830    heparin injection 3,400 Units  40 Units/kg (Adjusted) Intravenous PRN Jeremy M Gonda, M.D.   3,400 Units at 10/13/23 0831    insulin GLARGINE (Lantus,Semglee) injection  0.2 Units/kg/day Subcutaneous Q EVENING Liz Taylor M.D.   24 Units at 10/12/23 1710       Fluids    Intake/Output Summary (Last 24 hours) at 10/13/2023 1217  Last data filed at 10/13/2023 0800  Gross per 24 hour   Intake 4843.02 ml   Output 1435 ml   Net 3408.02 ml       Laboratory  Recent Labs     10/11/23  0022 10/11/23  1426 10/11/23  1632 10/12/23  0502 10/13/23  0229   [id number]  --  7.49  --   --   --    [id number]  --  30.0  --   --   --    [id number]  --  56.6*  --   --   --    BVNY3EBM  --  90.2*  --   --   --    ARTHCO3  --  22  --   --   --    [id number] 10 lts. 60L 100%  --   --   --    ARTBE  --  0  --   --   --    ISTATAPH  --   --  7.372* 7.468 7.411   ISTATAPCO2  --   --  41.1* 31.0 37.9*   ISTATAPO2  --   --  80 80 110*   ISTATATCO2  --   --  25 23 25   [id number]  --   --  95 97 98   ISTATARTHCO3  --   --  23.9 22.5 24.0   ISTATARTBE  --   --  -1 0 0   ISTATTEMP  --   --  38.4 C 37.9 C 37.2 C   ISTATFIO2  --   --  100 80 60   ISTATSPEC  --   --  Arterial Arterial Arterial   ISTATAPHTC  --   --  7.352* 7.455 7.408   MRIUILOF8ED  --   --  87 85 111*     Recent Labs     10/11/23  1426 10/12/23  0000 10/12/23  0410 10/12/23  0548  10/12/23  2349 10/13/23  0230 10/13/23  0639   SODIUM 154*   < > 155*   < > 152* 151* 149*   POTASSIUM 4.0  --  3.7  --   --  3.7  --    CHLORIDE 121*  --  119*  --   --  117*  --    CO2 22  --  25  --   --  24  --    *  --  100*  --   --  98*  --    CREATININE 1.62*  --  1.89*  --   --  1.73*  --    MAGNESIUM  --   --  3.1*  --   --  2.8*  --    PHOSPHORUS  --   --  3.2  --   --  3.8  --    CALCIUM 8.3*  --  8.4*  --   --  7.9*  --     < > = values in this interval not displayed.     Recent Labs     10/11/23  0455 10/11/23  1426 10/12/23  0410 10/13/23  0230   ALTSGPT 16  --   --   --    ASTSGOT 35  --   --   --    ALKPHOSPHAT 63  --   --   --    TBILIRUBIN 3.0*  --   --   --    PREALBUMIN  --   --  4.4*  --    GLUCOSE 245* 244* 236* 174*     Recent Labs     10/11/23  0455 10/11/23  1426 10/12/23  0410 10/13/23  0230   WBC 17.8* 18.4* 23.4* 20.4*   NEUTSPOLYS 80.00* 89.60* 91.20* 89.50*   LYMPHOCYTES 9.50* 4.30* 7.00* 7.80*   MONOCYTES 9.60 5.20 1.80 0.90   EOSINOPHILS 0.90 0.00 0.00 0.90   BASOPHILS 0.00 0.90 0.00 0.90   ASTSGOT 35  --   --   --    ALTSGPT 16  --   --   --    ALKPHOSPHAT 63  --   --   --    TBILIRUBIN 3.0*  --   --   --      Recent Labs     10/11/23  1426 10/12/23  0000 10/12/23  0410 10/13/23  0230   RBC 5.17  --  5.16 4.39*   HEMOGLOBIN 15.9  --  15.8 13.6*   HEMATOCRIT 48.8  --  49.0 42.2   PLATELETCT 127*  --  145* 123*   PROTHROMBTM  --  21.0*  --   --    APTT  --  39.5*  --   --    INR  --  1.79*  --   --        Imaging  X-Ray:  I have personally reviewed the images and compared with prior images.  EKG:  I have personally reviewed the images and compared with prior images.  CT:    Reviewed  Echo:   Reviewed  Ultrasound:  Reviewed    ASSESSEMENT and PLAN:    * Hypernatremia- (present on admission)  Assessment & Plan  Hypovolemic hypernatremia  Nephrology consulted  Continue to correct fluid status while closely monitoring Na  Free water flushes 300->400 mL q4h->q2h, free water deficit  -4.0 L  1/2 NS 83 ml/hr    Acute respiratory failure with hypoxia (HCC)- (present on admission)  Assessment & Plan  Patient is not protecting airway adequately and is requiring maximum HFNC --> intubate 10/11/23  Start full mech vent support  Post-intubation ABG/CXR and titrate vent accordingly  Sedation lite with precedex/fent  RT/O2 protocols  ABCDEF bundle    Altered mental status- (present on admission)  Assessment & Plan  Acute metabolic encephalopathy due to uremia, hypernatremia, sepsis, hypoxia  Limit sedatives  CT head and EEG unrevealing  Continue correcting underlying causes    Sepsis (HCC)- (present on admission)  Assessment & Plan  This is Septic shock Present on admission  SIRS criteria identified on my evaluation include: Tachycardia, with heart rate greater than 90 BPM, Tachypnea, with respirations greater than 20 per minute and Leukocytosis, with WBC greater than 12,000  Clinical indicators of end organ dysfunction include Hypotension with systolic blood pressure less than 90 or MAP less than 65, Lactic Acid greater than 2, Toxic Metabolic Encephalopathy and Acute Respiratory Failure - (mechanical ventilation or BiPap or PaO2/FiO2 ratio < 300)  Indicators of septic shock include: Sepsis present and persistent hypotension despite fluid resuscitation   Sources is: pulmonary vs GI  Sepsis protocol initiated  Crystalloid Fluid Administration: Resuscitation volume of 10 ml/kg ordered. Reason that resuscitation volume of less than 30ml/kg was ordered hypernatremia and desire to not overcorrect too quickly  IV antibiotics as appropriate for source of sepsis  Reassessment: I have reassessed the patient's hemodynamic status  Begin NE gtt to keep MAP >65    ADELAIDE (acute kidney injury) (HCC)- (present on admission)  Assessment & Plan  Secondary to ATN, pre-renal and rhabdo  Nephrology consulted  Avoid nephrotoxins  Monitor creatinine, UOP, electrolytes closely  Maintain perfusion to kidneys with vasopressor  support    Shock (HCC)  Assessment & Plan  Undifferentiated - cardiac due to arrhythmia vs hypovolemia vs sepsis  Titrate NE gtt to keep MAP >65  Fluid resuscitation  Monitor UOP, LA, VS closely for adequacy of resuscitation      Rhabdomyolysis- (present on admission)  Assessment & Plan  Continue IVF resuscitation, monitor CPK    Atrial fibrillation with RVR ([facility])- (present on admission)  Assessment & Plan  No known history of Afib, RVR with hypotension attempted electrical cardioversion 10/11  Transission amiodarone gtt to PO  Cardiology consulted  Heparin gtt  Optimize electrolytes, continuous tele monitoring    Dehydration- (present on admission)  Assessment & Plan  Continue fluid resuscitation intravenously and enterally for now  Monitor UOP closely    Diabetes mellitus-Type 2, not on treatment- (present on admission)  Assessment & Plan  SSI, hypoglycemia protocol, diabetic tube feeding    BPH (benign prostatic hyperplasia)- (present on admission)  Assessment & Plan  Triplett catheter in place, remove when clinically appropriate        VTE:   Heparin GTT  Ulcer: H2 Antagonist  Lines: Central Line  Ongoing indication addressed and Triplett Catheter  Ongoing indication addressed    I have performed a physical exam and reviewed and updated ROS and Plan today (10/13/2023). In review of yesterday's note (10/12/2023), there are no changes except as documented above.     Discussed patient condition and risk of morbidity and/or mortality with Hospitalist, Family, RN, RT, Therapies, Pharmacy, , and     Ming Farah D.O.  PGY-3 Internal Medicine Resident

## 2023-10-13 NOTE — CARE PLAN
Problem: Ventilation  Goal: Ability to achieve and maintain unassisted ventilation or tolerate decreased levels of ventilator support  Description: Target End Date:  4 days     Document on Vent flowsheet    1.  Support and monitor invasive and noninvasive mechanical ventilation  2.  Monitor ventilator weaning response  3.  Perform ventilator associated pneumonia prevention interventions  4.  Manage ventilation therapy by monitoring diagnostic test results  Outcome: Not Met       Respiratory Update    Ventilator Daily Summary    Vent Day #2    Airway: 8@22    Ventilator settings: 22/450/+12/70%    Weaning trials: No    Respiratory Procedures: No    Plan: Continue current ventilator settings and wean mechanical ventilation as tolerated per physician orders.

## 2023-10-14 ENCOUNTER — APPOINTMENT (OUTPATIENT)
Dept: RADIOLOGY | Facility: MEDICAL CENTER | Age: 83
DRG: 871 | End: 2023-10-14
Attending: INTERNAL MEDICINE
Payer: COMMERCIAL

## 2023-10-14 LAB
ANION GAP SERPL CALC-SCNC: 5 MMOL/L (ref 7–16)
BACTERIA BLD CULT: NORMAL
BACTERIA BLD CULT: NORMAL
BASOPHILS # BLD AUTO: 0 % (ref 0–1.8)
BASOPHILS # BLD: 0 K/UL (ref 0–0.12)
BUN SERPL-MCNC: 89 MG/DL (ref 8–22)
BURR CELLS BLD QL SMEAR: NORMAL
CALCIUM SERPL-MCNC: 8.1 MG/DL (ref 8.5–10.5)
CHLORIDE SERPL-SCNC: 115 MMOL/L (ref 96–112)
CO2 SERPL-SCNC: 25 MMOL/L (ref 20–33)
CREAT SERPL-MCNC: 1.29 MG/DL (ref 0.5–1.4)
EOSINOPHIL # BLD AUTO: 0.16 K/UL (ref 0–0.51)
EOSINOPHIL NFR BLD: 1.7 % (ref 0–6.9)
ERYTHROCYTE [DISTWIDTH] IN BLOOD BY AUTOMATED COUNT: 53.2 FL (ref 35.9–50)
GFR SERPLBLD CREATININE-BSD FMLA CKD-EPI: 55 ML/MIN/1.73 M 2
GLUCOSE BLD STRIP.AUTO-MCNC: 194 MG/DL (ref 65–99)
GLUCOSE BLD STRIP.AUTO-MCNC: 209 MG/DL (ref 65–99)
GLUCOSE BLD STRIP.AUTO-MCNC: 218 MG/DL (ref 65–99)
GLUCOSE BLD STRIP.AUTO-MCNC: 238 MG/DL (ref 65–99)
GLUCOSE SERPL-MCNC: 244 MG/DL (ref 65–99)
HCT VFR BLD AUTO: 31.7 % (ref 42–52)
HGB BLD-MCNC: 10 G/DL (ref 14–18)
LYMPHOCYTES # BLD AUTO: 0.57 K/UL (ref 1–4.8)
LYMPHOCYTES NFR BLD: 6 % (ref 22–41)
MAGNESIUM SERPL-MCNC: 2.5 MG/DL (ref 1.5–2.5)
MANUAL DIFF BLD: NORMAL
MCH RBC QN AUTO: 31.1 PG (ref 27–33)
MCHC RBC AUTO-ENTMCNC: 31.5 G/DL (ref 32.3–36.5)
MCV RBC AUTO: 98.4 FL (ref 81.4–97.8)
MONOCYTES # BLD AUTO: 0.48 K/UL (ref 0–0.85)
MONOCYTES NFR BLD AUTO: 5.1 % (ref 0–13.4)
MORPHOLOGY BLD-IMP: NORMAL
NEUTROPHILS # BLD AUTO: 8.28 K/UL (ref 1.82–7.42)
NEUTROPHILS NFR BLD: 87.2 % (ref 44–72)
NRBC # BLD AUTO: 0 K/UL
NRBC BLD-RTO: 0 /100 WBC (ref 0–0.2)
PHOSPHATE SERPL-MCNC: 3 MG/DL (ref 2.5–4.5)
PLATELET # BLD AUTO: 87 K/UL (ref 164–446)
PLATELET BLD QL SMEAR: NORMAL
PLATELETS.RETICULATED NFR BLD AUTO: 7.3 % (ref 0.6–13.1)
PMV BLD AUTO: 12.4 FL (ref 9–12.9)
POIKILOCYTOSIS BLD QL SMEAR: NORMAL
POTASSIUM SERPL-SCNC: 4 MMOL/L (ref 3.6–5.5)
RBC # BLD AUTO: 3.22 M/UL (ref 4.7–6.1)
RBC BLD AUTO: PRESENT
SIGNIFICANT IND 70042: NORMAL
SIGNIFICANT IND 70042: NORMAL
SITE SITE: NORMAL
SITE SITE: NORMAL
SODIUM SERPL-SCNC: 145 MMOL/L (ref 135–145)
SODIUM SERPL-SCNC: 146 MMOL/L (ref 135–145)
SOURCE SOURCE: NORMAL
SOURCE SOURCE: NORMAL
WBC # BLD AUTO: 9.5 K/UL (ref 4.8–10.8)

## 2023-10-14 PROCEDURE — 700102 HCHG RX REV CODE 250 W/ 637 OVERRIDE(OP): Performed by: INTERNAL MEDICINE

## 2023-10-14 PROCEDURE — 83735 ASSAY OF MAGNESIUM: CPT

## 2023-10-14 PROCEDURE — A9270 NON-COVERED ITEM OR SERVICE: HCPCS | Performed by: INTERNAL MEDICINE

## 2023-10-14 PROCEDURE — 94150 VITAL CAPACITY TEST: CPT

## 2023-10-14 PROCEDURE — 700105 HCHG RX REV CODE 258: Performed by: INTERNAL MEDICINE

## 2023-10-14 PROCEDURE — 94003 VENT MGMT INPAT SUBQ DAY: CPT

## 2023-10-14 PROCEDURE — 85055 RETICULATED PLATELET ASSAY: CPT

## 2023-10-14 PROCEDURE — 84295 ASSAY OF SERUM SODIUM: CPT

## 2023-10-14 PROCEDURE — 85007 BL SMEAR W/DIFF WBC COUNT: CPT

## 2023-10-14 PROCEDURE — 85027 COMPLETE CBC AUTOMATED: CPT

## 2023-10-14 PROCEDURE — 94799 UNLISTED PULMONARY SVC/PX: CPT

## 2023-10-14 PROCEDURE — 700102 HCHG RX REV CODE 250 W/ 637 OVERRIDE(OP): Performed by: NURSE PRACTITIONER

## 2023-10-14 PROCEDURE — 700111 HCHG RX REV CODE 636 W/ 250 OVERRIDE (IP): Mod: JZ | Performed by: INTERNAL MEDICINE

## 2023-10-14 PROCEDURE — 99232 SBSQ HOSP IP/OBS MODERATE 35: CPT | Performed by: INTERNAL MEDICINE

## 2023-10-14 PROCEDURE — 700102 HCHG RX REV CODE 250 W/ 637 OVERRIDE(OP): Performed by: STUDENT IN AN ORGANIZED HEALTH CARE EDUCATION/TRAINING PROGRAM

## 2023-10-14 PROCEDURE — A9270 NON-COVERED ITEM OR SERVICE: HCPCS | Performed by: STUDENT IN AN ORGANIZED HEALTH CARE EDUCATION/TRAINING PROGRAM

## 2023-10-14 PROCEDURE — A9270 NON-COVERED ITEM OR SERVICE: HCPCS | Performed by: NURSE PRACTITIONER

## 2023-10-14 PROCEDURE — 80048 BASIC METABOLIC PNL TOTAL CA: CPT

## 2023-10-14 PROCEDURE — 82962 GLUCOSE BLOOD TEST: CPT

## 2023-10-14 PROCEDURE — 99291 CRITICAL CARE FIRST HOUR: CPT | Performed by: INTERNAL MEDICINE

## 2023-10-14 PROCEDURE — 84100 ASSAY OF PHOSPHORUS: CPT

## 2023-10-14 PROCEDURE — 770022 HCHG ROOM/CARE - ICU (200)

## 2023-10-14 RX ORDER — INSULIN LISPRO 100 [IU]/ML
3-14 INJECTION, SOLUTION INTRAVENOUS; SUBCUTANEOUS EVERY 6 HOURS
Status: DISCONTINUED | OUTPATIENT
Start: 2023-10-14 | End: 2023-10-19

## 2023-10-14 RX ORDER — DEXTROSE MONOHYDRATE 25 G/50ML
25 INJECTION, SOLUTION INTRAVENOUS
Status: DISCONTINUED | OUTPATIENT
Start: 2023-10-14 | End: 2023-10-19

## 2023-10-14 RX ORDER — FAMOTIDINE 20 MG/1
20 TABLET, FILM COATED ORAL 2 TIMES DAILY
Status: DISCONTINUED | OUTPATIENT
Start: 2023-10-14 | End: 2023-10-14

## 2023-10-14 RX ADMIN — AMIODARONE HYDROCHLORIDE 200 MG: 200 TABLET ORAL at 05:38

## 2023-10-14 RX ADMIN — SENNOSIDES AND DOCUSATE SODIUM 2 TABLET: 50; 8.6 TABLET ORAL at 05:38

## 2023-10-14 RX ADMIN — POLYETHYLENE GLYCOL 3350 1 PACKET: 17 POWDER, FOR SOLUTION ORAL at 05:38

## 2023-10-14 RX ADMIN — FENTANYL CITRATE 100 MCG: 50 INJECTION, SOLUTION INTRAMUSCULAR; INTRAVENOUS at 09:42

## 2023-10-14 RX ADMIN — INSULIN GLARGINE-YFGN 24 UNITS: 100 INJECTION, SOLUTION SUBCUTANEOUS at 17:23

## 2023-10-14 RX ADMIN — HEPARIN SODIUM 26 UNITS/KG/HR: 5000 INJECTION, SOLUTION INTRAVENOUS at 04:57

## 2023-10-14 RX ADMIN — INSULIN LISPRO 3 UNITS: 100 INJECTION, SOLUTION INTRAVENOUS; SUBCUTANEOUS at 11:44

## 2023-10-14 RX ADMIN — INSULIN LISPRO 3 UNITS: 100 INJECTION, SOLUTION INTRAVENOUS; SUBCUTANEOUS at 06:10

## 2023-10-14 RX ADMIN — SODIUM CHLORIDE: 4.5 INJECTION, SOLUTION INTRAVENOUS at 21:21

## 2023-10-14 RX ADMIN — INSULIN LISPRO 4 UNITS: 100 INJECTION, SOLUTION INTRAVENOUS; SUBCUTANEOUS at 17:21

## 2023-10-14 RX ADMIN — SODIUM CHLORIDE: 4.5 INJECTION, SOLUTION INTRAVENOUS at 09:46

## 2023-10-14 RX ADMIN — FAMOTIDINE 20 MG: 20 TABLET ORAL at 05:38

## 2023-10-14 RX ADMIN — APIXABAN 5 MG: 5 TABLET, FILM COATED ORAL at 17:23

## 2023-10-14 RX ADMIN — AMOXICILLIN AND CLAVULANATE POTASSIUM 1 TABLET: 875; 125 TABLET, FILM COATED ORAL at 05:38

## 2023-10-14 RX ADMIN — ACETAMINOPHEN 650 MG: 325 TABLET, FILM COATED ORAL at 23:09

## 2023-10-14 ASSESSMENT — PAIN DESCRIPTION - PAIN TYPE
TYPE: ACUTE PAIN

## 2023-10-14 ASSESSMENT — PULMONARY FUNCTION TESTS
FVC: 1.4
FVC: 0.7

## 2023-10-14 NOTE — PROGRESS NOTES
"Critical Care Progress Note    Date of admission  10/9/2023    Chief Complaint  \"83 y.o. male who presented 10/9/2023 with a PMHx of DM and prostate disease admitted 10/9 from [facility] after being found down x 2 days. Found to be in Afib with RVR which converted with metoprolol and altered with hypernatremia and uremia and mild rhabo. Admitted to telemetry under hospitalist services. This afternoon developed worsening hypoxia requiring escalation to maximum HFNC. Also went back into Afib with RVR but this time was hemodynamically significant with a BP that dropped to 50/30 requiring attempted electrical cardioversion. Cardiology consulted. Na increasing despite IVF resuscitation. Nephrology consulted by hospitalist.\"    Hospital Course  10/14: improving Na, extubated    Interval Problem Update  Reviewed last 24 hour events:  Neuro: RASS 0- to +2, moves all 4  HR: SR 60-80s  SBP: levo off  Tmax: AF  GI: BM PTA, escalating bowel regimen. TF at goal  I/O:   Lines: PIV, subclaviaun line  Mobility: level 2  Resp: VD 4, 20/450/8/35. 0.7 VC.    Vte: heparin gtt  PPI/H2:pepcid  Antibx: augmentin  Lytes OK  Lantus  Bump sliding scale from mod to high  Maybe switch to lovenox    Switch free water flushes to q4h  D/c augmentin. Between C3, unasyn and augmentin hes had an adequate course for empiric pneumonia coverage    Review of Systems  Review of Systems   Unable to perform ROS: Intubated        Vital Signs for last 24 hours   Temp:  [36.7 °C (98.1 °F)-37.3 °C (99.1 °F)] 37 °C (98.6 °F)  Pulse:  [59-74] 70  Resp:  [3-32] 24  BP: ()/(49-77) 105/54  SpO2:  [81 %-100 %] 98 %    Hemodynamic parameters for last 24 hours  CVP:  [3 MM HG-13 MM HG] 5 MM HG    Respiratory Information for the last 24 hours  Vent Mode: Spont  Rate (breaths/min): 20  Vt Target (mL): 450  PEEP/CPAP: 8  P Support: 5  MAP: 11  Length of Weaning Trial (Hours): 4    Physical Exam   Physical Exam  /54   Pulse 64   Temp 37 °C (98.6 °F) " "(Bladder)   Resp (!) 26   Ht 1.803 m (5' 11\")   Wt 102 kg (225 lb 15.5 oz)   SpO2 98%   BMI 31.52 kg/m²   General: vent, off sedation  HEENT: ett  Pulm: ronchi on L, clear on R. Suctioned with imprved breath sounds  Cv: RRR, distant heart tones.   Abd: nl BS, soft, nontender  Neuro: alert, follows commands all 4, weaker LLE than RLE  Psych: calm  Skin: warm, dry  Medications  Current Facility-Administered Medications   Medication Dose Route Frequency Provider Last Rate Last Admin    famotidine (Pepcid) tablet 20 mg  20 mg Enteral Tube BID Amy Padgett M.D.        Or    famotidine (Pepcid) injection 20 mg  20 mg Intravenous BID Amy Padgett M.D.        insulin lispro (HumaLOG,AdmeLOG) injection  3-14 Units Subcutaneous Q6HRS Amy Padgett M.D.   3 Units at 10/14/23 1144    And    dextrose 50% (D50W) injection 25 g  25 g Intravenous Q15 MIN PRN Amy Padgett M.D.        oxyCODONE immediate-release (Roxicodone) tablet 5 mg  5 mg Enteral Tube Q6HRS PRN Sarina Jones M.D.   5 mg at 10/13/23 0536    senna-docusate (Pericolace Or Senokot S) 8.6-50 MG per tablet 2 Tablet  2 Tablet Enteral Tube BID Cullen Rodrigues Jr., D.O.   2 Tablet at 10/14/23 0538    And    polyethylene glycol/lytes (Miralax) PACKET 1 Packet  1 Packet Enteral Tube QDAY PRN Cullen Rodrigues Jr., D.O.   1 Packet at 10/14/23 0538    And    magnesium hydroxide (Milk Of Magnesia) suspension 30 mL  30 mL Enteral Tube QDAY PRN Cullen Rodrigues Jr., D.OSumit        And    bisacodyl (Dulcolax) suppository 10 mg  10 mg Rectal QDAY PRN Cullen Rodrigues Jr., D.O.        amiodarone (Cordarone) tablet 200 mg  200 mg Enteral Tube DAILY KEN ZavaletaP.N.   200 mg at 10/14/23 0538    1/2 NS infusion   Intravenous Continuous Cullen Rodrigues Jr., D.O. 83 mL/hr at 10/14/23 0946 New Bag at 10/14/23 0946    norepinephrine (Levophed) 8 mg in 250 mL NS infusion (premix)  0-1 mcg/kg/min (Ideal) Intravenous Continuous Jeremy M Gonda, M.D.   Paused at 10/13/23 " 0800    Respiratory Therapy Consult   Nebulization Continuous RT Jeremy M Gonda, M.D. MD Alert...ICU Electrolyte Replacement per Pharmacy   Other PHARMACY TO DOSE Jeremy M Gonda, M.D.        lidocaine (Xylocaine) 1 % injection 2 mL  2 mL Tracheal Tube Q30 MIN PRN Jeremy M Gonda, M.D.        Pharmacy Consult: Enteral tube insertion - review meds/change route/product selection  1 Each Other PHARMACY TO DOSE Jeremy M Gonda, M.D.        fentaNYL (Sublimaze) injection 100 mcg  100 mcg Intravenous Q15 MIN PRN Jeremy M Gonda, M.D.   100 mcg at 10/14/23 0942    And    fentaNYL (Sublimaze) injection 200 mcg  200 mcg Intravenous Q15 MIN PRN Jeremy M Gonda, M.D.        acetaminophen (Tylenol) tablet 650 mg  650 mg Enteral Tube Q6HRS PRN Sarina Jones M.D.   650 mg at 10/12/23 0540    heparin infusion 25,000 units in 500 mL 0.45% NACL  0-30 Units/kg/hr (Adjusted) Intravenous Continuous Jeremy M Gonda, M.D. 44.1 mL/hr at 10/14/23 0717 26 Units/kg/hr at 10/14/23 0717    heparin injection 3,400 Units  40 Units/kg (Adjusted) Intravenous PRN Jeremy M Gonda, M.D.   3,400 Units at 10/13/23 0831    insulin GLARGINE (Lantus,Semglee) injection  0.2 Units/kg/day Subcutaneous Q EVENING Liz Taylor M.D.   24 Units at 10/13/23 1751       Fluids    Intake/Output Summary (Last 24 hours) at 10/14/2023 1600  Last data filed at 10/14/2023 1200  Gross per 24 hour   Intake 7712.35 ml   Output 1460 ml   Net 6252.35 ml       Laboratory  Recent Labs     10/11/23  1632 10/12/23  0502 10/13/23  0229   ISTATAPH 7.372* 7.468 7.411   ISTATAPCO2 41.1* 31.0 37.9*   ISTATAPO2 80 80 110*   ISTATATCO2 25 23 25   [id number] 95 97 98   ISTATARTHCO3 23.9 22.5 24.0   ISTATARTBE -1 0 0   ISTATTEMP 38.4 C 37.9 C 37.2 C   ISTATFIO2 100 80 60   ISTATSPEC Arterial Arterial Arterial   ISTATAPHTC 7.352* 7.455 7.408   [id number] 87 85 111*         Recent Labs     10/12/23  0410 10/12/23  0548 10/13/23  0230 10/13/23  0639 10/13/23  7511 10/14/23  1414  10/14/23  1149   SODIUM 155*   < > 151*   < > 146* 145 145   POTASSIUM 3.7  --  3.7  --   --  4.0  --    CHLORIDE 119*  --  117*  --   --  115*  --    CO2 25  --  24  --   --  25  --    *  --  98*  --   --  89*  --    CREATININE 1.89*  --  1.73*  --   --  1.29  --    MAGNESIUM 3.1*  --  2.8*  --   --  2.5  --    PHOSPHORUS 3.2  --  3.8  --   --  3.0  --    CALCIUM 8.4*  --  7.9*  --   --  8.1*  --     < > = values in this interval not displayed.     Recent Labs     10/12/23  0410 10/13/23  0230 10/14/23  0434   PREALBUMIN 4.4*  --   --    GLUCOSE 236* 174* 244*     Recent Labs     10/12/23  0410 10/13/23  0230 10/14/23  0434   WBC 23.4* 20.4* 9.5   NEUTSPOLYS 91.20* 89.50* 87.20*   LYMPHOCYTES 7.00* 7.80* 6.00*   MONOCYTES 1.80 0.90 5.10   EOSINOPHILS 0.00 0.90 1.70   BASOPHILS 0.00 0.90 0.00     Recent Labs     10/12/23  0000 10/12/23  0410 10/13/23  0230 10/14/23  0434   RBC  --  5.16 4.39* 3.22*   HEMOGLOBIN  --  15.8 13.6* 10.0*   HEMATOCRIT  --  49.0 42.2 31.7*   PLATELETCT  --  145* 123* 87*   PROTHROMBTM 21.0*  --   --   --    APTT 39.5*  --   --   --    INR 1.79*  --   --   --        Imaging  X-Ray:  I have personally reviewed the images and compared with prior images.    Assessment/Plan  * Hypernatremia- (present on admission)  Assessment & Plan  Hypovolemic hypernatremia  Nephrology consulted  Continue to correct fluid status while closely monitoring Na  Correcting appropriately. Decrease free water flushes today and continue checks    Shock (HCC)  Assessment & Plan  Undifferentiated - cardiac due to arrhythmia vs hypovolemia vs sepsis  Shock resolved      Rhabdomyolysis- (present on admission)  Assessment & Plan  Continue IVF resuscitation, monitor CPK  Relatively mild elevation likely does not explain his renal dysfunction    Atrial fibrillation with RVR (HCC)- (present on admission)  Assessment & Plan  No known history of Afib, RVR with hypotension attempted electrical cardioversion 10/11  PO  amiodarone  Cardiology consulted  Heparin gtt-->apixaban given improved renal function  Optimize electrolytes, continuous tele monitoring    Dehydration- (present on admission)  Assessment & Plan  Continue fluid resuscitation intravenously and enterally for now  Monitor UOP closely    Erythrocytosis- (present on admission)  Assessment & Plan  Likely d/t hemoconcentration    Acute respiratory failure with hypoxia (HCC)- (present on admission)  Assessment & Plan  Intubated 10/11 for mental status and hypoxia on max HFNC  Lung protective ventilation   ABCDEF bundle  CXR as indicated: monitor lung volumes and tube/line placement  VAP bundle prevention, oral care, post pyloric feeding  Head of bed > 30 degree  GI prophylaxis  Daily awakening and SBT trials unless contraindicated  Monitor for liberation  Respiratory treatments: prn  Trial of extubation today, albeit without perfect weaning parameters    ADELAIDE (acute kidney injury) (HCC)- (present on admission)  Assessment & Plan  Secondary to ATN, pre-renal and rhabdo  Improving  Nephrology consulted  Avoid nephrotoxins  Monitor creatinine, UOP, electrolytes closely  Maintain perfusion to kidneys with vasopressor support    Altered mental status- (present on admission)  Assessment & Plan  Acute metabolic encephalopathy likely due to uremia, hypernatremia, possible sepsis, hypoxia  Weaning sedation  Improving.  Follows commands in all 4    Sepsis (HCC)- (present on admission)  Assessment & Plan  Unclear if patient truly had sepsis  Possible aspiration pneumonia has been adequately treated    Diabetes mellitus-Type 2, not on treatment- (present on admission)  Assessment & Plan  SSI  hypoglycemia protocol  diabetic tube feeding    BPH (benign prostatic hyperplasia)- (present on admission)  Assessment & Plan  Triplett catheter in place, remove when clinically appropriate         VTE:  Heparin  Ulcer: H2 Antagonist  Lines: Central Line  Ongoing indication addressed and Triplett  Catheter  Ongoing indication addressed    I have performed a physical exam and reviewed and updated ROS and Plan today (10/14/2023). In review of yesterday's note (10/13/2023), there are no changes except as documented above.     Discussed patient condition and risk of morbidity and/or mortality with RN, RT, Pharmacy, Charge nurse / hot rounds, and Patient  The patient remains critically ill on mechanical ventilation.  Critical care time = 75 minutes in directly providing and coordinating critical care and extensive data review.  No time overlap and excludes procedures.

## 2023-10-14 NOTE — PROGRESS NOTES
Extubation    Cuff leak noted? Yes  Stridor present? No    FiO2%: 30 % (10/14/23 1420)  O2 (LPM): 3 (10/14/23 1520)     Patient toleration? Tolerated well  Events/Summary/Plan: Extubation (10/14/23 1520)

## 2023-10-14 NOTE — PROGRESS NOTES
Nephrology Daily Progress Note    Date of Service  10/14/2023    Chief Complaint  83 y.o. male admitted 10/9/2023 with encephalopathy, hyponatremia, developed hypotension, was intubated yesterday.    Interval Problem Update  Patient still VDRF, no acute events overnight,  10/13 patient still VDRF, off of IV pressors  10/14 patient is very weak today, still VDRF  Good urine output  Review of Systems  Review of Systems   Unable to perform ROS: Intubated        Physical Exam  Temp:  [36.7 °C (98.1 °F)-37.3 °C (99.1 °F)] 37 °C (98.6 °F)  Pulse:  [59-74] 65  Resp:  [3-32] 22  BP: ()/(49-77) 105/54  SpO2:  [81 %-100 %] 97 %    Physical Exam  Vitals and nursing note reviewed.   Constitutional:       General: He is awake.      Appearance: He is ill-appearing.      Interventions: He is intubated.   HENT:      Head: Normocephalic and atraumatic.      Right Ear: External ear normal.      Left Ear: External ear normal.      Nose: Nose normal.      Mouth/Throat:      Pharynx: No oropharyngeal exudate or posterior oropharyngeal erythema.      Comments: ETT  Eyes:      General:         Right eye: No discharge.         Left eye: No discharge.      Conjunctiva/sclera: Conjunctivae normal.   Cardiovascular:      Rate and Rhythm: Normal rate and regular rhythm.   Pulmonary:      Effort: Respiratory distress present. He is intubated.      Breath sounds: No wheezing.      Comments: Coarse BS  Abdominal:      General: Abdomen is flat. Bowel sounds are normal.   Musculoskeletal:      Cervical back: No rigidity. No muscular tenderness.      Right lower leg: No edema.      Left lower leg: No edema.   Skin:     General: Skin is warm and dry.      Coloration: Skin is not jaundiced.   Neurological:      Mental Status: He is unresponsive.   Psychiatric:         Behavior: Behavior normal.         Fluids    Intake/Output Summary (Last 24 hours) at 10/14/2023 1244  Last data filed at 10/14/2023 1200  Gross per 24 hour   Intake 9712.35 ml  "  Output 1760 ml   Net 7952.35 ml         Laboratory  Recent Labs     10/12/23  0410 10/13/23  0230 10/14/23  0434   WBC 23.4* 20.4* 9.5   RBC 5.16 4.39* 3.22*   HEMOGLOBIN 15.8 13.6* 10.0*   HEMATOCRIT 49.0 42.2 31.7*   MCV 95.0 96.1 98.4*   MCH 30.6 31.0 31.1   MCHC 32.2* 32.2* 31.5*   RDW 52.2* 53.2* 53.2*   PLATELETCT 145* 123* 87*   MPV 11.3 11.6 12.4       Recent Labs     10/12/23  0410 10/12/23  0548 10/13/23  0230 10/13/23  0639 10/13/23  2351 10/14/23  0434 10/14/23  1149   SODIUM 155*   < > 151*   < > 146* 145 145   POTASSIUM 3.7  --  3.7  --   --  4.0  --    CHLORIDE 119*  --  117*  --   --  115*  --    CO2 25  --  24  --   --  25  --    GLUCOSE 236*  --  174*  --   --  244*  --    *  --  98*  --   --  89*  --    CREATININE 1.89*  --  1.73*  --   --  1.29  --    CALCIUM 8.4*  --  7.9*  --   --  8.1*  --     < > = values in this interval not displayed.       Recent Labs     10/12/23  0000   APTT 39.5*   INR 1.79*       No results for input(s): \"NTPROBNP\" in the last 72 hours.          Imaging  EC-ECHOCARDIOGRAM LTD W/O CONT   Final Result      DX-CHEST-PORTABLE (1 VIEW)   Final Result      1.  Mild interstitial pulmonary edema, similar to prior   2.  Bibasilar and perihilar underinflation atelectasis which could obscure an additional process. This is unchanged.      DX-CHEST-PORTABLE (1 VIEW)   Final Result      1.  Mild interstitial pulmonary edema   2.  Bibasilar and perihilar underinflation atelectasis which could obscure an additional process.   3.  Small LEFT pleural effusion      DX-CHEST-LIMITED (1 VIEW)   Final Result      1.  Bilateral basilar atelectasis and/or consolidation. Underlying infection is possible.   2.  Stable enlargement of the cardiomediastinal silhouette.   3.  Interval insertion of a central venous catheter which terminates with the tip projecting over the expected region of the mid superior vena cava.   4.  Interval placement of an endotracheal tube which terminates in " satisfactory position at the level of the aortic arch.   5.  Interval placement of an enteric feeding tube which terminates in the left upper quadrant projecting over the expected location of the stomach.      DX-CHEST-PORTABLE (1 VIEW)   Final Result      No significant change from prior exam.      DX-CHEST-PORTABLE (1 VIEW)   Final Result      Bibasilar underinflation atelectasis. Superimposed pneumonia not excluded.      US-RUQ   Final Result      1.  Gallbladder sludge   2.  RIGHT pleural effusion      EC-ECHOCARDIOGRAM COMPLETE W/ CONT   Final Result      CT-CSPINE WITHOUT PLUS RECONS   Final Result         1. No acute fracture from C1 through T1 is visualized.         CT-HEAD W/O   Final Result         1. No acute intracranial abnormality. No evidence of acute intracranial hemorrhage or mass lesion.                     DX-CHEST-PORTABLE (1 VIEW)   Final Result      1.  Low lung volumes without definite acute cardiopulmonary abnormality.            Assessment/Plan  1 hyponatremia: Secondary to volume depletion Free water deficit, sodium level is improving slowly  2 high BUN to creatinine ratio: Most likely secondary to volume depletion  3 encephalopathy  4 hypotension  5 VDRF  6 leukocytosis  no acute need for HD  Continue volume resuscitation  Serial sodium level check  Daily BMP, CBC.  Renal dose all meds  Avoid nephrotoxins like NSAIDs.  We will sign off the case please call if needed

## 2023-10-14 NOTE — CARE PLAN
The patient is Watcher - Medium risk of patient condition declining or worsening    Shift Goals  Clinical Goals: hemodynamic stability, pain management, mobility  Patient Goals: BA  Family Goals: BA    Progress made toward(s) clinical / shift goals:    Problem: Pain - Standard  Goal: Alleviation of pain or a reduction in pain to the patient’s comfort goal  Description: Target End Date:  Prior to discharge or change in level of care    Document on Vitals flowsheet    1.  Document pain using the appropriate pain scale per order or unit policy  2.  Educate and implement non-pharmacologic comfort measures (i.e. relaxation, distraction, massage, cold/heat therapy, etc.)  3.  Pain management medications as ordered  4.  Reassess pain after pain med administration per policy  5.  If opiods administered assess patient's response to pain medication is appropriate per POSS sedation scale  6.  Follow pain management plan developed in collaboration with patient and interdisciplinary team (including palliative care or pain specialists if applicable)  Outcome: Progressing  Note: Pain monitored throughout shift using CPOT with interventions as needed, see MAR     Problem: Safety - Medical Restraint  Goal: Remains free of injury from restraints (Restraint for Interference with Medical Device)  Description: INTERVENTIONS:  1. Determine that other, less restrictive measures have been tried or would not be effective before applying the restraint  2. Evaluate the patient's condition at the time of restraint application  3. Educate patient/family regarding the reason for restraint  4. Q2H: Monitor safety, psychosocial status, comfort, circulation, respiratory status, LOC, nutrition and hydration  Outcome: Progressing  Note: Patient remains in restraints due to risk of pulling lifesaving equipment, monitored and repositioned throughout shift

## 2023-10-14 NOTE — CARE PLAN
Problem: Ventilation  Goal: Ability to achieve and maintain unassisted ventilation or tolerate decreased levels of ventilator support  Description: Target End Date:  4 days     Document on Vent flowsheet    1.  Support and monitor invasive and noninvasive mechanical ventilation  2.  Monitor ventilator weaning response  3.  Perform ventilator associated pneumonia prevention interventions  4.  Manage ventilation therapy by monitoring diagnostic test results  Outcome: Not Met       Respiratory Update    Ventilator Daily Summary    Vent Day #4    Airway: 8@24    Ventilator settings: 20/450/8/45%    Weaning trials: No    Respiratory Procedures: No    Plan: Continue current ventilator settings and wean mechanical ventilation as tolerated per physician orders.

## 2023-10-14 NOTE — CARE PLAN
The patient is Unstable - High likelihood or risk of patient condition declining or worsening    Shift Goals  Clinical Goals: Stable hemodynamics, wean levo, mobilize  Patient Goals: BA  Family Goals: BA    Progress made toward(s) clinical / shift goals:    Problem: Knowledge Deficit - Standard  Goal: Patient and family/care givers will demonstrate understanding of plan of care, disease process/condition, diagnostic tests and medications  Outcome: Progressing     Problem: Pain - Standard  Goal: Alleviation of pain or a reduction in pain to the patient’s comfort goal  Outcome: Progressing     Problem: Hemodynamics  Goal: Patient's hemodynamics, fluid balance and neurologic status will be stable or improve  Outcome: Progressing     Problem: Fluid Volume  Goal: Fluid volume balance will be maintained  Outcome: Progressing     Problem: Urinary - Renal Perfusion  Goal: Ability to achieve and maintain adequate renal perfusion and functioning will improve  Outcome: Progressing     Problem: Respiratory  Goal: Patient will achieve/maintain optimum respiratory ventilation and gas exchange  Outcome: Progressing     Problem: Safety - Medical Restraint  Goal: Remains free of injury from restraints (Restraint for Interference with Medical Device)  Outcome: Progressing  Goal: Free from restraint(s) (Restraint for Interference with Medical Device)  Outcome: Progressing       Patient is not progressing towards the following goals:

## 2023-10-15 ENCOUNTER — APPOINTMENT (OUTPATIENT)
Dept: RADIOLOGY | Facility: MEDICAL CENTER | Age: 83
DRG: 871 | End: 2023-10-15
Attending: INTERNAL MEDICINE
Payer: COMMERCIAL

## 2023-10-15 PROBLEM — K02.9 DENTAL CARIES: Status: RESOLVED | Noted: 2023-10-09 | Resolved: 2023-10-15

## 2023-10-15 LAB
ANION GAP SERPL CALC-SCNC: 5 MMOL/L (ref 7–16)
ANISOCYTOSIS BLD QL SMEAR: ABNORMAL
BASE EXCESS BLDA CALC-SCNC: 0 MMOL/L (ref -4–3)
BASE EXCESS BLDA CALC-SCNC: 0 MMOL/L (ref -4–3)
BASOPHILS # BLD AUTO: 0 % (ref 0–1.8)
BASOPHILS # BLD: 0 K/UL (ref 0–0.12)
BODY TEMPERATURE: ABNORMAL DEGREES
BODY TEMPERATURE: NORMAL DEGREES
BREATHS SETTING VENT: 20
BUN SERPL-MCNC: 62 MG/DL (ref 8–22)
BURR CELLS BLD QL SMEAR: NORMAL
CALCIUM SERPL-MCNC: 8.5 MG/DL (ref 8.5–10.5)
CHLORIDE SERPL-SCNC: 117 MMOL/L (ref 96–112)
CO2 BLDA-SCNC: 25 MMOL/L (ref 20–33)
CO2 BLDA-SCNC: 26 MMOL/L (ref 20–33)
CO2 SERPL-SCNC: 25 MMOL/L (ref 20–33)
CREAT SERPL-MCNC: 0.87 MG/DL (ref 0.5–1.4)
DELSYS IDSYS: ABNORMAL
DELSYS IDSYS: NORMAL
END TIDAL CARBON DIOXIDE IECO2: 32 MMHG
EOSINOPHIL # BLD AUTO: 0.29 K/UL (ref 0–0.51)
EOSINOPHIL NFR BLD: 2.5 % (ref 0–6.9)
ERYTHROCYTE [DISTWIDTH] IN BLOOD BY AUTOMATED COUNT: 51.2 FL (ref 35.9–50)
GFR SERPLBLD CREATININE-BSD FMLA CKD-EPI: 86 ML/MIN/1.73 M 2
GLUCOSE BLD STRIP.AUTO-MCNC: 186 MG/DL (ref 65–99)
GLUCOSE BLD STRIP.AUTO-MCNC: 199 MG/DL (ref 65–99)
GLUCOSE BLD STRIP.AUTO-MCNC: 225 MG/DL (ref 65–99)
GLUCOSE BLD STRIP.AUTO-MCNC: 236 MG/DL (ref 65–99)
GLUCOSE BLD STRIP.AUTO-MCNC: 265 MG/DL (ref 65–99)
GLUCOSE SERPL-MCNC: 195 MG/DL (ref 65–99)
GRAM STN SPEC: NORMAL
HCO3 BLDA-SCNC: 23.8 MMOL/L (ref 17–25)
HCO3 BLDA-SCNC: 24.7 MMOL/L (ref 17–25)
HCT VFR BLD AUTO: 31.5 % (ref 42–52)
HGB BLD-MCNC: 9.9 G/DL (ref 14–18)
HOROWITZ INDEX BLDA+IHG-RTO: 108 MM[HG]
LPM ILPM: 15 LPM
LYMPHOCYTES # BLD AUTO: 0.87 K/UL (ref 1–4.8)
LYMPHOCYTES NFR BLD: 7.6 % (ref 22–41)
MANUAL DIFF BLD: NORMAL
MCH RBC QN AUTO: 30.8 PG (ref 27–33)
MCHC RBC AUTO-ENTMCNC: 31.4 G/DL (ref 32.3–36.5)
MCV RBC AUTO: 98.1 FL (ref 81.4–97.8)
METAMYELOCYTES NFR BLD MANUAL: 1.7 %
MICROCYTES BLD QL SMEAR: ABNORMAL
MODE IMODE: NORMAL
MONOCYTES # BLD AUTO: 0.39 K/UL (ref 0–0.85)
MONOCYTES NFR BLD AUTO: 3.4 % (ref 0–13.4)
MORPHOLOGY BLD-IMP: NORMAL
MYELOCYTES NFR BLD MANUAL: 0.9 %
NEUTROPHILS # BLD AUTO: 9.56 K/UL (ref 1.82–7.42)
NEUTROPHILS NFR BLD: 80.5 % (ref 44–72)
NEUTS BAND NFR BLD MANUAL: 3.4 % (ref 0–10)
NRBC # BLD AUTO: 0 K/UL
NRBC BLD-RTO: 0 /100 WBC (ref 0–0.2)
O2/TOTAL GAS SETTING VFR VENT: 60 %
PCO2 BLDA: 36.5 MMHG (ref 26–37)
PCO2 BLDA: 37.6 MMHG (ref 26–37)
PCO2 TEMP ADJ BLDA: 36.3 MMHG (ref 26–37)
PCO2 TEMP ADJ BLDA: 36.6 MMHG (ref 26–37)
PEEP END EXPIRATORY PRESSURE IPEEP: 10 CMH20
PH BLDA: 7.42 [PH] (ref 7.4–7.5)
PH BLDA: 7.42 [PH] (ref 7.4–7.5)
PH TEMP ADJ BLDA: 7.42 [PH] (ref 7.4–7.5)
PH TEMP ADJ BLDA: 7.43 [PH] (ref 7.4–7.5)
PLATELET # BLD AUTO: 104 K/UL (ref 164–446)
PLATELET BLD QL SMEAR: NORMAL
PLATELETS.RETICULATED NFR BLD AUTO: 7.5 % (ref 0.6–13.1)
PMV BLD AUTO: 12.3 FL (ref 9–12.9)
PO2 BLDA: 47 MMHG (ref 64–87)
PO2 BLDA: 65 MMHG (ref 64–87)
PO2 TEMP ADJ BLDA: 45 MMHG (ref 64–87)
PO2 TEMP ADJ BLDA: 65 MMHG (ref 64–87)
POIKILOCYTOSIS BLD QL SMEAR: NORMAL
POTASSIUM SERPL-SCNC: 4.2 MMOL/L (ref 3.6–5.5)
RBC # BLD AUTO: 3.21 M/UL (ref 4.7–6.1)
RBC BLD AUTO: PRESENT
SAO2 % BLDA: 84 % (ref 93–99)
SAO2 % BLDA: 93 % (ref 93–99)
SIGNIFICANT IND 70042: NORMAL
SITE SITE: NORMAL
SODIUM SERPL-SCNC: 143 MMOL/L (ref 135–145)
SODIUM SERPL-SCNC: 144 MMOL/L (ref 135–145)
SODIUM SERPL-SCNC: 147 MMOL/L (ref 135–145)
SODIUM SERPL-SCNC: 147 MMOL/L (ref 135–145)
SOURCE SOURCE: NORMAL
SPECIMEN DRAWN FROM PATIENT: ABNORMAL
SPECIMEN DRAWN FROM PATIENT: NORMAL
TIDAL VOLUME IVT: 450 ML
WBC # BLD AUTO: 11.4 K/UL (ref 4.8–10.8)

## 2023-10-15 PROCEDURE — 31500 INSERT EMERGENCY AIRWAY: CPT

## 2023-10-15 PROCEDURE — 31645 BRNCHSC W/THER ASPIR 1ST: CPT | Performed by: INTERNAL MEDICINE

## 2023-10-15 PROCEDURE — 770022 HCHG ROOM/CARE - ICU (200)

## 2023-10-15 PROCEDURE — 0B9L8ZX DRAINAGE OF LEFT LUNG, VIA NATURAL OR ARTIFICIAL OPENING ENDOSCOPIC, DIAGNOSTIC: ICD-10-PCS | Performed by: INTERNAL MEDICINE

## 2023-10-15 PROCEDURE — 80048 BASIC METABOLIC PNL TOTAL CA: CPT

## 2023-10-15 PROCEDURE — 85055 RETICULATED PLATELET ASSAY: CPT

## 2023-10-15 PROCEDURE — 84295 ASSAY OF SERUM SODIUM: CPT

## 2023-10-15 PROCEDURE — 31500 INSERT EMERGENCY AIRWAY: CPT | Performed by: INTERNAL MEDICINE

## 2023-10-15 PROCEDURE — 5A1945Z RESPIRATORY VENTILATION, 24-96 CONSECUTIVE HOURS: ICD-10-PCS | Performed by: INTERNAL MEDICINE

## 2023-10-15 PROCEDURE — 99152 MOD SED SAME PHYS/QHP 5/>YRS: CPT

## 2023-10-15 PROCEDURE — 87070 CULTURE OTHR SPECIMN AEROBIC: CPT

## 2023-10-15 PROCEDURE — 700111 HCHG RX REV CODE 636 W/ 250 OVERRIDE (IP): Performed by: INTERNAL MEDICINE

## 2023-10-15 PROCEDURE — 85007 BL SMEAR W/DIFF WBC COUNT: CPT

## 2023-10-15 PROCEDURE — 700102 HCHG RX REV CODE 250 W/ 637 OVERRIDE(OP): Performed by: INTERNAL MEDICINE

## 2023-10-15 PROCEDURE — 700102 HCHG RX REV CODE 250 W/ 637 OVERRIDE(OP): Performed by: NURSE PRACTITIONER

## 2023-10-15 PROCEDURE — 99233 SBSQ HOSP IP/OBS HIGH 50: CPT | Mod: 25 | Performed by: INTERNAL MEDICINE

## 2023-10-15 PROCEDURE — 92526 ORAL FUNCTION THERAPY: CPT | Performed by: SPEECH-LANGUAGE PATHOLOGIST

## 2023-10-15 PROCEDURE — A9270 NON-COVERED ITEM OR SERVICE: HCPCS | Performed by: INTERNAL MEDICINE

## 2023-10-15 PROCEDURE — 94799 UNLISTED PULMONARY SVC/PX: CPT

## 2023-10-15 PROCEDURE — 700105 HCHG RX REV CODE 258: Performed by: INTERNAL MEDICINE

## 2023-10-15 PROCEDURE — 99291 CRITICAL CARE FIRST HOUR: CPT | Mod: 25 | Performed by: INTERNAL MEDICINE

## 2023-10-15 PROCEDURE — 302978 HCHG BRONCHOSCOPY-DIAGNOSTIC

## 2023-10-15 PROCEDURE — 94640 AIRWAY INHALATION TREATMENT: CPT

## 2023-10-15 PROCEDURE — 82962 GLUCOSE BLOOD TEST: CPT | Mod: 91

## 2023-10-15 PROCEDURE — 0BH17EZ INSERTION OF ENDOTRACHEAL AIRWAY INTO TRACHEA, VIA NATURAL OR ARTIFICIAL OPENING: ICD-10-PCS | Performed by: INTERNAL MEDICINE

## 2023-10-15 PROCEDURE — 71045 X-RAY EXAM CHEST 1 VIEW: CPT

## 2023-10-15 PROCEDURE — 87186 SC STD MICRODIL/AGAR DIL: CPT

## 2023-10-15 PROCEDURE — 87205 SMEAR GRAM STAIN: CPT

## 2023-10-15 PROCEDURE — A9270 NON-COVERED ITEM OR SERVICE: HCPCS | Performed by: NURSE PRACTITIONER

## 2023-10-15 PROCEDURE — 82803 BLOOD GASES ANY COMBINATION: CPT | Mod: 91

## 2023-10-15 PROCEDURE — 92610 EVALUATE SWALLOWING FUNCTION: CPT | Performed by: SPEECH-LANGUAGE PATHOLOGIST

## 2023-10-15 PROCEDURE — 99233 SBSQ HOSP IP/OBS HIGH 50: CPT | Performed by: INTERNAL MEDICINE

## 2023-10-15 PROCEDURE — 87077 CULTURE AEROBIC IDENTIFY: CPT

## 2023-10-15 PROCEDURE — 36600 WITHDRAWAL OF ARTERIAL BLOOD: CPT

## 2023-10-15 PROCEDURE — 700101 HCHG RX REV CODE 250: Performed by: INTERNAL MEDICINE

## 2023-10-15 PROCEDURE — 85027 COMPLETE CBC AUTOMATED: CPT

## 2023-10-15 RX ORDER — PROPOFOL 10 MG/ML
40 INJECTION, EMULSION INTRAVENOUS ONCE
Status: COMPLETED | OUTPATIENT
Start: 2023-10-15 | End: 2023-10-15

## 2023-10-15 RX ORDER — SODIUM CHLORIDE FOR INHALATION 7 %
4 VIAL, NEBULIZER (ML) INHALATION
Status: DISCONTINUED | OUTPATIENT
Start: 2023-10-15 | End: 2023-10-16

## 2023-10-15 RX ORDER — LABETALOL HYDROCHLORIDE 5 MG/ML
10 INJECTION, SOLUTION INTRAVENOUS EVERY 6 HOURS PRN
Status: DISCONTINUED | OUTPATIENT
Start: 2023-10-15 | End: 2023-11-06 | Stop reason: HOSPADM

## 2023-10-15 RX ORDER — IPRATROPIUM BROMIDE AND ALBUTEROL SULFATE 2.5; .5 MG/3ML; MG/3ML
SOLUTION RESPIRATORY (INHALATION)
Status: ACTIVE
Start: 2023-10-15 | End: 2023-10-16

## 2023-10-15 RX ORDER — DEXMEDETOMIDINE HYDROCHLORIDE 4 UG/ML
0-.7 INJECTION, SOLUTION INTRAVENOUS CONTINUOUS
Status: DISCONTINUED | OUTPATIENT
Start: 2023-10-15 | End: 2023-10-17

## 2023-10-15 RX ORDER — TERAZOSIN 1 MG/1
1 CAPSULE ORAL EVERY EVENING
Status: DISCONTINUED | OUTPATIENT
Start: 2023-10-15 | End: 2023-10-20

## 2023-10-15 RX ORDER — ETOMIDATE 2 MG/ML
25 INJECTION INTRAVENOUS ONCE
Status: COMPLETED | OUTPATIENT
Start: 2023-10-15 | End: 2023-10-15

## 2023-10-15 RX ORDER — ROCURONIUM BROMIDE 10 MG/ML
90 INJECTION, SOLUTION INTRAVENOUS ONCE
Status: COMPLETED | OUTPATIENT
Start: 2023-10-15 | End: 2023-10-15

## 2023-10-15 RX ORDER — FAMOTIDINE 20 MG/1
20 TABLET, FILM COATED ORAL EVERY 12 HOURS
Status: DISCONTINUED | OUTPATIENT
Start: 2023-10-15 | End: 2023-10-17

## 2023-10-15 RX ORDER — TAMSULOSIN HYDROCHLORIDE 0.4 MG/1
0.4 CAPSULE ORAL
Status: DISCONTINUED | OUTPATIENT
Start: 2023-10-15 | End: 2023-10-15

## 2023-10-15 RX ORDER — PROPOFOL 10 MG/ML
30 INJECTION, EMULSION INTRAVENOUS ONCE
Status: COMPLETED | OUTPATIENT
Start: 2023-10-15 | End: 2023-10-15

## 2023-10-15 RX ADMIN — ALBUTEROL SULFATE 2.5 MG: 2.5 SOLUTION RESPIRATORY (INHALATION) at 18:18

## 2023-10-15 RX ADMIN — PROPOFOL 40 MG: 10 INJECTION, EMULSION INTRAVENOUS at 16:16

## 2023-10-15 RX ADMIN — FENTANYL CITRATE 100 MCG/HR: 50 INJECTION, SOLUTION INTRAMUSCULAR; INTRAVENOUS at 19:49

## 2023-10-15 RX ADMIN — AMIODARONE HYDROCHLORIDE 200 MG: 200 TABLET ORAL at 06:26

## 2023-10-15 RX ADMIN — APIXABAN 5 MG: 5 TABLET, FILM COATED ORAL at 06:26

## 2023-10-15 RX ADMIN — ALBUTEROL SULFATE 2.5 MG: 2.5 SOLUTION RESPIRATORY (INHALATION) at 22:15

## 2023-10-15 RX ADMIN — INSULIN LISPRO 4 UNITS: 100 INJECTION, SOLUTION INTRAVENOUS; SUBCUTANEOUS at 00:22

## 2023-10-15 RX ADMIN — FAMOTIDINE 20 MG: 20 TABLET ORAL at 17:26

## 2023-10-15 RX ADMIN — INSULIN LISPRO 3 UNITS: 100 INJECTION, SOLUTION INTRAVENOUS; SUBCUTANEOUS at 05:46

## 2023-10-15 RX ADMIN — SENNOSIDES AND DOCUSATE SODIUM 2 TABLET: 50; 8.6 TABLET ORAL at 17:26

## 2023-10-15 RX ADMIN — FENTANYL CITRATE 50 MCG: 50 INJECTION, SOLUTION INTRAMUSCULAR; INTRAVENOUS at 16:46

## 2023-10-15 RX ADMIN — SODIUM CHLORIDE: 4.5 INJECTION, SOLUTION INTRAVENOUS at 07:58

## 2023-10-15 RX ADMIN — FENTANYL CITRATE 50 MCG: 50 INJECTION, SOLUTION INTRAMUSCULAR; INTRAVENOUS at 17:03

## 2023-10-15 RX ADMIN — Medication 4 ML: at 20:00

## 2023-10-15 RX ADMIN — SODIUM CHLORIDE: 4.5 INJECTION, SOLUTION INTRAVENOUS at 19:12

## 2023-10-15 RX ADMIN — FENTANYL CITRATE 50 MCG: 50 INJECTION, SOLUTION INTRAMUSCULAR; INTRAVENOUS at 19:40

## 2023-10-15 RX ADMIN — PROPOFOL 30 MG: 10 INJECTION, EMULSION INTRAVENOUS at 16:03

## 2023-10-15 RX ADMIN — ETOMIDATE INJECTION 25 MG: 2 SOLUTION INTRAVENOUS at 16:00

## 2023-10-15 RX ADMIN — FENTANYL CITRATE 100 MCG: 50 INJECTION, SOLUTION INTRAMUSCULAR; INTRAVENOUS at 17:57

## 2023-10-15 RX ADMIN — INSULIN GLARGINE-YFGN 24 UNITS: 100 INJECTION, SOLUTION SUBCUTANEOUS at 17:36

## 2023-10-15 RX ADMIN — ROCURONIUM BROMIDE 90 MG: 50 INJECTION, SOLUTION INTRAVENOUS at 16:00

## 2023-10-15 RX ADMIN — TERAZOSIN 1 MG: 1 CAPSULE ORAL at 17:26

## 2023-10-15 RX ADMIN — APIXABAN 5 MG: 5 TABLET, FILM COATED ORAL at 17:26

## 2023-10-15 RX ADMIN — INSULIN LISPRO 3 UNITS: 100 INJECTION, SOLUTION INTRAVENOUS; SUBCUTANEOUS at 11:51

## 2023-10-15 RX ADMIN — INSULIN LISPRO 4 UNITS: 100 INJECTION, SOLUTION INTRAVENOUS; SUBCUTANEOUS at 17:34

## 2023-10-15 ASSESSMENT — PAIN DESCRIPTION - PAIN TYPE
TYPE: ACUTE PAIN

## 2023-10-15 ASSESSMENT — CHA2DS2 SCORE
SEX: MALE
AGE 75 OR GREATER: YES
AGE 65 TO 74: NO
PRIOR STROKE OR TIA OR THROMBOEMBOLISM: NO
DIABETES: YES
CHA2DS2 VASC SCORE: 3
VASCULAR DISEASE: NO

## 2023-10-15 ASSESSMENT — FIBROSIS 4 INDEX: FIB4 SCORE: 6.9

## 2023-10-15 NOTE — CARE PLAN
The patient is Unstable - High likelihood or risk of patient condition declining or worsening    Shift Goals  Clinical Goals: Wean O2, manage pain  Patient Goals: BA  Family Goals: BA    Progress made toward(s) clinical / shift goals: Pt. Was extubated to a nasal canula, and pain was well controled.   Problem: Knowledge Deficit - Standard  Goal: Patient and family/care givers will demonstrate understanding of plan of care, disease process/condition, diagnostic tests and medications  Outcome: Progressing     Problem: Pain - Standard  Goal: Alleviation of pain or a reduction in pain to the patient’s comfort goal  Outcome: Progressing     Problem: Hemodynamics  Goal: Patient's hemodynamics, fluid balance and neurologic status will be stable or improve  Outcome: Progressing     Problem: Fluid Volume  Goal: Fluid volume balance will be maintained  Outcome: Progressing     Problem: Urinary - Renal Perfusion  Goal: Ability to achieve and maintain adequate renal perfusion and functioning will improve  Outcome: Progressing     Problem: Skin Integrity  Goal: Skin integrity is maintained or improved  Outcome: Progressing       Patient is not progressing towards the following goals:

## 2023-10-15 NOTE — THERAPY
"Speech Language Pathology   Daily Treatment     Patient Name: Perry Marrufo  AGE:  83 y.o., SEX:  male  Medical Record #: [id number]  Date of Service: 10/15/2023      Precautions:  Precautions: Fall Risk, Swallow Precautions, Nasogastric Tube         Subjective  RN cleared pt to be seen and reported he was Aox1. Pt with improving voice per RN but continues to be dysphonic. Pt was \"uncomfortable\" upon SLP arrival in room so X2 RN's re-positioned pt upright in bed at 90 degrees. Pt with a left lean.       Assessment  Pt's oral cavity was visualized and noted to be coated in thick secretions. Oral care was completed with toothbrush and suction. Pt noted to have poor oral awareness as he could not bring the toothbrush to his mouth. SLP completed oral care and pt noted to have hyperactive gag reflex.   Pt refused to put in his dentures and was noted to have increasing confusion at this time. He was difficult to re-direct and perseverating on wanting to \"sit up higher\" despite being in a seated position in the chair.   Pt trialed ice chips X2 with adequate labial seal, suspected bolus loss and delayed initiation of swallow, and overt weak wet cough on 2/2 with no attempt to follow cues for continued stronger coughing or expulsion of bolus. Suction required to remove part of bolus from oral cavity.       Clinical Impressions  Pt's current cognitive level is impairing him from safety with oral intake. Pt would benefit from a FEES to determine physiology of swallow and least restrictive oral diet. Recommend continue NPO with NG tube until that is completed.       Recommendations  Treatment Completed: Dysphagia Treatment  Dysphagia Treatment  Diet Consistency: NPO with NG tube  Instrumentation: FEES  Medication: Non Oral  Oral Care: Q2h          SLP Treatment Plan  Treatment Plan: Dysphagia Treatment, Speech-Language Treatment  SLP Frequency: 3x Per Week  Estimated Duration: Until Therapy Goals Met      Anticipated Discharge " Needs  Discharge Recommendations: Recommend post-acute placement for additional speech therapy services prior to discharge home  Therapy Recommendations Upon DC: Dysphagia Training, Expression Training, Community Re-Integration, Patient / Family / Caregiver Education      Patient / Family Goals  Patient / Family Goal #1: None provided this date  Goal #1 Outcome: Progressing slower than expected  Short Term Goals  Short Term Goal # 1: Pt will consume prefeeding trials with SLP with no overt s/sx of aspiration  Goal Outcome # 1: Progressing slower than expected  Short Term Goal # 2: Pt will complete instrumental swallow assessment to determine physiology of swallow.      Nancy Dewitt MS, CCC-SLP

## 2023-10-15 NOTE — PROGRESS NOTES
Hospital Medicine Daily Progress Note    Date of Service  10/15/2023    Chief Complaint  Perry Marrufo is a 83 y.o. male admitted 10/9/2023 with altered mental status    Hospital Course  83-year-old male with history of diabetes and prostate disease who presented 10/9 from [place] with his nephew after found down by neighbors for 2 days.  Patient was not oriented and not able to provide history.  Patient was found on the floor covered with feces.  On admission CT head and CT spine was negative for any fracture, labs showed leukocytosis with lactic acidosis, blood sugar more than 300.  Her sodium was 151 urine drug screen was positive for benzos likely from the hospital.  Patient received IV fluid and Rocephin.  Chest x-ray did not show any infiltration and procalcitonin was negative.      On admission EKG showed A-fib with RVR, patient received 1 dose of metoprolol, patient was admitted to telemetry, echo did not show any acute finding.    On 10/11 patient developed hypotension with A-fib and RVR, rapid response was called, cardiologist and intensivist were consulted, patient received cardiac shock however not responding, amiodarone with nor epi was initiated and patient was transferred to the ICU.    Patient underwent emergent electrical cardioversion on October 11, 2023.    Nephrology consulted for his hyponatremia and it found to be hypovolemic hyponatremia.  He found to be in shock and he was placed on pressors.  He also found to have rhabdomyolysis and he has been receiving IV fluid and monitor CPK.    Today on October 15, 2023 intensivist Dr. Padgett requested to transfer care to hospitalist service.          Interval Problem Update    10/15/23  I evaluated and examined him at the bedside in ICU.  He was unable to provide me detailed information.  He was able to tell me his name and he lives in [place].  Currently he is requiring 5 L of oxygen via OxyMask and is maintaining 91% of oxygen saturation.  His  current blood pressure is 128/59 and respiratory rate of 35.  His current pulse is 67.  Current white blood cell count is 11.4, hemoglobin of 9.9 and platelet count of 104  Current sodium level is 147 and he has been receiving free water flushes.  I discussed plan of care with intensivist Dr. Padgett.  I discussed plan of care with bedside RN in ICU.      I have discussed this patient's plan of care and discharge plan at IDT rounds today with Case Management, Nursing, Nursing leadership, and other members of the IDT team.    Consultants/Specialty  Cardiologist, intensivist and nephrologist    Code Status  Full Code    Disposition  The patient is not medically cleared for discharge to home or a post-acute facility.  Anticipate discharge to: skilled nursing facility    I have placed the appropriate orders for post-discharge needs.    Review of Systems  Review of Systems   Unable to perform ROS: Mental status change        Physical Exam  Temp:  [36.8 °C (98.2 °F)-37.1 °C (98.8 °F)] 36.8 °C (98.2 °F)  Pulse:  [62-75] 67  Resp:  [22-37] 35  BP: (102-158)/(50-72) 128/59  SpO2:  [91 %-100 %] 91 %    Physical Exam  Vitals reviewed.   Constitutional:       General: He is not in acute distress.     Appearance: He is ill-appearing.   HENT:      Head: Normocephalic and atraumatic. No contusion.      Right Ear: External ear normal.      Left Ear: External ear normal.      Nose: Nose normal.      Comments: Feeding tube     Mouth/Throat:      Mouth: Mucous membranes are dry.      Pharynx: No oropharyngeal exudate.      Comments: OxyMask  Eyes:      General:         Right eye: No discharge.         Left eye: No discharge.      Pupils: Pupils are equal, round, and reactive to light.   Cardiovascular:      Rate and Rhythm: Normal rate and regular rhythm.      Heart sounds: No murmur heard.     No friction rub. No gallop.   Pulmonary:      Effort: Pulmonary effort is normal.      Breath sounds: No wheezing or rhonchi.   Abdominal:       General: Bowel sounds are normal. There is no distension.      Palpations: Abdomen is soft.      Tenderness: There is no abdominal tenderness. There is no rebound.   Musculoskeletal:         General: No swelling or tenderness. Normal range of motion.      Cervical back: No rigidity. No muscular tenderness.   Skin:     General: Skin is warm and dry.      Coloration: Skin is not jaundiced.   Neurological:      General: No focal deficit present.      Mental Status: He is alert.      Cranial Nerves: No cranial nerve deficit.      Sensory: No sensory deficit.      Comments: AAO x1  Following commands   Psychiatric:         Mood and Affect: Mood normal.           Fluids    Intake/Output Summary (Last 24 hours) at 10/15/2023 1339  Last data filed at 10/15/2023 1200  Gross per 24 hour   Intake 6828.93 ml   Output 3030 ml   Net 3798.93 ml         Laboratory  Recent Labs     10/13/23  0230 10/14/23  0434 10/15/23  0537   WBC 20.4* 9.5 11.4*   RBC 4.39* 3.22* 3.21*   HEMOGLOBIN 13.6* 10.0* 9.9*   HEMATOCRIT 42.2 31.7* 31.5*   MCV 96.1 98.4* 98.1*   MCH 31.0 31.1 30.8   MCHC 32.2* 31.5* 31.4*   RDW 53.2* 53.2* 51.2*   PLATELETCT 123* 87* 104*   MPV 11.6 12.4 12.3       Recent Labs     10/13/23  0230 10/13/23  0639 10/14/23  0434 10/14/23  1149 10/15/23  0016 10/15/23  0537 10/15/23  1150   SODIUM 151*   < > 145   < > 143 147* 147*   POTASSIUM 3.7  --  4.0  --   --  4.2  --    CHLORIDE 117*  --  115*  --   --  117*  --    CO2 24  --  25  --   --  25  --    GLUCOSE 174*  --  244*  --   --  195*  --    BUN 98*  --  89*  --   --  62*  --    CREATININE 1.73*  --  1.29  --   --  0.87  --    CALCIUM 7.9*  --  8.1*  --   --  8.5  --     < > = values in this interval not displayed.                       Imaging  DX-ABDOMEN FOR TUBE PLACEMENT   Final Result      Nasogastric tube tip overlies the mid stomach      DX-ABDOMEN FOR TUBE PLACEMENT   Final Result      NG tube turns in the gastric body with tip at the level of the gastric  fundus.      EC-ECHOCARDIOGRAM LTD W/O CONT   Final Result      DX-CHEST-PORTABLE (1 VIEW)   Final Result      1.  Mild interstitial pulmonary edema, similar to prior   2.  Bibasilar and perihilar underinflation atelectasis which could obscure an additional process. This is unchanged.      DX-CHEST-PORTABLE (1 VIEW)   Final Result      1.  Mild interstitial pulmonary edema   2.  Bibasilar and perihilar underinflation atelectasis which could obscure an additional process.   3.  Small LEFT pleural effusion      DX-CHEST-LIMITED (1 VIEW)   Final Result      1.  Bilateral basilar atelectasis and/or consolidation. Underlying infection is possible.   2.  Stable enlargement of the cardiomediastinal silhouette.   3.  Interval insertion of a central venous catheter which terminates with the tip projecting over the expected region of the mid superior vena cava.   4.  Interval placement of an endotracheal tube which terminates in satisfactory position at the level of the aortic arch.   5.  Interval placement of an enteric feeding tube which terminates in the left upper quadrant projecting over the expected location of the stomach.      DX-CHEST-PORTABLE (1 VIEW)   Final Result      No significant change from prior exam.      DX-CHEST-PORTABLE (1 VIEW)   Final Result      Bibasilar underinflation atelectasis. Superimposed pneumonia not excluded.      US-RUQ   Final Result      1.  Gallbladder sludge   2.  RIGHT pleural effusion      EC-ECHOCARDIOGRAM COMPLETE W/ CONT   Final Result      CT-CSPINE WITHOUT PLUS RECONS   Final Result         1. No acute fracture from C1 through T1 is visualized.         CT-HEAD W/O   Final Result         1. No acute intracranial abnormality. No evidence of acute intracranial hemorrhage or mass lesion.                     DX-CHEST-PORTABLE (1 VIEW)   Final Result      1.  Low lung volumes without definite acute cardiopulmonary abnormality.             Assessment/Plan  * Hypernatremia- (present on  admission)  Assessment & Plan  Hypovolemic hypernatremia  Nephrology consulted  Continue to correct fluid status while closely monitoring Na  Correcting appropriately.  Changed free water flushes 400 mL every 2 hours to every 4 hours, calculated 2.6L free water deficit, change q6h sodium checks to q12h  Continue monitor sodium.  Nephrology has been following him.    Shock ([facility])  Assessment & Plan  Undifferentiated - cardiac due to arrhythmia vs hypovolemia vs sepsis  Shock resolved    Aspiration precautions  Assessment & Plan  Increasing oxygen requirement and chest x-ray showed infiltration possible pneumonia  Oxygen requirement has significantly improved.  Continue monitor closely.    Rhabdomyolysis- (present on admission)  Assessment & Plan  Continue IVF resuscitation, monitor CPK  CPK significant trended down.    Atrial fibrillation with RVR ([facility])- (present on admission)  Assessment & Plan  No known history of Afib, RVR with hypotension attempted electrical cardioversion 10/11  PO amiodarone  Cardiology consulted  Heparin gtt-->apixaban given improved renal function  Optimize electrolytes, continuous tele monitoring  Continue monitor electrolytes and replace as needed.  Continue monitor closely on telemetry.    Dehydration- (present on admission)  Assessment & Plan  Continue fluid resuscitation intravenously and enterally for now  Monitor UOP closely    Erythrocytosis- (present on admission)  Assessment & Plan  Likely d/t hemoconcentration    Acute respiratory failure with hypoxia ([facility])- (present on admission)  Assessment & Plan  Intubated 10/11 for mental status and hypoxia on max HFNC  Extubated 10/14  Pulmonary hygiene  Wean O2 as able  Currently he is on 0.5 L of oxygen maintaining oxygen saturation.      ADELAIDE (acute kidney injury) ([facility])- (present on admission)  Assessment & Plan  Secondary to ATN, pre-renal and rhabdo  Improving  Nephrology has been following him.  Worsening renal functions.  Avoid  nephrotoxins  Monitor creatinine, UOP, electrolytes closely  I ordered CMP for tomorrow.    Altered mental status- (present on admission)  Assessment & Plan  Acute metabolic encephalopathy likely due to uremia, hypernatremia and hypoxia  Overall improving   Continue to monitor.    DM2 (diabetes mellitus, type 2) ([facility])- (present on admission)  Assessment & Plan  A1c 8  Continue insulin sliding scale with hypoglycemia protocol.  Continue diabetic tube feeding.      Sepsis ([facility])- (present on admission)  Assessment & Plan  Unclear if patient truly had sepsis  Possible aspiration pneumonia has been adequately treated  Currently he is hemodynamically stable.      Diabetes mellitus-Type 2, not on treatment- (present on admission)  Assessment & Plan  Continue Lantus 24 units daily  Current HbA1c is 8.0  I am continuing insulin sliding scale with hypoglycemia protocol.    BPH (benign prostatic hyperplasia)- (present on admission)  Assessment & Plan  I started him on tamsulosin.         I personally discussed case with intensivist Dr. Padgett.    >51 minutes total time spent in records review, lab/radiology assessment, patient history and assessment, and directing plan of care with high level medical decision making complexity. See orders.    Addendum: Bedside RN notified me that patient developed severe shortness of breath and he was placed on nonrebreather mask.  I reviewed with him at the bedside and ordered a stat chest x-ray and ABGs.  I discussed that with intensivist Dr. Padgett.  Patient later got intubated.  Transferred to critical care team.      VTE prophylaxis:    therapeutic anticoagulation with eliquis 5 mg BID      I have performed a physical exam and reviewed and updated ROS and Plan today (10/15/2023). In review of yesterday's note (10/14/2023), there are no changes except as documented above.

## 2023-10-15 NOTE — CARE PLAN
The patient is Stable - Low risk of patient condition declining or worsening    Shift Goals  Clinical Goals: wean O2, pain management, hemodynamic stability  Patient Goals: rest  Family Goals: BA    Progress made toward(s) clinical / shift goals:    Problem: Knowledge Deficit - Standard  Goal: Patient and family/care givers will demonstrate understanding of plan of care, disease process/condition, diagnostic tests and medications  Description: Target End Date:  1-3 days or as soon as patient condition allows    Document in Patient Education    1.  Patient and family/caregiver oriented to unit, equipment, visitation policy and means for communicating concern  2.  Complete/review Learning Assessment  3.  Assess knowledge level of disease process/condition, treatment plan, diagnostic tests and medications  4.  Explain disease process/condition, treatment plan, diagnostic tests and medications  Outcome: Progressing  Note: Pt educated on interventions and plan of care throughout shift     Problem: Pain - Standard  Goal: Alleviation of pain or a reduction in pain to the patient’s comfort goal  Description: Target End Date:  Prior to discharge or change in level of care    Document on Vitals flowsheet    1.  Document pain using the appropriate pain scale per order or unit policy  2.  Educate and implement non-pharmacologic comfort measures (i.e. relaxation, distraction, massage, cold/heat therapy, etc.)  3.  Pain management medications as ordered  4.  Reassess pain after pain med administration per policy  5.  If opiods administered assess patient's response to pain medication is appropriate per POSS sedation scale  6.  Follow pain management plan developed in collaboration with patient and interdisciplinary team (including palliative care or pain specialists if applicable)  Outcome: Progressing  Note: Pain monitored throughout shift using 0-10 scale with interventions as needed, see MAR     Problem: Skin Integrity  Goal:  Skin integrity is maintained or improved  Description: Target End Date:  Prior to discharge or change in level of care    Document interventions on Skin Risk/Steven flowsheet groups and corresponding LDA    1.  Assess and monitor skin integrity, appearance and/or temperature  2.  Assess risk factors for impaired skin integrity and/or pressures ulcers  3.  Implement precautions to protect skin integrity in collaboration with interdisciplinary team  4.  Implement pressure ulcer prevention protocol if at risk for skin breakdown  5.  Confirm wound care consult if at risk for skin breakdown  6.  Ensure patient use of pressure relieving devices  (Low air loss bed, waffle overlay, heel protectors, ROHO cushion, etc)  Outcome: Progressing  Note: Wound and pressure dressing changed

## 2023-10-15 NOTE — PROGRESS NOTES
[facility] Progress Note    Admit Date: 10/9/2023    Resident(s): Ming Farah D.O.   Attending:  SEVEN LEDESMA/ Dr. Rodrigues    Patient ID:    Name:  Perry Marrufo     YOB: 1940  Age:  83 y.o.  male   MRN:  [id number]    Reason for Consultation  Respiratory failure, AMS, Afib with RVR    Hospital Course (carried forward and updated):    83 y.o. male who presented 10/9/2023 with a PMHx of DM and prostate disease admitted 10/9 from [facility] after being found down x 2 days. Found to be in Afib with RVR which converted with metoprolol and altered with hypernatremia and uremia and mild rhabo. Admitted to telemetry under hospitalist services. This afternoon developed worsening hypoxia requiring escalation to maximum HFNC. Also went back into Afib with RVR but this time was hemodynamically significant with a BP that dropped to 50/30 requiring attempted electrical cardioversion. Cardiology consulted. Na increasing despite IVF resuscitation. Nephrology consulted by hospitalist.    10/12 - VD #2, transition to oral amiodarone, free water flushes 300 mL q4h, free water deficit -5.1 L  10/13 - VD #3, Increase free water flushes  10/14 - Extubated today    Consultants:  Cardiology  Nephrology     Interval Events:    ANNEEON    Reviewed last 24 hour events:  Neuro: A&O x1  HR: NSR 60s  SBP: 80-150s  Tmax: AF  GI: NPO, TF at goal, 3 large BM in 24 hrs  Endo: Glu 200s, Glarg 24u, Lispro 11u  I/O: +6351/-2700/+3651, net +[id number] since admit  Lines: CVC, arceo  Mobility: Level 2  Resp: 98% at 3L NC  CXR: None today  Vte: Apixaban  PPI/H2: Not indicated  Antibx: Unasyn (10/10-10/14)  GTT: None    WBC 20.4->9.5->11.4, HgB 15.8->13.6->10->9.9 (baseline unknown), MCV ~95, Plt 104 (baseline 150s)  Na 143->147 (151 on admit on 10/9), Cl 121->117, Cr 1.89->0.87 (Baseline unknown),  Bcx (10/9/23): NGTD, expanded viral panel negative  Echo (10/13/23): EF 65%, no significant valvular abnormalities    -Changed free water flushes 400 mL  every 2 hours to every 4 hours, calculated 2.6L free water deficit, change q6h sodium checks to q12h  -Continue load with amiodarone for atrial fibrillation  -Pulmonary hygiene  -Delirium precautions  -Downgrade to IMCU      Review of Systems  Review of Systems   Unable to perform ROS: Critical illness       Vital Signs for the last 24 hours  Temp:  [36.9 °C (98.4 °F)-37.1 °C (98.8 °F)] 36.9 °C (98.5 °F)  Pulse:  [62-75] 72  Resp:  [22-37] 37  BP: ()/(50-72) 112/55  SpO2:  [92 %-100 %] 99 %    Hemodynamic parameters for the last 24 hours  CVP:  [2 MM HG-10 MM HG] 7 MM HG    Vent Settings for the last 24 hours  Vent Mode: Spont  PEEP/CPAP: 8  P Support: 5  MAP: 11    Physical Exam  Physical Exam  Vitals and nursing note reviewed.   Constitutional:       Appearance: Normal appearance.   HENT:      Head: Normocephalic and atraumatic.      Right Ear: External ear normal.      Left Ear: External ear normal.      Nose: Nose normal.      Mouth/Throat:      Mouth: Mucous membranes are moist.      Pharynx: Oropharynx is clear.   Eyes:      Extraocular Movements: Extraocular movements intact.      Pupils: Pupils are equal, round, and reactive to light.   Cardiovascular:      Rate and Rhythm: Normal rate and regular rhythm.      Pulses: Normal pulses.      Heart sounds: Normal heart sounds.   Pulmonary:      Breath sounds: Rhonchi present.   Abdominal:      General: There is no distension.      Palpations: Abdomen is soft.      Tenderness: There is no abdominal tenderness. There is no guarding or rebound.   Genitourinary:     Comments: Triplett in place  Musculoskeletal:         General: Normal range of motion.      Cervical back: Normal range of motion.   Skin:     General: Skin is warm.      Capillary Refill: Capillary refill takes less than 2 seconds.   Neurological:      Mental Status: He is alert.      Comments: A&O x1         Medications  Current Facility-Administered Medications   Medication Dose Route Frequency  Provider Last Rate Last Admin    insulin lispro (HumaLOG,AdmeLOG) injection  3-14 Units Subcutaneous Q6HRS Amy Padgett M.D.   3 Units at 10/15/23 1151    And    dextrose 50% (D50W) injection 25 g  25 g Intravenous Q15 MIN PRN Amy Padgett M.D.        apixaban (Eliquis) tablet 5 mg  5 mg Enteral Tube BID Amy Padgett M.D.   5 mg at 10/15/23 0626    senna-docusate (Pericolace Or Senokot S) 8.6-50 MG per tablet 2 Tablet  2 Tablet Enteral Tube BID Cullen Rodrigues Jr., D.O.   2 Tablet at 10/14/23 0538    And    polyethylene glycol/lytes (Miralax) PACKET 1 Packet  1 Packet Enteral Tube QDAY PRN Cullen Rodrigues Jr., D.O.   1 Packet at 10/14/23 0538    And    magnesium hydroxide (Milk Of Magnesia) suspension 30 mL  30 mL Enteral Tube QDAY PRN Cullen Rodrigues Jr., D.O.        And    bisacodyl (Dulcolax) suppository 10 mg  10 mg Rectal QDAY PRN Cullen Rodrigues Jr., D.O.        amiodarone (Cordarone) tablet 200 mg  200 mg Enteral Tube DAILY Pilar Small A.P.N.   200 mg at 10/15/23 0626    1/2 NS infusion   Intravenous Continuous Cullen Rodrigues Jr., D.O. 83 mL/hr at 10/15/23 0758 New Bag at 10/15/23 0758    Respiratory Therapy Consult   Nebulization Continuous RT Jeremy M Gonda, M.D.        MD Alert...ICU Electrolyte Replacement per Pharmacy   Other PHARMACY TO DOSE Jeremy M Gonda, M.D.        Pharmacy Consult: Enteral tube insertion - review meds/change route/product selection  1 Each Other PHARMACY TO DOSE Jeremy M Gonda, M.D.        acetaminophen (Tylenol) tablet 650 mg  650 mg Enteral Tube Q6HRS PRN Sarina Jones M.D.   650 mg at 10/14/23 2309    insulin GLARGINE (Lantus,Semglee) injection  0.2 Units/kg/day Subcutaneous Q EVENING Liz Taylor M.D.   24 Units at 10/14/23 1723       Fluids    Intake/Output Summary (Last 24 hours) at 10/15/2023 1225  Last data filed at 10/15/2023 0800  Gross per 24 hour   Intake 5408.93 ml   Output 2740 ml   Net 2668.93 ml       Laboratory  Recent Labs     10/13/23  4574    ISTATAPH 7.411   ISTATAPCO2 37.9*   ISTATAPO2 110*   ISTATATCO2 25   DMOGYWN6OGU 98   ISTATARTHCO3 24.0   ISTATARTBE 0   ISTATTEMP 37.2 C   ISTATFIO2 60   ISTATSPEC Arterial   ISTATAPHTC 7.408   [id number] 111*     Recent Labs     10/13/23  0230 10/13/23  0639 10/14/23  0434 10/14/23  1149 10/14/23  1729 10/15/23  0016 10/15/23  0537   SODIUM 151*   < > 145   < > 145 143 147*   POTASSIUM 3.7  --  4.0  --   --   --  4.2   CHLORIDE 117*  --  115*  --   --   --  117*   CO2 24  --  25  --   --   --  25   BUN 98*  --  89*  --   --   --  62*   CREATININE 1.73*  --  1.29  --   --   --  0.87   MAGNESIUM 2.8*  --  2.5  --   --   --   --    PHOSPHORUS 3.8  --  3.0  --   --   --   --    CALCIUM 7.9*  --  8.1*  --   --   --  8.5    < > = values in this interval not displayed.     Recent Labs     10/13/23  0230 10/14/23  0434 10/15/23  0537   GLUCOSE 174* 244* 195*     Recent Labs     10/13/23  0230 10/14/23  0434 10/15/23  0537   WBC 20.4* 9.5 11.4*   NEUTSPOLYS 89.50* 87.20* 80.50*   LYMPHOCYTES 7.80* 6.00* 7.60*   MONOCYTES 0.90 5.10 3.40   EOSINOPHILS 0.90 1.70 2.50   BASOPHILS 0.90 0.00 0.00     Recent Labs     10/13/23  0230 10/14/23  0434 10/15/23  0537   RBC 4.39* 3.22* 3.21*   HEMOGLOBIN 13.6* 10.0* 9.9*   HEMATOCRIT 42.2 31.7* 31.5*   PLATELETCT 123* 87* 104*       Imaging  X-Ray:  I have personally reviewed the images and compared with prior images.  EKG:  I have personally reviewed the images and compared with prior images.  CT:    Reviewed  Echo:   Reviewed  Ultrasound:  Reviewed    ASSESSEMENT and PLAN:    * Hypernatremia- (present on admission)  Assessment & Plan  Hypovolemic hypernatremia  Nephrology consulted  Continue to correct fluid status while closely monitoring Na  Correcting appropriately.  Changed free water flushes 400 mL every 2 hours to every 4 hours, calculated 2.6L free water deficit, change q6h sodium checks to q12h    Acute respiratory failure with hypoxia (HCC)- (present on  admission)  Assessment & Plan  Intubated 10/11 for mental status and hypoxia on max HFNC  Extubated 10/14  Pulmonary hygiene  Wean O2 as able      Altered mental status- (present on admission)  Assessment & Plan  Acute metabolic encephalopathy likely due to uremia, hypernatremia, possible sepsis, hypoxia  Weaning sedation  Improving.  Follows commands in all 4  Delirium precautions    Sepsis ([facility])- (present on admission)  Assessment & Plan  Unclear if patient truly had sepsis  Possible aspiration pneumonia has been adequately treated      ADELAIDE (acute kidney injury) ([facility])- (present on admission)  Assessment & Plan  Secondary to ATN, pre-renal and rhabdo  Improving  Nephrology consulted  Avoid nephrotoxins  Monitor creatinine, UOP, electrolytes closely  Maintain perfusion to kidneys with vasopressor support    Shock ([facility])  Assessment & Plan  Undifferentiated - cardiac due to arrhythmia vs hypovolemia vs sepsis  Shock resolved    Aspiration precautions  Assessment & Plan  Increasing oxygen requirement and chest x-ray showed infiltration possible pneumonia  Patient came with altered mental status  Continue Unasyn and waiting for cultures  Continue oxygen therapy    Rhabdomyolysis- (present on admission)  Assessment & Plan  Continue IVF resuscitation, monitor CPK  Relatively mild elevation likely does not explain his renal dysfunction    Atrial fibrillation with RVR ([facility])- (present on admission)  Assessment & Plan  No known history of Afib, RVR with hypotension attempted electrical cardioversion 10/11  PO amiodarone  Cardiology consulted  Heparin gtt-->apixaban given improved renal function  Optimize electrolytes, continuous tele monitoring    Dehydration- (present on admission)  Assessment & Plan  Continue fluid resuscitation intravenously and enterally for now  Monitor UOP closely    Erythrocytosis- (present on admission)  Assessment & Plan  Likely d/t hemoconcentration    DM2 (diabetes mellitus, type 2) ([facility])- (present  on admission)  Assessment & Plan  A1c 8  SSI  hypoglycemia protocol  diabetic tube feeding    Diabetes mellitus-Type 2, not on treatment- (present on admission)  Assessment & Plan  Continue Lantus 24 units daily  A1c 8    BPH (benign prostatic hyperplasia)- (present on admission)  Assessment & Plan  Triplett catheter in place, remove when clinically appropriate          VTE:   Heparin GTT  Ulcer: H2 Antagonist  Lines: Triplett Catheter  Ongoing indication addressed    I have performed a physical exam and reviewed and updated ROS and Plan today (10/15/2023). In review of yesterday's note (10/14/2023), there are no changes except as documented above.     Discussed patient condition and risk of morbidity and/or mortality with Hospitalist, Family, RN, RT, Therapies, Pharmacy, , and     Ming Farah D.O.  PGY-3 Internal Medicine Resident

## 2023-10-15 NOTE — PROGRESS NOTES
Update: Patient was doing well throughout the day.  Then developed relatively acute onset hypoxia and tachypnea.  Exam with absent L lung breath sounds. CXR new L base infiltrate. ABG low PO2, otherwise OK. Tachypneic to 40s. SaO2 low 90s on max NRB. I reintubated and proceeded with bronchoscopy that showed L mainstem occluded by thick mucous which was cleared. Added QID hypersal, IPV, albuterol.     The patient remains critically ill on mechanical ventilation.  Critical care time = 35 minutes in directly providing and coordinating critical care and extensive data review.  No time overlap and excludes procedures.

## 2023-10-15 NOTE — CARE PLAN
Problem: Ventilation  Goal: Ability to achieve and maintain unassisted ventilation or tolerate decreased levels of ventilator support  Description: Target End Date:  4 days     Document on Vent flowsheet    1.  Support and monitor invasive and noninvasive mechanical ventilation  2.  Monitor ventilator weaning response  3.  Perform ventilator associated pneumonia prevention interventions  4.  Manage ventilation therapy by monitoring diagnostic test results  Outcome: Not Met     Ventilator Daily Summary    Vent Day #1 (25 hours post extubation)    Airway: 7.5@22    Ventilator settings: 20/450/+10/60%    Weaning trials: No    Respiratory Procedures: Intubation/bronch    Plan: Continue current ventilator settings and wean mechanical ventilation as tolerated per physician orders.

## 2023-10-15 NOTE — PROCEDURES
Intubation    Date/Time: 10/15/2023 4:35 PM    Performed by: Amy Padgett M.D.  Authorized by: Amy Padgett M.D.    Consent:     Consent obtained:  Emergent situation  Pre-procedure details:     Patient status:  Altered mental status    Pretreatment meds: etomidate.    Paralytics:  Rocuronium  Procedure details:     CPR in progress: no      Intubation method:  Oral    Oral intubation technique:  Video-assisted    Laryngoscope type:  GlideScope    Laryngoscope blade:  Mac 4    Cormack-Lehane Classification:  Grade 1    Tube size (mm):  7.5    Tube type:  Cuffed    Number of attempts:  1    Cricoid pressure: no      Tube visualized through cords: yes    Placement assessment:     Tube secured with:  ETT vang    Breath sounds:  Equal and absent over the epigastrium    Placement verification: chest rise, condensation, direct visualization, equal breath sounds, ETCO2 detector, fiberoptic scope and tube exhalation      CXR findings:  ETT in proper place  Post-procedure details:     Patient tolerance of procedure:  Tolerated well, no immediate complications

## 2023-10-15 NOTE — PROCEDURES
"Bronchoscopy procedure note    Date of Service: 10/15/2023    Procedure:  Diagnostic and therapeutic bronchoscopy with BAL    Indication: hypoxic resp fail    Physician:  Dr. Natalie Padgett MD    Post Procedure Diagnosis:  1.  L mucous plugging      Narrative:  Appropriate consent was obtained and \"time out\" was performed.  A flexible fiberoptic bronchoscope was then inserted through the ETT without difficulty.  All airways were evaluated to the sub-segmental level.  The airway mucosa was dark in color.  The L mainstem was completely occluded with thick, white secretions, which were suctioned. The R lung was clear. Bronch wash sent for culture.  No immediate complications.  EBL = 0.      Amy Padgett M.D.      "

## 2023-10-16 ENCOUNTER — APPOINTMENT (OUTPATIENT)
Dept: RADIOLOGY | Facility: MEDICAL CENTER | Age: 83
DRG: 871 | End: 2023-10-16
Attending: INTERNAL MEDICINE
Payer: COMMERCIAL

## 2023-10-16 LAB
ALBUMIN SERPL BCP-MCNC: 1.9 G/DL (ref 3.2–4.9)
ALBUMIN/GLOB SERPL: 0.8 G/DL
ALP SERPL-CCNC: 66 U/L (ref 30–99)
ALT SERPL-CCNC: 11 U/L (ref 2–50)
ANION GAP SERPL CALC-SCNC: 5 MMOL/L (ref 7–16)
ANISOCYTOSIS BLD QL SMEAR: ABNORMAL
ARTERIAL PATENCY WRIST A: ABNORMAL
AST SERPL-CCNC: 18 U/L (ref 12–45)
BASE EXCESS BLDA CALC-SCNC: -2 MMOL/L (ref -4–3)
BASOPHILS # BLD AUTO: 0 % (ref 0–1.8)
BASOPHILS # BLD: 0 K/UL (ref 0–0.12)
BILIRUB SERPL-MCNC: 0.7 MG/DL (ref 0.1–1.5)
BODY TEMPERATURE: ABNORMAL DEGREES
BREATHS SETTING VENT: 20
BUN SERPL-MCNC: 53 MG/DL (ref 8–22)
CALCIUM ALBUM COR SERPL-MCNC: 9.9 MG/DL (ref 8.5–10.5)
CALCIUM SERPL-MCNC: 8.2 MG/DL (ref 8.5–10.5)
CHLORIDE SERPL-SCNC: 114 MMOL/L (ref 96–112)
CO2 BLDA-SCNC: 25 MMOL/L (ref 20–33)
CO2 SERPL-SCNC: 25 MMOL/L (ref 20–33)
CREAT SERPL-MCNC: 0.99 MG/DL (ref 0.5–1.4)
CRP SERPL HS-MCNC: 5.67 MG/DL (ref 0–0.75)
CRP SERPL HS-MCNC: 6.01 MG/DL (ref 0–0.75)
DELSYS IDSYS: ABNORMAL
END TIDAL CARBON DIOXIDE IECO2: 33 MMHG
EOSINOPHIL # BLD AUTO: 0.19 K/UL (ref 0–0.51)
EOSINOPHIL NFR BLD: 1.7 % (ref 0–6.9)
ERYTHROCYTE [DISTWIDTH] IN BLOOD BY AUTOMATED COUNT: 52 FL (ref 35.9–50)
GFR SERPLBLD CREATININE-BSD FMLA CKD-EPI: 76 ML/MIN/1.73 M 2
GLOBULIN SER CALC-MCNC: 2.5 G/DL (ref 1.9–3.5)
GLUCOSE BLD STRIP.AUTO-MCNC: 162 MG/DL (ref 65–99)
GLUCOSE BLD STRIP.AUTO-MCNC: 162 MG/DL (ref 65–99)
GLUCOSE BLD STRIP.AUTO-MCNC: 277 MG/DL (ref 65–99)
GLUCOSE BLD STRIP.AUTO-MCNC: 311 MG/DL (ref 65–99)
GLUCOSE SERPL-MCNC: 287 MG/DL (ref 65–99)
HCO3 BLDA-SCNC: 23.6 MMOL/L (ref 17–25)
HCT VFR BLD AUTO: 28.6 % (ref 42–52)
HGB BLD-MCNC: 8.8 G/DL (ref 14–18)
HOROWITZ INDEX BLDA+IHG-RTO: 168 MM[HG]
LYMPHOCYTES # BLD AUTO: 0.99 K/UL (ref 1–4.8)
LYMPHOCYTES NFR BLD: 8.7 % (ref 22–41)
MAGNESIUM SERPL-MCNC: 2.2 MG/DL (ref 1.5–2.5)
MANUAL DIFF BLD: NORMAL
MCH RBC QN AUTO: 30.4 PG (ref 27–33)
MCHC RBC AUTO-ENTMCNC: 30.8 G/DL (ref 32.3–36.5)
MCV RBC AUTO: 99 FL (ref 81.4–97.8)
MICROCYTES BLD QL SMEAR: ABNORMAL
MODE IMODE: ABNORMAL
MONOCYTES # BLD AUTO: 0.59 K/UL (ref 0–0.85)
MONOCYTES NFR BLD AUTO: 5.2 % (ref 0–13.4)
MORPHOLOGY BLD-IMP: NORMAL
NEUTROPHILS # BLD AUTO: 9.62 K/UL (ref 1.82–7.42)
NEUTROPHILS NFR BLD: 83.5 % (ref 44–72)
NEUTS BAND NFR BLD MANUAL: 0.9 % (ref 0–10)
NRBC # BLD AUTO: 0 K/UL
NRBC BLD-RTO: 0 /100 WBC (ref 0–0.2)
O2/TOTAL GAS SETTING VFR VENT: 60 %
PCO2 BLDA: 41.9 MMHG (ref 26–37)
PCO2 TEMP ADJ BLDA: 40.3 MMHG (ref 26–37)
PEEP END EXPIRATORY PRESSURE IPEEP: 10 CMH20
PH BLDA: 7.36 [PH] (ref 7.4–7.5)
PH TEMP ADJ BLDA: 7.37 [PH] (ref 7.4–7.5)
PHOSPHATE SERPL-MCNC: 3.5 MG/DL (ref 2.5–4.5)
PLATELET # BLD AUTO: 97 K/UL (ref 164–446)
PLATELET BLD QL SMEAR: NORMAL
PMV BLD AUTO: 12.4 FL (ref 9–12.9)
PO2 BLDA: 101 MMHG (ref 64–87)
PO2 TEMP ADJ BLDA: 95 MMHG (ref 64–87)
POTASSIUM SERPL-SCNC: 4 MMOL/L (ref 3.6–5.5)
PREALB SERPL-MCNC: 8 MG/DL (ref 18–38)
PREALB SERPL-MCNC: 8.9 MG/DL (ref 18–38)
PROT SERPL-MCNC: 4.4 G/DL (ref 6–8.2)
RBC # BLD AUTO: 2.89 M/UL (ref 4.7–6.1)
RBC BLD AUTO: PRESENT
SAO2 % BLDA: 97 % (ref 93–99)
SODIUM SERPL-SCNC: 142 MMOL/L (ref 135–145)
SODIUM SERPL-SCNC: 144 MMOL/L (ref 135–145)
SODIUM SERPL-SCNC: 146 MMOL/L (ref 135–145)
SPECIMEN DRAWN FROM PATIENT: ABNORMAL
TIDAL VOLUME IVT: 450 ML
WBC # BLD AUTO: 11.4 K/UL (ref 4.8–10.8)

## 2023-10-16 PROCEDURE — 84100 ASSAY OF PHOSPHORUS: CPT

## 2023-10-16 PROCEDURE — 94640 AIRWAY INHALATION TREATMENT: CPT

## 2023-10-16 PROCEDURE — 85007 BL SMEAR W/DIFF WBC COUNT: CPT

## 2023-10-16 PROCEDURE — 36600 WITHDRAWAL OF ARTERIAL BLOOD: CPT

## 2023-10-16 PROCEDURE — 700101 HCHG RX REV CODE 250: Performed by: INTERNAL MEDICINE

## 2023-10-16 PROCEDURE — A9270 NON-COVERED ITEM OR SERVICE: HCPCS | Performed by: INTERNAL MEDICINE

## 2023-10-16 PROCEDURE — 700105 HCHG RX REV CODE 258: Performed by: INTERNAL MEDICINE

## 2023-10-16 PROCEDURE — 84134 ASSAY OF PREALBUMIN: CPT

## 2023-10-16 PROCEDURE — 85027 COMPLETE CBC AUTOMATED: CPT

## 2023-10-16 PROCEDURE — 99291 CRITICAL CARE FIRST HOUR: CPT | Performed by: INTERNAL MEDICINE

## 2023-10-16 PROCEDURE — 94799 UNLISTED PULMONARY SVC/PX: CPT

## 2023-10-16 PROCEDURE — 71045 X-RAY EXAM CHEST 1 VIEW: CPT

## 2023-10-16 PROCEDURE — 94150 VITAL CAPACITY TEST: CPT

## 2023-10-16 PROCEDURE — 86140 C-REACTIVE PROTEIN: CPT

## 2023-10-16 PROCEDURE — 80053 COMPREHEN METABOLIC PANEL: CPT

## 2023-10-16 PROCEDURE — 82962 GLUCOSE BLOOD TEST: CPT | Mod: 91

## 2023-10-16 PROCEDURE — 770022 HCHG ROOM/CARE - ICU (200)

## 2023-10-16 PROCEDURE — 82803 BLOOD GASES ANY COMBINATION: CPT

## 2023-10-16 PROCEDURE — 83735 ASSAY OF MAGNESIUM: CPT

## 2023-10-16 PROCEDURE — 84295 ASSAY OF SERUM SODIUM: CPT

## 2023-10-16 PROCEDURE — 700102 HCHG RX REV CODE 250 W/ 637 OVERRIDE(OP): Performed by: INTERNAL MEDICINE

## 2023-10-16 PROCEDURE — A9270 NON-COVERED ITEM OR SERVICE: HCPCS | Performed by: NURSE PRACTITIONER

## 2023-10-16 PROCEDURE — 700102 HCHG RX REV CODE 250 W/ 637 OVERRIDE(OP): Performed by: NURSE PRACTITIONER

## 2023-10-16 PROCEDURE — 94003 VENT MGMT INPAT SUBQ DAY: CPT

## 2023-10-16 RX ADMIN — SODIUM CHLORIDE: 4.5 INJECTION, SOLUTION INTRAVENOUS at 06:15

## 2023-10-16 RX ADMIN — SENNOSIDES AND DOCUSATE SODIUM 2 TABLET: 50; 8.6 TABLET ORAL at 05:00

## 2023-10-16 RX ADMIN — APIXABAN 5 MG: 5 TABLET, FILM COATED ORAL at 17:05

## 2023-10-16 RX ADMIN — APIXABAN 5 MG: 5 TABLET, FILM COATED ORAL at 05:01

## 2023-10-16 RX ADMIN — INSULIN LISPRO 10 UNITS: 100 INJECTION, SOLUTION INTRAVENOUS; SUBCUTANEOUS at 00:13

## 2023-10-16 RX ADMIN — INSULIN LISPRO 7 UNITS: 100 INJECTION, SOLUTION INTRAVENOUS; SUBCUTANEOUS at 04:54

## 2023-10-16 RX ADMIN — FAMOTIDINE 20 MG: 20 TABLET ORAL at 05:01

## 2023-10-16 RX ADMIN — TERAZOSIN 1 MG: 1 CAPSULE ORAL at 17:05

## 2023-10-16 RX ADMIN — INSULIN LISPRO 3 UNITS: 100 INJECTION, SOLUTION INTRAVENOUS; SUBCUTANEOUS at 11:47

## 2023-10-16 RX ADMIN — FAMOTIDINE 20 MG: 20 TABLET ORAL at 17:05

## 2023-10-16 RX ADMIN — Medication 4 ML: at 06:36

## 2023-10-16 RX ADMIN — INSULIN LISPRO 3 UNITS: 100 INJECTION, SOLUTION INTRAVENOUS; SUBCUTANEOUS at 17:06

## 2023-10-16 RX ADMIN — AMIODARONE HYDROCHLORIDE 200 MG: 200 TABLET ORAL at 05:01

## 2023-10-16 ASSESSMENT — PULMONARY FUNCTION TESTS: FVC: 1.1

## 2023-10-16 ASSESSMENT — PAIN DESCRIPTION - PAIN TYPE
TYPE: ACUTE PAIN

## 2023-10-16 NOTE — DIETARY
Nutrition support weekly update:  Day 7 of admit.  Perry Marrufo is a 83 y.o. male with admitting DX of hypernatremia.    RD consulted for tube feeding assessment. Pt previously assessed by RD for tube feeding 10/12.    Tube feeding currently running Impact peptide 1.5 @ goal rate 55 mL/hr providing 1980 kcals, 124 grams protein and 1016 mL free water.     Assessment:  Weight 75.9 kg via bed scale 10/15. Pt with bed scale wt of 102.5 kg 10/12. Wt change of 26.6 kg in 3 days highly unlikely and pt is +22.96 L fluid. Suspect weights inaccurate. Previous assessment done based on initial measured weight 10/10 of 96.2 kg.   Re-estimate of nutritional needs is not indicated at this time.      Calculation/Equation: ( 10/12 based on weight 96.2 kg)  REE per MSJ = 1686 kcal/day;   MR per [facility] (vent L/min 9.6, T max/24 hours 37) = 1883 kcal/day  Total Calories / day: 1700 - 2100 (Calories / k - 22)  Total Grams Protein / day: 120 - 144 (Grams Protein / k.25 - 1.5)    Evaluation:   Pt currently receiving and tolerating tube feeding at goal. Pt re-intubated 10/15. Nutrition support remains indicated.   Current clinical picture and MD progress notes reviewed. Pt with PMHx of DM and prostate disease. Found down now s/p cardioversion 10/11. Re-intubated 10/15.   Labs reviewed   Meds: Humalog, senna  Skin Per Wound Team evaluation 10/9:  Pressure Injury Buttocks;Coccyx;Ischium Left;Right POA evolving DTI opening up (Active)  Pressure Injury Knee Lateral Right POA unstageable (Active)  Pressure Injury Foot;MTH 1 Medial Left POA DTI (Active)  Pressure Injury Hip;Pelvis POA DTI (Active)  Current feeding remains appropriate to meet estimated needs.    Recommendations/Plan:  Continue Impact peptide 1.5 @ 55 mL/hr to provide 1980 kcals, 124 grams protein and 1016 mL free water.   Fluids per MD   Monitor weight and obtain updated accurate weight as able    RD following

## 2023-10-16 NOTE — PROGRESS NOTES
Patient extubated at 1104 to 4L NC. Quickly patient needed increased O2 support. Patient transitioned to oxymask 15L. Patient saturation remained in 80s and patient tachypneic to mid 30s. MD Farah called to bedside to evaluate patient. MD verbalized orders for HFNC and to continue to monitor.

## 2023-10-16 NOTE — DISCHARGE PLANNING
Case Management Discharge Planning    Admission Date: 10/9/2023  GMLOS: 5.1  ALOS: 7    6-Clicks ADL Score: 9  6-Clicks Mobility Score: 7  PT and/or OT Eval ordered: No  Post-acute Referrals Ordered: No  Post-acute Choice Obtained: No  Has referral(s) been sent to post-acute provider:  No      Anticipated Discharge Dispo: Discharge Disposition: D/T to SNF with Medicare cert in anticipation of skilled care (03)    DME Needed: Unable to determine at this time.    Action(s) Taken: Updated Provider/Nurse on Discharge Plan    Escalations Completed: None    Medically Clear: No    Next Steps: Follow-up with the Attending and Bedside RN for any issues/changes and update the discharge plan accordingly.    Barriers to Discharge: Medical clearance and Pending Procedures    Is the patient up for discharge tomorrow: No

## 2023-10-16 NOTE — RESPIRATORY CARE
Extubation    Cuff leak noted yes  Stridor present no    O2 (LPM): 7 (10/16/23 1104)     Patient toleration well  RCP Complete? yes  Events/Summary/Plan: Extubated patient (10/16/23 1104)

## 2023-10-16 NOTE — PROGRESS NOTES
"I have seen and examined the patient today.  I have reviewed the medical record, laboratory data, imaging and all relevant studies.  I have discussed the plan of care with the Internal Medicine Resident and agree with the note and plan as documented with the following additions and clarifications:    HPI:  \"83 y.o. male who presented 10/9/2023 with a PMHx of DM and prostate disease admitted 10/9 from [facility] after being found down x 2 days. Found to be in Afib with RVR which converted with metoprolol and altered with hypernatremia and uremia and mild rhabo. Admitted to telemetry under hospitalist services. This afternoon developed worsening hypoxia requiring escalation to maximum HFNC. Also went back into Afib with RVR but this time was hemodynamically significant with a BP that dropped to 50/30 requiring attempted electrical cardioversion. Cardiology consulted. Na increasing despite IVF resuscitation. Nephrology consulted by hospitalist.\"      Hospital Course:  10/12 - VD #2, transition to oral amiodarone, free water flushes 300 mL q4h, free water deficit -5.1 L  10/13 - VD #3, Increase free water flushes  10/14 - Extubated   10/15 - FEES, thick mucus, resp distress and re-intubated; bronch with purulent lingula secretions, BAL many WBCs, on Unasyn  10/16 - VD #2, extubated again; HFNC -> 3lpm, NPO, TF, aspiration precautions, stop 1/2 NS, decrease free water    Interval Events:    W/d's nfe  Fent gtt  SR  SBP 90 - 120  Afebrile  NGT  TF held this am  I/O = 5724/1200, cola  Na 142  SBT  1.1L, -30  VD#2, previously intubated 4 days  CXR mild lower\  BAL many WBCs  Lantus, inc to 30    Ext today  Palliative care eval   1/2 NS at 83    A/P:  Acute hypoxemic respiratory failure  -Intubated 10/11 - 10/14  -Thick secretions, possible aspiration, reintubated 10/15 - 10/16  -Empiric antibiotics with Unasyn, follow-up cultures  -Aspiration precautions, close monitoring in ICU for need for reintubation  -RT protocols, " bronchodilators as needed    Sepsis  -Possible sepsis due to pneumonia  -Prior shock likely related to volume status and sedating medications  -Continue Unasyn for pneumonia as above, follow-up BAL from 10/15    Acute encephalopathy  -Suspect acute metabolic encephalopathy secondary to hyponatremia, uremia, possible sepsis  -Improving, follow closely, further work-up as indicated    T2DM  -Continue glycemic control with glargine, increase today     ADELAIDE  -Secondary to ATN, prerenal, rhabdomyolysis  -+23 L since admission  -Renal function improved, follow    Atrial fibrillation with RVR  -No known history of AF  -RVR with attempted cardioversion for hypotension 10/11  -Currently SR  -Heparin drip converted to apixaban  -Continue to monitor electrolytes, follow    Hypernatremia  -Clinically improved, stop 1/2 NS, decrease free water flushes, continue to follow    Monitor closely in ICU for need for further respiratory support/intubation  The patient remains critically ill.  Critical care time = 45 minutes in directly providing and coordinating critical care and extensive data review.  No time overlap and excludes procedures.

## 2023-10-16 NOTE — CARE PLAN
Problem: Fluid Volume  Goal: Fluid volume balance will be maintained  Description: Target End Date:  Prior to discharge or change in level of care    Document on I/O flowsheet    1.  Monitor intake and output as ordered  2.  Promote oral intake as appropriate  3.  Report inadequate intake or output to physician  4.  Administer IV therapy as ordered  5.  Weights per provider order  6.  Assess for signs and symptoms of bleeding  7.  Monitor for signs of fluid overload (respiratory changes, edema, weight gain, increased abdominal girth)  8.  Monitor of signs for inadequate fluid volume (poor skin turgor, dry mucous membranes)  9.  Instruct patient on adherence to fluid restrictions  Outcome: Progressing     Problem: Respiratory  Goal: Patient will achieve/maintain optimum respiratory ventilation and gas exchange  Description: Target End Date:  Prior to discharge or change in level of care    Document on Assessment flowsheet    1.  Assess and monitor rate, rhythm, depth and effort of respiration  2.  Breath sounds assessed qshift and/or as needed  3.  Assess O2 saturation, administer/titrate oxygen as ordered  4.  Position patient for maximum ventilatory efficiency  5.  Turn, cough, and deep breath with splinting to improve effectiveness  6.  Collaborate with RT to administer medication/treatments per order  7.  Encourage use of incentive spirometer and encourage patient to cough after use and utilize splinting techniques if applicable  8.  Airway suctioning  9.  Monitor sputum production for changes in color, consistency and frequency  10. Perform frequent oral hygiene  11. Alternate physical activity with rest periods  Outcome: Progressing   The patient is Watcher - Medium risk of patient condition declining or worsening    Shift Goals  Clinical Goals: SBT/SAT  Patient Goals: unable to assess  Family Goals: unable to assess    Progress made toward(s) clinical / shift goals:  Patient able to be extubated today and  remained off ventilator. Patient continuing to have thick secretions that are difficult to manage that could be a barrier to remaining off the ventilator. Patient educated regarding importance of deep breathing and coughing. Unfortunately no progress has been made regarding plan of care and designating a decision maker for this patient. Multiple attempts to get in touch with family have been made without any success. Will continue to make efforts to speak with patient's family.      Patient is not progressing towards the following goals:

## 2023-10-16 NOTE — CARE PLAN
The patient is Unstable - High likelihood or risk of patient condition declining or worsening    Shift Goals  Clinical Goals: Wean O2, Mobilize  Patient Goals: Feel Better  Family Goals: BA    Progress made toward(s) clinical / shift goals:    Problem: Knowledge Deficit - Standard  Goal: Patient and family/care givers will demonstrate understanding of plan of care, disease process/condition, diagnostic tests and medications  Outcome: Progressing     Problem: Pain - Standard  Goal: Alleviation of pain or a reduction in pain to the patient’s comfort goal  Outcome: Progressing     Problem: Hemodynamics  Goal: Patient's hemodynamics, fluid balance and neurologic status will be stable or improve  Outcome: Progressing     Problem: Fluid Volume  Goal: Fluid volume balance will be maintained  Outcome: Progressing     Problem: Urinary - Renal Perfusion  Goal: Ability to achieve and maintain adequate renal perfusion and functioning will improve  Outcome: Progressing     Problem: Respiratory  Goal: Patient will achieve/maintain optimum respiratory ventilation and gas exchange  Outcome: Progressing     Problem: Mechanical Ventilation  Goal: Safe management of artificial airway and ventilation  Outcome: Progressing  Goal: Successful weaning off mechanical ventilator, spontaneously maintains adequate gas exchange  Outcome: Progressing  Goal: Patient will be able to express needs and understand communication  Outcome: Progressing     Problem: Skin Integrity  Goal: Skin integrity is maintained or improved  Outcome: Progressing     Problem: Safety - Medical Restraint  Goal: Remains free of injury from restraints (Restraint for Interference with Medical Device)  Outcome: Progressing  Goal: Free from restraint(s) (Restraint for Interference with Medical Device)  Outcome: Progressing       Patient is not progressing towards the following goals:

## 2023-10-16 NOTE — CARE PLAN
Problem: Ventilation  Goal: Ability to achieve and maintain unassisted ventilation or tolerate decreased levels of ventilator support  Description: Target End Date:  4 days     Document on Vent flowsheet    1.  Support and monitor invasive and noninvasive mechanical ventilation  2.  Monitor ventilator weaning response  3.  Perform ventilator associated pneumonia prevention interventions  4.  Manage ventilation therapy by monitoring diagnostic test results  Outcome: Progressing     Ventilator Daily Summary     Vent Day #2     Airway: 7.5@23     Ventilator settings: 20/450/+10/55%

## 2023-10-16 NOTE — PROGRESS NOTES
[facility] ICU Progress Note    Admit Date: 10/9/2023    Resident(s): Ming Farah D.O.   Attending:  SEVEN LEDESMA/ Dr. Dominguez    Patient ID:    Name:  Perry Marrufo     YOB: 1940  Age:  83 y.o.  male   MRN:  [id number]    Reason for Consultation  Respiratory failure, AMS, Afib with RVR    Hospital Course (carried forward and updated):    83 y.o. male who presented 10/9/2023 with a PMHx of DM and prostate disease admitted 10/9 from [place] after being found down x 2 days. Found to be in Afib with RVR which converted with metoprolol and altered with hypernatremia and uremia and mild rhabo. Admitted to telemetry under hospitalist services. This afternoon developed worsening hypoxia requiring escalation to maximum HFNC. Also went back into Afib with RVR but this time was hemodynamically significant with a BP that dropped to 50/30 requiring attempted electrical cardioversion. Cardiology consulted. Na increasing despite IVF resuscitation. Nephrology consulted by hospitalist.    10/12 - VD #2, transition to oral amiodarone, free water flushes 300 mL q4h, free water deficit -5.1 L  10/13 - VD #3, Increase free water flushes  10/14 - Extubated, continues to be encephalopathic  10/15 - Re intubated due to increasing AMS and hypoxia, bronchoscopy with L mainstem occlusion with thick, white secretions that were suctioned    Consultants:  Cardiology  Nephrology     Interval Events:    NAEON    Reviewed last 24 hour events:  Neuro: RASS -1  HR: NSR 60s  SBP: 90-130s  Tmax: AF  GI: NPO, TF at goal, Last BM 10/15  Endo: Glu 180-310s, Glarg 24u, Lispro 14u  I/O: +5724/-1200/+2524, net + [phone] since admit  Lines: 2 PIV, arceo  Mobility: Level 1  Resp: VD #2, APV-CMV 20/60/10/450  AB.359/41.9/101/23.6  CXR: Bilateral lower lobe infiltrates, decreased since prior  Vte: Apixaban  PPI/H2: Famotidine 20 mg twice daily  Antibx: Unasyn (10/10-10/14)  GTT: 1/2 NS 83 mL/hour, fentanyl OFF, dex OFF    WBC 20.4->9.5->11.4, HgB  15.8->13.6->9.9->8.8 (baseline unknown), MCV ~95, Plt 104 (baseline 150s)  Na 143->147->144 (151 on admit on 10/9), Cl 121->117, Cr 1.89->0.87->0.99 (Baseline unknown),  BAL (10/15/23): NGTD    -Plan to extubate today  -Changed free water flushes 400 mL every 2 hours to every 8 hours  -D/C 1/2 NS  -Sodium serum q12h, if normal, change to daily BMP  -SLP following, appreciate recommendations  -Continue load with amiodarone for atrial fibrillation  -Pulmonary hygiene  -Delirium precautions      Review of Systems  Review of Systems   Unable to perform ROS: Critical illness       Vital Signs for the last 24 hours  Pulse:  [57-83] 78  Resp:  [18-34] 29  BP: ()/(51-90) 125/60  SpO2:  [94 %-100 %] 95 %    Hemodynamic parameters for the last 24 hours       Vent Settings for the last 24 hours  Vent Mode: Spont  Rate (breaths/min): 20  Vt Target (mL): 450  PEEP/CPAP: 8  P Support: 5  MAP: 13  Control VTE (exp VT): 480    Physical Exam  Physical Exam  Vitals and nursing note reviewed.   Constitutional:       Appearance: Normal appearance.   HENT:      Head: Normocephalic and atraumatic.      Right Ear: External ear normal.      Left Ear: External ear normal.      Nose: Nose normal.      Mouth/Throat:      Mouth: Mucous membranes are moist.      Pharynx: Oropharynx is clear.   Eyes:      Extraocular Movements: Extraocular movements intact.      Pupils: Pupils are equal, round, and reactive to light.   Cardiovascular:      Rate and Rhythm: Normal rate and regular rhythm.      Pulses: Normal pulses.      Heart sounds: Normal heart sounds.   Pulmonary:      Breath sounds: Rhonchi present.   Abdominal:      General: There is no distension.      Palpations: Abdomen is soft.      Tenderness: There is no abdominal tenderness. There is no guarding or rebound.   Genitourinary:     Comments: Triplett in place  Musculoskeletal:         General: Normal range of motion.      Cervical back: Normal range of motion.   Skin:     General:  Skin is warm.      Capillary Refill: Capillary refill takes less than 2 seconds.   Neurological:      Mental Status: He is alert.      Comments: A&O x1         Medications  Current Facility-Administered Medications   Medication Dose Route Frequency Provider Last Rate Last Admin    insulin GLARGINE (Lantus,Semglee) injection  30 Units Subcutaneous Q EVENING Cipriano Dominguez M.D.        terazosin (Hytrin) capsule 1 mg  1 mg Enteral Tube Q EVENING Oli Burton M.D.   1 mg at 10/15/23 1726    famotidine (Pepcid) tablet 20 mg  20 mg Enteral Tube Q12HRS Amy Padgett M.D.   20 mg at 10/16/23 0501    Or    famotidine (Pepcid) injection 20 mg  20 mg Intravenous Q12HRS Amy Padgett M.D.        fentaNYL (Sublimaze) injection 50 mcg  50 mcg Intravenous Q15 MIN PRN Amy Padgett M.D.   50 mcg at 10/15/23 1940    And    fentaNYL (Sublimaze) injection 100 mcg  100 mcg Intravenous Q15 MIN PRN Amy Padgett M.D.   100 mcg at 10/15/23 1757    And    fentaNYL (SUBLIMAZE) 50 mcg/mL in 50mL (Continuous Infusion)   Intravenous Continuous Amy Padgett M.D.   Stopped at 10/16/23 0633    And    dexmedetomidine (PRECEDEX) 400 mcg/100mL NS premix infusion  0-0.7 mcg/kg/hr (Ideal) Intravenous Continuous Amy Padgett M.D.   Dose not Required at 10/15/23 1615    fentaNYL (Sublimaze) injection 100 mcg  100 mcg Intravenous Once Amy Padgett M.D.        labetalol (Normodyne/Trandate) injection 10 mg  10 mg Intravenous Q6HRS PRN Ming Farah D.O.        insulin lispro (HumaLOG,AdmeLOG) injection  3-14 Units Subcutaneous Q6HRS Amy Padgett M.D.   3 Units at 10/16/23 1147    And    dextrose 50% (D50W) injection 25 g  25 g Intravenous Q15 MIN PRN Amy Padgett M.D.        apixaban (Eliquis) tablet 5 mg  5 mg Enteral Tube BID Amy Padgett M.D.   5 mg at 10/16/23 0501    senna-docusate (Pericolace Or Senokot S) 8.6-50 MG per tablet 2 Tablet  2 Tablet Enteral Tube BID Cullen Rodrigues Jr., D.O.   2 Tablet at 10/16/23  0500    And    polyethylene glycol/lytes (Miralax) PACKET 1 Packet  1 Packet Enteral Tube QDAY PRN Cullen Rodrigues Jr., D.O.   1 Packet at 10/14/23 0538    And    magnesium hydroxide (Milk Of Magnesia) suspension 30 mL  30 mL Enteral Tube QDAY PRN Cullen Rodrigues Jr., D.O.        And    bisacodyl (Dulcolax) suppository 10 mg  10 mg Rectal QDAY PRN Cullen Rodrigues Jr., D.O.        amiodarone (Cordarone) tablet 200 mg  200 mg Enteral Tube DAILY Pilar Small, A.P.N.   200 mg at 10/16/23 0501    Respiratory Therapy Consult   Nebulization Continuous RT Jeremy M Gonda, M.D. MD Alert...ICU Electrolyte Replacement per Pharmacy   Other PHARMACY TO DOSE Jeremy M Gonda, M.D.        Pharmacy Consult: Enteral tube insertion - review meds/change route/product selection  1 Each Other PHARMACY TO DOSE Jeremy M Gonda, M.D.        acetaminophen (Tylenol) tablet 650 mg  650 mg Enteral Tube Q6HRS PRN Sarina Jones M.D.   650 mg at 10/14/23 2309       Fluids    Intake/Output Summary (Last 24 hours) at 10/16/2023 1627  Last data filed at 10/16/2023 1600  Gross per 24 hour   Intake 4289.4 ml   Output 1285 ml   Net 3004.4 ml       Laboratory  Recent Labs     10/15/23  1542 10/15/23  1721 10/16/23  0248   ISTATAPH 7.425 7.422 7.359*   ISTATAPCO2 37.6* 36.5 41.9*   ISTATAPO2 47* 65 101*   ISTATATCO2 26 25 25   REOTLYW9OVX 84* 93 97   ISTATARTHCO3 24.7 23.8 23.6   ISTATARTBE 0 0 -2   ISTATTEMP 36.4 C 36.9 C 36.1 C   ISTATFIO2  --  60 60   ISTATSPEC Arterial Arterial Arterial   ISTATAPHTC 7.434 7.424 7.372*   [id number] 45* 65 95*     Recent Labs     10/14/23  0434 10/14/23  1149 10/15/23  0537 10/15/23  1150 10/15/23  2124 10/16/23  0300 10/16/23  0940   SODIUM 145   < > 147*   < > 144 144 142   POTASSIUM 4.0  --  4.2  --   --  4.0  --    CHLORIDE 115*  --  117*  --   --  114*  --    CO2 25  --  25  --   --  25  --    BUN 89*  --  62*  --   --  53*  --    CREATININE 1.29  --  0.87  --   --  0.99  --    MAGNESIUM 2.5  --   --    --   --  2.2  --    PHOSPHORUS 3.0  --   --   --   --  3.5  --    CALCIUM 8.1*  --  8.5  --   --  8.2*  --     < > = values in this interval not displayed.     Recent Labs     10/14/23  0434 10/15/23  0537 10/16/23  0300   ALTSGPT  --   --  11   ASTSGOT  --   --  18   ALKPHOSPHAT  --   --  66   TBILIRUBIN  --   --  0.7   PREALBUMIN  --   --  8.0*   GLUCOSE 244* 195* 287*     Recent Labs     10/14/23  0434 10/15/23  0537 10/16/23  0300   WBC 9.5 11.4* 11.4*   NEUTSPOLYS 87.20* 80.50* 83.50*   LYMPHOCYTES 6.00* 7.60* 8.70*   MONOCYTES 5.10 3.40 5.20   EOSINOPHILS 1.70 2.50 1.70   BASOPHILS 0.00 0.00 0.00   ASTSGOT  --   --  18   ALTSGPT  --   --  11   ALKPHOSPHAT  --   --  66   TBILIRUBIN  --   --  0.7     Recent Labs     10/14/23  0434 10/15/23  0537 10/16/23  0300   RBC 3.22* 3.21* 2.89*   HEMOGLOBIN 10.0* 9.9* 8.8*   HEMATOCRIT 31.7* 31.5* 28.6*   PLATELETCT 87* 104* 97*       Imaging  X-Ray:  I have personally reviewed the images and compared with prior images.  EKG:  I have personally reviewed the images and compared with prior images.  CT:    Reviewed  Echo:   Reviewed  Ultrasound:  Reviewed    ASSESSEMENT and PLAN:    * Acute respiratory failure with hypoxia (HCC)- (present on admission)  Assessment & Plan  Intubated 10/11 for mental status and hypoxia on max HFNC  Extubated 10/14  Re-intubated 10/15 for mental status and hypoxia, bronchoscopy with L mainstem occlusion with thick, white secretions that were suctioned  Extubated 10/16    Pulmonary hygiene  Wean O2 as able  SLP following, appreciate recommendations      Altered mental status- (present on admission)  Assessment & Plan  Acute metabolic encephalopathy likely due to uremia, hypernatremia and hypoxia  Overall improving   Continue to monitor.    Sepsis (HCC)- (present on admission)  Assessment & Plan  Unclear if patient truly had sepsis  Possible aspiration pneumonia has been adequately treated  Currently he is hemodynamically stable.      ADELAIDE (acute  kidney injury) ([facility])- (present on admission)  Assessment & Plan  Secondary to ATN, pre-renal and rhabdo  Improving  Nephrology has been following him.  Worsening renal functions.  Avoid nephrotoxins  Monitor creatinine, UOP, electrolytes closely    Shock ([facility])  Assessment & Plan  Undifferentiated - cardiac due to arrhythmia vs hypovolemia vs sepsis  Shock resolved    Aspiration precautions  Assessment & Plan  Increasing oxygen requirement and chest x-ray showed infiltration possible pneumonia  Oxygen requirement has significantly improved.  Continue monitor closely.    Rhabdomyolysis- (present on admission)  Assessment & Plan  Continue IVF resuscitation, monitor CPK  CPK significant trended down.    Atrial fibrillation with RVR ([facility])- (present on admission)  Assessment & Plan  No known history of Afib, RVR with hypotension attempted electrical cardioversion 10/11  PO amiodarone  Cardiology consulted  Heparin gtt-->apixaban given improved renal function  Optimize electrolytes, continuous tele monitoring  Continue monitor electrolytes and replace as needed.  Continue monitor closely on telemetry.    Dehydration- (present on admission)  Assessment & Plan  Continue fluid resuscitation intravenously and enterally for now  Monitor UOP closely    Erythrocytosis- (present on admission)  Assessment & Plan  Likely d/t hemoconcentration    Hypernatremia- (present on admission)  Assessment & Plan  Hypovolemic hypernatremia  Nephrology consulted  Continue to correct fluid status while closely monitoring Na  Correcting appropriately.  Changed free water flushes 400 mL every 2 hours to every 8 hours  D/C 1/2 NS  Sodium serum q12h, if normal, change to daily BMP      DM2 (diabetes mellitus, type 2) ([facility])- (present on admission)  Assessment & Plan  A1c 8  Continue insulin sliding scale with hypoglycemia protocol.  Continue diabetic tube feeding.      Diabetes mellitus-Type 2, not on treatment- (present on admission)  Assessment &  Plan  Continue Lantus 24 units daily  Current HbA1c is 8.0  I am continuing insulin sliding scale with hypoglycemia protocol.    BPH (benign prostatic hyperplasia)- (present on admission)  Assessment & Plan  I started him on tamsulosin.          VTE:   Heparin GTT  Ulcer: H2 Antagonist  Lines: Triplett Catheter  Ongoing indication addressed    I have performed a physical exam and reviewed and updated ROS and Plan today (10/16/2023). In review of yesterday's note (10/15/2023), there are no changes except as documented above.     Discussed patient condition and risk of morbidity and/or mortality with Hospitalist, Family, RN, RT, Therapies, Pharmacy, , and     Ming Farah D.O.  PGY-3 Internal Medicine Resident

## 2023-10-16 NOTE — THERAPY
Speech Language Therapy Contact Note    Patient Name: Perry Marrufo  Age:  83 y.o., Sex:  male  Medical Record #: [id number]  Today's Date: 10/16/2023    Discussed missed therapy with RN     10/16/23 0802   Treatment Variance   Reason For Missed Therapy Medical - Patient on Hold from Therapy;Medical - Patient not Able To Participate   Interdisciplinary Plan of Care Collaboration   IDT Collaboration with  Nursing   Collaboration Comments Orders received for FEES study. Pt is re-intubated 10/15. Will monitor for extubation and complete CSE/ dx study as able/ medically appropriate.        None

## 2023-10-16 NOTE — CARE PLAN
The patient is Watcher - Medium risk of patient condition declining or worsening    Shift Goals  Clinical Goals: Wean O2  Patient Goals: BA  Family Goals: BA    Problem: Knowledge Deficit - Standard  Goal: Patient and family/care givers will demonstrate understanding of plan of care, disease process/condition, diagnostic tests and medications  Outcome: Progressing     Problem: Pain - Standard  Goal: Alleviation of pain or a reduction in pain to the patient’s comfort goal  Outcome: Progressing     Problem: Hemodynamics  Goal: Patient's hemodynamics, fluid balance and neurologic status will be stable or improve  Outcome: Progressing     Problem: Urinary - Renal Perfusion  Goal: Ability to achieve and maintain adequate renal perfusion and functioning will improve  Outcome: Progressing     Problem: Safety - Medical Restraint  Goal: Remains free of injury from restraints (Restraint for Interference with Medical Device)  Outcome: Progressing

## 2023-10-16 NOTE — FLOWSHEET NOTE
10/16/23 0636   Weaning Parameters   RR (bpm) 22   $ FVC / Vital Capacity (liters)  1.1   NIF (cm H2O)  -30   Rapid Shallow Breathing Index (RR/VT) 41   Spontaneous VE 15.8   Spontaneous      +8/40%

## 2023-10-17 ENCOUNTER — APPOINTMENT (OUTPATIENT)
Dept: RADIOLOGY | Facility: MEDICAL CENTER | Age: 83
DRG: 871 | End: 2023-10-17
Attending: INTERNAL MEDICINE
Payer: COMMERCIAL

## 2023-10-17 LAB
ANION GAP SERPL CALC-SCNC: 5 MMOL/L (ref 7–16)
ANION GAP SERPL CALC-SCNC: 7 MMOL/L (ref 7–16)
ANISOCYTOSIS BLD QL SMEAR: ABNORMAL
BACTERIA SPEC RESP CULT: ABNORMAL
BACTERIA SPEC RESP CULT: ABNORMAL
BASOPHILS # BLD AUTO: 0 % (ref 0–1.8)
BASOPHILS # BLD: 0 K/UL (ref 0–0.12)
BUN SERPL-MCNC: 34 MG/DL (ref 8–22)
BUN SERPL-MCNC: 37 MG/DL (ref 8–22)
CALCIUM SERPL-MCNC: 7.3 MG/DL (ref 8.5–10.5)
CALCIUM SERPL-MCNC: 8.1 MG/DL (ref 8.5–10.5)
CHLORIDE SERPL-SCNC: 112 MMOL/L (ref 96–112)
CHLORIDE SERPL-SCNC: 118 MMOL/L (ref 96–112)
CO2 SERPL-SCNC: 21 MMOL/L (ref 20–33)
CO2 SERPL-SCNC: 27 MMOL/L (ref 20–33)
CREAT SERPL-MCNC: 0.67 MG/DL (ref 0.5–1.4)
CREAT SERPL-MCNC: 0.7 MG/DL (ref 0.5–1.4)
EOSINOPHIL # BLD AUTO: 0 K/UL (ref 0–0.51)
EOSINOPHIL NFR BLD: 0 % (ref 0–6.9)
ERYTHROCYTE [DISTWIDTH] IN BLOOD BY AUTOMATED COUNT: 50 FL (ref 35.9–50)
ERYTHROCYTE [DISTWIDTH] IN BLOOD BY AUTOMATED COUNT: 50.2 FL (ref 35.9–50)
ERYTHROCYTE [DISTWIDTH] IN BLOOD BY AUTOMATED COUNT: 54.9 FL (ref 35.9–50)
GFR SERPLBLD CREATININE-BSD FMLA CKD-EPI: 92 ML/MIN/1.73 M 2
GFR SERPLBLD CREATININE-BSD FMLA CKD-EPI: 93 ML/MIN/1.73 M 2
GLUCOSE BLD STRIP.AUTO-MCNC: 154 MG/DL (ref 65–99)
GLUCOSE BLD STRIP.AUTO-MCNC: 168 MG/DL (ref 65–99)
GLUCOSE BLD STRIP.AUTO-MCNC: 183 MG/DL (ref 65–99)
GLUCOSE BLD STRIP.AUTO-MCNC: 96 MG/DL (ref 65–99)
GLUCOSE SERPL-MCNC: 103 MG/DL (ref 65–99)
GLUCOSE SERPL-MCNC: 179 MG/DL (ref 65–99)
GRAM STN SPEC: ABNORMAL
HCT VFR BLD AUTO: 29 % (ref 42–52)
HCT VFR BLD AUTO: 29.8 % (ref 42–52)
HCT VFR BLD AUTO: 30.2 % (ref 42–52)
HEMOCCULT STL QL: POSITIVE
HGB BLD-MCNC: 8.8 G/DL (ref 14–18)
HGB BLD-MCNC: 9.3 G/DL (ref 14–18)
HGB BLD-MCNC: 9.5 G/DL (ref 14–18)
LYMPHOCYTES # BLD AUTO: 0.69 K/UL (ref 1–4.8)
LYMPHOCYTES NFR BLD: 5.1 % (ref 22–41)
MAGNESIUM SERPL-MCNC: 2 MG/DL (ref 1.5–2.5)
MANUAL DIFF BLD: NORMAL
MCH RBC QN AUTO: 30.8 PG (ref 27–33)
MCH RBC QN AUTO: 30.8 PG (ref 27–33)
MCH RBC QN AUTO: 31.2 PG (ref 27–33)
MCHC RBC AUTO-ENTMCNC: 29.1 G/DL (ref 32.3–36.5)
MCHC RBC AUTO-ENTMCNC: 31.9 G/DL (ref 32.3–36.5)
MCHC RBC AUTO-ENTMCNC: 32.1 G/DL (ref 32.3–36.5)
MCV RBC AUTO: 105.6 FL (ref 81.4–97.8)
MCV RBC AUTO: 96.8 FL (ref 81.4–97.8)
MCV RBC AUTO: 97.3 FL (ref 81.4–97.8)
METAMYELOCYTES NFR BLD MANUAL: 1.7 %
MICROCYTES BLD QL SMEAR: ABNORMAL
MONOCYTES # BLD AUTO: 0.12 K/UL (ref 0–0.85)
MONOCYTES NFR BLD AUTO: 0.9 % (ref 0–13.4)
MORPHOLOGY BLD-IMP: NORMAL
MYELOCYTES NFR BLD MANUAL: 1.7 %
NEUTROPHILS # BLD AUTO: 12.32 K/UL (ref 1.82–7.42)
NEUTROPHILS NFR BLD: 90.6 % (ref 44–72)
NRBC # BLD AUTO: 0 K/UL
NRBC BLD-RTO: 0 /100 WBC (ref 0–0.2)
PHOSPHATE SERPL-MCNC: 2.7 MG/DL (ref 2.5–4.5)
PLATELET # BLD AUTO: 115 K/UL (ref 164–446)
PLATELET # BLD AUTO: 145 K/UL (ref 164–446)
PLATELET # BLD AUTO: 151 K/UL (ref 164–446)
PLATELET BLD QL SMEAR: NORMAL
PMV BLD AUTO: 11.6 FL (ref 9–12.9)
PMV BLD AUTO: 11.7 FL (ref 9–12.9)
PMV BLD AUTO: 11.8 FL (ref 9–12.9)
POTASSIUM SERPL-SCNC: 4.4 MMOL/L (ref 3.6–5.5)
POTASSIUM SERPL-SCNC: 4.9 MMOL/L (ref 3.6–5.5)
PROCALCITONIN SERPL-MCNC: 0.18 NG/ML
RBC # BLD AUTO: 2.86 M/UL (ref 4.7–6.1)
RBC # BLD AUTO: 2.98 M/UL (ref 4.7–6.1)
RBC # BLD AUTO: 3.08 M/UL (ref 4.7–6.1)
RBC BLD AUTO: PRESENT
SIGNIFICANT IND 70042: ABNORMAL
SITE SITE: ABNORMAL
SODIUM SERPL-SCNC: 144 MMOL/L (ref 135–145)
SODIUM SERPL-SCNC: 145 MMOL/L (ref 135–145)
SODIUM SERPL-SCNC: 146 MMOL/L (ref 135–145)
SODIUM SERPL-SCNC: 147 MMOL/L (ref 135–145)
SOURCE SOURCE: ABNORMAL
WBC # BLD AUTO: 11.2 K/UL (ref 4.8–10.8)
WBC # BLD AUTO: 11.8 K/UL (ref 4.8–10.8)
WBC # BLD AUTO: 13.6 K/UL (ref 4.8–10.8)

## 2023-10-17 PROCEDURE — 99291 CRITICAL CARE FIRST HOUR: CPT | Mod: GC | Performed by: INTERNAL MEDICINE

## 2023-10-17 PROCEDURE — 84100 ASSAY OF PHOSPHORUS: CPT

## 2023-10-17 PROCEDURE — 80048 BASIC METABOLIC PNL TOTAL CA: CPT

## 2023-10-17 PROCEDURE — 700102 HCHG RX REV CODE 250 W/ 637 OVERRIDE(OP): Performed by: INTERNAL MEDICINE

## 2023-10-17 PROCEDURE — 84295 ASSAY OF SERUM SODIUM: CPT

## 2023-10-17 PROCEDURE — 700102 HCHG RX REV CODE 250 W/ 637 OVERRIDE(OP): Performed by: NURSE PRACTITIONER

## 2023-10-17 PROCEDURE — 94669 MECHANICAL CHEST WALL OSCILL: CPT

## 2023-10-17 PROCEDURE — 85027 COMPLETE CBC AUTOMATED: CPT

## 2023-10-17 PROCEDURE — C9113 INJ PANTOPRAZOLE SODIUM, VIA: HCPCS | Performed by: INTERNAL MEDICINE

## 2023-10-17 PROCEDURE — 83735 ASSAY OF MAGNESIUM: CPT

## 2023-10-17 PROCEDURE — 82272 OCCULT BLD FECES 1-3 TESTS: CPT

## 2023-10-17 PROCEDURE — 74018 RADEX ABDOMEN 1 VIEW: CPT

## 2023-10-17 PROCEDURE — 92610 EVALUATE SWALLOWING FUNCTION: CPT

## 2023-10-17 PROCEDURE — A9270 NON-COVERED ITEM OR SERVICE: HCPCS | Performed by: INTERNAL MEDICINE

## 2023-10-17 PROCEDURE — 99221 1ST HOSP IP/OBS SF/LOW 40: CPT | Performed by: NURSE PRACTITIONER

## 2023-10-17 PROCEDURE — 82962 GLUCOSE BLOOD TEST: CPT | Mod: 91

## 2023-10-17 PROCEDURE — A9270 NON-COVERED ITEM OR SERVICE: HCPCS | Performed by: NURSE PRACTITIONER

## 2023-10-17 PROCEDURE — 85007 BL SMEAR W/DIFF WBC COUNT: CPT

## 2023-10-17 PROCEDURE — 84145 PROCALCITONIN (PCT): CPT

## 2023-10-17 PROCEDURE — 700111 HCHG RX REV CODE 636 W/ 250 OVERRIDE (IP): Performed by: INTERNAL MEDICINE

## 2023-10-17 PROCEDURE — 71045 X-RAY EXAM CHEST 1 VIEW: CPT

## 2023-10-17 PROCEDURE — 770022 HCHG ROOM/CARE - ICU (200)

## 2023-10-17 RX ORDER — FAMOTIDINE 20 MG/1
20 TABLET, FILM COATED ORAL DAILY
Status: DISCONTINUED | OUTPATIENT
Start: 2023-10-18 | End: 2023-10-17

## 2023-10-17 RX ORDER — PANTOPRAZOLE SODIUM 40 MG/10ML
40 INJECTION, POWDER, LYOPHILIZED, FOR SOLUTION INTRAVENOUS 2 TIMES DAILY
Status: DISCONTINUED | OUTPATIENT
Start: 2023-10-17 | End: 2023-10-19

## 2023-10-17 RX ADMIN — INSULIN LISPRO 3 UNITS: 100 INJECTION, SOLUTION INTRAVENOUS; SUBCUTANEOUS at 00:20

## 2023-10-17 RX ADMIN — INSULIN LISPRO 3 UNITS: 100 INJECTION, SOLUTION INTRAVENOUS; SUBCUTANEOUS at 17:23

## 2023-10-17 RX ADMIN — INSULIN LISPRO 3 UNITS: 100 INJECTION, SOLUTION INTRAVENOUS; SUBCUTANEOUS at 13:39

## 2023-10-17 RX ADMIN — FAMOTIDINE 20 MG: 20 TABLET ORAL at 06:04

## 2023-10-17 RX ADMIN — PANTOPRAZOLE SODIUM 40 MG: 40 INJECTION, POWDER, FOR SOLUTION INTRAVENOUS at 17:22

## 2023-10-17 RX ADMIN — INSULIN LISPRO 3 UNITS: 100 INJECTION, SOLUTION INTRAVENOUS; SUBCUTANEOUS at 23:18

## 2023-10-17 RX ADMIN — AMIODARONE HYDROCHLORIDE 200 MG: 200 TABLET ORAL at 06:04

## 2023-10-17 RX ADMIN — TERAZOSIN 1 MG: 1 CAPSULE ORAL at 17:54

## 2023-10-17 RX ADMIN — APIXABAN 5 MG: 5 TABLET, FILM COATED ORAL at 17:23

## 2023-10-17 RX ADMIN — APIXABAN 5 MG: 5 TABLET, FILM COATED ORAL at 06:04

## 2023-10-17 ASSESSMENT — ENCOUNTER SYMPTOMS
ABDOMINAL PAIN: 0
COUGH: 0
DIZZINESS: 0
SHORTNESS OF BREATH: 0
PALPITATIONS: 0
CHILLS: 0
FEVER: 0
VOMITING: 0
NAUSEA: 0
HEADACHES: 0

## 2023-10-17 ASSESSMENT — PAIN DESCRIPTION - PAIN TYPE
TYPE: ACUTE PAIN

## 2023-10-17 ASSESSMENT — PATIENT HEALTH QUESTIONNAIRE - PHQ9
SUM OF ALL RESPONSES TO PHQ9 QUESTIONS 1 AND 2: 0
1. LITTLE INTEREST OR PLEASURE IN DOING THINGS: NOT AT ALL
2. FEELING DOWN, DEPRESSED, IRRITABLE, OR HOPELESS: NOT AT ALL

## 2023-10-17 NOTE — CONSULTS
MRN: [id number]  Date of palliative consult: October 16  Reason for consult: Complex advance care planning  Referring provider: Gagan  Location of consult: [place] [address]  Additional consulting services: Therapies, critical care, discharge planning, nephrology, cardiology    HPI:   Perry Marrufo is a 83 y.o. male with past medical history of diabetes type 2, prostate disease who presented on October 9 from [place] with his nephew Yuriy after being found down by his neighbors unknown downtime.  Neighbors who found them reportedly about the truck 2 days prior looked somewhat sick but did not clarify how he was, they subsequently found him on the ground thirsty and completely confused.  Baseline orientation x3.    In the emergency department patient was found to be dehydrated, uremic, altered, with new onset A-fib with RVR that resolved after diltiazem IV pushes.  No cardiopulmonary abnormality on chest x-ray, CT head negative for acute process, CT C-spine without acute fractures.  Elevated WBC, sodium of 151, glucose 351, , creatinine 1.67, CPK 1030, lactic acid 3.0, troponin 39, PT of 16.7, INR 1.33.    Patient was ultimately intubated and admitted to the ICU.  He was extubated on October 14 and noted to have thick mucus with respiratory distress and was reintubated.  He had a bronc with purulent lingula secretions, BAL with many WBCs and was on Unasyn.  Subsequently extubated on October 16 to high flow nasal cannula, currently n.p.o., on tube feed, with aspiration precautions.    Significant/pertinent past medical history: History of C. difficile per chart review in 2015.    Pain History: Unable to obtain  Onset:   Location:   Duration:   Characteristics:   Aggravating factors:   Alleviating factors:   Radiation:   Treatments:   Severity:     Additional symptoms: Patient unable to obtain.  Has had some agitation in the ICU.    Interval History:  Nursing notes reviewed from with increased  overnight, patient self  removed NG tube and oxy mask verbal aggressiveness and combat if/agitation.  Wrist restraints reapplied and NG tube was replaced.    Medication Allergy/Sensitivities:  Allergies   Allergen Reactions    Nicotine          ROS:    Review of Systems   Unable to perform ROS: Mental acuity       PE:   Recent vital signs  BMI: Body mass index is 23.34 kg/m².    No data recorded.  Monitored Temp: 37 °C (98.6 °F)  Pulse  Av.4  Min: 42  Max: 150   Blood Pressure : (!) 141/63      Physical Exam  Vitals and nursing note reviewed.   Constitutional:       Appearance: He is ill-appearing. He is not toxic-appearing.      Interventions: Nasal cannula in place.   Cardiovascular:      Rate and Rhythm: Rhythm irregular.   Pulmonary:      Breath sounds: Examination of the right-middle field reveals rhonchi. Examination of the left-middle field reveals rhonchi. Rhonchi present.   Musculoskeletal:      Right lower le+ Pitting Edema present.      Left lower le+ Pitting Edema present.   Skin:     General: Skin is warm and dry.      Capillary Refill: Capillary refill takes less than 2 seconds.   Neurological:      Mental Status: He is alert. He is disoriented and confused.   Psychiatric:         Attention and Perception: He is inattentive.         Mood and Affect: Mood is anxious.         Behavior: Behavior is agitated and slowed.         Cognition and Memory: Cognition is impaired. Memory is impaired.       Recent Labs     10/15/23  0537 10/15/23  1150 10/16/23  0300 10/16/23  0940 10/16/23  2145 10/17/23  0405   SODIUM 147*   < > 144 142 146* 146*   POTASSIUM 4.2  --  4.0  --   --  4.9   CHLORIDE 117*  --  114*  --   --  118*   CO2 25  --  25  --   --  21   GLUCOSE 195*  --  287*  --   --  103*   BUN 62*  --  53*  --   --  37*   CREATININE 0.87  --  0.99  --   --  0.67   CALCIUM 8.5  --  8.2*  --   --  7.3*    < > = values in this interval not displayed.     Recent Labs     10/15/23  0537 10/16/23  0300 10/17/23  0405   WBC  11.4* 11.4* 13.6*   RBC 3.21* 2.89* 2.86*   HEMOGLOBIN 9.9* 8.8* 8.8*   HEMATOCRIT 31.5* 28.6* 30.2*   MCV 98.1* 99.0* 105.6*   MCH 30.8 30.4 30.8   MCHC 31.4* 30.8* 29.1*   RDW 51.2* 52.0* 54.9*   PLATELETCT 104* 97* 115*   MPV 12.3 12.4 11.8       ASSESSMENT/PLAN WITH SHARED DECISION MAKING:   Review  Pertinent imaging reviewed.    PHYSICAL ASPECTS OF CARE  Palliative Performance Scale: 40%    #Acute hypoxemic respiratory failure  #Sepsis  #Acute encephalopathy  #Type 2 diabetes mellitus  #Acute kidney injury  #Atrial fibrillation with RVR  #Hypernatremia   #Protein calorie malnutrition      SOCIAL ASPECTS OF CARE  Mostly unknown patient resides alone in [place] per what he tells me.  He reports he has 6 children and has been  for 20 years.  He states he was a .  He is affiliated with the [employer] and his advanced directive is on file with same.    SPIRITUAL ASPECTS OF CARE   Patient states he is Judaism and has been since birth.  He reports he is still affiliated with his [employer] and attends regularly.  He is not able to name any Sikh friends, however.  I have placed spiritual care consult for possible assistance from local Sikh representatives.    GOALS OF CARE/SERIOUS ILLNESS CONVERSATION  EMR reviewed, appreciate updates per primary nurse and staff/team.  Met with patient at bedside introduced myself and role of palliative care.  Patient remains confused but alert.  Reportedly agitated last night currently out of restraints and following commands.  He is not able to have meaningful conversation regarding goals of care, unfortunately. Outcome: Patient remains full CODE STATUS, pending further discussion with family/friends and as patient's mental status continues to clear.    Code Status: Full     ACP Documents: On file, indicate no heroic measures if duration dire.    0 minutes spent discussing advance care planning, this time excludes any other billed services.    I  spent a total of 50 minutes reviewing medical records, direct face-to-face time with the patient and/or family, documentation and coordination of care. This is separate from the time spent on advance care planning, which is documented above.    Mackenzie Parekh, MSN, APRN, ACNPC-AG.  Palliative Care Nurse Practitioner  [phone]

## 2023-10-17 NOTE — CARE PLAN
The patient is Watcher - Medium risk of patient condition declining or worsening    Shift Goals  Clinical Goals: Wean O2  Patient Goals: BA  Family Goals: BA    Problem: Pain - Standard  Goal: Alleviation of pain or a reduction in pain to the patient’s comfort goal  Outcome: Progressing  Note: Pain evaluated by CPOT.     Problem: Urinary - Renal Perfusion  Goal: Ability to achieve and maintain adequate renal perfusion and functioning will improve  Outcome: Progressing     Problem: Skin Integrity  Goal: Skin integrity is maintained or improved  Outcome: Progressing  Note: Heel float boots and waffle overlay in place. Q 2 hour turns in place.      Problem: Fall Risk  Goal: Patient will remain free from falls  Outcome: Progressing     Problem: Safety - Medical Restraint  Goal: Remains free of injury from restraints (Restraint for Interference with Medical Device)  Outcome: Progressing  Flowsheets (Taken 10/17/2023 0414)  Addressed this shift: Remains free of injury from restraints (restraint for interference with medical device):   Determine that other, less restrictive measures have been tried or would not be effective before applying the restraint   Evaluate the patient's condition at the time of restraint application   Inform patient/family regarding the reason for restraint   Every 2 hours: Monitor safety, psychosocial status, comfort, nutrition and hydration

## 2023-10-17 NOTE — PROGRESS NOTES
[facility] Progress Note    Admit Date: 10/9/2023    Resident(s): Ming Farah D.O.   Attending:  SEVEN LEDESMA/ Dr. Dominguez    Patient ID:    Name:  Perry Marrufo     YOB: 1940  Age:  83 y.o.  male   MRN:  [id number]    Reason for Consultation  Respiratory failure, AMS, Afib with RVR    Hospital Course (carried forward and updated):    83 y.o. male who presented 10/9/2023 with a PMHx of DM and prostate disease admitted 10/9 from [place] after being found down x 2 days. Found to be in Afib with RVR which converted with metoprolol and altered with hypernatremia and uremia and mild rhabo. Admitted to telemetry under hospitalist services. This afternoon developed worsening hypoxia requiring escalation to maximum HFNC. Also went back into Afib with RVR but this time was hemodynamically significant with a BP that dropped to 50/30 requiring attempted electrical cardioversion. Cardiology consulted. Na increasing despite IVF resuscitation. Nephrology consulted by hospitalist.    10/12 - VD #2, transition to oral amiodarone, free water flushes 300 mL q4h, free water deficit -5.1 L  10/13 - VD #3, Increase free water flushes  10/14 - Extubated, continues to be encephalopathic  10/15 - Re intubated due to increasing AMS and hypoxia, bronchoscopy with L mainstem occlusion with thick, white secretions that were suctioned  10/16 - Extubated, decrease free water flushes 400 ml q2h to q8h, d/c 1/2 NS, attempted to contact 4 different family members without success    Consultants:  Cardiology  Nephrology     Interval Events:    Became combative and verbally aggressive, remove NG tube and OxyMask, restraints placed, NG tube replaced    Complains of thirst    Reviewed last 24 hour events:  Neuro: A&Ox2  HR: NSR 60-70s  SBP: 90-160s  Tmax: AF  GI: NPO, TF at goal, Last BM 10/17, type 7  Endo: Glu 180-310s, Glargine 30u, Lispro 23u  I/O: +1821/-1955/-133, net + [phone] since admit  Lines: 2 PIV, arceo  Mobility: Level  1  Resp: 94% on 2L oxymask  CXR: Bilateral lower lobe infiltrates concerning for infiltrate  Vte: Apixaban  PPI/H2: Not indicated  Antibx: Unasyn (10/10-10/14)  GTT: None    WBC 20.4->9.5->11.4->13.6, HgB 15.8->13.6->9.9->8.8 (baseline unknown), MCV ~95, Plt ~115 (baseline 150s)  Na 144->146 (151 on admit on 10/9), Cl 121->117, Cr 1.89->0.87->0.99 (Baseline unknown),  BAL (10/15/23): NGTD    -Changed free water flushes 400 mL every 8 hours to every 4 hours  -Sodium serum q12h  -Remove Pepcid  -Repeat pro calcitonin  -Glargine 30 units to 20 units  -SLP following, appreciate recommendations  -PT/OT ordered  -Continue load with amiodarone for atrial fibrillation  -Pulmonary hygiene  -Delirium precautions  -Transfer to telemetry or back to med-surg      YST    ANNEEON    Reviewed last 24 hour events:  Neuro: RASS -1  HR: NSR 60s  SBP: 90-130s  Tmax: AF  GI: NPO, TF at goal, Last BM 10/15  Endo: Glu 180-310s, Glarg 24u, Lispro 14u  I/O: +5724/-1200/+2524, net + [phone] since admit  Lines: 2 PIV, arceo  Mobility: Level 1  Resp: VD #2, APV-CMV 20/60/10/450  AB.359/41.9/101/23.6  CXR: Bilateral lower lobe infiltrates, decreased since prior  Vte: Apixaban  PPI/H2: Famotidine 20 mg twice daily  Antibx: Unasyn (10/10-10/14)  GTT: 1/2 NS 83 mL/hour, fentanyl OFF, dex OFF    WBC 20.4->9.5->11.4, HgB 15.8->13.6->9.9->8.8 (baseline unknown), MCV ~95, Plt 104 (baseline 150s)  Na 143->147->144 (151 on admit on 10/9), Cl 121->117, Cr 1.89->0.87->0.99 (Baseline unknown),  BAL (10/15/23): NGTD    -Plan to extubate today  -Changed free water flushes 400 mL every 2 hours to every 8 hours  -D/C / NS  -Sodium serum q12h, if normal, change to daily BMP  -SLP following, appreciate recommendations  -Continue load with amiodarone for atrial fibrillation  -Pulmonary hygiene  -Delirium precautions        Review of Systems  Review of Systems   Constitutional:  Negative for chills, fever and malaise/fatigue.   Respiratory:  Negative for cough  and shortness of breath.    Cardiovascular:  Negative for chest pain and palpitations.   Gastrointestinal:  Negative for abdominal pain, nausea and vomiting.   Genitourinary:  Negative for dysuria.   Skin:  Negative for rash.   Neurological:  Negative for dizziness and headaches.   All other systems reviewed and are negative.      Vital Signs for the last 24 hours  Temp:  [37.1 °C (98.8 °F)] 37.1 °C (98.8 °F)  Pulse:  [63-78] 68  Resp:  [23-34] 26  BP: ()/(50-80) 122/60  SpO2:  [89 %-100 %] 95 %    Hemodynamic parameters for the last 24 hours       Vent Settings for the last 24 hours       Physical Exam  Physical Exam  Vitals and nursing note reviewed.   Constitutional:       Appearance: Normal appearance.   HENT:      Head: Normocephalic and atraumatic.      Right Ear: External ear normal.      Left Ear: External ear normal.      Nose: Nose normal.      Mouth/Throat:      Mouth: Mucous membranes are moist.      Pharynx: Oropharynx is clear.   Eyes:      Extraocular Movements: Extraocular movements intact.      Pupils: Pupils are equal, round, and reactive to light.   Cardiovascular:      Rate and Rhythm: Normal rate and regular rhythm.      Pulses: Normal pulses.      Heart sounds: Normal heart sounds.   Pulmonary:      Breath sounds: Rhonchi present.   Abdominal:      General: There is no distension.      Palpations: Abdomen is soft.      Tenderness: There is no abdominal tenderness. There is no guarding or rebound.   Genitourinary:     Comments: Triplett in place  Musculoskeletal:         General: Normal range of motion.      Cervical back: Normal range of motion.   Skin:     General: Skin is warm.      Capillary Refill: Capillary refill takes less than 2 seconds.   Neurological:      Mental Status: He is alert.      Comments: A&O x2-3         Medications  Current Facility-Administered Medications   Medication Dose Route Frequency Provider Last Rate Last Admin    insulin GLARGINE (Lantus,Semglee) injection  20  Units Subcutaneous Q EVENING Ming Farah D.O.        terazosin (Hytrin) capsule 1 mg  1 mg Enteral Tube Q EVENING Oli Burton M.D.   1 mg at 10/16/23 1705    fentaNYL (Sublimaze) injection 50 mcg  50 mcg Intravenous Q15 MIN PRN Amy Padgett M.D.   50 mcg at 10/15/23 1940    And    fentaNYL (Sublimaze) injection 100 mcg  100 mcg Intravenous Q15 MIN PRN Amy Padgett M.D.   100 mcg at 10/15/23 1757    And    fentaNYL (SUBLIMAZE) 50 mcg/mL in 50mL (Continuous Infusion)   Intravenous Continuous Amy Padgett M.D.   Stopped at 10/16/23 0633    And    dexmedetomidine (PRECEDEX) 400 mcg/100mL NS premix infusion  0-0.7 mcg/kg/hr (Ideal) Intravenous Continuous Amy Padgett M.D.   Dose not Required at 10/15/23 1615    labetalol (Normodyne/Trandate) injection 10 mg  10 mg Intravenous Q6HRS PRN Ming Farah D.O.        insulin lispro (HumaLOG,AdmeLOG) injection  3-14 Units Subcutaneous Q6HRS Amy Padgett M.D.   3 Units at 10/17/23 0020    And    dextrose 50% (D50W) injection 25 g  25 g Intravenous Q15 MIN PRN Amy Padgett M.D.        apixaban (Eliquis) tablet 5 mg  5 mg Enteral Tube BID Amy Padgett M.D.   5 mg at 10/17/23 0604    senna-docusate (Pericolace Or Senokot S) 8.6-50 MG per tablet 2 Tablet  2 Tablet Enteral Tube BID KASSI Jarvis Jr..OSumit   2 Tablet at 10/16/23 0500    And    polyethylene glycol/lytes (Miralax) PACKET 1 Packet  1 Packet Enteral Tube QDAY PRN KASSI Jarvis Jr..OSumit   1 Packet at 10/14/23 0538    And    magnesium hydroxide (Milk Of Magnesia) suspension 30 mL  30 mL Enteral Tube QDAY PRN KASSI Jarvis Jr..LINDA        And    bisacodyl (Dulcolax) suppository 10 mg  10 mg Rectal QDAY PRN Cullen Rodrigues Jr., D.O.        amiodarone (Cordarone) tablet 200 mg  200 mg Enteral Tube DAILY Pilar Small, A.P.N.   200 mg at 10/17/23 0604    Respiratory Therapy Consult   Nebulization Continuous RT Jeremy M Gonda, M.D.        MD Alert...ICU Electrolyte Replacement per  Pharmacy   Other PHARMACY TO DOSE Jeremy M Gonda, M.D.        Pharmacy Consult: Enteral tube insertion - review meds/change route/product selection  1 Each Other PHARMACY TO DOSE Jeremy M Gonda, M.D.        acetaminophen (Tylenol) tablet 650 mg  650 mg Enteral Tube Q6HRS PRN Sarian Jones M.D.   650 mg at 10/14/23 2309       Fluids    Intake/Output Summary (Last 24 hours) at 10/17/2023 1016  Last data filed at 10/17/2023 1000  Gross per 24 hour   Intake 2020.74 ml   Output 2055 ml   Net -34.26 ml       Laboratory  Recent Labs     10/15/23  1542 10/15/23  1721 10/16/23  0248   ISTATAPH 7.425 7.422 7.359*   ISTATAPCO2 37.6* 36.5 41.9*   ISTATAPO2 47* 65 101*   ISTATATCO2 26 25 25   EMQEEPC6XOX 84* 93 97   ISTATARTHCO3 24.7 23.8 23.6   ISTATARTBE 0 0 -2   ISTATTEMP 36.4 C 36.9 C 36.1 C   ISTATFIO2  --  60 60   ISTATSPEC Arterial Arterial Arterial   ISTATAPHTC 7.434 7.424 7.372*   [id number] 45* 65 95*     Recent Labs     10/15/23  0537 10/15/23  1150 10/16/23  0300 10/16/23  0940 10/16/23  2145 10/17/23  0405 10/17/23  0850   SODIUM 147*   < > 144   < > 146* 146* 145   POTASSIUM 4.2  --  4.0  --   --  4.9  --    CHLORIDE 117*  --  114*  --   --  118*  --    CO2 25  --  25  --   --  21  --    BUN 62*  --  53*  --   --  37*  --    CREATININE 0.87  --  0.99  --   --  0.67  --    MAGNESIUM  --   --  2.2  --   --  2.0  --    PHOSPHORUS  --   --  3.5  --   --  2.7  --    CALCIUM 8.5  --  8.2*  --   --  7.3*  --     < > = values in this interval not displayed.     Recent Labs     10/15/23  0537 10/16/23  0300 10/16/23  1544 10/17/23  0405   ALTSGPT  --  11  --   --    ASTSGOT  --  18  --   --    ALKPHOSPHAT  --  66  --   --    TBILIRUBIN  --  0.7  --   --    PREALBUMIN  --  8.0* 8.9*  --    GLUCOSE 195* 287*  --  103*     Recent Labs     10/15/23  0537 10/16/23  0300 10/17/23  0405   WBC 11.4* 11.4* 13.6*   NEUTSPOLYS 80.50* 83.50* 90.60*   LYMPHOCYTES 7.60* 8.70* 5.10*   MONOCYTES 3.40 5.20 0.90   EOSINOPHILS 2.50 1.70  0.00   BASOPHILS 0.00 0.00 0.00   ASTSGOT  --  18  --    ALTSGPT  --  11  --    ALKPHOSPHAT  --  66  --    TBILIRUBIN  --  0.7  --      Recent Labs     10/15/23  0537 10/16/23  0300 10/17/23  0405   RBC 3.21* 2.89* 2.86*   HEMOGLOBIN 9.9* 8.8* 8.8*   HEMATOCRIT 31.5* 28.6* 30.2*   PLATELETCT 104* 97* 115*       Imaging  X-Ray:  I have personally reviewed the images and compared with prior images.  EKG:  I have personally reviewed the images and compared with prior images.  CT:    Reviewed  Echo:   Reviewed  Ultrasound:  Reviewed    ASSESSEMENT and PLAN:    * Acute respiratory failure with hypoxia (HCC)- (present on admission)  Assessment & Plan  No home O2 or inhalers or history of COPD  Intubated 10/11 for mental status and hypoxia on max HFNC  Extubated 10/14  Re-intubated 10/15 for mental status and hypoxia, bronchoscopy with L mainstem occlusion with thick, white secretions that were suctioned  Extubated 10/16    Pulmonary hygiene  Wean O2 as able  SLP following, appreciate recommendations      Hypernatremia- (present on admission)  Assessment & Plan  Hypovolemic hypernatremia  Nephrology consulted  Continue to correct fluid status while closely monitoring Na  Correcting appropriately.  Changed free water flushes 400 mL every 8 hours to every 4 hours  Sodium serum q12h  SLP following, appreciate recommendations      Altered mental status- (present on admission)  Assessment & Plan  Acute metabolic encephalopathy likely due to uremia, hypernatremia and hypoxia  Overall improving   Delirium precautions:  Optimize patient day night schedule with lights on during day windows shades open.    Minimize or avoid anticholinergic, benzo's, narcotics, but will treat pain, optimize patient hearing, visual clues, frequent orientations, and family involvement   Maintain corrective eye glasses within reach   Will re-access the needs of restraints, lines/tubes, and have a emphasis on mobilization and removal as soon as possible.     Minimize night time interruptions        Sepsis ([facility])- (present on admission)  Assessment & Plan  Unclear if patient truly had sepsis  Possible aspiration pneumonia has been adequately treated  Currently he is hemodynamically stable.      Atrial fibrillation with RVR ([facility])- (present on admission)  Assessment & Plan  No known history of Afib, RVR with hypotension attempted electrical cardioversion 10/11 without success, put on amio GTT  Echo EF 65%, no valvular abnormalities  Rate Controlled  Continuous tele monitoring  Anticoagulation with Eliquis  Rhythm control with amiodarone, continue loading  Optimize electrolytes     ADELAIDE (acute kidney injury) ([facility])- (present on admission)  Assessment & Plan  Secondary to ATN, pre-renal and rhabdo  Improving  Nephrology has been following him.  Worsening renal functions.  Avoid nephrotoxins  Monitor creatinine, UOP, electrolytes closely    Shock ([facility])  Assessment & Plan  Undifferentiated - cardiac due to arrhythmia vs hypovolemia vs sepsis  Shock resolved    Aspiration precautions  Assessment & Plan  Increasing oxygen requirement and chest x-ray showed infiltration possible pneumonia  Oxygen requirement has significantly improved.  Continue monitor closely.    Rhabdomyolysis- (present on admission)  Assessment & Plan  Continue IVF resuscitation, monitor CPK  CPK significant trended down.    Dehydration- (present on admission)  Assessment & Plan  Continue fluid resuscitation intravenously and enterally for now  Monitor UOP closely    Erythrocytosis- (present on admission)  Assessment & Plan  Likely d/t hemoconcentration    DM2 (diabetes mellitus, type 2) ([facility])- (present on admission)  Assessment & Plan  A1c 8  Continue insulin sliding scale with hypoglycemia protocol.  Continue diabetic tube feeding.      Diabetes mellitus-Type 2, not on treatment- (present on admission)  Assessment & Plan  Current HbA1c is 8.0  Glargine 20 units, SSI, hypoglycemia protocol, diabetic  TF        BPH (benign prostatic hyperplasia)- (present on admission)  Assessment & Plan  I started him on tamsulosin.          VTE:   Heparin GTT  Ulcer: H2 Antagonist  Lines: Triplett Catheter  Ongoing indication addressed    I have performed a physical exam and reviewed and updated ROS and Plan today (10/17/2023). In review of yesterday's note (10/16/2023), there are no changes except as documented above.     Discussed patient condition and risk of morbidity and/or mortality with Hospitalist, Family, RN, RT, Therapies, Pharmacy, , and     Ming Farah D.O.  PGY-3 Internal Medicine Resident

## 2023-10-17 NOTE — PROGRESS NOTES
0130: Patient removed NG tube and oxy-mask. Patient combative and verbally aggressive towards nursing staff. Bilateral wrist restraints applied for removal of necessary medical equipment and Dr. Moon notified. NG tube replaced.    0449: Patient becoming increasingly agitated and combative. Patient not redirectable or receptive to education. Dr. Moon notified and to bedside. No new orders at this time.    Abdomen x-ray for NG tube replacement not read. Dr. Moon notified.

## 2023-10-17 NOTE — THERAPY
Speech Language Pathology   Clinical Swallow Evaluation     Patient Name: Perry Marrufo  AGE:  83 y.o., SEX:  male  Medical Record #: [id number]  Date of Service: 10/17/2023      History of Present Illness  83 y.o. male who presented on 10/9/2023 after being found down by neighbors. PT was found confused, thirsty.     10/14 - Extubated, continues to be encephalopathic  10/15 - Re intubated due to increasing AMS and hypoxia, bronchoscopy with L mainstem occlusion with thick, white secretions that were suctioned  10/16 - Extubated, decrease free water flushes 400 ml q2h to q8h, d/c 1/2 NS, attempted to contact 4 different family members without success     PMHx DM type II, prostate disease  No previous h/o SLP services at [facility]    CMHx Acute respiratory failure w/ hypoxia, Hypernatremia, AMS, Sepsis, Afib w/ RVR, ADELAIDE, Shock, Rhabdomyolysis, dehydration    CXR 10/17/2023  1.  Bilateral lower lobe infiltrates, stable since prior study.  2.  Atherosclerosis    General Information:  Vitals  O2 (LPM): 1  O2 Delivery Device: Silicone Nasal Cannula  Level of Consciousness: Awake, Lethargic  Patient Behaviors: Confused, Fatigue  Orientation: Self, , General place  Follows Directives: Inconsistent      Prior Living Situation & Level of Function:  Housing / Facility: Unable To Determine At This Time  Lives with - Patient's Self Care Capacity: Unable To Determine At This Time     Communication: WFL  Swallowing: Pt denies h/o dysphagia       Oral Mechanism Evaluation:  Dentition: Edentulous (unknown if pt owns dentures)   Facial Symmetry: Equal  Facial Sensation: Equal     Labial Observations: WFL   Lingual Observations: Midline  Motor Speech: mild dysarthria            Laryngeal Function:  Secretion Management: Adequate  Voice Quality: Hoarse  Cough: Perceptually weak         Subjective  Patient cleared by RN for evaluation. Pt received asleep, roused to verbal cues. Pt maintained eyes closed for majority of session, needing  verbal/tactile cues to maintain wakefulness      Assessment  Current Method of Nutrition: NPO until cleared by speech pathology, NGT  Positioning: Mon's (60-90 degrees)  Bolus Administration: SLP  O2 (LPM): 1 O2 Delivery Device: Silicone Nasal Cannula  Factor(s) Affecting Performance: Impaired endurance, Impaired mental status, Impaired command following  Tracheostomy : No          Swallowing Trials:  Swallowing Trials  Ice: Impaired  Thin Liquid (TN0): Impaired  Liquidised (LQ3): Impaired      Comments: Oral care provided via suction toothbrush prior to PO presentation. Adequate oral acceptance/containment and transit. Adequate bolus stripping from utensil. Timely swallow initiation appreciated. No oral residue noted post swallow. Immediate, weak cough response appreciated across all trials, concerning for airway invasion. Pt would benefit from instrumental swallow study to objectively assess swallow function and informed POC.       Clinical Impressions  Patient presents with clinical indicators and is at high risk for oropharyngeal dysphagia 2/2 AMS, endotracheal intubation x2 and prolonged NPO status. An instrumental swallow study is recommended to objectively assess swallow function and informed POC; however, given increased lethargy/ AMS pt is not appropriate to participate on this date. Service will continue to follow and complete study as able/appropriate.         Recommendations  Diet Consistency: NPO/NGT, pending FEES   -Clear for minimal ice chips/hour 1:1 w/RN, when alert, after oral care to reduce xerostomia and mitigate disuse atrophy of swallow musculature  Instrumentation: FEES  Medication: Non Oral  Oral Care: Q4h         SLP Treatment Plan  Treatment Plan: Dysphagia Treatment, Patient/Family/Caregiver Training  SLP Frequency: 3x Per Week  Estimated Duration: Until Therapy Goals Met      Anticipated Discharge Needs  Discharge Recommendations: Recommend post-acute placement for additional speech  "therapy services prior to discharge home   Therapy Recommendations Upon DC: Dysphagia Training, Patient / Family / Caregiver Education        Patient / Family Goals  Patient / Family Goal #1: \"water\"  Short Term Goals  Short Term Goal # 1: Pt will consume prefeeding trials with SLP with no overt s/sx of aspiration  Short Term Goal # 2: Pt will complete instrumental swallow assessment to determine physiology of swallow.      Yvette Walls MS,CCC-SLP     "

## 2023-10-18 ENCOUNTER — APPOINTMENT (OUTPATIENT)
Dept: RADIOLOGY | Facility: MEDICAL CENTER | Age: 83
DRG: 871 | End: 2023-10-18
Attending: INTERNAL MEDICINE
Payer: COMMERCIAL

## 2023-10-18 LAB
ANION GAP SERPL CALC-SCNC: 5 MMOL/L (ref 7–16)
BASE EXCESS BLDA CALC-SCNC: 2 MMOL/L (ref -4–3)
BASOPHILS # BLD AUTO: 0.9 % (ref 0–1.8)
BASOPHILS # BLD: 0.1 K/UL (ref 0–0.12)
BODY TEMPERATURE: ABNORMAL DEGREES
BUN SERPL-MCNC: 29 MG/DL (ref 8–22)
CALCIUM SERPL-MCNC: 8.2 MG/DL (ref 8.5–10.5)
CHLORIDE SERPL-SCNC: 113 MMOL/L (ref 96–112)
CO2 BLDA-SCNC: 27 MMOL/L (ref 20–33)
CO2 SERPL-SCNC: 28 MMOL/L (ref 20–33)
CREAT SERPL-MCNC: 0.74 MG/DL (ref 0.5–1.4)
DELSYS IDSYS: ABNORMAL
EOSINOPHIL # BLD AUTO: 0 K/UL (ref 0–0.51)
EOSINOPHIL NFR BLD: 0 % (ref 0–6.9)
ERYTHROCYTE [DISTWIDTH] IN BLOOD BY AUTOMATED COUNT: 49.6 FL (ref 35.9–50)
GFR SERPLBLD CREATININE-BSD FMLA CKD-EPI: 90 ML/MIN/1.73 M 2
GLUCOSE BLD STRIP.AUTO-MCNC: 123 MG/DL (ref 65–99)
GLUCOSE BLD STRIP.AUTO-MCNC: 131 MG/DL (ref 65–99)
GLUCOSE BLD STRIP.AUTO-MCNC: 133 MG/DL (ref 65–99)
GLUCOSE BLD STRIP.AUTO-MCNC: 165 MG/DL (ref 65–99)
GLUCOSE BLD STRIP.AUTO-MCNC: 194 MG/DL (ref 65–99)
GLUCOSE SERPL-MCNC: 172 MG/DL (ref 65–99)
HCO3 BLDA-SCNC: 25.7 MMOL/L (ref 17–25)
HCT VFR BLD AUTO: 30.9 % (ref 42–52)
HGB BLD-MCNC: 9.8 G/DL (ref 14–18)
LPM ILPM: 4 LPM
LYMPHOCYTES # BLD AUTO: 0.69 K/UL (ref 1–4.8)
LYMPHOCYTES NFR BLD: 6.2 % (ref 22–41)
MANUAL DIFF BLD: NORMAL
MCH RBC QN AUTO: 30.6 PG (ref 27–33)
MCHC RBC AUTO-ENTMCNC: 31.7 G/DL (ref 32.3–36.5)
MCV RBC AUTO: 96.6 FL (ref 81.4–97.8)
MICROCYTES BLD QL SMEAR: ABNORMAL
MONOCYTES # BLD AUTO: 0.1 K/UL (ref 0–0.85)
MONOCYTES NFR BLD AUTO: 0.9 % (ref 0–13.4)
MORPHOLOGY BLD-IMP: NORMAL
MYELOCYTES NFR BLD MANUAL: 0.9 %
NEUTROPHILS # BLD AUTO: 10.2 K/UL (ref 1.82–7.42)
NEUTROPHILS NFR BLD: 91.1 % (ref 44–72)
NRBC # BLD AUTO: 0 K/UL
NRBC BLD-RTO: 0 /100 WBC (ref 0–0.2)
PCO2 BLDA: 35.8 MMHG (ref 26–37)
PCO2 TEMP ADJ BLDA: 36.3 MMHG (ref 26–37)
PH BLDA: 7.46 [PH] (ref 7.4–7.5)
PH TEMP ADJ BLDA: 7.46 [PH] (ref 7.4–7.5)
PHOSPHATE SERPL-MCNC: 2.8 MG/DL (ref 2.5–4.5)
PLATELET # BLD AUTO: 163 K/UL (ref 164–446)
PLATELET BLD QL SMEAR: NORMAL
PMV BLD AUTO: 11.3 FL (ref 9–12.9)
PO2 BLDA: 62 MMHG (ref 64–87)
PO2 TEMP ADJ BLDA: 63 MMHG (ref 64–87)
POTASSIUM SERPL-SCNC: 4.2 MMOL/L (ref 3.6–5.5)
RBC # BLD AUTO: 3.2 M/UL (ref 4.7–6.1)
RBC BLD AUTO: PRESENT
SAO2 % BLDA: 93 % (ref 93–99)
SODIUM SERPL-SCNC: 142 MMOL/L (ref 135–145)
SODIUM SERPL-SCNC: 144 MMOL/L (ref 135–145)
SODIUM SERPL-SCNC: 146 MMOL/L (ref 135–145)
SPECIMEN DRAWN FROM PATIENT: ABNORMAL
WBC # BLD AUTO: 11.2 K/UL (ref 4.8–10.8)

## 2023-10-18 PROCEDURE — 99231 SBSQ HOSP IP/OBS SF/LOW 25: CPT | Performed by: NURSE PRACTITIONER

## 2023-10-18 PROCEDURE — 700102 HCHG RX REV CODE 250 W/ 637 OVERRIDE(OP): Performed by: NURSE PRACTITIONER

## 2023-10-18 PROCEDURE — 82962 GLUCOSE BLOOD TEST: CPT | Mod: 91

## 2023-10-18 PROCEDURE — 85027 COMPLETE CBC AUTOMATED: CPT

## 2023-10-18 PROCEDURE — 700111 HCHG RX REV CODE 636 W/ 250 OVERRIDE (IP): Mod: JZ | Performed by: INTERNAL MEDICINE

## 2023-10-18 PROCEDURE — 94669 MECHANICAL CHEST WALL OSCILL: CPT

## 2023-10-18 PROCEDURE — 700105 HCHG RX REV CODE 258: Performed by: INTERNAL MEDICINE

## 2023-10-18 PROCEDURE — 770022 HCHG ROOM/CARE - ICU (200)

## 2023-10-18 PROCEDURE — 99291 CRITICAL CARE FIRST HOUR: CPT | Performed by: INTERNAL MEDICINE

## 2023-10-18 PROCEDURE — 84295 ASSAY OF SERUM SODIUM: CPT

## 2023-10-18 PROCEDURE — 36600 WITHDRAWAL OF ARTERIAL BLOOD: CPT

## 2023-10-18 PROCEDURE — 71045 X-RAY EXAM CHEST 1 VIEW: CPT

## 2023-10-18 PROCEDURE — 94640 AIRWAY INHALATION TREATMENT: CPT

## 2023-10-18 PROCEDURE — C9113 INJ PANTOPRAZOLE SODIUM, VIA: HCPCS | Performed by: INTERNAL MEDICINE

## 2023-10-18 PROCEDURE — 97163 PT EVAL HIGH COMPLEX 45 MIN: CPT

## 2023-10-18 PROCEDURE — 80048 BASIC METABOLIC PNL TOTAL CA: CPT

## 2023-10-18 PROCEDURE — 700102 HCHG RX REV CODE 250 W/ 637 OVERRIDE(OP): Performed by: STUDENT IN AN ORGANIZED HEALTH CARE EDUCATION/TRAINING PROGRAM

## 2023-10-18 PROCEDURE — 82803 BLOOD GASES ANY COMBINATION: CPT

## 2023-10-18 PROCEDURE — 700111 HCHG RX REV CODE 636 W/ 250 OVERRIDE (IP): Performed by: INTERNAL MEDICINE

## 2023-10-18 PROCEDURE — 85007 BL SMEAR W/DIFF WBC COUNT: CPT

## 2023-10-18 PROCEDURE — 700102 HCHG RX REV CODE 250 W/ 637 OVERRIDE(OP): Performed by: INTERNAL MEDICINE

## 2023-10-18 PROCEDURE — 84100 ASSAY OF PHOSPHORUS: CPT

## 2023-10-18 PROCEDURE — A9270 NON-COVERED ITEM OR SERVICE: HCPCS | Performed by: NURSE PRACTITIONER

## 2023-10-18 PROCEDURE — A9270 NON-COVERED ITEM OR SERVICE: HCPCS | Performed by: STUDENT IN AN ORGANIZED HEALTH CARE EDUCATION/TRAINING PROGRAM

## 2023-10-18 RX ORDER — DEXTROSE MONOHYDRATE 50 MG/ML
INJECTION, SOLUTION INTRAVENOUS CONTINUOUS
Status: DISCONTINUED | OUTPATIENT
Start: 2023-10-18 | End: 2023-10-19

## 2023-10-18 RX ADMIN — ACETAMINOPHEN 650 MG: 325 TABLET, FILM COATED ORAL at 11:21

## 2023-10-18 RX ADMIN — PANTOPRAZOLE SODIUM 40 MG: 40 INJECTION, POWDER, FOR SOLUTION INTRAVENOUS at 17:23

## 2023-10-18 RX ADMIN — DEXTROSE MONOHYDRATE: 50 INJECTION, SOLUTION INTRAVENOUS at 15:48

## 2023-10-18 RX ADMIN — FENTANYL CITRATE 100 MCG: 50 INJECTION, SOLUTION INTRAMUSCULAR; INTRAVENOUS at 02:42

## 2023-10-18 RX ADMIN — FENTANYL CITRATE 50 MCG: 50 INJECTION, SOLUTION INTRAMUSCULAR; INTRAVENOUS at 06:00

## 2023-10-18 RX ADMIN — PANTOPRAZOLE SODIUM 40 MG: 40 INJECTION, POWDER, FOR SOLUTION INTRAVENOUS at 05:34

## 2023-10-18 RX ADMIN — INSULIN LISPRO 3 UNITS: 100 INJECTION, SOLUTION INTRAVENOUS; SUBCUTANEOUS at 05:57

## 2023-10-18 RX ADMIN — AMIODARONE HYDROCHLORIDE 200 MG: 200 TABLET ORAL at 05:34

## 2023-10-18 ASSESSMENT — PAIN DESCRIPTION - PAIN TYPE
TYPE: ACUTE PAIN

## 2023-10-18 ASSESSMENT — COGNITIVE AND FUNCTIONAL STATUS - GENERAL
CLIMB 3 TO 5 STEPS WITH RAILING: TOTAL
TURNING FROM BACK TO SIDE WHILE IN FLAT BAD: UNABLE
STANDING UP FROM CHAIR USING ARMS: TOTAL
SUGGESTED CMS G CODE MODIFIER MOBILITY: CN
MOVING TO AND FROM BED TO CHAIR: UNABLE
WALKING IN HOSPITAL ROOM: TOTAL
MOBILITY SCORE: 6
MOVING FROM LYING ON BACK TO SITTING ON SIDE OF FLAT BED: UNABLE

## 2023-10-18 ASSESSMENT — GAIT ASSESSMENTS: GAIT LEVEL OF ASSIST: UNABLE TO PARTICIPATE

## 2023-10-18 ASSESSMENT — FIBROSIS 4 INDEX: FIB4 SCORE: 2.73

## 2023-10-18 NOTE — THERAPY
Physical Therapy   Initial Evaluation     Patient Name: Perry Marrufo  Age:  83 y.o., Sex:  male  Medical Record #: [id number]  Today's Date: 10/18/2023     Precautions  Precautions: Fall Risk;Swallow Precautions;Nasogastric Tube;Other (See Comments)  Comments: Seizure precautions    Assessment  Patient is 83 y.o. male who presented 10/9/2023 from [facility] with his nephew Yuriy after being found down by his neighbors.    Cardioversion 10/11, EEG 10/11, diagnostic & therapeutic bronchoscopy 10/15.   Intubated 10/11 & 10/15; Extubated 10/14 & 10/17.   Currently been managed for AMS, Rhabdomyolysis, A fib with RVR, Dehydration, acute hypoxemic respiratory failure, sepsis, acute encephalopathy, ADELAIDE, hypernatremia.   PMH: DM2, prostate disease     Patient seen for PT evaluation. Patient currently requiring total assist with bed mobility. Currently has impairments in his ROM, strength, balance, functional mobility, activity tolerance. Will benefit from continued PT services and recommend post-acute placement.     Plan    Physical Therapy Initial Treatment Plan   Treatment Plan : Bed Mobility, Gait Training, Neuro Re-Education / Balance, Stair Training, Therapeutic Activities, Therapeutic Exercise  Treatment Frequency: 2 Times per Week (Increase frequency once patient is able to participate more)  Duration: Until Therapy Goals Met    DC Equipment Recommendations: Unable to determine at this time  Discharge Recommendations: Recommend post-acute placement for additional physical therapy services prior to discharge home      Objective     10/18/23 1147   Charge Group   PT Evaluation PT Evaluation High   Total Time Spent   PT Evaluation Time Spent (Mins) 22   PT Total Time Spent (Calculated) 22   Initial Contact Note    Initial Contact Note Order Received and Verified, Physical Therapy Evaluation in Progress with Full Report to Follow.   Precautions   Precautions Fall Risk;Swallow Precautions;Nasogastric Tube;Other (See  Comments)   Comments Seizure precautions   Vitals   Pulse 79   Patient BP Position Mon's Position  (Post activity)   Blood Pressure  126/64   Pulse Oximetry 93 %   O2 (LPM) 10   O2 Delivery Device Oxymask   Pain   Pain Scales Non Verbal Scale   Intervention Repositioned;Rest   Non Verbal Descriptors   Non Verbal Scale  Tense Body Language   Prior Living Situation   Prior Services Unable To Determine At This Time   Comments Patient unable to provide any of the above information at this time.   Prior Level of Functional Mobility   Comments Unable to determine his baseline functional mobility.   Cognition    Cognition / Consciousness X   Speech/ Communication Delayed Responses;Hard of Hearing   Orientation Level Not Oriented to Time;Not Oriented to Place;Not Oriented to Reason   Level of Consciousness Alert   Ability To Follow Commands 1 Step   Comments Following commands intermittently, < 25% of the time, requires repeated cues   Passive ROM Lower Body   Passive ROM Lower Body WDL   Active ROM Lower Body    Active ROM Lower Body  X   Comments Able to perform knee extension while seated EOB; did not perform hip flexion or ankle movements upon command or spontaneously   Strength Lower Body   Lower Body Strength  X   Comments Grossly BLE 2-/5; Unable to accurately assess due to cognition   Balance Assessment   Sitting Balance (Static) Dependent   Sitting Balance (Dynamic) Dependent   Bed Mobility    Supine to Sit Total Assist   Sit to Supine Total Assist   Scooting Total Assist   Rolling Total Assist to Rt.;Total Assist to Lt.   Comments x2 person assist   Gait Analysis   Gait Level Of Assist Unable to Participate   Functional Mobility   Sit to Stand Unable to Participate   How much difficulty does the patient currently have...   Turning over in bed (including adjusting bedclothes, sheets and blankets)? 1   Sitting down on and standing up from a chair with arms (e.g., wheelchair, bedside commode, etc.) 1   Moving from  lying on back to sitting on the side of the bed? 1   How much help from another person does the patient currently need...   Moving to and from a bed to a chair (including a wheelchair)? 1   Need to walk in a hospital room? 1   Climbing 3-5 steps with a railing? 1   6 clicks Mobility Score 6   Activity Tolerance   Sitting Edge of Bed 10 minutes   Edema / Skin Assessment   Edema / Skin  X   Comments BLE edema, heel protector-B/L   Patient / Family Goals    Patient / Family Goal #1 Unable to state   Short Term Goals    Short Term Goal # 1 Patient will perform supine-sit with Mod A in 6 visits   Short Term Goal # 2 Patient will attempt sit-stand with FWW with Max A in 6 visits   Short Term Goal # 3 Patient will tolerate sitting EOB for > 10 minutes with Min A in 6 visits   Education Group   Education Provided Role of Physical Therapist   Role of Physical Therapist Patient Response Patient;No Learning Evidence   Physical Therapy Initial Treatment Plan    Treatment Plan  Bed Mobility;Gait Training;Neuro Re-Education / Balance;Stair Training;Therapeutic Activities;Therapeutic Exercise   Treatment Frequency 2 Times per Week  (Increase frequency once patient is able to participate more)   Duration Until Therapy Goals Met   Problem List    Problems Pain;Impaired Bed Mobility;Impaired Transfers;Impaired Ambulation;Functional ROM Deficit;Functional Strength Deficit;Impaired Balance;Impaired Coordination;Decreased Activity Tolerance;Safety Awareness Deficits / Cognition;Motor Planning / Sequencing   Anticipated Discharge Equipment and Recommendations   DC Equipment Recommendations Unable to determine at this time   Discharge Recommendations Recommend post-acute placement for additional physical therapy services prior to discharge home   Interdisciplinary Plan of Care Collaboration   IDT Collaboration with  Nursing   Patient Position at End of Therapy In Bed;Bed Alarm On;Call Light within Reach   Session Information   Date /  Session Number  10/18-1(1/2, 10/24)

## 2023-10-18 NOTE — PROGRESS NOTES
[facility] Progress Note    Admit Date: 10/9/2023    Resident(s): Ming Farah D.O.   Attending:  SEVEN LEDESMA/ Dr. Dominguez    Patient ID:    Name:  Perry Marrufo     YOB: 1940  Age:  83 y.o.  male   MRN:  [id number]    Reason for Consultation  Respiratory failure, AMS, Afib with RVR    Hospital Course (carried forward and updated):    83 y.o. male who presented 10/9/2023 with a PMHx of DM and prostate disease admitted 10/9 from [place] after being found down x 2 days. Found to be in Afib with RVR which converted with metoprolol and altered with hypernatremia and uremia and mild rhabo. Admitted to telemetry under hospitalist services. This afternoon developed worsening hypoxia requiring escalation to maximum HFNC. Also went back into Afib with RVR but this time was hemodynamically significant with a BP that dropped to 50/30 requiring attempted electrical cardioversion. Cardiology consulted. Na increasing despite IVF resuscitation. Nephrology consulted by hospitalist.    10/12 - VD #2, transition to oral amiodarone, free water flushes 300 mL q4h, free water deficit -5.1 L  10/13 - VD #3, Increase free water flushes  10/14 - Extubated, continues to be encephalopathic  10/15 - Re intubated due to increasing AMS and hypoxia, bronchoscopy with L mainstem occlusion with thick, white secretions that were suctioned  10/16 - Extubated, decrease free water flushes 400 ml q2h to q8h, d/c 1/2 NS, attempted to contact 4 different family members without success  10/17 - Increase free water flushes, palliative unable to find family, positive FIT, hold apixaban    Consultants:  Cardiology  Nephrology     Interval Events:    Became more obtunded, awake, but does not answer questions    Reviewed last 24 hour events:  Neuro: A&Ox1  HR: NSR 60-70s  SBP: 110-150s  Tmax: AF  GI: NPO, TF at goal, Last BM 10/17, type 7  Endo: Glu 100-180s, Glargine 20u, Lispro 12u  I/O: + +30-40/-2200/+1040, net + [phone] since admit  Lines: 2  PIV, arceo  Mobility: Level 1  Resp: 96% on 5L oxymask  CXR: Bilateral lower lobe infiltrates, stable, trace bilateral pleural effusion, right rib fracture  Vte: Apixaban held  PPI/H2: Pantoprazole 40 mg IVP twice daily  Antibx: Unasyn (10/10-10/14)  GTT: None    WBC 20.4->13.6->11.2, HgB 15.8->13.6->9.9->8.8->9.8 (baseline unknown), MCV ~95, Plt ~115->163 (baseline 150s)  Na 144->146 (151 on admit on 10/9), Cl 121->117, Cr 1.89->0.99->0.74 (Baseline unknown),  BAL (10/15/23): MSSA, sensitive to Cefazolin, Unasyn  FIT +, procalcitonin 0.18    -Hold TF today due to nasogastric bleeding  -Continue to hold Apixaban  -Start D5 50 ml/hr  -SLP following, appreciate recommendations  -Palliative following, appreciate recommendations  -2 Physician decision to change patient's code status to DNAR/I okay due to patient's severe morbidity and mortality    YST    Became combative and verbally aggressive, remove NG tube and OxyMask, restraints placed, NG tube replaced    Complains of thirst    Reviewed last 24 hour events:  Neuro: A&Ox2  HR: NSR 60-70s  SBP: 90-160s  Tmax: AF  GI: NPO, TF at goal, Last BM 10/17, type 7  Endo: Glu 180-310s, Glargine 30u, Lispro 23u  I/O: +1821/-1955/-133, net + [phone] since admit  Lines: 2 PIV, arceo  Mobility: Level 1  Resp: 94% on 2L oxymask  CXR: Bilateral lower lobe infiltrates concerning for infiltrate  Vte: Apixaban  PPI/H2: Not indicated  Antibx: Unasyn (10/10-10/14)  GTT: None    WBC 20.4->9.5->11.4->13.6, HgB 15.8->13.6->9.9->8.8 (baseline unknown), MCV ~95, Plt ~115 (baseline 150s)  Na 144->146 (151 on admit on 10/9), Cl 121->117, Cr 1.89->0.87->0.99 (Baseline unknown),  BAL (10/15/23): NGTD    -Changed free water flushes 400 mL every 8 hours to every 4 hours  -Sodium serum q12h  -Remove Pepcid  -Repeat pro calcitonin  -Glargine 30 units to 20 units  -SLP following, appreciate recommendations  -PT/OT ordered  -Continue load with amiodarone for atrial fibrillation  -Pulmonary  hygiene  -Delirium precautions      Review of Systems  Review of Systems   Unable to perform ROS: Critical illness       Vital Signs for the last 24 hours  Pulse:  [66-85] 66  Resp:  [23-41] 24  BP: (103-159)/(54-83) 112/58  SpO2:  [85 %-100 %] 100 %    Hemodynamic parameters for the last 24 hours       Vent Settings for the last 24 hours       Physical Exam  Physical Exam  Vitals and nursing note reviewed.   Constitutional:       Appearance: Normal appearance.   HENT:      Head: Normocephalic and atraumatic.      Right Ear: External ear normal.      Left Ear: External ear normal.      Nose: Nose normal.      Mouth/Throat:      Mouth: Mucous membranes are moist.      Pharynx: Oropharynx is clear.   Eyes:      Extraocular Movements: Extraocular movements intact.      Pupils: Pupils are equal, round, and reactive to light.   Cardiovascular:      Rate and Rhythm: Normal rate and regular rhythm.      Pulses: Normal pulses.      Heart sounds: Normal heart sounds.   Pulmonary:      Breath sounds: Rhonchi present.   Abdominal:      General: There is no distension.      Palpations: Abdomen is soft.      Tenderness: There is no abdominal tenderness. There is no guarding or rebound.   Genitourinary:     Comments: Triplett in place  Musculoskeletal:         General: Normal range of motion.      Cervical back: Normal range of motion.   Skin:     General: Skin is warm.      Capillary Refill: Capillary refill takes less than 2 seconds.   Neurological:      Mental Status: He is alert.      Comments: A&O x1         Medications  Current Facility-Administered Medications   Medication Dose Route Frequency Provider Last Rate Last Admin    dextrose 5% infusion   Intravenous Continuous Cipriano Dominguez M.D. 50 mL/hr at 10/18/23 1548 New Bag at 10/18/23 1548    insulin GLARGINE (Lantus,Semglee) injection  10 Units Subcutaneous Q EVENING Ming Farah D.O.   10 Units at 10/18/23 1802    pantoprazole (Protonix) injection 40 mg  40 mg Intravenous  BID Cipriano Dominguez M.D.   40 mg at 10/18/23 1723    terazosin (Hytrin) capsule 1 mg  1 mg Enteral Tube Q EVENING Oli Burton M.D.   1 mg at 10/17/23 1754    labetalol (Normodyne/Trandate) injection 10 mg  10 mg Intravenous Q6HRS PRN Ming Farah D.O.        insulin lispro (HumaLOG,AdmeLOG) injection  3-14 Units Subcutaneous Q6HRS Amy Padgett M.D.   3 Units at 10/18/23 0557    And    dextrose 50% (D50W) injection 25 g  25 g Intravenous Q15 MIN PRN Amy Padgett M.D.        [Held by provider] apixaban (Eliquis) tablet 5 mg  5 mg Enteral Tube BID Amy Padgett M.D.   5 mg at 10/17/23 1723    amiodarone (Cordarone) tablet 200 mg  200 mg Enteral Tube DAILY Pilar Small, A.P.N.   200 mg at 10/18/23 0534    Respiratory Therapy Consult   Nebulization Continuous RT Jeremy M Gonda, M.D. MD Alert...ICU Electrolyte Replacement per Pharmacy   Other PHARMACY TO DOSE Jeremy M Gonda, M.D.        Pharmacy Consult: Enteral tube insertion - review meds/change route/product selection  1 Each Other PHARMACY TO DOSE Jeremy M Gonda, M.D.        acetaminophen (Tylenol) tablet 650 mg  650 mg Enteral Tube Q6HRS PRN Sarina Jones M.D.   650 mg at 10/18/23 1121       Fluids    Intake/Output Summary (Last 24 hours) at 10/18/2023 2321  Last data filed at 10/18/2023 2200  Gross per 24 hour   Intake 1750 ml   Output 1850 ml   Net -100 ml       Laboratory  Recent Labs     10/16/23  0248 10/18/23  0738   ISTATAPH 7.359* 7.463   ISTATAPCO2 41.9* 35.8   ISTATAPO2 101* 62*   ISTATATCO2 25 27   YICOGXV9CQC 97 93   ISTATARTHCO3 23.6 25.7*   ISTATARTBE -2 2   ISTATTEMP 36.1 C 37.3 C   ISTATFIO2 60  --    ISTATSPEC Arterial Arterial   ISTATAPHTC 7.372* 7.459   AURAGTFU2VA 95* 63*     Recent Labs     10/16/23  0300 10/16/23  0940 10/17/23  0405 10/17/23  0850 10/17/23  1415 10/17/23  1945 10/18/23  0120 10/18/23  1309 10/18/23  2055   SODIUM 144   < > 146*   < > 144   < > 146* 144 142   POTASSIUM 4.0  --  4.9  --  4.4  --   4.2  --   --    CHLORIDE 114*  --  118*  --  112  --  113*  --   --    CO2 25  --  21  --  27  --  28  --   --    BUN 53*  --  37*  --  34*  --  29*  --   --    CREATININE 0.99  --  0.67  --  0.70  --  0.74  --   --    MAGNESIUM 2.2  --  2.0  --   --   --   --   --   --    PHOSPHORUS 3.5  --  2.7  --   --   --  2.8  --   --    CALCIUM 8.2*  --  7.3*  --  8.1*  --  8.2*  --   --     < > = values in this interval not displayed.     Recent Labs     10/16/23  0300 10/16/23  1544 10/17/23  0405 10/17/23  1415 10/18/23  0120   ALTSGPT 11  --   --   --   --    ASTSGOT 18  --   --   --   --    ALKPHOSPHAT 66  --   --   --   --    TBILIRUBIN 0.7  --   --   --   --    PREALBUMIN 8.0* 8.9*  --   --   --    GLUCOSE 287*  --  103* 179* 172*     Recent Labs     10/16/23  0300 10/17/23  0405 10/17/23  1415 10/17/23  1945 10/18/23  0120   WBC 11.4* 13.6* 11.8* 11.2* 11.2*   NEUTSPOLYS 83.50* 90.60*  --   --  91.10*   LYMPHOCYTES 8.70* 5.10*  --   --  6.20*   MONOCYTES 5.20 0.90  --   --  0.90   EOSINOPHILS 1.70 0.00  --   --  0.00   BASOPHILS 0.00 0.00  --   --  0.90   ASTSGOT 18  --   --   --   --    ALTSGPT 11  --   --   --   --    ALKPHOSPHAT 66  --   --   --   --    TBILIRUBIN 0.7  --   --   --   --      Recent Labs     10/17/23  1415 10/17/23  1945 10/18/23  0120   RBC 3.08* 2.98* 3.20*   HEMOGLOBIN 9.5* 9.3* 9.8*   HEMATOCRIT 29.8* 29.0* 30.9*   PLATELETCT 145* 151* 163*       Imaging  X-Ray:  I have personally reviewed the images and compared with prior images.  EKG:  I have personally reviewed the images and compared with prior images.  CT:    Reviewed  Echo:   Reviewed  Ultrasound:  Reviewed    ASSESSEMENT and PLAN:    * Acute respiratory failure with hypoxia (HCC)- (present on admission)  Assessment & Plan  No home O2 or inhalers or history of COPD  Intubated 10/11 for mental status and hypoxia on max HFNC  Extubated 10/14  Re-intubated 10/15 for mental status and hypoxia, bronchoscopy with L mainstem occlusion with  thick, white secretions that were suctioned  Extubated 10/16    Pulmonary hygiene  Wean O2 as able  SLP following, appreciate recommendations      Hypernatremia- (present on admission)  Assessment & Plan  Hypovolemic hypernatremia  Nephrology consulted  Continue to correct fluid status while closely monitoring Na  Correcting appropriately.  Changed free water flushes 400 mL every 8 hours to every 4 hours  Sodium serum q12h   Add D5w 50 ml/hr  SLP following, appreciate recommendations      Altered mental status- (present on admission)  Assessment & Plan  Acute metabolic encephalopathy likely due to uremia, hypernatremia and hypoxia  Overall improving   Delirium precautions:  Optimize patient day night schedule with lights on during day windows shades open.    Minimize or avoid anticholinergic, benzo's, narcotics, but will treat pain, optimize patient hearing, visual clues, frequent orientations, and family involvement   Maintain corrective eye glasses within reach   Will re-access the needs of restraints, lines/tubes, and have a emphasis on mobilization and removal as soon as possible.    Minimize night time interruptions        Sepsis (HCC)- (present on admission)  Assessment & Plan  Unclear if patient truly had sepsis  Possible aspiration pneumonia has been adequately treated  Currently he is hemodynamically stable.      Atrial fibrillation with RVR ([facility])- (present on admission)  Assessment & Plan  No known history of Afib, RVR with hypotension attempted electrical cardioversion 10/11 without success, put on amio GTT  Echo EF 65%, no valvular abnormalities  Rate Controlled  Continuous tele monitoring  Anticoagulation with Eliquis  Rhythm control with amiodarone, continue loading  Optimize electrolytes     ADELAIDE (acute kidney injury) ([facility])- (present on admission)  Assessment & Plan  Secondary to ATN, pre-renal and rhabdo  Improving  Nephrology has been following him.  Worsening renal functions.  Avoid  nephrotoxins  Monitor creatinine, UOP, electrolytes closely    Shock ([facility])  Assessment & Plan  Undifferentiated - cardiac due to arrhythmia vs hypovolemia vs sepsis  Shock resolved    Aspiration precautions  Assessment & Plan  Increasing oxygen requirement and chest x-ray showed infiltration possible pneumonia  Oxygen requirement has significantly improved.  Continue monitor closely.    Rhabdomyolysis- (present on admission)  Assessment & Plan  Continue IVF resuscitation, monitor CPK  CPK significant trended down.    Dehydration- (present on admission)  Assessment & Plan  Continue fluid resuscitation intravenously and enterally for now  Monitor UOP closely    Erythrocytosis- (present on admission)  Assessment & Plan  Likely d/t hemoconcentration    DM2 (diabetes mellitus, type 2) ([facility])- (present on admission)  Assessment & Plan  A1c 8  Continue insulin sliding scale with hypoglycemia protocol.  Continue diabetic tube feeding.      Diabetes mellitus-Type 2, not on treatment- (present on admission)  Assessment & Plan  Current HbA1c is 8.0  Glargine 20 units, SSI, hypoglycemia protocol, diabetic TF        BPH (benign prostatic hyperplasia)- (present on admission)  Assessment & Plan  I started him on tamsulosin.          VTE:   Heparin GTT  Ulcer: H2 Antagonist  Lines: Triplett Catheter  Ongoing indication addressed    I have performed a physical exam and reviewed and updated ROS and Plan today (10/18/2023). In review of yesterday's note (10/17/2023), there are no changes except as documented above.     Discussed patient condition and risk of morbidity and/or mortality with Hospitalist, Family, RN, RT, Therapies, Pharmacy, , and     Ming Farah D.O.  PGY-3 Internal Medicine Resident

## 2023-10-18 NOTE — DISCHARGE PLANNING
Case Management Discharge Planning    Admission Date: 10/9/2023  GMLOS: 5.1  ALOS: 9    6-Clicks ADL Score: 9  6-Clicks Mobility Score: 6      Anticipated Discharge Dispo: Discharge Disposition: D/T to SNF with Medicare cert in anticipation of skilled care (03)      Action(s) Taken: RNCM requested NOK search to be completed.     RNCM placed call to Callie Marrufo-number is disconnected. The same number is listed for Garima Marrufo.     Maureen Peters: [phone]-left     Mir Marrufo: [phone]-not in service; attempted email     Yuriy Marrufo: [phone]-left

## 2023-10-18 NOTE — PROGRESS NOTES
Went to check on patient- found him to have his gown completely removed; leads off his chest, NGT out with mild bleed from the nostril and oxymask off from his face.  Patient redirected- able to follow but too forgetful.  Soft restraints applied to both wrist per order.  Notified Dr. Farah (Resident on duty) -said to hold off NGT reinsertion due to risk of further bleeding.  Will continue to monitor closely.

## 2023-10-18 NOTE — PROGRESS NOTES
I have seen and examined the patient today.  I have reviewed the medical record, laboratory data, imaging and all relevant studies.  I have discussed the plan of care with the Internal Medicine Resident and agree with the note and plan as documented with the following additions and clarifications:     HPI:  83 y.o. M, PMH T2DM, admitted in transfer from [place] 10/9/2023 after being found down, LK W 2 days prior, AF with RVR, hypernatremia, uremia and rhabdomyolysis.  Transferred to ICU 10/11, attempted cardioversion, intubation, fluid resuscitation.        Hospital Course:  10/11 - transfer to ICU, intubated, cardioversion, aggressive fluid resuscitation  10/12 - VD #2, transition to oral amiodarone, free water flushes 300 mL q4h, free water deficit -5.1 L  10/13 - VD #3, Increase free water flushes  10/14 - Extubated   10/15 - FEES, thick mucus, resp distress and re-intubated; bronch with purulent lingula secretions, BAL many WBCs, on Unasyn  10/16 - VD #2, extubated again; HFNC -> 3lpm, NPO, TF, aspiration precautions, stop 1/2 NS, decrease free water  10/17 - Extubated yesterday; somnolent but arouses follows commands; inc free water; SLP  10/18 -fluctuating mental status, high risk for deterioration, still have not found any family, 5 LPM oxygen, likely some ongoing aspiration     Interval Events:    More lethargic this am after narcotics  5 lpm oxy mask  Weak cough  Not clearing secretions well, blood tinged  TF at goal  Free water flush 400q4  Na 146  Loose stools  I/O = 3240/2200  Apixiban held with GIB, ? NG trauma  Amio   No abx  BAL MSSA  PCT nl    DNR/I OK - appropriate given advanced age, clinical course; still looking for any next of kin.       A/P:  Acute hypoxemic respiratory failure  -Intubated 10/11 - 10/14  -Thick secretions, possible aspiration, reintubated 10/15 - 10/16  -Empiric antibiotics with Unasyn, follow-up cultures  -Aspiration precautions, close monitoring in ICU for need for  reintubation  -RT protocols, bronchodilators as needed  -Fluctuating mental status, advance PT/OT and OOB as tolerated     Sepsis  -Possible sepsis due to pneumonia  -Prior shock likely related to volume status and sedating medications  -Completed Unasyn for pneumonia as above, BAL from 10/15 rare UURF, rare MSSA  -Monitor need for further antibiotics     Acute encephalopathy  -Suspect acute metabolic encephalopathy secondary to hyponatremia, uremia, possible sepsis  -Improving, follow closely  -Some initial concern for CVA with A-fib and encephalopathy, currently symmetric strength with no focal weakness     T2DM  -Continue glycemic control with glargine, SSI     ADELAIDE  -Secondary to ATN, prerenal, rhabdomyolysis  -+23 L since admission  -Renal function improved, follow     Atrial fibrillation with RVR  -No known history of AF  -RVR with attempted cardioversion for hypotension 10/11  -Currently SR, p.o. amiodarone  -Heparin drip converted to apixaban  -Continue to monitor electrolytes, follow  -Outpatient cardiology follow-up     Hypernatremia  -Clinically improved, stopped 1/2 NS 10/16  -Continue to monitor, adjust upward free water flushes today    GOC  -Chest compressions in the setting would be nonbeneficial care given advanced age, ICU level of care and clinical course.  -I would recommend against further intubation however patient does not have surrogate decision makers.    -CODE STATUS changed to DNR/I OK by me  -Palliative care and  assisting to find any next of kin     The patient remains critically ill.  Critical care time = 40 minutes in directly providing and coordinating critical care and extensive data review.  No time overlap and excludes procedures.

## 2023-10-18 NOTE — PROGRESS NOTES
Patient became increasingly restless this morning despite interventions. Patient is able to only follow commands but is no longer able to answer questions or clear any secretions. A&O x0, O2 sats 95%  on 4L oxymask. Resident notified and to bedside. Dr. Zamora also at bedside at this time. Stat CXR ordered

## 2023-10-18 NOTE — CARE PLAN
The patient is Watcher - Medium risk of patient condition declining or worsening    Shift Goals  Clinical Goals: Maintain pt comfort, reorient pt, manage pain PRN, ambulate, up to chair  Patient Goals: Comfort  Family Goals: BA    Progress made toward(s) clinical / shift goals:    Problem: Pain - Standard  Goal: Alleviation of pain or a reduction in pain to the patient’s comfort goal  Outcome: Progressing     Problem: Skin Integrity  Goal: Skin integrity is maintained or improved  Outcome: Progressing     Problem: Safety - Medical Restraint  Goal: Remains free of injury from restraints (Restraint for Interference with Medical Device)  Outcome: Progressing       Patient is not progressing towards the following goals:

## 2023-10-18 NOTE — PROGRESS NOTES
Patient is desating on low 80's- able to suction once with thick bloody/ tan secretions. Oxymask increased to 15 lpm- sats to 91-92%; notified RRT Melina.

## 2023-10-18 NOTE — PROGRESS NOTES
Inpatient Palliative Care     Location: [facility]     HPI:   Patient is seen lying in bed with simple facemask in place.  His voice is gurgly, very wet cough noted.  Difficult to understand but attempting to converse with provider.  Does not seem to be in any respiratory distress at this time.     Summary:  Spiritual care has contacted local [employer] representatives in an attempt to find next of kin for patient.  Clinically appears to be declining slowly.  We will continue to follow.     Active listening, reflection, reminiscing, validation & normalization, and empathic support utilized throughout this encounter.  All questions answered and contact information provided, encouraged to call with any questions or concerns.      Plan:     1) continue to assist with goals of care discussion as able  2) continue full CODE STATUS pending next of kin location.  3)        Thank you for allowing me the opportunity to participate in the care of  Vick.     I spent a total of 31 minutes reviewing medical records, direct face-to-face time with the patient and/or family, coordination of care, and documentation. This is separate from the time spent on advance care planning, which is documented above.     Mackenzie Parekh, MSN, APRN, ACNPC-AG.  Palliative Care Nurse Practitioner  [phone]

## 2023-10-18 NOTE — THERAPY
"Speech Language Therapy Contact Note    Patient Name: Perry Marrufo  Age:  83 y.o., Sex:  male  Medical Record #: [id number]  Today's Date: 10/18/2023       10/18/23 0834   Treatment Variance   Reason For Missed Therapy Medical - Patient Is Not Medically Stable   Vitals   O2 (LPM) 5   O2 Delivery Device Oxymask   Interdisciplinary Plan of Care Collaboration   IDT Collaboration with  Nursing;Other (See Comments)  (EMR)   Collaboration Comments Per RN/EMR - patient's respiratory status is not stable for FEES this date. Pt is \"no longer able to answer questions or clear any secretions.\" Will HOLD FEES this date and complete when pt is medically stable and appropriate to participate in diagnostic.       "

## 2023-10-18 NOTE — CARE PLAN
The patient is Watcher - Medium risk of patient condition declining or worsening    Shift Goals  Clinical Goals: Provide comfort, improve mentation, mobility  Patient Goals: Rest  Family Goals: BA    Progress made toward(s) clinical / shift goals:    Problem: Knowledge Deficit - Standard  Goal: Patient and family/care givers will demonstrate understanding of plan of care, disease process/condition, diagnostic tests and medications  Outcome: Progressing     Problem: Pain - Standard  Goal: Alleviation of pain or a reduction in pain to the patient’s comfort goal  Outcome: Progressing     Problem: Hemodynamics  Goal: Patient's hemodynamics, fluid balance and neurologic status will be stable or improve  Outcome: Progressing     Problem: Urinary - Renal Perfusion  Goal: Ability to achieve and maintain adequate renal perfusion and functioning will improve  Outcome: Progressing     Problem: Safety - Medical Restraint  Goal: Remains free of injury from restraints (Restraint for Interference with Medical Device)  Outcome: Progressing

## 2023-10-18 NOTE — PROGRESS NOTES
Dr. Moon notified of increased secretions consisting of bright red blood. Order received to run morning labs now.

## 2023-10-18 NOTE — CARE PLAN
The patient is Watcher - Medium risk of patient condition declining or worsening    Shift Goals  Clinical Goals: imrove breathing/oxygenation, maintain airway open, rest and comfort, mobilize  Patient Goals: NA  Family Goals: BA    Progress made toward(s) clinical / shift goals:    HHFNC 40/100%; optimized rest and comfort; mobilized EOB    Problem: Pain - Standard  Goal: Alleviation of pain or a reduction in pain to the patient’s comfort goal  Outcome: Progressing     Problem: Hemodynamics  Goal: Patient's hemodynamics, fluid balance and neurologic status will be stable or improve  Outcome: Progressing     Problem: Fluid Volume  Goal: Fluid volume balance will be maintained  Outcome: Progressing     Problem: Urinary - Renal Perfusion  Goal: Ability to achieve and maintain adequate renal perfusion and functioning will improve  Outcome: Progressing     Problem: Respiratory  Goal: Patient will achieve/maintain optimum respiratory ventilation and gas exchange  Outcome: Progressing     Problem: Skin Integrity  Goal: Skin integrity is maintained or improved  Outcome: Progressing     Problem: Fall Risk  Goal: Patient will remain free from falls  Outcome: Progressing     Problem: Safety - Medical Restraint  Goal: Remains free of injury from restraints (Restraint for Interference with Medical Device)  Outcome: Progressing  Goal: Free from restraint(s) (Restraint for Interference with Medical Device)  Outcome: Not Progressing       Patient is not progressing towards the following goals:      Problem: Knowledge Deficit - Standard  Goal: Patient and family/care givers will demonstrate understanding of plan of care, disease process/condition, diagnostic tests and medications  Outcome: Not Progressing     Problem: Safety - Medical Restraint  Goal: Free from restraint(s) (Restraint for Interference with Medical Device)  Outcome: Not Progressing

## 2023-10-19 ENCOUNTER — APPOINTMENT (OUTPATIENT)
Dept: RADIOLOGY | Facility: MEDICAL CENTER | Age: 83
DRG: 871 | End: 2023-10-19
Attending: INTERNAL MEDICINE
Payer: COMMERCIAL

## 2023-10-19 ENCOUNTER — APPOINTMENT (OUTPATIENT)
Dept: RADIOLOGY | Facility: MEDICAL CENTER | Age: 83
DRG: 871 | End: 2023-10-19
Attending: STUDENT IN AN ORGANIZED HEALTH CARE EDUCATION/TRAINING PROGRAM
Payer: COMMERCIAL

## 2023-10-19 LAB
ANION GAP SERPL CALC-SCNC: 4 MMOL/L (ref 7–16)
BASOPHILS # BLD AUTO: 0.3 % (ref 0–1.8)
BASOPHILS # BLD: 0.04 K/UL (ref 0–0.12)
BUN SERPL-MCNC: 24 MG/DL (ref 8–22)
CALCIUM SERPL-MCNC: 7.7 MG/DL (ref 8.5–10.5)
CHLORIDE SERPL-SCNC: 109 MMOL/L (ref 96–112)
CO2 SERPL-SCNC: 29 MMOL/L (ref 20–33)
CREAT SERPL-MCNC: 0.76 MG/DL (ref 0.5–1.4)
EOSINOPHIL # BLD AUTO: 0.15 K/UL (ref 0–0.51)
EOSINOPHIL NFR BLD: 1.2 % (ref 0–6.9)
ERYTHROCYTE [DISTWIDTH] IN BLOOD BY AUTOMATED COUNT: 50.4 FL (ref 35.9–50)
GFR SERPLBLD CREATININE-BSD FMLA CKD-EPI: 90 ML/MIN/1.73 M 2
GLUCOSE BLD STRIP.AUTO-MCNC: 104 MG/DL (ref 65–99)
GLUCOSE BLD STRIP.AUTO-MCNC: 104 MG/DL (ref 65–99)
GLUCOSE BLD STRIP.AUTO-MCNC: 85 MG/DL (ref 65–99)
GLUCOSE SERPL-MCNC: 142 MG/DL (ref 65–99)
HCT VFR BLD AUTO: 28.2 % (ref 42–52)
HGB BLD-MCNC: 9 G/DL (ref 14–18)
IMM GRANULOCYTES # BLD AUTO: 0.63 K/UL (ref 0–0.11)
IMM GRANULOCYTES NFR BLD AUTO: 4.8 % (ref 0–0.9)
LYMPHOCYTES # BLD AUTO: 0.78 K/UL (ref 1–4.8)
LYMPHOCYTES NFR BLD: 6 % (ref 22–41)
MCH RBC QN AUTO: 30.7 PG (ref 27–33)
MCHC RBC AUTO-ENTMCNC: 31.9 G/DL (ref 32.3–36.5)
MCV RBC AUTO: 96.2 FL (ref 81.4–97.8)
MONOCYTES # BLD AUTO: 0.68 K/UL (ref 0–0.85)
MONOCYTES NFR BLD AUTO: 5.2 % (ref 0–13.4)
NEUTROPHILS # BLD AUTO: 10.71 K/UL (ref 1.82–7.42)
NEUTROPHILS NFR BLD: 82.5 % (ref 44–72)
NRBC # BLD AUTO: 0 K/UL
NRBC BLD-RTO: 0 /100 WBC (ref 0–0.2)
PLATELET # BLD AUTO: 191 K/UL (ref 164–446)
PMV BLD AUTO: 10.9 FL (ref 9–12.9)
POTASSIUM SERPL-SCNC: 4.3 MMOL/L (ref 3.6–5.5)
RBC # BLD AUTO: 2.93 M/UL (ref 4.7–6.1)
SODIUM SERPL-SCNC: 142 MMOL/L (ref 135–145)
WBC # BLD AUTO: 13 K/UL (ref 4.8–10.8)

## 2023-10-19 PROCEDURE — C9113 INJ PANTOPRAZOLE SODIUM, VIA: HCPCS | Performed by: INTERNAL MEDICINE

## 2023-10-19 PROCEDURE — 85025 COMPLETE CBC W/AUTO DIFF WBC: CPT

## 2023-10-19 PROCEDURE — 770000 HCHG ROOM/CARE - INTERMEDIATE ICU *

## 2023-10-19 PROCEDURE — 700102 HCHG RX REV CODE 250 W/ 637 OVERRIDE(OP): Performed by: INTERNAL MEDICINE

## 2023-10-19 PROCEDURE — 700105 HCHG RX REV CODE 258: Performed by: INTERNAL MEDICINE

## 2023-10-19 PROCEDURE — 94640 AIRWAY INHALATION TREATMENT: CPT

## 2023-10-19 PROCEDURE — 80048 BASIC METABOLIC PNL TOTAL CA: CPT

## 2023-10-19 PROCEDURE — 82962 GLUCOSE BLOOD TEST: CPT

## 2023-10-19 PROCEDURE — A9270 NON-COVERED ITEM OR SERVICE: HCPCS | Performed by: INTERNAL MEDICINE

## 2023-10-19 PROCEDURE — 99232 SBSQ HOSP IP/OBS MODERATE 35: CPT | Performed by: NURSE PRACTITIONER

## 2023-10-19 PROCEDURE — 94669 MECHANICAL CHEST WALL OSCILL: CPT

## 2023-10-19 PROCEDURE — 92526 ORAL FUNCTION THERAPY: CPT

## 2023-10-19 PROCEDURE — 97602 WOUND(S) CARE NON-SELECTIVE: CPT

## 2023-10-19 PROCEDURE — 700111 HCHG RX REV CODE 636 W/ 250 OVERRIDE (IP): Performed by: INTERNAL MEDICINE

## 2023-10-19 PROCEDURE — 99291 CRITICAL CARE FIRST HOUR: CPT | Mod: GC | Performed by: INTERNAL MEDICINE

## 2023-10-19 RX ORDER — SODIUM CHLORIDE FOR INHALATION 7 %
4 VIAL, NEBULIZER (ML) INHALATION
Status: DISCONTINUED | OUTPATIENT
Start: 2023-10-20 | End: 2023-10-20

## 2023-10-19 RX ORDER — IPRATROPIUM BROMIDE AND ALBUTEROL SULFATE 2.5; .5 MG/3ML; MG/3ML
3 SOLUTION RESPIRATORY (INHALATION)
Status: DISCONTINUED | OUTPATIENT
Start: 2023-10-19 | End: 2023-10-22

## 2023-10-19 RX ORDER — INSULIN LISPRO 100 [IU]/ML
3-14 INJECTION, SOLUTION INTRAVENOUS; SUBCUTANEOUS EVERY 6 HOURS
Status: DISCONTINUED | OUTPATIENT
Start: 2023-10-19 | End: 2023-10-31

## 2023-10-19 RX ORDER — LANSOPRAZOLE 30 MG/1
30 TABLET, ORALLY DISINTEGRATING, DELAYED RELEASE ORAL 2 TIMES DAILY
Status: DISCONTINUED | OUTPATIENT
Start: 2023-10-19 | End: 2023-10-20

## 2023-10-19 RX ORDER — FUROSEMIDE 10 MG/ML
40 INJECTION INTRAMUSCULAR; INTRAVENOUS ONCE
Status: COMPLETED | OUTPATIENT
Start: 2023-10-20 | End: 2023-10-20

## 2023-10-19 RX ORDER — NYSTATIN 100000 [USP'U]/G
POWDER TOPICAL 2 TIMES DAILY
Status: DISPENSED | OUTPATIENT
Start: 2023-10-19 | End: 2023-10-24

## 2023-10-19 RX ADMIN — TERAZOSIN 1 MG: 1 CAPSULE ORAL at 17:22

## 2023-10-19 RX ADMIN — DEXTROSE MONOHYDRATE: 50 INJECTION, SOLUTION INTRAVENOUS at 02:10

## 2023-10-19 RX ADMIN — APIXABAN 5 MG: 5 TABLET, FILM COATED ORAL at 17:22

## 2023-10-19 RX ADMIN — LANSOPRAZOLE 30 MG: 30 TABLET, ORALLY DISINTEGRATING, DELAYED RELEASE ORAL at 17:23

## 2023-10-19 RX ADMIN — PANTOPRAZOLE SODIUM 40 MG: 40 INJECTION, POWDER, FOR SOLUTION INTRAVENOUS at 05:06

## 2023-10-19 ASSESSMENT — PAIN DESCRIPTION - PAIN TYPE
TYPE: ACUTE PAIN

## 2023-10-19 ASSESSMENT — FIBROSIS 4 INDEX: FIB4 SCORE: 2.33

## 2023-10-19 NOTE — PROGRESS NOTES
[facility] ICU Progress Note    Admit Date: 10/9/2023    Resident(s): Ming Farah D.O.   Attending:  SEVEN LEDESMA/ Dr. Dominguez    Patient ID:    Name:  Perry Marrufo     YOB: 1940  Age:  83 y.o.  male   MRN:  [id number]    Reason for Consultation  Respiratory failure, AMS, Afib with RVR    Hospital Course (carried forward and updated):    83 y.o. male who presented 10/9/2023 with a PMHx of DM and prostate disease admitted 10/9 from [place] after being found down x 2 days. Found to be in Afib with RVR which converted with metoprolol and altered with hypernatremia and uremia and mild rhabo. Admitted to telemetry under hospitalist services. This afternoon developed worsening hypoxia requiring escalation to maximum HFNC. Also went back into Afib with RVR but this time was hemodynamically significant with a BP that dropped to 50/30 requiring attempted electrical cardioversion. Cardiology consulted. Na increasing despite IVF resuscitation. Nephrology consulted by hospitalist.    10/12 - VD #2, transition to oral amiodarone, free water flushes 300 mL q4h, free water deficit -5.1 L  10/13 - VD #3, Increase free water flushes  10/14 - Extubated, continues to be encephalopathic  10/15 - Re intubated due to increasing AMS and hypoxia, bronchoscopy with L mainstem occlusion with thick, white secretions that were suctioned  10/16 - Extubated, decrease free water flushes 400 ml q2h to q8h, d/c 1/2 NS, attempted to contact 4 different family members without success  10/17 -  Increase free water flushes, palliative unable to find family, positive FIT, hold apixaban  10/18 - Held TF, held apixaban, changed code status to DNAR/I-Okay    Consultants:  Cardiology  Nephrology     Interval Events:    ELIDIA    Reviewed last 24 hour events:  Neuro: A&Ox2-3  HR: NSR 70-80s  SBP: 110-150s  Tmax: AF  GI: NPO, hold TF at goal, Last BM 10/17, type 7  Endo: Glu 100-180s, Glargine 10u, Lispro 3u  I/O: + +910/-1650/-740, net + [phone]  since admit  Lines: 2 PIV, arceo  Mobility: Level 1  Resp: HFNC 30L, FiO2 40%  CXR: Bilateral lower lobe infiltrates, stable, trace bilateral pleural effusion, right rib fracture  Vte: Apixaban held  PPI/H2: Pantoprazole 40 mg IVP twice daily  Antibx: Unasyn (10/10-10/14)  GTT: None    WBC 20.4->13.6->11.2->13.0, HgB 15.8->13.6->9.9->8.8->9.8 (baseline unknown), MCV ~95, Plt ~115->163 (baseline 150s)  Na 144->146 (151 on admit on 10/9), Cl 121->117, Cr 1.89->0.99->0.74 (Baseline unknown)    -Restart TF, restart Apixaban  -Continue D5 50 ml/hr, continue free water flush 400 mL every 4 hours  -SLP following, appreciate recommendations  -Palliative following, appreciate recommendations, code status changed to DNR/DNI  -Transfer to [facility]    YST    Became more obtunded, awake, but does not answer questions    Reviewed last 24 hour events:  Neuro: A&Ox1  HR: NSR 60-70s  SBP: 110-150s  Tmax: AF  GI: NPO, TF at goal, Last BM 10/19, type 7  Endo: Glu 100-180s, Glargine 20u, Lispro 12u  I/O: + +30-40/-2200/+1040, net + [phone] since admit  Lines: 2 PIV, arceo  Mobility: Level 1  Resp: 96% on 5L oxymask  CXR: Bilateral lower lobe infiltrates, stable, trace bilateral pleural effusion, right rib fracture  Vte: Apixaban held  PPI/H2: Pantoprazole 40 mg IVP twice daily  Antibx: Unasyn (10/10-10/14)  GTT: None    WBC 20.4->13.6->11.2, HgB 15.8->13.6->9.9->8.8->9.8->9.0 (baseline unknown), MCV ~95, Plt ~115->163 (baseline 150s)  Na 144->146 (151 on admit on 10/9), Cl 121->117, Cr 1.89->0.99->0.74 (Baseline unknown),  BAL (10/15/23): MSSA, sensitive to Cefazolin, Unasyn  FIT +, procalcitonin 0.18    -Hold TF today due to nasogastric bleeding  -Continue to hold Apixaban  -Start D5 50 ml/hr  -SLP following, appreciate recommendations  -Palliative following, appreciate recommendations  -2 Physician decision to change patient's code status to DNAR/I okay due to patient's severe morbidity and mortality  -Stop d5W      Review of  Systems  Review of Systems   Unable to perform ROS: Critical illness       Vital Signs for the last 24 hours  Temp:  [37.4 °C (99.3 °F)-37.7 °C (99.9 °F)] 37.7 °C (99.9 °F)  Pulse:  [62-76] 75  Resp:  [18-46] 46  BP: ()/(52-71) 103/57  SpO2:  [86 %-100 %] 90 %    Hemodynamic parameters for the last 24 hours       Vent Settings for the last 24 hours       Physical Exam  Physical Exam  Vitals and nursing note reviewed.   Constitutional:       Appearance: Normal appearance.   HENT:      Head: Normocephalic and atraumatic.      Right Ear: External ear normal.      Left Ear: External ear normal.      Nose: Nose normal.      Comments: NG tube in place     Mouth/Throat:      Mouth: Mucous membranes are moist.      Pharynx: Oropharynx is clear.   Eyes:      Extraocular Movements: Extraocular movements intact.      Pupils: Pupils are equal, round, and reactive to light.   Cardiovascular:      Rate and Rhythm: Normal rate and regular rhythm.      Pulses: Normal pulses.      Heart sounds: Normal heart sounds.   Pulmonary:      Breath sounds: Rhonchi present.   Abdominal:      General: There is no distension.      Palpations: Abdomen is soft.      Tenderness: There is no abdominal tenderness. There is no guarding or rebound.   Genitourinary:     Comments: Triplett in place  Musculoskeletal:         General: Normal range of motion.      Cervical back: Normal range of motion.   Skin:     General: Skin is warm.      Capillary Refill: Capillary refill takes less than 2 seconds.   Neurological:      Mental Status: He is alert.      Comments: A&O x1         Medications  Current Facility-Administered Medications   Medication Dose Route Frequency Provider Last Rate Last Admin    lansoprazole (Prevacid) solutab 30 mg  30 mg Enteral Tube BID Cipriano Dominguez M.D.   30 mg at 10/19/23 1723    insulin lispro (HumaLOG,AdmeLOG) injection  3-14 Units Subcutaneous Q6HRS Cipriano Dominguez M.D.        And    dextrose 10 % BOLUS 25 g  25 g  Intravenous Q15 MIN PRN Cipriano Dominguez M.D.        insulin GLARGINE (Lantus,Semglee) injection  10 Units Subcutaneous Q EVENING Ming Farah D.O.   10 Units at 10/18/23 1802    terazosin (Hytrin) capsule 1 mg  1 mg Enteral Tube Q EVENING Oli Burton M.D.   1 mg at 10/19/23 1722    labetalol (Normodyne/Trandate) injection 10 mg  10 mg Intravenous Q6HRS PRN Ming Farah D.O.        apixaban (Eliquis) tablet 5 mg  5 mg Enteral Tube BID Amy Padgett M.D.   5 mg at 10/19/23 1722    amiodarone (Cordarone) tablet 200 mg  200 mg Enteral Tube DAILY Pilar Small A.P.N.   200 mg at 10/18/23 0534    Respiratory Therapy Consult   Nebulization Continuous RT Jeremy M Gonda, M.D.        MD Alert...ICU Electrolyte Replacement per Pharmacy   Other PHARMACY TO DOSE Jeremy M Gonda, M.D.        Pharmacy Consult: Enteral tube insertion - review meds/change route/product selection  1 Each Other PHARMACY TO DOSE Jeremy M Gonda, M.D.        acetaminophen (Tylenol) tablet 650 mg  650 mg Enteral Tube Q6HRS PRN Sarina Jones M.D.   650 mg at 10/18/23 1121       Fluids    Intake/Output Summary (Last 24 hours) at 10/19/2023 2011  Last data filed at 10/19/2023 2000  Gross per 24 hour   Intake 0 ml   Output 1200 ml   Net -1200 ml       Laboratory  Recent Labs     10/18/23  0738   ISTATAPH 7.463   ISTATAPCO2 35.8   ISTATAPO2 62*   ISTATATCO2 27   OALTNFH5WLU 93   ISTATARTHCO3 25.7*   ISTATARTBE 2   ISTATTEMP 37.3 C   ISTATSPEC Arterial   ISTATAPHTC 7.459   VQBNMMRH0AH 63*     Recent Labs     10/17/23  0405 10/17/23  0850 10/17/23  1415 10/17/23  1945 10/18/23  0120 10/18/23  1309 10/18/23  2055 10/19/23  0310   SODIUM 146*   < > 144   < > 146* 144 142 142   POTASSIUM 4.9  --  4.4  --  4.2  --   --  4.3   CHLORIDE 118*  --  112  --  113*  --   --  109   CO2 21  --  27  --  28  --   --  29   BUN 37*  --  34*  --  29*  --   --  24*   CREATININE 0.67  --  0.70  --  0.74  --   --  0.76   MAGNESIUM 2.0  --   --   --   --   --   --    --    PHOSPHORUS 2.7  --   --   --  2.8  --   --   --    CALCIUM 7.3*  --  8.1*  --  8.2*  --   --  7.7*    < > = values in this interval not displayed.     Recent Labs     10/17/23  1415 10/18/23  0120 10/19/23  0310   GLUCOSE 179* 172* 142*     Recent Labs     10/17/23  0405 10/17/23  1415 10/17/23  1945 10/18/23  0120 10/19/23  0310   WBC 13.6*   < > 11.2* 11.2* 13.0*   NEUTSPOLYS 90.60*  --   --  91.10* 82.50*   LYMPHOCYTES 5.10*  --   --  6.20* 6.00*   MONOCYTES 0.90  --   --  0.90 5.20   EOSINOPHILS 0.00  --   --  0.00 1.20   BASOPHILS 0.00  --   --  0.90 0.30    < > = values in this interval not displayed.     Recent Labs     10/17/23  1945 10/18/23  0120 10/19/23  0310   RBC 2.98* 3.20* 2.93*   HEMOGLOBIN 9.3* 9.8* 9.0*   HEMATOCRIT 29.0* 30.9* 28.2*   PLATELETCT 151* 163* 191       Imaging  X-Ray:  I have personally reviewed the images and compared with prior images.  EKG:  I have personally reviewed the images and compared with prior images.  CT:    Reviewed  Echo:   Reviewed  Ultrasound:  Reviewed    ASSESSEMENT and PLAN:    * Acute respiratory failure with hypoxia (HCC)- (present on admission)  Assessment & Plan  No home O2 or inhalers or history of COPD  Intubated 10/11 for mental status and hypoxia on max HFNC  Extubated 10/14  Re-intubated 10/15 for mental status and hypoxia, bronchoscopy with L mainstem occlusion with thick, white secretions that were suctioned  Extubated 10/16    Pulmonary hygiene  Wean O2 as able  SLP following, appreciate recommendations  Palliative following, appreciate recommendations, code status changed to DNR/DNI  Transfer to [facility]    Hypernatremia- (present on admission)  Assessment & Plan  Hypovolemic hypernatremia  Nephrology consulted  Continue to correct fluid status while closely monitoring Na  Correcting appropriately.  Continue D5 50 ml/hr, continue free water flush 400 mL every 4 hours  SLP following, appreciate recommendations      Altered mental status- (present on  admission)  Assessment & Plan  Acute metabolic encephalopathy likely due to uremia, hypernatremia and hypoxia  Overall improving   Delirium precautions:  Optimize patient day night schedule with lights on during day windows shades open.    Minimize or avoid anticholinergic, benzo's, narcotics, but will treat pain, optimize patient hearing, visual clues, frequent orientations, and family involvement   Maintain corrective eye glasses within reach   Will re-access the needs of restraints, lines/tubes, and have a emphasis on mobilization and removal as soon as possible.    Minimize night time interruptions        Sepsis ([facility])- (present on admission)  Assessment & Plan  Unclear if patient truly had sepsis  Possible aspiration pneumonia has been adequately treated  Currently he is hemodynamically stable.      Atrial fibrillation with RVR ([facility])- (present on admission)  Assessment & Plan  No known history of Afib, RVR with hypotension attempted electrical cardioversion 10/11 without success, put on amio GTT  Echo EF 65%, no valvular abnormalities  Rate Controlled  Continuous tele monitoring  Anticoagulation with Eliquis  Rhythm control with amiodarone, continue loading  Optimize electrolytes     ADELAIDE (acute kidney injury) ([facility])- (present on admission)  Assessment & Plan  Secondary to ATN, pre-renal and rhabdo  Improving  Nephrology has been following him.  Worsening renal functions.  Avoid nephrotoxins  Monitor creatinine, UOP, electrolytes closely    Shock ([facility])  Assessment & Plan  Undifferentiated - cardiac due to arrhythmia vs hypovolemia vs sepsis  Shock resolved    Aspiration precautions  Assessment & Plan  Increasing oxygen requirement and chest x-ray showed infiltration possible pneumonia  Oxygen requirement has significantly improved.  Continue monitor closely.    Rhabdomyolysis- (present on admission)  Assessment & Plan  Continue IVF resuscitation, monitor CPK  CPK significant trended down.    Dehydration- (present  on admission)  Assessment & Plan  Continue fluid resuscitation intravenously and enterally for now  Monitor UOP closely    Erythrocytosis- (present on admission)  Assessment & Plan  Likely d/t hemoconcentration    DM2 (diabetes mellitus, type 2) (HCC)- (present on admission)  Assessment & Plan  A1c 8  Continue insulin sliding scale with hypoglycemia protocol.  Continue diabetic tube feeding.      Diabetes mellitus-Type 2, not on treatment- (present on admission)  Assessment & Plan  Current HbA1c is 8.0  Glargine 10 units, SSI, hypoglycemia protocol, diabetic TF        BPH (benign prostatic hyperplasia)- (present on admission)  Assessment & Plan  Continue tamsulosin.        VTE:  NOAC  Ulcer: H2 Antagonist  Lines: Triplett Catheter  Ongoing indication addressed    I have performed a physical exam and reviewed and updated ROS and Plan today (10/19/2023). In review of yesterday's note (10/18/2023), there are no changes except as documented above.     Discussed patient condition and risk of morbidity and/or mortality with Hospitalist, Family, RN, RT, Therapies, Pharmacy, , and     Ming Farah D.O.  PGY-3 Internal Medicine Resident

## 2023-10-19 NOTE — THERAPY
Speech Language Pathology   Daily Treatment     Patient Name: Perry Marrufo  AGE:  83 y.o., SEX:  male  Medical Record #: [id number]  Date of Service: 10/19/2023      Precautions:  Precautions: Fall Risk, Swallow Precautions         Subjective  Patient cleared by RN for session. Pt received awake, eager for PO. Pt reported increased difficulty breathing and pain with swallowing. Upon entry, significant secretions noted on pt's gown. Wet vocal quality appreciated, pt cued to cough, yielding thick brown tinged secretions. Pt continued to have productive cough during session, needing oral suction via [place].       Assessment  Patient seen on this date for dysphagia management with focus on assessing readiness for diagnostic swallow study (FEES). Oral care provided prior to PO presentations. PO trials of ice chips and thin liquids via tsp/cup assessed. Adequate oral bolus acceptance. Impaired containment with anterior bolus loss appreciated with trials of thin liquids via cup. Immediate, weak cough response with desaturation (~84-86% SpO2) appreciated with trials of thin liquids, concerning for airway invasion. Limited trials presented on this date d/t concern for pt's safety.        Clinical Impressions  Patient presents with clinical indicators and is at high risk for oropharyngeal dysphagia 2/2 AMS, endotracheal intubation x2 and prolonged NPO status. An instrumental swallow study is recommended to objectively assess swallow function and informed POC; however, pt is not appropriate to participate on this date. Service will re-evaluate tomorrow to assess readiness for diagnostic swallow study vs alternate source of nutrition/hydration/meds.      Recommendations  Treatment Completed: Dysphagia Treatment       Dysphagia Treatment  Diet Consistency: NPO, pend re-eval 10/20   -Clear for minimal ice chips/hour 1:1 w/RN, when alert, after oral care to reduce xerostomia and mitigate disuse atrophy of swallow  "musculature  Instrumentation: FEES  Medication: Non Oral  Oral Care: Q4h                     SLP Treatment Plan  Treatment Plan: Dysphagia Treatment, Patient/Family/Caregiver Training  SLP Frequency: 3x Per Week  Estimated Duration: Until Therapy Goals Met      Anticipated Discharge Needs  Discharge Recommendations: Recommend post-acute placement for additional speech therapy services prior to discharge home  Therapy Recommendations Upon DC: Dysphagia Training, Patient / Family / Caregiver Education      Patient / Family Goals  Patient / Family Goal #1: \"water\"  Goal #1 Outcome: Progressing slower than expected  Short Term Goals  Short Term Goal # 1: Pt will consume prefeeding trials with SLP with no overt s/sx of aspiration  Goal Outcome # 1: Progressing slower than expected  Short Term Goal # 2: Pt will complete instrumental swallow assessment to determine physiology of swallow.      Yvette Walls MS,CCC-SLP    "

## 2023-10-19 NOTE — PROGRESS NOTES
"Inpatient Palliative Care     Location: [facility]     HPI:   Vick is seen lying in bed with HFNC, more alert and oriented x3 today. He has no c/o. He confirms that Jessica is his daughter who resides in [place].    .     Summary:  Re visited goals of care discussion, specifically regarding code status. Patient has very good understanding of cardiac/respiratory arrest complications and resuscitation efforts. He states, \"when it is my time, let me go\". I confirmed this information with him.      Active listening, reflection, reminiscing, validation & normalization, and empathic support utilized throughout this encounter.  All questions answered and contact information provided, encouraged to call with any questions or concerns.      Plan:     1) DNR/DNI status  2) PC to follow as needed, care coordination finding family and [facility] representatives-patient is alert oriented and has capacity today.   3)        Thank you for allowing me the opportunity to participate in the care of  NAME@.     I spent a total of 42 minutes reviewing medical records, direct face-to-face time with the patient and/or family, coordination of care, and documentation. This is separate from the time spent on advance care planning, which is documented above.     Mackenzie Parekh, MSN, APRN, ACNPC-AG.  Palliative Care Nurse Practitioner  [phone]    "

## 2023-10-19 NOTE — CARE PLAN
IS stable at 300  Problem: Hyperinflation  Goal: Prevent or improve atelectasis  Description: Target End Date:  3 to 4 days    1. Instruct incentive spirometry usage  2.  Perform hyperinflation therapy as indicated  Outcome: Not Progressing     Problem: Humidified High Flow Nasal Cannula  Goal: Maintain adequate oxygenation dependent on patient condition  Description: Target End Date:  resolve prior to discharge or when underlying condition is resolved/stabilized    1.  Implement humidified high flow oxygen therapy  2.  Titrate high flow oxygen to maintain appropriate SpO2  Flowsheets (Taken 10/19/2023 1600 by Mark Ferraro R.N.)  O2 (LPM): 30  FiO2%: 50 %

## 2023-10-19 NOTE — DIETARY
"Nutrition support weekly update:  Day 10 of admit.  Perry Marrufo is a 83 y.o. male with admitting DX of AMS (altered mental status).    Pt on TF 1012-10/18. TF stopped when NGT was \"removed by pt\" on 10/18 @ 1300.  Planning FEES tomorrow per SLP if pt appropriate.  Prior TF orders are still active: Impact Peptide 1.5, goal rate 55 ml/hr to provide 1980 kcal, 124 grams protein, and 1016 ml free water per day.     Assessment:  Weight 73 kg on 10/18; BMI 22.45.    Estimated nutritional needs:  REE per MSJ x1.2 = 1744 kcal/day   25-30 kcal/kg = 9530-5033 kcal/day  1.25-1.5 grams protein/kg =  grams protein/day    Evaluation:   Pt extubated 10/16.   Pt without nutrition >24 hours.   Labs reviewed.   Meds reviewed.  \"Wound deteriorating\" per Wound Team evaluation today.    TF orders remain appropriate if pt is unable to start PO diet per SLP/FEES.    Recommendations/Plan:  Diet per SLP/FEES vs resume prior TF regimen.    RD following.   "

## 2023-10-19 NOTE — WOUND TEAM
[facility]  Inpatient Services  Wound and Skin Care Follow-up    Admission Date: 10/9/2023     Last order of IP CONSULT TO WOUND CARE was found on 10/19/2023 from Hospital Encounter on 10/9/2023     HPI, PMH, SH: Reviewed    Past Surgical History:   Procedure Laterality Date    PIN INSERTION  1/2/2015    Performed by Alon Finn M.D. at SURGERY [facility] ORS    HIP CANNULATED SCREW  1/2/2015    Performed by Alon Finn M.D. at SURGERY [facility] ORS    OTHER ORTHOPEDIC SURGERY      FRACTURE LEFT HIP     Social History     Tobacco Use    Smoking status: Never    Smokeless tobacco: Not on file   Substance Use Topics    Alcohol use: No     Chief Complaint   Patient presents with    T-5000 GLF     Pt found down.  LKW 2130 on 10/8     Diagnosis: AMS (altered mental status) [R41.82]    Unit where seen by Wound Team: T629/00     WOUND FOLLOW UP RELATED TO:  New consult received to reassess sacrum and right hip       WOUND TEAM PLAN OF CARE - Frequency of Follow-up:   Nursing to follow dressing orders written for wound care. Contact wound team if area fails to progress, deteriorates or with any questions/concerns if something comes up before next scheduled follow up (See below as to whether wound is following and frequency of wound follow up)  Dressing changes by wound team:                   Weekly - 10/26/23    WOUND HISTORY:     83 y.o. male who presented 10/9/2023 with a PMHx of DM and prostate disease admitted 10/9 from [place] after being found down x 2 days. Found to be in Afib with RVR which converted with metoprolol and altered with hypernatremia and uremia and mild rhabo. Admitted to telemetry under hospitalist services. This afternoon developed worsening hypoxia requiring escalation to maximum HFNC. Also went back into Afib with RVR but this time was hemodynamically significant with a BP that dropped to 50/30 requiring attempted electrical cardioversion. Cardiology  consulted. Na increasing despite IVF resuscitation. Nephrology consulted by hospitalist.     10/12 - VD #2, transition to oral amiodarone, free water flushes 300 mL q4h, free water deficit -5.1 L  10/13 - VD #3, Increase free water flushes  10/14 - Extubated, continues to be encephalopathic  10/15 - Re intubated due to increasing AMS and hypoxia, bronchoscopy with L mainstem occlusion with thick, white secretions that were suctioned  10/16 - Extubated, decrease free water flushes 400 ml q2h to q8h, d/c 1/2 NS, attempted to contact 4 different family members without success  10/17 - Increase free water flushes, palliative unable to find family, positive         WOUND ASSESSMENT/LDA  Wound 10/09/23 Pressure Injury Sacrum;Buttocks Bilateral POA evolving DTI (Active)   Date First Assessed/Time First Assessed: 10/09/23 2100   Present on Original Admission: Yes  Hand Hygiene Completed: Yes  Primary Wound Type: Pressure Injury  Location: Sacrum;Buttocks  Laterality: Bilateral  Wound Description (Comments): POA evolving...      Assessments 10/19/2023  3:00 PM   Wound Image     Site Assessment Pink;Red;Excoriated;Fragile;Scabbed   Periwound Assessment Pink;Red;Excoriated;Flaky;Fragile;Non-blanchable erythema;Rash   Margins Undefined edges   Closure Secondary intention   Drainage Amount Scant   Drainage Description Serous   Treatments Cleansed;Nonselective debridement;Site care;Offloading   Wound Cleansing Foam Cleanser/Washcloth   Periwound Protectant Barrier Paste;Viscopaste   Dressing Status Removed   Dressing Changed Changed   Dressing Cleansing/Solutions Not Applicable   Dressing Options Viscopaste   Dressing Change/Treatment Frequency As Needed   NEXT Weekly Photo (Inpatient Only) 10/26/23   Wound Team Following Weekly   Pressure Injury Stage Deep Tissue   Wound Length (cm) 20 cm   Wound Width (cm) 20 cm   Wound Depth (cm) 0.05 cm   Wound Surface Area (cm^2) 400 cm^2   Wound Volume (cm^3) 20 cm^3   Shape irregular rash    Wound Odor None   WOUND NURSE ONLY - Time Spent with Patient (mins) 30       Wound 10/09/23 Pressure Injury Hip;Pelvis Right POA DTI (Active)   Date First Assessed/Time First Assessed: 10/09/23 2100   Present on Original Admission: Yes  Hand Hygiene Completed: Yes  Primary Wound Type: Pressure Injury  Location: Hip;Pelvis  Laterality: Right  Wound Description (Comments): POA DTI      Assessments 10/19/2023  3:00 PM   Site Assessment Yellow;Pink;Slough   Periwound Assessment Dry;Intact   Margins Attached edges;Defined edges   Closure Secondary intention   Drainage Amount Scant   Drainage Description Serous   Treatments Cleansed;Nonselective debridement;Site care   Wound Cleansing Foam Cleanser/Washcloth   Periwound Protectant No-sting Skin Prep   Dressing Status Clean;Dry;Intact   Dressing Changed New   Dressing Options Hydrocolloid Thin   Dressing Change/Treatment Frequency Every 72 hrs, and As Needed   NEXT Dressing Change/Treatment Date 10/22/23   NEXT Weekly Photo (Inpatient Only) 10/25/23   Wound Team Following Weekly   Pressure Injury Stage Deep Tissue   Wound Length (cm) 1 cm   Wound Width (cm) 2.5 cm   Wound Depth (cm) 0.1 cm   Wound Surface Area (cm^2) 2.5 cm^2   Wound Volume (cm^3) 0.25 cm^3   Wound Bed Slough (%) 100 %   Shape oval   Wound Odor None   Exposed Structures None   WOUND NURSE ONLY - Time Spent with Patient (mins) 30        Vascular:    LENI:   No results found.    Lab Values:    Lab Results   Component Value Date/Time    WBC 13.0 (H) 10/19/2023 03:10 AM    RBC 2.93 (L) 10/19/2023 03:10 AM    HEMOGLOBIN 9.0 (L) 10/19/2023 03:10 AM    HEMATOCRIT 28.2 (L) 10/19/2023 03:10 AM    CREACTPROT 6.01 (H) 10/16/2023 03:44 PM    HBA1C 8.0 (H) 10/10/2023 12:28 AM         Culture Results show:  No results found for this or any previous visit (from the past 720 hour(s)).    Pain Level/Medicated:  None, Tolerated without pain medication       INTERVENTIONS BY WOUND TEAM:  Chart and images reviewed. Discussed  with bedside RN. All areas of concern (based on picture review, LDA review and discussion with bedside RN) have been thoroughly assessed. Documentation of areas based on significant findings. This RN in to assess patient. Performed standard wound care which includes appropriate positioning, dressing removal and non-selective debridement. Pictures and measurements obtained weekly if/when required.    Wound:  SACRUM  Preparation for Dressing removal: Removed without difficulty  Cleansed/Non-selectively Debrided with:  Wound cleanser and Moist warm washcloth  Magdiel wound: Cleansed with Wound cleanser and Moist warm washcloth, Prepped with Barrier paste  Primary Dressing:  viscopaste     Wound:  RIGHT HIP  Preparation for Dressing removal: Open to air  Cleansed/Non-selectively Debrided with:  Wound cleanser and Moist warm washcloth  Magdiel wound: Cleansed with Wound cleanser and Moist warm washcloth, Prepped with No Sting  Primary Dressing:  hydrocolloid    Advanced Wound Care Discharge Planning  Number of Clinicians necessary to complete wound care: 2  Is patient requiring IV pain medications for dressing changes:  No   Length of time for dressing change 30 min. (This does not include chart review, pre-medication time, set up, clean up or time spent charting.)    Interdisciplinary consultation: Patient, Bedside RN (Mark)    EVALUATION / RATIONALE FOR TREATMENT:     Date:  10/19/23  Wound Status:  Wound deteriorating    Sacrum deep tissue pressure injury to sacrum opening with pink and yellow excoriated tissue, magdiel-wound is fragile and flaking. Viscopatch zinc impregnated gauze applied to encourage re-epithelialization of superficial 100% viable wound bed, and to provide a non-stick wound contact layer. Right hip deep tissue injury evolved -macerated with moist yellow slough to wound bed, periwound is dry and intact with no discoloration at this time. Hydrocolloid applied to allow rapid epithelialization at this phase  of wound healing, maintain moist wound bed, to lower pH for wound healing and to facilitate autolytic debridement.          Goals: Steady decrease in wound area and depth weekly.    NURSING PLAN OF CARE ORDERS:  Dressing changes: See Dressing Care orders  Skin care: See Skin Care orders    NUTRITION RECOMMENDATIONS   Wound Team Recommendations:  Dietary consult     DIET ORDERS (From admission to next 24h)       Start     Ordered    10/15/23 1547  Diet NPO Restrict to: Strict (okay to receive meds through the NG/OG tube)  ALL MEALS        Question Answer Comment   Type: Now    Diet NPO Restrict to: Strict okay to receive meds through the NG/OG tube       10/15/23 1547    10/12/23 1306  Diet: Diet Tube Feed; Formula: Impact Peptide 1.5; Goal Rate (mL/Hour): 55  ALL MEALS        Question Answer Comment   Diet Diet Tube Feed    Formula: Impact Peptide 1.5    Goal Rate (mL/Hour) 55        10/12/23 1306                    PREVENTATIVE INTERVENTIONS:   Q shift Steven - performed per nursing policy  Q shift pressure point assessments - performed per nursing policy    Surface/Positioning  Low Airloss - Currently in Place  Reposition q 2 hours - Currently in Place  TAPs Turning system - Currently in Place  Waffle overlay  - Currently in Place    Offloading/Redistribution  Heel offloading dressing (Silicone dressing) - Currently in Place  Heel float boots (Prevalon boot) - Currently in Place      Containment/Moisture Prevention    Barrier paste - Currently in Place    Anticipated discharge plans:  TBD        Vac Discharge Needs:  Vac Discharge plan is purely a recommendation from wound team and not a requirement for discharge unless otherwise stated by physician.  Not Applicable Pt not on a wound vac

## 2023-10-19 NOTE — CARE PLAN
Problem: Humidified High Flow Nasal Cannula  Goal: Maintain adequate oxygenation dependent on patient condition  Description: Target End Date:  resolve prior to discharge or when underlying condition is resolved/stabilized    1.  Implement humidified high flow oxygen therapy  2.  Titrate high flow oxygen to maintain appropriate SpO2  Outcome: Progressing     Problem: Hyperinflation  Goal: Prevent or improve atelectasis  Description: Target End Date:  3 to 4 days    1. Instruct incentive spirometry usage  2.  Perform hyperinflation therapy as indicated  Outcome: Not Progressing       Is roughly 300mls, Poor effort despite multiple instructions. Pt is clearing a decent amount of bloody secretions spontaneously.

## 2023-10-19 NOTE — CARE PLAN
The patient is Watcher - Medium risk of patient condition declining or worsening    Shift Goals  Clinical Goals: oxygenation stability, rest, provide comfort  Patient Goals: BA  Family Goals: BA    Progress made toward(s) clinical / shift goals:    Problem: Knowledge Deficit - Standard  Goal: Patient and family/care givers will demonstrate understanding of plan of care, disease process/condition, diagnostic tests and medications  Outcome: Progressing     Problem: Pain - Standard  Goal: Alleviation of pain or a reduction in pain to the patient’s comfort goal  Outcome: Progressing     Problem: Hemodynamics  Goal: Patient's hemodynamics, fluid balance and neurologic status will be stable or improve  Outcome: Progressing     Problem: Respiratory  Goal: Patient will achieve/maintain optimum respiratory ventilation and gas exchange  Outcome: Progressing     Problem: Safety - Medical Restraint  Goal: Remains free of injury from restraints (Restraint for Interference with Medical Device)  Outcome: Progressing       Patient is not progressing towards the following goals:      Problem: Skin Integrity  Goal: Skin integrity is maintained or improved  Outcome: Not Progressing

## 2023-10-19 NOTE — PROGRESS NOTES
Patient's friends/ neighbors Young and Dereck called in the unit.  Asked if they have any contact numbers to the immediate family number. Dereck was able to give a phone number on pt's dtr Jessica that lives in [place]; contact # [phone] . Friends volunteered to try to go to immediate family members in [facility] who lives close by and will try to give us a call.

## 2023-10-20 PROBLEM — D64.9 ANEMIA: Status: ACTIVE | Noted: 2023-10-20

## 2023-10-20 LAB
ANION GAP SERPL CALC-SCNC: 7 MMOL/L (ref 7–16)
BASOPHILS # BLD AUTO: 0.2 % (ref 0–1.8)
BASOPHILS # BLD: 0.03 K/UL (ref 0–0.12)
BUN SERPL-MCNC: 24 MG/DL (ref 8–22)
CALCIUM SERPL-MCNC: 7.6 MG/DL (ref 8.5–10.5)
CHLORIDE SERPL-SCNC: 107 MMOL/L (ref 96–112)
CO2 SERPL-SCNC: 26 MMOL/L (ref 20–33)
CREAT SERPL-MCNC: 0.79 MG/DL (ref 0.5–1.4)
EOSINOPHIL # BLD AUTO: 0.09 K/UL (ref 0–0.51)
EOSINOPHIL NFR BLD: 0.7 % (ref 0–6.9)
ERYTHROCYTE [DISTWIDTH] IN BLOOD BY AUTOMATED COUNT: 49.1 FL (ref 35.9–50)
GFR SERPLBLD CREATININE-BSD FMLA CKD-EPI: 89 ML/MIN/1.73 M 2
GLUCOSE BLD STRIP.AUTO-MCNC: 116 MG/DL (ref 65–99)
GLUCOSE BLD STRIP.AUTO-MCNC: 98 MG/DL (ref 65–99)
GLUCOSE SERPL-MCNC: 104 MG/DL (ref 65–99)
HCT VFR BLD AUTO: 25.4 % (ref 42–52)
HGB BLD-MCNC: 8.3 G/DL (ref 14–18)
IMM GRANULOCYTES # BLD AUTO: 0.57 K/UL (ref 0–0.11)
IMM GRANULOCYTES NFR BLD AUTO: 4.7 % (ref 0–0.9)
LYMPHOCYTES # BLD AUTO: 0.75 K/UL (ref 1–4.8)
LYMPHOCYTES NFR BLD: 6.2 % (ref 22–41)
MCH RBC QN AUTO: 30.9 PG (ref 27–33)
MCHC RBC AUTO-ENTMCNC: 32.7 G/DL (ref 32.3–36.5)
MCV RBC AUTO: 94.4 FL (ref 81.4–97.8)
MONOCYTES # BLD AUTO: 0.55 K/UL (ref 0–0.85)
MONOCYTES NFR BLD AUTO: 4.6 % (ref 0–13.4)
NEUTROPHILS # BLD AUTO: 10.08 K/UL (ref 1.82–7.42)
NEUTROPHILS NFR BLD: 83.6 % (ref 44–72)
NRBC # BLD AUTO: 0 K/UL
NRBC BLD-RTO: 0 /100 WBC (ref 0–0.2)
PLATELET # BLD AUTO: 229 K/UL (ref 164–446)
PMV BLD AUTO: 10.9 FL (ref 9–12.9)
POTASSIUM SERPL-SCNC: 4.2 MMOL/L (ref 3.6–5.5)
RBC # BLD AUTO: 2.69 M/UL (ref 4.7–6.1)
SODIUM SERPL-SCNC: 140 MMOL/L (ref 135–145)
WBC # BLD AUTO: 12.1 K/UL (ref 4.8–10.8)

## 2023-10-20 PROCEDURE — A9270 NON-COVERED ITEM OR SERVICE: HCPCS | Performed by: INTERNAL MEDICINE

## 2023-10-20 PROCEDURE — 85025 COMPLETE CBC W/AUTO DIFF WBC: CPT

## 2023-10-20 PROCEDURE — 700111 HCHG RX REV CODE 636 W/ 250 OVERRIDE (IP): Mod: JZ | Performed by: STUDENT IN AN ORGANIZED HEALTH CARE EDUCATION/TRAINING PROGRAM

## 2023-10-20 PROCEDURE — 700101 HCHG RX REV CODE 250: Performed by: INTERNAL MEDICINE

## 2023-10-20 PROCEDURE — 80048 BASIC METABOLIC PNL TOTAL CA: CPT

## 2023-10-20 PROCEDURE — 700102 HCHG RX REV CODE 250 W/ 637 OVERRIDE(OP): Performed by: INTERNAL MEDICINE

## 2023-10-20 PROCEDURE — 94640 AIRWAY INHALATION TREATMENT: CPT

## 2023-10-20 PROCEDURE — 770000 HCHG ROOM/CARE - INTERMEDIATE ICU *

## 2023-10-20 PROCEDURE — 700102 HCHG RX REV CODE 250 W/ 637 OVERRIDE(OP): Performed by: NURSE PRACTITIONER

## 2023-10-20 PROCEDURE — 82962 GLUCOSE BLOOD TEST: CPT

## 2023-10-20 PROCEDURE — A9270 NON-COVERED ITEM OR SERVICE: HCPCS | Performed by: NURSE PRACTITIONER

## 2023-10-20 PROCEDURE — 99222 1ST HOSP IP/OBS MODERATE 55: CPT | Performed by: HOSPITALIST

## 2023-10-20 PROCEDURE — 92612 ENDOSCOPY SWALLOW (FEES) VID: CPT

## 2023-10-20 RX ORDER — OMEPRAZOLE 20 MG/1
20 CAPSULE, DELAYED RELEASE ORAL DAILY
Status: DISCONTINUED | OUTPATIENT
Start: 2023-10-21 | End: 2023-10-21

## 2023-10-20 RX ORDER — ACETAMINOPHEN 325 MG/1
650 TABLET ORAL EVERY 6 HOURS PRN
Status: DISCONTINUED | OUTPATIENT
Start: 2023-10-20 | End: 2023-10-21

## 2023-10-20 RX ORDER — AMIODARONE HYDROCHLORIDE 200 MG/1
200 TABLET ORAL DAILY
Status: DISCONTINUED | OUTPATIENT
Start: 2023-10-21 | End: 2023-10-21

## 2023-10-20 RX ORDER — IPRATROPIUM BROMIDE AND ALBUTEROL SULFATE 2.5; .5 MG/3ML; MG/3ML
3 SOLUTION RESPIRATORY (INHALATION)
Status: DISCONTINUED | OUTPATIENT
Start: 2023-10-20 | End: 2023-10-21

## 2023-10-20 RX ORDER — TERAZOSIN 1 MG/1
1 CAPSULE ORAL EVERY EVENING
Status: DISCONTINUED | OUTPATIENT
Start: 2023-10-20 | End: 2023-10-21

## 2023-10-20 RX ADMIN — IPRATROPIUM BROMIDE AND ALBUTEROL SULFATE 3 ML: 2.5; .5 SOLUTION RESPIRATORY (INHALATION) at 19:46

## 2023-10-20 RX ADMIN — APIXABAN 5 MG: 5 TABLET, FILM COATED ORAL at 06:08

## 2023-10-20 RX ADMIN — FUROSEMIDE 40 MG: 10 INJECTION, SOLUTION INTRAMUSCULAR; INTRAVENOUS at 00:09

## 2023-10-20 RX ADMIN — LANSOPRAZOLE 30 MG: 30 TABLET, ORALLY DISINTEGRATING, DELAYED RELEASE ORAL at 05:00

## 2023-10-20 RX ADMIN — AMIODARONE HYDROCHLORIDE 200 MG: 200 TABLET ORAL at 05:00

## 2023-10-20 RX ADMIN — IPRATROPIUM BROMIDE AND ALBUTEROL SULFATE 3 ML: 2.5; .5 SOLUTION RESPIRATORY (INHALATION) at 06:27

## 2023-10-20 ASSESSMENT — PAIN DESCRIPTION - PAIN TYPE
TYPE: ACUTE PAIN
TYPE: ACUTE PAIN

## 2023-10-20 ASSESSMENT — ENCOUNTER SYMPTOMS
NERVOUS/ANXIOUS: 0
DIZZINESS: 0
BACK PAIN: 0
FEVER: 0
COUGH: 1
SHORTNESS OF BREATH: 1
NAUSEA: 0

## 2023-10-20 NOTE — PROGRESS NOTES
Report given to Rupa. Patient transferred to [facility] with belongings, escorted by RN x2 and RT. Patient belongings and dentures with patient

## 2023-10-20 NOTE — PROGRESS NOTES
Patient in notable distress related to bilateral wrist restraints. Restraints removed and patient encouraged to wash his own face. Patient removed NG tube at this time. Patient AOx3 and requesting to no longer continue care. Will update palliative care team and MD. Attempted to contact daughter via phone however, call transferred directly to voice mailbox- no message left as VM was not identified.

## 2023-10-20 NOTE — PROGRESS NOTES
Patient continues to remove oxygen device and was becoming frustrated with RN replacing device. Patient states, he is done with care and to please let him go. RN told him Yuriy is on is way and would like to be here with him as he passes. Patient then replaced his own oxygen device and stated he would wait for Yuriy. Yuriy updated.

## 2023-10-20 NOTE — PROGRESS NOTES
"Contact made to Yuriy. Yuriy is Perry's nephew- Yuriy will call the family.   Per Yuriy there are 4 children and 4 step-children. Primary decision makers per Yuriy, will be Josue (son) who lives in [place] and Connie (daughter) who lives [place]. Phone numbers for these individuals are in Vick's phone of which is at his home. Yuriy is leaving work now to go get and charge the phone to be able to contact the children. He appreciates Vick is \"probably ready to move on and be with the rest of his family.\" Yuriy is \"surprised he has held on this long.\"    Yuriy would like to be here with Vick for transition to comfort measures if possible.       "

## 2023-10-20 NOTE — CONSULTS
"Hospital Medicine Consultation    Date of Service  10/20/2023    Referring Physician  Cipriano Dominguez M.D.    Consulting Physician  Young Colin D.O.    Reason for Consultation  Transfer of care out of ICU after being found down    History of Presenting Illness  Perry Marrufo is an 83 y.o. male w/ hx of DMII, prostate disease.  He presented 10/9/2023 with transfer from [place] after being found down and last known at mental baseline 2 days prior.  He was found to have hypernatremia, atrial fibrillation with a rapid ventricular rate, rhabdomyolysis.  He was intubated, cardioversion attempted and transferred to ICU 10/11 he was extubated 10/17.     10/20: He has failed FEEs study.  He does not know he is in [place], \"I'm in [facility]\" and doesn't know he is in a hospital.  He was made DNR/DNI previously.  He state he is \"done\" and keeps pulling off his HFNC.  States being  and has several children.  I have asked case management to reach out find his children.  Nurse left a message on the one jessica phone.    Review of Systems  Review of Systems   Constitutional:  Positive for malaise/fatigue. Negative for fever.   Respiratory:  Positive for cough and shortness of breath.    Cardiovascular:  Negative for leg swelling.   Gastrointestinal:  Negative for nausea.   Musculoskeletal:  Negative for back pain.   Neurological:  Negative for dizziness.   Psychiatric/Behavioral:  The patient is not nervous/anxious.        Past Medical History   has a past medical history of Diabetes mellitus, type 2 (HCC) and Prostate disease.    Surgical History   has a past surgical history that includes other orthopedic surgery; pin insertion (2015); and hip cannulated screw (2015).    Family History  Parents     Social History   reports that he has never smoked. He does not have any smokeless tobacco history on file. He reports that he does not drink alcohol and does not use drugs. .    Medications  Current " Facility-Administered Medications   Medication Dose Route Frequency Provider Last Rate Last Admin    ipratropium-albuterol (DUONEB) nebulizer solution  3 mL Nebulization Q6HRS (RT) Tim Hills M.D.   3 mL at 10/20/23 0627    [START ON 10/21/2023] amiodarone (Cordarone) tablet 200 mg  200 mg Oral DAILY Young Colin D.O.        apixaban (Eliquis) tablet 5 mg  5 mg Oral BID Young Colin D.O.        terazosin (Hytrin) capsule 1 mg  1 mg Oral Q EVENING Young Colin D.O.        acetaminophen (Tylenol) tablet 650 mg  650 mg Oral Q6HRS PRN Young Colin D.O.        [START ON 10/21/2023] omeprazole (PriLOSEC) capsule 20 mg  20 mg Oral DAILY Young Colin D.O.        insulin lispro (HumaLOG,AdmeLOG) injection  3-14 Units Subcutaneous Q6HRS Cipriano Dominguez M.D.        And    dextrose 10 % BOLUS 25 g  25 g Intravenous Q15 MIN PRN Cipriano Dominguez M.D.        nystatin (Mycostatin) powder   Topical BID Emil Graham M.D.        ipratropium-albuterol (DUONEB) nebulizer solution  3 mL Nebulization Q4H PRN (RT) Emil Graham M.D.        insulin GLARGINE (Lantus,Semglee) injection  10 Units Subcutaneous Q EVENING Ming Farah D.O.   10 Units at 10/18/23 1802    labetalol (Normodyne/Trandate) injection 10 mg  10 mg Intravenous Q6HRS PRN Ming Farah D.O.        Respiratory Therapy Consult   Nebulization Continuous RT Jeremy M Gonda, M.D.        MD Alert...ICU Electrolyte Replacement per Pharmacy   Other PHARMACY TO DOSE Jeremy M Gonda, M.D.           Allergies  Allergies   Allergen Reactions    Nicotine        Physical Exam  Temp:  [36.8 °C (98.2 °F)-37.7 °C (99.9 °F)] 37.3 °C (99.1 °F)  Pulse:  [63-98] 82  Resp:  [22-49] 36  BP: ()/(54-71) 107/54  SpO2:  [89 %-98 %] 94 %    Physical Exam  Constitutional:       Appearance: He is ill-appearing.   HENT:      Head: Normocephalic and atraumatic.      Nose: No congestion.   Eyes:      Extraocular Movements: Extraocular movements intact.      Conjunctiva/sclera:  Conjunctivae normal.   Cardiovascular:      Rate and Rhythm: Normal rate and regular rhythm.      Pulses: Normal pulses.      Heart sounds: No murmur heard.  Pulmonary:      Effort: Pulmonary effort is normal.      Breath sounds: Decreased breath sounds present.   Abdominal:      General: Bowel sounds are normal. There is no distension.      Palpations: Abdomen is soft.      Tenderness: There is no abdominal tenderness.   Musculoskeletal:      Cervical back: Neck supple.      Right lower leg: No edema.      Left lower leg: No edema.   Lymphadenopathy:      Cervical: No cervical adenopathy.   Skin:     General: Skin is dry.      Coloration: Skin is not jaundiced.   Neurological:      Mental Status: He is alert. He is disoriented.   Psychiatric:         Attention and Perception: Attention normal.         Speech: Speech normal.         Behavior: Behavior is cooperative.         Cognition and Memory: Cognition is impaired.         Fluids      Laboratory  Recent Labs     10/18/23  0120 10/19/23  0310 10/20/23  0034   WBC 11.2* 13.0* 12.1*   RBC 3.20* 2.93* 2.69*   HEMOGLOBIN 9.8* 9.0* 8.3*   HEMATOCRIT 30.9* 28.2* 25.4*   MCV 96.6 96.2 94.4   MCH 30.6 30.7 30.9   MCHC 31.7* 31.9* 32.7   RDW 49.6 50.4* 49.1   PLATELETCT 163* 191 229   MPV 11.3 10.9 10.9     Recent Labs     10/18/23  0120 10/18/23  1309 10/18/23  2055 10/19/23  0310 10/20/23  0034   SODIUM 146*   < > 142 142 140   POTASSIUM 4.2  --   --  4.3 4.2   CHLORIDE 113*  --   --  109 107   CO2 28  --   --  29 26   GLUCOSE 172*  --   --  142* 104*   BUN 29*  --   --  24* 24*   CREATININE 0.74  --   --  0.76 0.79   CALCIUM 8.2*  --   --  7.7* 7.6*    < > = values in this interval not displayed.                     Imaging  DX-ABDOMEN FOR TUBE PLACEMENT   Final Result         1.  Pulmonary infiltrates, increased on the left vertebral study.   2.  Moderate layering left pleural effusion, increased since prior study.   3.  Atherosclerosis   4.  Right rib fractures       DX-ABDOMEN FOR TUBE PLACEMENT   Final Result         1.  Nonspecific bowel gas pattern in the upper abdomen.   2.  Nasogastric tube terminating just distal to the gastroesophageal junction, recommend advancement.   3.  Hazy left pulmonary infiltrates, stable since prior study.   4.  Moderate layering left pleural effusion, overall stable      DX-ABDOMEN FOR TUBE PLACEMENT   Final Result      1. Appropriate position of the esophagogastric tube.   2. Worsening left lower lobe parenchymal consolidation and interval increase in size of the left pleural effusion.   3. The remainder is stable.      DX-CHEST-PORTABLE (1 VIEW)   Final Result         1.  Bilateral lower lobe infiltrates, stable since prior study.   2.  Trace bilateral pleural effusions   3.  Atherosclerosis   4.  Right rib fractures      DX-ABDOMEN FOR TUBE PLACEMENT   Final Result      1.  Enteric tube tip overlies the gastric antrum.      [id number] VIEW   Final Result      Nonobstructive bowel gas pattern.      DX-ABDOMEN FOR TUBE PLACEMENT   Final Result         1.  Nonspecific bowel gas pattern in the upper abdomen.   2.  Nasogastric tube tip terminates overlying the expected location of the pylorus or first duodenal segment.   3.  Bilateral lower lobe opacities concerning for infiltrate.      DX-CHEST-PORTABLE (1 VIEW)   Final Result         1.  Bilateral lower lobe infiltrates, stable since prior study.   2.  Atherosclerosis      DX-CHEST-PORTABLE (1 VIEW)   Final Result         1.  Bilateral lower lobe infiltrates, decreased since prior study.      DX-CHEST-PORTABLE (1 VIEW)   Final Result      1.  Supportive tubing as described above.   2.  Slight increased inflation.   3.  No other significant change from prior exam.         DX-CHEST-LIMITED (1 VIEW)   Final Result      1.  Ill-defined bibasilar pulmonary opacities, slightly worse on the left compared to prior study. Pneumonia is in the differential.   2.  Small left pleural effusion, new since  prior.      DX-ABDOMEN FOR TUBE PLACEMENT   Final Result      Nasogastric tube tip overlies the mid stomach      DX-ABDOMEN FOR TUBE PLACEMENT   Final Result      NG tube turns in the gastric body with tip at the level of the gastric fundus.      EC-ECHOCARDIOGRAM LTD W/O CONT   Final Result      DX-CHEST-PORTABLE (1 VIEW)   Final Result      1.  Mild interstitial pulmonary edema, similar to prior   2.  Bibasilar and perihilar underinflation atelectasis which could obscure an additional process. This is unchanged.      DX-CHEST-PORTABLE (1 VIEW)   Final Result      1.  Mild interstitial pulmonary edema   2.  Bibasilar and perihilar underinflation atelectasis which could obscure an additional process.   3.  Small LEFT pleural effusion      DX-CHEST-LIMITED (1 VIEW)   Final Result      1.  Bilateral basilar atelectasis and/or consolidation. Underlying infection is possible.   2.  Stable enlargement of the cardiomediastinal silhouette.   3.  Interval insertion of a central venous catheter which terminates with the tip projecting over the expected region of the mid superior vena cava.   4.  Interval placement of an endotracheal tube which terminates in satisfactory position at the level of the aortic arch.   5.  Interval placement of an enteric feeding tube which terminates in the left upper quadrant projecting over the expected location of the stomach.      DX-CHEST-PORTABLE (1 VIEW)   Final Result      No significant change from prior exam.      DX-CHEST-PORTABLE (1 VIEW)   Final Result      Bibasilar underinflation atelectasis. Superimposed pneumonia not excluded.      US-RUQ   Final Result      1.  Gallbladder sludge   2.  RIGHT pleural effusion      EC-ECHOCARDIOGRAM COMPLETE W/ CONT   Final Result      CT-CSPINE WITHOUT PLUS RECONS   Final Result         1. No acute fracture from C1 through T1 is visualized.         CT-HEAD W/O   Final Result         1. No acute intracranial abnormality. No evidence of acute  "intracranial hemorrhage or mass lesion.                     DX-CHEST-PORTABLE (1 VIEW)   Final Result      1.  Low lung volumes without definite acute cardiopulmonary abnormality.          Assessment/Plan  * Acute respiratory failure with hypoxia (HCC)- (present on admission)  Assessment & Plan  Question of aspiration  Extubated 10/17  DNI now per prior MD discussions  Pulmonary hygiene  Aspiration precautions  Was on HFNC but keeps pulling it off as of 10/20.  He is \"done\".  I will attempt to reach out to family  Palliative following.  Wean O2 as able  SLP following, appreciate recommendations  Palliative following, appreciate recommendations, code status changed to DNR/DNI  Transfer to [facility]    Anemia  Assessment & Plan  Hgb:8.3 <9<9.8  Monitor cbc    Shock (HCC)  Assessment & Plan  Undifferentiated - cardiac due to arrhythmia vs hypovolemia  Shock resolved  Monitor vitals.    Aspiration precautions  Assessment & Plan  Increasing oxygen requirement and chest x-ray showed infiltration possible pneumonia  Failed 10/20 FEEs  Does not want Enteral tube back in  Aspiration precautions.  Continue monitor closely.    Rhabdomyolysis- (present on admission)  Assessment & Plan  Continue IVF resuscitation, monitor CPK  CPK significant trended down.  Wean IV fluids    Atrial fibrillation with RVR (HCC)- (present on admission)  Assessment & Plan  No known history of Afib, RVR with hypotension attempted electrical cardioversion 10/11 without success, put on amio GTT  Echo EF 65%, no valvular abnormalities  Rate Controlled  Continuous tele monitoring  Anticoagulation with Eliquis.  If decision to continue care he will need lovenox or heparin until Enteral tube or able to swallow.  Rhythm control with amiodarone, continue loading  Optimize electrolytes     Dehydration- (present on admission)  Assessment & Plan  Continue fluid resuscitation intravenously and enterally for now  Monitor UOP closely    Erythrocytosis- (present on " admission)  Assessment & Plan  Likely d/t hemoconcentration    Hypernatremia- (present on admission)  Assessment & Plan  Hypovolemic hypernatremia  Improved with fluids  Nephrology consulted      ADELAIDE (acute kidney injury) ([facility])- (present on admission)  Assessment & Plan  Improved with fluids  Monitoring bmp, I/O's    Altered mental status- (present on admission)  Assessment & Plan  Per prior notes improving  Still confused to place, city, year.  Reaching out to family      DM2 (diabetes mellitus, type 2) ([facility])- (present on admission)  Assessment & Plan  Glargine 10units q HS  Monitor accuchecks and cover with SSI  Hypoglycemic protocol  A1c 8 in past week  10/20 hold glargine as pulled out NG tube and failed FEEs      Sepsis ([facility])- (present on admission)  Assessment & Plan  No clear etiology of sepsis  But question of aspiration      Diabetes mellitus-Type 2, not on treatment  Assessment & Plan          BPH (benign prostatic hyperplasia)- (present on admission)  Assessment & Plan  Continue tamsulosin.

## 2023-10-20 NOTE — PROGRESS NOTES
NOC RN entered room shortly after receiving report from day RN. Patient found to have pulled off HFNC and pulled out NG tube. Tube immediately replaced, CXR ordered. Restraints reapplied at this time.

## 2023-10-20 NOTE — PROGRESS NOTES
"Inpatient Palliative Care     Location: [address]     HPI:     Perry Marrufo is an 83-year-old male with past medical history significant for diabetes and prostate disease found down by neighbor at his home in [place] covered in stool, unknown downtime.  Mental status and work-up notable for dehydration, uremia, atrial fibrillation with RVR patient intubated 10/9 respiratory failure in emergency department with admission to ICU and extubated 10/14/2023 but required reintubation.  He was subsequently extubated 10/16 to high flow nasal cannula and transferred to [facility].  Originally seen by palliative care 10/17/2023 for advance care planning.      Summary:     Patient pulled nasogastric feeding tube out this morning during care.  Patient failed FEES per SLP.  Heart rate in the 30s to 40s.  94% on 10 L via oxy mask.    Met with patient at patient's bedside.  Introduced myself and discussed role on care team.  Patient is oriented to person, knows he is in a hospital and stated [place] initially but then stated [place] disoriented to time/day/date, unable to articulate why he is in the hospital however states he is ill.  Denies pain, denies anxiety, denies shortness of breath.  Does have some retracted breathing and tachypnea.    Patient expressed she does not want feeding tube replaced stating \"I have already signed the paperwork. I'm done.\" I clarified through yes/no questions and providing different options that he meant ready for end of life/death. He confirmed \"I don't want to die but Im ready.\"  He confirmed he is Hinduism but declined spiritual care visit stating \"I had rather just get it over with.\"      We discussed options (continued care vs comfort care/hospice). Patient states he wants comfort care and is okay with hospice. He is hallucinating and asked about a baby on his back. He is also concerned with getting back to [place] to get any money out of his bank account.  He is somewhat tangential.  I " "asked  what family member or friends he wants me to attempt to contact. He would like his son Will contacted and friend Jason.     Appreciate efforts care coordination efforts and locating next of kin; all notes reviewed.    Attempted to reach patient's healthcare power of  Jason Delgado however first # disconnected/no longer in service and second VM with [place] VM set up (as documented per  notes). Generic VM left for     Updated ALVARADO Escalante and Dr. Colin.  Per Dr. Coiln patient lack decisional capacity. ALVARADO Escalante will attempt to reach out to other listed family NOK (Yuriy Marrufo, Jessica Stone).      Plan:     1) patient lacks decisional capacity however   2) likely self determining in regards to not wanting feeding tube reinserted and an acute respiratory failure  3) continue reaching out to family  4) continue DNR/DNI  5) I recommend comfort care/hospice referral () care based on patients stated preferences     I spent a total of 60 minutes reviewing medical records, direct face-to-face time with the patient and/or family, coordination of care, and documentation.     Marybel \"Jaymie\" JEFF Yanez, [facility]  Palliative Care Nurse Practitioner  [phone]               "

## 2023-10-20 NOTE — CARE PLAN
Problem: Ventilation  Goal: Ability to achieve and maintain unassisted ventilation or tolerate decreased levels of ventilator support  Description: Target End Date:  4 days     Document on Vent flowsheet    1.  Support and monitor invasive and noninvasive mechanical ventilation  2.  Monitor ventilator weaning response  3.  Perform ventilator associated pneumonia prevention interventions  4.  Manage ventilation therapy by monitoring diagnostic test results  Outcome: Met     Problem: Hyperinflation  Goal: Prevent or improve atelectasis  Description: Target End Date:  3 to 4 days    1. Instruct incentive spirometry usage  2.  Perform hyperinflation therapy as indicated  Outcome: Not Met   PEP QID, IS unable to perform this evening      Problem: Humidified High Flow Nasal Cannula  Goal: Maintain adequate oxygenation dependent on patient condition  Description: Target End Date:  resolve prior to discharge or when underlying condition is resolved/stabilized    1.  Implement humidified high flow oxygen therapy  2.  Titrate high flow oxygen to maintain appropriate SpO2  Outcome: Not Met     30L 55%      Problem: Bronchoconstriction  Goal: Improve in air movement and diminished wheezing  Description: Target End Date:  2 to 3 days    1.  Implement inhaled treatments  2.  Evaluate and manage medication effects  Outcome: Progressing     7% Sodium Chloride 4ml/Duo PRN

## 2023-10-20 NOTE — PROGRESS NOTES
Restraints restarted. Patient states he is tried of waiting for Yuriy. Oxygen delivery removed by patient and refusing to put back. Education provided. MD updated-still trying to contact family for comfort orders as patient is confused today and is not deemed capacitated.

## 2023-10-20 NOTE — PROGRESS NOTES
Hemothorax seen on most recent CXR which appears to be new compared to prior.   CBC appears to be stable.   Bedside ultrasound done with Dr. Hills  This is more likely a mucous plug with some small amount of overlying effusion     Plan:  Duonebs  Hypertonic nebs  Lasix 40mg IV   Repeat CBC

## 2023-10-20 NOTE — PROGRESS NOTES
Meri and Mara called for update. Meri is patients sister. Update given. They are trying to help reach patients direct children to update them on his wishes.    Patient continues to decompensate despite increased oxygen delivery. HFNC and oxy mask in use. MD updated.

## 2023-10-20 NOTE — THERAPY
Speech Language Pathology   Flexible Endoscopic Evaluation of Swallowing (FEES)        Patient Name: Perry Marrufo  AGE:  83 y.o., SEX:  male  Medical Record #: [id number]  Date of Service: 10/20/2023      History of Present Illness  83 y.o. male who presented on 10/9/2023 after being found down by neighbors. PT was found confused, thirsty.      10/14 - Extubated, continues to be encephalopathic  10/15 - Re intubated due to increasing AMS and hypoxia, bronchoscopy with L mainstem occlusion with thick, white secretions that were suctioned  10/16 - Extubated, decrease free water flushes 400 ml q2h to q8h, d/c 1/2 NS, attempted to contact 4 different family members without success     PMHx DM type II, prostate disease  No previous h/o SLP services at [facility]     CMHx Acute respiratory failure w/ hypoxia, Hypernatremia, AMS, Sepsis, Afib w/ RVR, ADELAIDE, Shock, Rhabdomyolysis, dehydration    CXR 10/19/23  1.  Bilateral lower lobe infiltrates, stable since prior study.  2.  Trace bilateral pleural effusions  3.  Atherosclerosis  4.  Right rib fractures      Pertinent Information  Current Method of Nutrition: NPO until cleared by speech pathology  Patient Behaviors: Confused   Dentition: Edentulous   Feeding Tube: None   Tracheostomy: No               Factor(s) Affecting Performance: Impaired endurance, Impaired mental status, Impaired command following     Discussed the risks, benefits, and alternatives of the FEES procedure. Patient/family acknowledged and agreed to proceed.      Assessment  Flexible Endoscopic Evaluation of Swallowing (FEES) completed at bedside today. The endoscope was passed transnasally via Right nare to evaluate the anatomy and physiology of swallowing. Pt needing max encouragement to participate in study and during the study. Pt was tangential, frequently moving.   Anatomic Findings: Space occupying lesions on 1/3 posterior VF likely from contact with ETT.   Vocal Fold Motion: Bilateral movement  Secretion  Management: Excess secretions, suctioning required  PO Trials: Ice Chips, Thin Liquid, Mildly Thick Liquid, Liquidised, Pudding      Consistency PAS Score Timing Residue Comments   Thin Liquid 8 During swallow  (Inferred), Post swallow Vallecular Residue: Mild (5%-25%)  Pyriform Sinus Residue: Mild (5%-25%) tsp   Mildly Thick 8 During Swallow  (Inferred), Post Swallow Vallecular Residue: Mild (5%-25%)  Pyriform Sinus Residue: Mild (5%-25%) Tsp, cup   Liquidised 8 During Swallow  (Inferred), Post Swallow Vallecular Residue: Mild (5%-25%)  Pyriform Sinus Residue: Moderate (25%-50%)    Pudding 8 During Swallow  (Inferred), Post Swallow Vallecular Residue: Mild (5%-25%)  Pyriform Sinus Residue: Moderate (25%-50%)      Penetration-Aspiration Scale (PAS)  1     No contrast enters airway  2     Contrast enters the airway, remains above the vocal folds, and is ejected from the airway (not seen in the airway at the end of the swallow).  3     Contrast enters the airway, remains above the vocal folds, and is not ejected from the airway (is seen in the airway after the swallow).  4     Contrast enters the airway, contacts the vocal folds, and is ejected from the airway.  5     Contrast enters the airway, contacts the vocal folds, and is not ejected from the airway  6     Contrast enters the airway, crosses the plane of the vocal folds, and is ejected from the airway.  7     Contrast enters the airway, crosses the plane of the vocal folds, and is not ejected from the airway despite effort.  8     Contrast enters the airway, crosses the plane of the vocal folds, is not ejected from the airway and there is no response to aspiration.      Oral phase: Adequate oral bolus acceptance/containment. Suspected piecemeal deglutition with more viscous consistencies, evidenced by continued oral manipulation after initial swallow followed by additional swallow.       Pharyngeal phase:  - Incomplete laryngeal vestibule closure resulted in  inferred aspiration of all PO trials mixed with secretions during the swallow. Bolus mixed with secretions resulted in penetration from the interarytenoid space, with re-aspiration with trials of purees. Pt was not sensate to aspiration events.  -Aspiration of PO trials after the swallow that would minimally clear with cued cough/ throat clear and be subsequently re-aspirated over the inter-arytenoid space. Query aspiration of refluxed material as well as there appeared to be a greater amount of aspirate in subsequent swallows than initially visualized.   - Reduced base of tongue retraction resulted in mild vallecular residue across all trials.   - Impaired pharyngeal shortening resulted in mild-moderate pyriform sinus residue with all trials.  - Impaired pharyngeal constriction resulted in diffused residue across all trials.  - Impaired PES opening resulted in penetration of bolus mixed with secretions from the interarytenoid space across all trials.    Esophageal phase:  Unable to visualize esophagus; however, pt had bubbling in the post-cricoid space/UES after PO trials. Query aspiration of refluxed material vs. pyriform sinus residue as well as there appeared to be a greater amount of aspirate in subsequent swallows than initially visualized.      Compensatory Strategies:  -Cued cough/ throat clear - effective in clearing penetrate, not aspirate  -Cleansing swallows - not effective in clearing residue      Severity Rating:  Severity Rating: BARNEY     BARNEY: Moderate-Severe      Clinical Impressions  The patient presents with a moderate-severe oropharyngeal dysphagia with suspect component of esophageal dysphagia. Pt's risk for ascending and descending aspiration and related sequelae is elevated. Dysphagia is likely acute related to endotracheal intubation, prolonged NPO status and generalized weakness. Would recommend NPO with consideration of alternate source of nutrition/hydration/meds if in alignment with POC.  "Pt will likely need repeat diagnostic evaluation (MBSS) prior to diet initiation. Pt appears to be a fair candidate for ongoing behavorial and exercise-based swallow rehabilitation. Dysphagia outcomes can be maximized with use of mobility as pt is able and frequent, thorough oral care.     Of note: Education provided re: FEES and SLP's recs. Pt stating he does not want feeding tube and wants to eat despite risk. Pt often asking to speak to his daughter and sister during study. Given risk for aspiration and pt wanting to eat despite risk, will defer diet to MD at this time.         Recommendations  Diet Consistency: NPO, rec alternate source of nutrition/hydration/meds. Patient wants to eat despite risk, defer to MD diet initiation   -Clear for minimal ice chips/hour 1:1 w/RN, when alert, after oral care to reduce xerostomia and mitigate disuse atrophy of swallow musculature  Medication: Non Oral  Oral Care: Q4h  Additional Instrumentation: Repeat diagnostic study when clinically appropriate         SLP Treatment Plan  Treatment Plan: Dysphagia Treatment, Patient/Family/Caregiver Training  SLP Frequency: 3x Per Week  Estimated Duration: Until Therapy Goals Met      Anticipated Discharge Needs  Discharge Recommendations: Recommend post-acute placement for additional speech therapy services prior to discharge home   Therapy Recommendations Upon DC: Dysphagia Training, Patient / Family / Caregiver Education       Patient / Family Goals  Patient / Family Goal #1: \"water\"  Goal #1 Outcome: Progressing slower than expected  Short Term Goal # 1: Pt will consume prefeeding trials with SLP with no overt s/sx of aspiration  Goal Outcome # 1: Progressing slower than expected  Short Term Goal # 2: Pt will complete instrumental swallow assessment to determine physiology of swallow.  Goal Outcome # 2 : Goal met, new goal aded  Short Term Goal # 2 B : Pt will complete 20 reps each of swallow exercises targeting pharyngeal " "shortening, laryngeal vestibule closure and base of tongue retraction with \"good\" acuracy      Yvettejuliane Walls, MS,CCC-SLP    "

## 2023-10-20 NOTE — CARE PLAN
Respiratory Update    Treatment modality: HHFNC 40L/100%   Frequency: Q4     Pt tolerating current treatments well with no adverse reactions.

## 2023-10-21 ENCOUNTER — APPOINTMENT (OUTPATIENT)
Dept: RADIOLOGY | Facility: MEDICAL CENTER | Age: 83
DRG: 871 | End: 2023-10-21
Attending: HOSPITALIST
Payer: COMMERCIAL

## 2023-10-21 LAB
ANION GAP SERPL CALC-SCNC: 7 MMOL/L (ref 7–16)
BASOPHILS # BLD AUTO: 0.4 % (ref 0–1.8)
BASOPHILS # BLD: 0.04 K/UL (ref 0–0.12)
BUN SERPL-MCNC: 23 MG/DL (ref 8–22)
CALCIUM SERPL-MCNC: 7.6 MG/DL (ref 8.5–10.5)
CHLORIDE SERPL-SCNC: 110 MMOL/L (ref 96–112)
CO2 SERPL-SCNC: 28 MMOL/L (ref 20–33)
CREAT SERPL-MCNC: 0.99 MG/DL (ref 0.5–1.4)
EOSINOPHIL # BLD AUTO: 0.1 K/UL (ref 0–0.51)
EOSINOPHIL NFR BLD: 0.9 % (ref 0–6.9)
ERYTHROCYTE [DISTWIDTH] IN BLOOD BY AUTOMATED COUNT: 50.2 FL (ref 35.9–50)
GFR SERPLBLD CREATININE-BSD FMLA CKD-EPI: 76 ML/MIN/1.73 M 2
GLUCOSE BLD STRIP.AUTO-MCNC: 102 MG/DL (ref 65–99)
GLUCOSE BLD STRIP.AUTO-MCNC: 115 MG/DL (ref 65–99)
GLUCOSE BLD STRIP.AUTO-MCNC: 85 MG/DL (ref 65–99)
GLUCOSE BLD STRIP.AUTO-MCNC: 93 MG/DL (ref 65–99)
GLUCOSE SERPL-MCNC: 106 MG/DL (ref 65–99)
HCT VFR BLD AUTO: 28.5 % (ref 42–52)
HGB BLD-MCNC: 9.1 G/DL (ref 14–18)
IMM GRANULOCYTES # BLD AUTO: 0.15 K/UL (ref 0–0.11)
IMM GRANULOCYTES NFR BLD AUTO: 1.4 % (ref 0–0.9)
LYMPHOCYTES # BLD AUTO: 1.02 K/UL (ref 1–4.8)
LYMPHOCYTES NFR BLD: 9.5 % (ref 22–41)
MCH RBC QN AUTO: 30.4 PG (ref 27–33)
MCHC RBC AUTO-ENTMCNC: 31.9 G/DL (ref 32.3–36.5)
MCV RBC AUTO: 95.3 FL (ref 81.4–97.8)
MONOCYTES # BLD AUTO: 0.63 K/UL (ref 0–0.85)
MONOCYTES NFR BLD AUTO: 5.9 % (ref 0–13.4)
NEUTROPHILS # BLD AUTO: 8.75 K/UL (ref 1.82–7.42)
NEUTROPHILS NFR BLD: 81.9 % (ref 44–72)
NRBC # BLD AUTO: 0 K/UL
NRBC BLD-RTO: 0 /100 WBC (ref 0–0.2)
PLATELET # BLD AUTO: 237 K/UL (ref 164–446)
PMV BLD AUTO: 10.7 FL (ref 9–12.9)
POTASSIUM SERPL-SCNC: 3.8 MMOL/L (ref 3.6–5.5)
RBC # BLD AUTO: 2.99 M/UL (ref 4.7–6.1)
SODIUM SERPL-SCNC: 145 MMOL/L (ref 135–145)
WBC # BLD AUTO: 10.7 K/UL (ref 4.8–10.8)

## 2023-10-21 PROCEDURE — A9270 NON-COVERED ITEM OR SERVICE: HCPCS | Performed by: HOSPITALIST

## 2023-10-21 PROCEDURE — 700102 HCHG RX REV CODE 250 W/ 637 OVERRIDE(OP): Performed by: HOSPITALIST

## 2023-10-21 PROCEDURE — 94640 AIRWAY INHALATION TREATMENT: CPT

## 2023-10-21 PROCEDURE — 700101 HCHG RX REV CODE 250: Performed by: INTERNAL MEDICINE

## 2023-10-21 PROCEDURE — 770000 HCHG ROOM/CARE - INTERMEDIATE ICU *

## 2023-10-21 PROCEDURE — 82962 GLUCOSE BLOOD TEST: CPT

## 2023-10-21 PROCEDURE — 700111 HCHG RX REV CODE 636 W/ 250 OVERRIDE (IP): Performed by: HOSPITALIST

## 2023-10-21 PROCEDURE — 99233 SBSQ HOSP IP/OBS HIGH 50: CPT | Performed by: HOSPITALIST

## 2023-10-21 PROCEDURE — 85025 COMPLETE CBC W/AUTO DIFF WBC: CPT

## 2023-10-21 PROCEDURE — 80048 BASIC METABOLIC PNL TOTAL CA: CPT

## 2023-10-21 RX ORDER — ACETAMINOPHEN 325 MG/1
650 TABLET ORAL EVERY 6 HOURS PRN
Status: DISCONTINUED | OUTPATIENT
Start: 2023-10-21 | End: 2023-10-31

## 2023-10-21 RX ORDER — LANSOPRAZOLE 30 MG/1
30 TABLET, ORALLY DISINTEGRATING, DELAYED RELEASE ORAL DAILY
Status: DISCONTINUED | OUTPATIENT
Start: 2023-10-22 | End: 2023-10-30 | Stop reason: ALTCHOICE

## 2023-10-21 RX ORDER — AMIODARONE HYDROCHLORIDE 200 MG/1
200 TABLET ORAL DAILY
Status: DISCONTINUED | OUTPATIENT
Start: 2023-10-22 | End: 2023-10-30

## 2023-10-21 RX ORDER — POTASSIUM CHLORIDE 7.45 MG/ML
10 INJECTION INTRAVENOUS
Status: COMPLETED | OUTPATIENT
Start: 2023-10-21 | End: 2023-10-21

## 2023-10-21 RX ADMIN — POTASSIUM CHLORIDE 10 MEQ: 7.46 INJECTION, SOLUTION INTRAVENOUS at 13:15

## 2023-10-21 RX ADMIN — POTASSIUM CHLORIDE 10 MEQ: 7.46 INJECTION, SOLUTION INTRAVENOUS at 11:51

## 2023-10-21 RX ADMIN — IPRATROPIUM BROMIDE AND ALBUTEROL SULFATE 3 ML: 2.5; .5 SOLUTION RESPIRATORY (INHALATION) at 02:17

## 2023-10-21 RX ADMIN — APIXABAN 5 MG: 5 TABLET, FILM COATED ORAL at 17:47

## 2023-10-21 ASSESSMENT — PAIN DESCRIPTION - PAIN TYPE
TYPE: ACUTE PAIN

## 2023-10-21 NOTE — PROGRESS NOTES
"Hospital Medicine Daily Progress Note    Date of Service  10/21/2023    Chief Complaint  Found down    Hospital Course  Perry Marrufo is an 83 y.o. male w/ hx of DMII, prostate disease.  He presented 10/9/2023 with transfer from [place] after being found down and last known at mental baseline 2 days prior.  He was found to have hypernatremia, atrial fibrillation with a rapid ventricular rate, rhabdomyolysis.  He was intubated, cardioversion attempted and transferred to ICU 10/11 he was extubated 10/17.     Interval Problem Update  10/21: On HFNC 30LPM and 90% FiO2 with improved SpO2:100%. Still confused to place, year. Per RN no contact with his children and we are trying to get in touch with family.  Only neighbors arrived yesterday per RN. Patient willing to have NG tube placed for nutrition and medications per RN.    10/20: He has failed FEEs study.  He does not know he is in [place], \"I'm in [facility]\" and doesn't know he is in a hospital.  He was made DNR/DNI previously.  He state he is \"done\" and keeps pulling off his HFNC.  States being  and has several children.  I have asked case management to reach out find his children.  Nurse left a message on the one jessica phone.    I have discussed this patient's plan of care and discharge plan at IDT rounds today with Case Management, Nursing, Nursing leadership, and other members of the IDT team.    Consultants/Specialty  cardiology, nephrology, and palliative care    Code Status  DNAR/DNI    Disposition  The patient is not medically cleared for discharge to home or a post-acute facility.      I have placed the appropriate orders for post-discharge needs.    Review of Systems  Review of Systems   Unable to perform ROS: Mental acuity        Physical Exam  Temp:  [36.2 °C (97.2 °F)-37.8 °C (100 °F)] 36.2 °C (97.2 °F)  Pulse:  [65-98] 70  Resp:  [22-45] 28  BP: ()/(50-64) 107/54  SpO2:  [84 %-100 %] 100 %    Physical Exam  Vitals reviewed. "   Constitutional:       General: He is awake.      Appearance: He is ill-appearing. He is not diaphoretic.   HENT:      Head: Normocephalic and atraumatic.      Mouth/Throat:      Mouth: Mucous membranes are dry.      Pharynx: No oropharyngeal exudate.      Comments: Dry skin on lips  Eyes:      General: No scleral icterus.        Right eye: No discharge.         Left eye: No discharge.      Extraocular Movements: Extraocular movements intact.      Conjunctiva/sclera: Conjunctivae normal.   Neck:      Vascular: No carotid bruit.   Cardiovascular:      Rate and Rhythm: Normal rate and regular rhythm.      Pulses:           Radial pulses are 2+ on the right side and 2+ on the left side.        Dorsalis pedis pulses are 2+ on the right side and 2+ on the left side.      Heart sounds: No murmur heard.  Pulmonary:      Effort: Pulmonary effort is normal. No respiratory distress.      Breath sounds: Normal breath sounds. No wheezing or rales.   Abdominal:      General: Bowel sounds are normal. There is no distension.      Palpations: Abdomen is soft.      Tenderness: There is no abdominal tenderness.   Genitourinary:     Comments: Penile edema  Musculoskeletal:         General: No swelling or tenderness.      Cervical back: No muscular tenderness.      Right lower leg: No edema.      Left lower leg: No edema.   Lymphadenopathy:      Cervical: No cervical adenopathy.   Skin:     Coloration: Skin is not jaundiced or pale.      Findings: Rash (left groin, skin fold) present.   Neurological:      Mental Status: He is disoriented.      Cranial Nerves: No cranial nerve deficit.      Motor: Weakness present.   Psychiatric:         Attention and Perception: Attention normal.         Behavior: Behavior is slowed.         Cognition and Memory: Cognition is impaired.         Fluids    Intake/Output Summary (Last 24 hours) at 10/21/2023 0956  Last data filed at 10/20/2023 2000  Gross per 24 hour   Intake --   Output 650 ml   Net -650  ml       Laboratory  Recent Labs     10/19/23  0310 10/20/23  0034 10/21/23  0317   WBC 13.0* 12.1* 10.7   RBC 2.93* 2.69* 2.99*   HEMOGLOBIN 9.0* 8.3* 9.1*   HEMATOCRIT 28.2* 25.4* 28.5*   MCV 96.2 94.4 95.3   MCH 30.7 30.9 30.4   MCHC 31.9* 32.7 31.9*   RDW 50.4* 49.1 50.2*   PLATELETCT 191 229 237   MPV 10.9 10.9 10.7     Recent Labs     10/19/23  0310 10/20/23  0034 10/21/23  0317   SODIUM 142 140 145   POTASSIUM 4.3 4.2 3.8   CHLORIDE 109 107 110   CO2 29 26 28   GLUCOSE 142* 104* 106*   BUN 24* 24* 23*   CREATININE 0.76 0.79 0.99   CALCIUM 7.7* 7.6* 7.6*                   Imaging  DX-ABDOMEN FOR TUBE PLACEMENT   Final Result         1.  Pulmonary infiltrates, increased on the left vertebral study.   2.  Moderate layering left pleural effusion, increased since prior study.   3.  Atherosclerosis   4.  Right rib fractures      DX-ABDOMEN FOR TUBE PLACEMENT   Final Result         1.  Nonspecific bowel gas pattern in the upper abdomen.   2.  Nasogastric tube terminating just distal to the gastroesophageal junction, recommend advancement.   3.  Hazy left pulmonary infiltrates, stable since prior study.   4.  Moderate layering left pleural effusion, overall stable      DX-ABDOMEN FOR TUBE PLACEMENT   Final Result      1. Appropriate position of the esophagogastric tube.   2. Worsening left lower lobe parenchymal consolidation and interval increase in size of the left pleural effusion.   3. The remainder is stable.      DX-CHEST-PORTABLE (1 VIEW)   Final Result         1.  Bilateral lower lobe infiltrates, stable since prior study.   2.  Trace bilateral pleural effusions   3.  Atherosclerosis   4.  Right rib fractures      DX-ABDOMEN FOR TUBE PLACEMENT   Final Result      1.  Enteric tube tip overlies the gastric antrum.      [id number] VIEW   Final Result      Nonobstructive bowel gas pattern.      DX-ABDOMEN FOR TUBE PLACEMENT   Final Result         1.  Nonspecific bowel gas pattern in the upper abdomen.   2.   Nasogastric tube tip terminates overlying the expected location of the pylorus or first duodenal segment.   3.  Bilateral lower lobe opacities concerning for infiltrate.      DX-CHEST-PORTABLE (1 VIEW)   Final Result         1.  Bilateral lower lobe infiltrates, stable since prior study.   2.  Atherosclerosis      DX-CHEST-PORTABLE (1 VIEW)   Final Result         1.  Bilateral lower lobe infiltrates, decreased since prior study.      DX-CHEST-PORTABLE (1 VIEW)   Final Result      1.  Supportive tubing as described above.   2.  Slight increased inflation.   3.  No other significant change from prior exam.         DX-CHEST-LIMITED (1 VIEW)   Final Result      1.  Ill-defined bibasilar pulmonary opacities, slightly worse on the left compared to prior study. Pneumonia is in the differential.   2.  Small left pleural effusion, new since prior.      DX-ABDOMEN FOR TUBE PLACEMENT   Final Result      Nasogastric tube tip overlies the mid stomach      DX-ABDOMEN FOR TUBE PLACEMENT   Final Result      NG tube turns in the gastric body with tip at the level of the gastric fundus.      EC-ECHOCARDIOGRAM LTD W/O CONT   Final Result      DX-CHEST-PORTABLE (1 VIEW)   Final Result      1.  Mild interstitial pulmonary edema, similar to prior   2.  Bibasilar and perihilar underinflation atelectasis which could obscure an additional process. This is unchanged.      DX-CHEST-PORTABLE (1 VIEW)   Final Result      1.  Mild interstitial pulmonary edema   2.  Bibasilar and perihilar underinflation atelectasis which could obscure an additional process.   3.  Small LEFT pleural effusion      DX-CHEST-LIMITED (1 VIEW)   Final Result      1.  Bilateral basilar atelectasis and/or consolidation. Underlying infection is possible.   2.  Stable enlargement of the cardiomediastinal silhouette.   3.  Interval insertion of a central venous catheter which terminates with the tip projecting over the expected region of the mid superior vena cava.   4.   Interval placement of an endotracheal tube which terminates in satisfactory position at the level of the aortic arch.   5.  Interval placement of an enteric feeding tube which terminates in the left upper quadrant projecting over the expected location of the stomach.      DX-CHEST-PORTABLE (1 VIEW)   Final Result      No significant change from prior exam.      DX-CHEST-PORTABLE (1 VIEW)   Final Result      Bibasilar underinflation atelectasis. Superimposed pneumonia not excluded.      US-RUQ   Final Result      1.  Gallbladder sludge   2.  RIGHT pleural effusion      EC-ECHOCARDIOGRAM COMPLETE W/ CONT   Final Result      CT-CSPINE WITHOUT PLUS RECONS   Final Result         1. No acute fracture from C1 through T1 is visualized.         CT-HEAD W/O   Final Result         1. No acute intracranial abnormality. No evidence of acute intracranial hemorrhage or mass lesion.                     DX-CHEST-PORTABLE (1 VIEW)   Final Result      1.  Low lung volumes without definite acute cardiopulmonary abnormality.           Assessment/Plan  * Acute respiratory failure with hypoxia (HCC)- (present on admission)  Assessment & Plan  Question of aspiration  Extubated 10/17  DNI now per prior MD discussions  Pulmonary hygiene  Aspiration precautions  10/21 Improved O2 sats on HFNC and more compliant.  Willing to have NG tube replaced  Wean O2 as able  SLP following, appreciate recommendations  Palliative following, appreciate recommendations, code status changed to DNR/DNI  Transfer to [facility]    Anemia  Assessment & Plan  10/21/23 Hgb:9.1 <8.3 <9<9.8  Monitor cbc    Shock (HCC)  Assessment & Plan  Undifferentiated - cardiac due to arrhythmia vs hypovolemia  Shock resolved  Monitor vitals.    Aspiration precautions  Assessment & Plan  Increasing oxygen requirement and chest x-ray showed infiltration possible pneumonia  Failed 10/20 FEEs  Does not want Enteral tube back in  Aspiration precautions.  Continue monitor  closely.    Rhabdomyolysis- (present on admission)  Assessment & Plan  Continue IVF resuscitation, monitor CPK  CPK significant trended down.  Wean IV fluids    Atrial fibrillation with RVR ([facility])- (present on admission)  Assessment & Plan  No known history of Afib, RVR with hypotension attempted electrical cardioversion 10/11 without success, put on amio GTT  Echo EF 65%, no valvular abnormalities  Rate Controlled  Continuous tele monitoring  Anticoagulation with Eliquis.  If decision to continue care he will need lovenox or heparin until Enteral tube or able to swallow.  Rhythm control with amiodarone, continue loading  Optimize electrolytes     Dehydration- (present on admission)  Assessment & Plan  Continue fluid resuscitation intravenously and enterally for now  Monitor UOP closely    Erythrocytosis- (present on admission)  Assessment & Plan  Likely d/t hemoconcentration    Hypernatremia- (present on admission)  Assessment & Plan  Hypovolemic hypernatremia  Improved with fluids  Nephrology consulted    ADELAIDE (acute kidney injury) ([facility])- (present on admission)  Assessment & Plan  Improved with fluids  Monitoring bmp, I/O's    Altered mental status- (present on admission)  Assessment & Plan  Per prior notes improving  Still confused to place, city, year.  Reaching out to family (case management unable to get ahold of family)  10/20 pm friends brought in his phone but needs to be charged and hopeful patient can remember password to get to his kids' phone numbers.    DM2 (diabetes mellitus, type 2) ([facility])- (present on admission)  Assessment & Plan  Glargine 10units q HS  Monitor accuchecks and cover with SSI  Hypoglycemic protocol  A1c 8 in past week  10/20 hold glargine as pulled out NG tube and failed FEEs      Sepsis ([facility])- (present on admission)  Assessment & Plan  No clear etiology of sepsis  But question of aspiration    Diabetes mellitus-Type 2, not on treatment  Assessment & Plan          BPH (benign prostatic  hyperplasia)- (present on admission)  Assessment & Plan  Continue tamsulosin.         VTE prophylaxis:    therapeutic anticoagulation with eliquis 5 mg BID      I have performed a physical exam and reviewed and updated ROS and Plan today (10/21/2023). In review of yesterday's note (10/20/2023), there are no changes except as documented above.

## 2023-10-21 NOTE — DISCHARGE PLANNING
Case Management Discharge Planning    Admission Date: 10/9/2023  GMLOS: 5.1  ALOS: 12    6-Clicks ADL Score: 9  6-Clicks Mobility Score: 6      Anticipated Discharge Dispo: Discharge Disposition: D/T to SNF with Medicare cert in anticipation of skilled care (03)      Action(s) Taken:     RNCM was able to charge patients phone and obtain number for     Nolvia Stone: [phone]- left     Renato Marrufo: [phone]-left

## 2023-10-21 NOTE — PROGRESS NOTES
Iris Placement    Tube Team verified patient name and medical record number prior to tube placement.  Cortrak tube (55 inches, 10 Armenian) placed at 75 cm in right nare.  Per Cortrak picture, tube appears to be in the stomach.  Nursing Instructions: Awaiting KUB to confirm placement before use for medications or feeding. Once placement confirmed, flush tube with 30 ml of water, and then remove and save stylet, in patient medication drawer.

## 2023-10-21 NOTE — CARE PLAN
The patient is Watcher - Medium risk of patient condition declining or worsening    Shift Goals  Clinical Goals: monitor O2, transfer to comfort care, get in touch with family  Patient Goals: transfer to comfort measures  Family Goals: BA    Progress made toward(s) clinical / shift goals:  Patient remains free of any new skin injuries. Patient understands that he needs to be turned frequently. Patient is still not complaining of any pain at this time.       Problem: Pain - Standard  Goal: Alleviation of pain or a reduction in pain to the patient’s comfort goal  Outcome: Progressing     Problem: Safety - Medical Restraint  Goal: Remains free of injury from restraints (Restraint for Interference with Medical Device)  Outcome: Progressing       Patient is not progressing towards the following goals: patient remains in restraints. Patient does not want to be turned frequently. Patient still is trying to get up out of bed to leave. Patient does not participate in education being given.       Problem: Knowledge Deficit - Standard  Goal: Patient and family/care givers will demonstrate understanding of plan of care, disease process/condition, diagnostic tests and medications  Outcome: Not Progressing     Problem: Skin Integrity  Goal: Skin integrity is maintained or improved  Outcome: Not Progressing     Problem: Fall Risk  Goal: Patient will remain free from falls  Outcome: Not Progressing     Problem: Safety - Medical Restraint  Goal: Free from restraint(s) (Restraint for Interference with Medical Device)  Outcome: Not Progressing

## 2023-10-21 NOTE — PROGRESS NOTES
"Spoke to the patient's daughter \"Nolvia.  She is a nurse in [employer].  She states her dad is allergic to most blood pressure medications and she had to take him off of some blood pressure medications back in 2022 as it was dropping his heart rate and blood pressure too much.  She states [facility] and [facility] these in the past.  We will need to get further records that she cannot recall what medications these were.  I gave her updates on her father expressed that he had previously said he was finished/done and did not want any further medical care.  I also explained to her that he was unaware of where he was at and he seemed to have some confusion she states that he should be a full code and that he would want to get through this if able.  I did ask if being on a ventilator would something he would want again she states yes.  Nolvia states that she would try and make it out here soon as possible.  She states that she would contact her brothers and sisters.  "

## 2023-10-21 NOTE — CARE PLAN
Problem: Hyperinflation  Goal: Prevent or improve atelectasis  Description: Target End Date:  3 to 4 days    1. Instruct incentive spirometry usage  2.  Perform hyperinflation therapy as indicated  Outcome: Not Met       Respiratory Update    Treatment modality: PEP  Frequency: QID  *Pt is not following for treatment.   Pt tolerating current treatments well with no adverse reactions.      Problem: Humidified High Flow Nasal Cannula  Goal: Maintain adequate oxygenation dependent on patient condition  Description: Target End Date:  resolve prior to discharge or when underlying condition is resolved/stabilized    1.  Implement humidified high flow oxygen therapy  2.  Titrate high flow oxygen to maintain appropriate SpO2  Outcome: Not Met      Respiratory Update    Treatment modality: HHF 40L/90%  Frequency: Q4    Pt tolerating current treatments well with no adverse reactions.      Problem: Bronchoconstriction  Goal: Improve in air movement and diminished wheezing  Description: Target End Date:  2 to 3 days    1.  Implement inhaled treatments  2.  Evaluate and manage medication effects  Outcome: Not Met      Respiratory Update    Treatment modality: Q6  Frequency: Duo    Pt tolerating current treatments well with no adverse reactions.

## 2023-10-21 NOTE — CARE PLAN
The patient is Stable - Low risk of patient condition declining or worsening    Shift Goals  Clinical Goals: monitor O2, contact family  Patient Goals: transfer to comfort care  Family Goals: BA    Patient is not progressing towards the following goals:      Problem: Knowledge Deficit - Standard  Goal: Patient and family/care givers will demonstrate understanding of plan of care, disease process/condition, diagnostic tests and medications  Outcome: Not Progressing     Problem: Safety - Medical Restraint  Goal: Remains free of injury from restraints (Restraint for Interference with Medical Device)  Outcome: Not Progressing  Goal: Free from restraint(s) (Restraint for Interference with Medical Device)  Outcome: Not Progressing

## 2023-10-22 ENCOUNTER — APPOINTMENT (OUTPATIENT)
Dept: RADIOLOGY | Facility: MEDICAL CENTER | Age: 83
DRG: 871 | End: 2023-10-22
Attending: HOSPITALIST
Payer: COMMERCIAL

## 2023-10-22 PROBLEM — L89.90 DECUBITUS SKIN ULCER: Status: ACTIVE | Noted: 2023-10-22

## 2023-10-22 LAB
ANION GAP SERPL CALC-SCNC: 6 MMOL/L (ref 7–16)
ARTERIAL PATENCY WRIST A: ABNORMAL
BASE EXCESS BLDA CALC-SCNC: 4 MMOL/L (ref -4–3)
BODY TEMPERATURE: ABNORMAL DEGREES
BUN SERPL-MCNC: 24 MG/DL (ref 8–22)
CALCIUM SERPL-MCNC: 7.6 MG/DL (ref 8.5–10.5)
CHLORIDE SERPL-SCNC: 112 MMOL/L (ref 96–112)
CO2 BLDA-SCNC: 29 MMOL/L (ref 20–33)
CO2 SERPL-SCNC: 29 MMOL/L (ref 20–33)
CREAT SERPL-MCNC: 0.86 MG/DL (ref 0.5–1.4)
DELSYS IDSYS: ABNORMAL
ERYTHROCYTE [DISTWIDTH] IN BLOOD BY AUTOMATED COUNT: 51.3 FL (ref 35.9–50)
GFR SERPLBLD CREATININE-BSD FMLA CKD-EPI: 86 ML/MIN/1.73 M 2
GLUCOSE BLD STRIP.AUTO-MCNC: 135 MG/DL (ref 65–99)
GLUCOSE BLD STRIP.AUTO-MCNC: 182 MG/DL (ref 65–99)
GLUCOSE BLD STRIP.AUTO-MCNC: 212 MG/DL (ref 65–99)
GLUCOSE SERPL-MCNC: 140 MG/DL (ref 65–99)
HCO3 BLDA-SCNC: 27.5 MMOL/L (ref 17–25)
HCT VFR BLD AUTO: 28.1 % (ref 42–52)
HGB BLD-MCNC: 8.7 G/DL (ref 14–18)
HOROWITZ INDEX BLDA+IHG-RTO: 128 MM[HG]
LPM ILPM: 30 LPM
MCH RBC QN AUTO: 29.9 PG (ref 27–33)
MCHC RBC AUTO-ENTMCNC: 31 G/DL (ref 32.3–36.5)
MCV RBC AUTO: 96.6 FL (ref 81.4–97.8)
O2/TOTAL GAS SETTING VFR VENT: 50 %
PCO2 BLDA: 34.9 MMHG (ref 26–37)
PCO2 TEMP ADJ BLDA: 35.2 MMHG (ref 26–37)
PH BLDA: 7.5 [PH] (ref 7.4–7.5)
PH TEMP ADJ BLDA: 7.5 [PH] (ref 7.4–7.5)
PLATELET # BLD AUTO: 297 K/UL (ref 164–446)
PMV BLD AUTO: 10.3 FL (ref 9–12.9)
PO2 BLDA: 64 MMHG (ref 64–87)
PO2 TEMP ADJ BLDA: 65 MMHG (ref 64–87)
POTASSIUM SERPL-SCNC: 3.9 MMOL/L (ref 3.6–5.5)
RBC # BLD AUTO: 2.91 M/UL (ref 4.7–6.1)
SAO2 % BLDA: 94 % (ref 93–99)
SODIUM SERPL-SCNC: 147 MMOL/L (ref 135–145)
SPECIMEN DRAWN FROM PATIENT: ABNORMAL
WBC # BLD AUTO: 8.5 K/UL (ref 4.8–10.8)

## 2023-10-22 PROCEDURE — A9270 NON-COVERED ITEM OR SERVICE: HCPCS | Performed by: HOSPITALIST

## 2023-10-22 PROCEDURE — A9270 NON-COVERED ITEM OR SERVICE: HCPCS | Performed by: STUDENT IN AN ORGANIZED HEALTH CARE EDUCATION/TRAINING PROGRAM

## 2023-10-22 PROCEDURE — 700101 HCHG RX REV CODE 250: Performed by: INTERNAL MEDICINE

## 2023-10-22 PROCEDURE — 82803 BLOOD GASES ANY COMBINATION: CPT

## 2023-10-22 PROCEDURE — 700111 HCHG RX REV CODE 636 W/ 250 OVERRIDE (IP): Performed by: INTERNAL MEDICINE

## 2023-10-22 PROCEDURE — 94669 MECHANICAL CHEST WALL OSCILL: CPT

## 2023-10-22 PROCEDURE — 80048 BASIC METABOLIC PNL TOTAL CA: CPT

## 2023-10-22 PROCEDURE — 85027 COMPLETE CBC AUTOMATED: CPT

## 2023-10-22 PROCEDURE — 770000 HCHG ROOM/CARE - INTERMEDIATE ICU *

## 2023-10-22 PROCEDURE — 99233 SBSQ HOSP IP/OBS HIGH 50: CPT | Performed by: HOSPITALIST

## 2023-10-22 PROCEDURE — 700102 HCHG RX REV CODE 250 W/ 637 OVERRIDE(OP): Performed by: STUDENT IN AN ORGANIZED HEALTH CARE EDUCATION/TRAINING PROGRAM

## 2023-10-22 PROCEDURE — 700105 HCHG RX REV CODE 258: Performed by: INTERNAL MEDICINE

## 2023-10-22 PROCEDURE — 94640 AIRWAY INHALATION TREATMENT: CPT

## 2023-10-22 PROCEDURE — 36600 WITHDRAWAL OF ARTERIAL BLOOD: CPT

## 2023-10-22 PROCEDURE — 71045 X-RAY EXAM CHEST 1 VIEW: CPT

## 2023-10-22 PROCEDURE — 99233 SBSQ HOSP IP/OBS HIGH 50: CPT | Performed by: INTERNAL MEDICINE

## 2023-10-22 PROCEDURE — 700102 HCHG RX REV CODE 250 W/ 637 OVERRIDE(OP): Performed by: HOSPITALIST

## 2023-10-22 PROCEDURE — G0316 PR PROLONGED IP/OBS E&M EA 15 MIN: HCPCS | Performed by: INTERNAL MEDICINE

## 2023-10-22 PROCEDURE — 82962 GLUCOSE BLOOD TEST: CPT | Mod: 91

## 2023-10-22 RX ORDER — ACETYLCYSTEINE 200 MG/ML
3 SOLUTION ORAL; RESPIRATORY (INHALATION) EVERY 4 HOURS
Status: DISCONTINUED | OUTPATIENT
Start: 2023-10-22 | End: 2023-10-25 | Stop reason: ALTCHOICE

## 2023-10-22 RX ORDER — IPRATROPIUM BROMIDE AND ALBUTEROL SULFATE 2.5; .5 MG/3ML; MG/3ML
3 SOLUTION RESPIRATORY (INHALATION)
Status: DISCONTINUED | OUTPATIENT
Start: 2023-10-22 | End: 2023-10-25

## 2023-10-22 RX ADMIN — INSULIN LISPRO 3 UNITS: 100 INJECTION, SOLUTION INTRAVENOUS; SUBCUTANEOUS at 12:42

## 2023-10-22 RX ADMIN — HYDROCORTISONE SODIUM SUCCINATE 50 MG: 100 INJECTION, POWDER, FOR SOLUTION INTRAMUSCULAR; INTRAVENOUS at 12:31

## 2023-10-22 RX ADMIN — IPRATROPIUM BROMIDE AND ALBUTEROL SULFATE 3 ML: 2.5; .5 SOLUTION RESPIRATORY (INHALATION) at 18:51

## 2023-10-22 RX ADMIN — NYSTATIN: 100000 POWDER TOPICAL at 06:00

## 2023-10-22 RX ADMIN — POTASSIUM BICARBONATE 25 MEQ: 978 TABLET, EFFERVESCENT ORAL at 10:47

## 2023-10-22 RX ADMIN — IPRATROPIUM BROMIDE AND ALBUTEROL SULFATE 3 ML: 2.5; .5 SOLUTION RESPIRATORY (INHALATION) at 16:33

## 2023-10-22 RX ADMIN — APIXABAN 5 MG: 5 TABLET, FILM COATED ORAL at 17:37

## 2023-10-22 RX ADMIN — INSULIN LISPRO 4 UNITS: 100 INJECTION, SOLUTION INTRAVENOUS; SUBCUTANEOUS at 17:51

## 2023-10-22 RX ADMIN — CEFAZOLIN 2 G: 2 INJECTION, POWDER, FOR SOLUTION INTRAMUSCULAR; INTRAVENOUS at 15:30

## 2023-10-22 RX ADMIN — APIXABAN 5 MG: 5 TABLET, FILM COATED ORAL at 05:18

## 2023-10-22 RX ADMIN — CEFAZOLIN 2 G: 2 INJECTION, POWDER, FOR SOLUTION INTRAMUSCULAR; INTRAVENOUS at 23:11

## 2023-10-22 RX ADMIN — CEFAZOLIN 2 G: 2 INJECTION, POWDER, FOR SOLUTION INTRAMUSCULAR; INTRAVENOUS at 10:52

## 2023-10-22 RX ADMIN — ACETYLCYSTEINE 3 ML: 200 SOLUTION ORAL; RESPIRATORY (INHALATION) at 18:51

## 2023-10-22 RX ADMIN — IPRATROPIUM BROMIDE AND ALBUTEROL SULFATE 3 ML: 2.5; .5 SOLUTION RESPIRATORY (INHALATION) at 12:32

## 2023-10-22 RX ADMIN — HYDROCORTISONE SODIUM SUCCINATE 50 MG: 100 INJECTION, POWDER, FOR SOLUTION INTRAMUSCULAR; INTRAVENOUS at 23:12

## 2023-10-22 RX ADMIN — ACETYLCYSTEINE 3 ML: 200 SOLUTION ORAL; RESPIRATORY (INHALATION) at 16:33

## 2023-10-22 RX ADMIN — ACETYLCYSTEINE 3 ML: 200 SOLUTION ORAL; RESPIRATORY (INHALATION) at 12:33

## 2023-10-22 RX ADMIN — AMIODARONE HYDROCHLORIDE 200 MG: 200 TABLET ORAL at 05:18

## 2023-10-22 RX ADMIN — ACETYLCYSTEINE 3 ML: 200 SOLUTION ORAL; RESPIRATORY (INHALATION) at 22:15

## 2023-10-22 RX ADMIN — IPRATROPIUM BROMIDE AND ALBUTEROL SULFATE 3 ML: 2.5; .5 SOLUTION RESPIRATORY (INHALATION) at 22:15

## 2023-10-22 RX ADMIN — HYDROCORTISONE SODIUM SUCCINATE 50 MG: 100 INJECTION, POWDER, FOR SOLUTION INTRAMUSCULAR; INTRAVENOUS at 17:37

## 2023-10-22 RX ADMIN — LANSOPRAZOLE 30 MG: 30 TABLET, ORALLY DISINTEGRATING, DELAYED RELEASE ORAL at 05:18

## 2023-10-22 ASSESSMENT — COGNITIVE AND FUNCTIONAL STATUS - GENERAL
SUGGESTED CMS G CODE MODIFIER DAILY ACTIVITY: CL
WALKING IN HOSPITAL ROOM: TOTAL
CLIMB 3 TO 5 STEPS WITH RAILING: TOTAL
EATING MEALS: A LOT
DRESSING REGULAR LOWER BODY CLOTHING: A LOT
PERSONAL GROOMING: A LOT
MOVING FROM LYING ON BACK TO SITTING ON SIDE OF FLAT BED: UNABLE
TOILETING: TOTAL
HELP NEEDED FOR BATHING: A LOT
SUGGESTED CMS G CODE MODIFIER MOBILITY: CM
DRESSING REGULAR UPPER BODY CLOTHING: A LOT
MOBILITY SCORE: 9
TURNING FROM BACK TO SIDE WHILE IN FLAT BAD: A LOT
DAILY ACTIVITIY SCORE: 11
STANDING UP FROM CHAIR USING ARMS: A LOT
MOVING TO AND FROM BED TO CHAIR: A LOT

## 2023-10-22 ASSESSMENT — PAIN DESCRIPTION - PAIN TYPE
TYPE: ACUTE PAIN

## 2023-10-22 ASSESSMENT — ENCOUNTER SYMPTOMS: SHORTNESS OF BREATH: 1

## 2023-10-22 ASSESSMENT — FIBROSIS 4 INDEX
FIB4 SCORE: 1.5
FIB4 SCORE: 1.5

## 2023-10-22 NOTE — CARE PLAN
The patient is Watcher - Medium risk of patient condition declining or worsening    Shift Goals  Clinical Goals: wean oxygen demand  Patient Goals: rest  Family Goals: updates    Progress made toward(s) clinical / shift goals:    Problem: Pain - Standard  Goal: Alleviation of pain or a reduction in pain to the patient’s comfort goal  Outcome: Progressing     Problem: Respiratory  Goal: Patient will achieve/maintain optimum respiratory ventilation and gas exchange  Outcome: Progressing  Note: Patient slowly weaning down on HHF.     Problem: Safety - Medical Restraint  Goal: Remains free of injury from restraints (Restraint for Interference with Medical Device)  Outcome: Progressing  Note: Patient's restraints were removed  Goal: Free from restraint(s) (Restraint for Interference with Medical Device)  Outcome: Progressing  Note: Patient restraints were removed.

## 2023-10-22 NOTE — PROGRESS NOTES
"Hospital Medicine Daily Progress Note    Date of Service  10/22/2023    Chief Complaint  Found down    Hospital Course  Perry Marrufo is an 83 y.o. male w/ hx of DMII, prostate disease.  He presented 10/9/2023 with transfer from [place] after being found down and last known at mental baseline 2 days prior.  He was found to have hypernatremia, atrial fibrillation with a rapid ventricular rate, rhabdomyolysis.  He was intubated, cardioversion attempted and transferred to ICU 10/11 he was extubated 10/17.     Interval Problem Update  10/22: Iris feeding tube placed last night. Feeding started.  Worse left lung opacification on CXR.  I have ordered and discussed pulmonary consult.  Check ABG for possible bipap.  I discussed with daughter and updated her last night, she states he is a FULL code.  Na:147.  He is more alert today.  On HFNC:30LPM 50% FiO2. States he is very thirsty and has sponge swabs that nursing has been giving and watch for aspiration.    10/21: On HFNC 30LPM and 90% FiO2 with improved SpO2:100%. Still confused to place, year. Per RN no contact with his children and we are trying to get in touch with family.  Only neighbors arrived yesterday per RN. Patient willing to have NG tube placed for nutrition and medications per RN.    10/20: He has failed FEEs study.  He does not know he is in [place], \"I'm in [facility]\" and doesn't know he is in a hospital.  He was made DNR/DNI previously.  He state he is \"done\" and keeps pulling off his HFNC.  States being  and has several children.  I have asked case management to reach out find his children.  Nurse left a message on the one jessica phone.    I have discussed this patient's plan of care and discharge plan at IDT rounds today with Case Management, Nursing, Nursing leadership, and other members of the IDT team.    Consultants/Specialty  cardiology, nephrology, and palliative care    Code Status  Full Code    Disposition  The patient is not medically " cleared for discharge to home or a post-acute facility.      I have placed the appropriate orders for post-discharge needs.    Review of Systems  Review of Systems   Unable to perform ROS: Mental acuity   Constitutional:  Positive for malaise/fatigue.   Respiratory:  Positive for shortness of breath.         Physical Exam  Temp:  [36.3 °C (97.4 °F)-36.5 °C (97.7 °F)] 36.5 °C (97.7 °F)  Pulse:  [67-82] 70  Resp:  [23-37] 32  BP: ()/(48-59) 109/55  SpO2:  [89 %-100 %] 96 %    Physical Exam  Vitals reviewed.   Constitutional:       General: He is awake.      Appearance: He is ill-appearing. He is not diaphoretic.   HENT:      Head: Normocephalic and atraumatic.      Mouth/Throat:      Mouth: Mucous membranes are dry.      Pharynx: No oropharyngeal exudate.      Comments: Dry skin on lips  Eyes:      General: No scleral icterus.        Right eye: No discharge.         Left eye: No discharge.      Extraocular Movements: Extraocular movements intact.      Conjunctiva/sclera: Conjunctivae normal.   Cardiovascular:      Rate and Rhythm: Normal rate and regular rhythm.      Pulses:           Radial pulses are 2+ on the right side and 2+ on the left side.        Dorsalis pedis pulses are 2+ on the right side and 2+ on the left side.      Heart sounds: No murmur heard.  Pulmonary:      Effort: Pulmonary effort is normal. No respiratory distress.      Breath sounds: Decreased breath sounds present. No wheezing or rales.   Abdominal:      General: Bowel sounds are normal. There is no distension.      Palpations: Abdomen is soft.      Tenderness: There is no abdominal tenderness.   Genitourinary:     Comments: Penile edema  Musculoskeletal:         General: No swelling or tenderness.      Cervical back: Neck supple. No muscular tenderness.      Right lower leg: Edema present.      Left lower leg: Edema present.   Skin:     Coloration: Skin is not jaundiced or pale.      Findings: Rash (left groin, skin fold) present.    Neurological:      Mental Status: He is alert. He is disoriented.      Cranial Nerves: No cranial nerve deficit.      Motor: Weakness present.   Psychiatric:         Attention and Perception: Attention normal.         Behavior: Behavior is slowed.         Cognition and Memory: Cognition is impaired.         Fluids    Intake/Output Summary (Last 24 hours) at 10/22/2023 1504  Last data filed at 10/22/2023 1119  Gross per 24 hour   Intake 255 ml   Output 720 ml   Net -465 ml       Laboratory  Recent Labs     10/20/23  0034 10/21/23  0317 10/22/23  0340   WBC 12.1* 10.7 8.5   RBC 2.69* 2.99* 2.91*   HEMOGLOBIN 8.3* 9.1* 8.7*   HEMATOCRIT 25.4* 28.5* 28.1*   MCV 94.4 95.3 96.6   MCH 30.9 30.4 29.9   MCHC 32.7 31.9* 31.0*   RDW 49.1 50.2* 51.3*   PLATELETCT 229 237 297   MPV 10.9 10.7 10.3     Recent Labs     10/20/23  0034 10/21/23  0317 10/22/23  0340   SODIUM 140 145 147*   POTASSIUM 4.2 3.8 3.9   CHLORIDE 107 110 112   CO2 26 28 29   GLUCOSE 104* 106* 140*   BUN 24* 23* 24*   CREATININE 0.79 0.99 0.86   CALCIUM 7.6* 7.6* 7.6*                   Imaging  DX-ABDOMEN FOR TUBE PLACEMENT   Final Result         Feeding tube with tip projecting over the expected area of the third portion duodenum.      DX-CHEST-LIMITED (1 VIEW)   Final Result      Interval replacement of gastric drainage tube with feeding tube   Complete opacification of the left lung, similar to prior      DX-ABDOMEN FOR TUBE PLACEMENT   Final Result      Enteric tube has been placed and the tip projects over the gastroduodenal junction      DX-ABDOMEN FOR TUBE PLACEMENT   Final Result         1.  Pulmonary infiltrates, increased on the left vertebral study.   2.  Moderate layering left pleural effusion, increased since prior study.   3.  Atherosclerosis   4.  Right rib fractures      DX-ABDOMEN FOR TUBE PLACEMENT   Final Result         1.  Nonspecific bowel gas pattern in the upper abdomen.   2.  Nasogastric tube terminating just distal to the  gastroesophageal junction, recommend advancement.   3.  Hazy left pulmonary infiltrates, stable since prior study.   4.  Moderate layering left pleural effusion, overall stable      DX-ABDOMEN FOR TUBE PLACEMENT   Final Result      1. Appropriate position of the esophagogastric tube.   2. Worsening left lower lobe parenchymal consolidation and interval increase in size of the left pleural effusion.   3. The remainder is stable.      DX-CHEST-PORTABLE (1 VIEW)   Final Result         1.  Bilateral lower lobe infiltrates, stable since prior study.   2.  Trace bilateral pleural effusions   3.  Atherosclerosis   4.  Right rib fractures      DX-ABDOMEN FOR TUBE PLACEMENT   Final Result      1.  Enteric tube tip overlies the gastric antrum.      [id number] VIEW   Final Result      Nonobstructive bowel gas pattern.      DX-ABDOMEN FOR TUBE PLACEMENT   Final Result         1.  Nonspecific bowel gas pattern in the upper abdomen.   2.  Nasogastric tube tip terminates overlying the expected location of the pylorus or first duodenal segment.   3.  Bilateral lower lobe opacities concerning for infiltrate.      DX-CHEST-PORTABLE (1 VIEW)   Final Result         1.  Bilateral lower lobe infiltrates, stable since prior study.   2.  Atherosclerosis      DX-CHEST-PORTABLE (1 VIEW)   Final Result         1.  Bilateral lower lobe infiltrates, decreased since prior study.      DX-CHEST-PORTABLE (1 VIEW)   Final Result      1.  Supportive tubing as described above.   2.  Slight increased inflation.   3.  No other significant change from prior exam.         DX-CHEST-LIMITED (1 VIEW)   Final Result      1.  Ill-defined bibasilar pulmonary opacities, slightly worse on the left compared to prior study. Pneumonia is in the differential.   2.  Small left pleural effusion, new since prior.      DX-ABDOMEN FOR TUBE PLACEMENT   Final Result      Nasogastric tube tip overlies the mid stomach      DX-ABDOMEN FOR TUBE PLACEMENT   Final Result       NG tube turns in the gastric body with tip at the level of the gastric fundus.      EC-ECHOCARDIOGRAM LTD W/O CONT   Final Result      DX-CHEST-PORTABLE (1 VIEW)   Final Result      1.  Mild interstitial pulmonary edema, similar to prior   2.  Bibasilar and perihilar underinflation atelectasis which could obscure an additional process. This is unchanged.      DX-CHEST-PORTABLE (1 VIEW)   Final Result      1.  Mild interstitial pulmonary edema   2.  Bibasilar and perihilar underinflation atelectasis which could obscure an additional process.   3.  Small LEFT pleural effusion      DX-CHEST-LIMITED (1 VIEW)   Final Result      1.  Bilateral basilar atelectasis and/or consolidation. Underlying infection is possible.   2.  Stable enlargement of the cardiomediastinal silhouette.   3.  Interval insertion of a central venous catheter which terminates with the tip projecting over the expected region of the mid superior vena cava.   4.  Interval placement of an endotracheal tube which terminates in satisfactory position at the level of the aortic arch.   5.  Interval placement of an enteric feeding tube which terminates in the left upper quadrant projecting over the expected location of the stomach.      DX-CHEST-PORTABLE (1 VIEW)   Final Result      No significant change from prior exam.      DX-CHEST-PORTABLE (1 VIEW)   Final Result      Bibasilar underinflation atelectasis. Superimposed pneumonia not excluded.      US-RUQ   Final Result      1.  Gallbladder sludge   2.  RIGHT pleural effusion      EC-ECHOCARDIOGRAM COMPLETE W/ CONT   Final Result      CT-CSPINE WITHOUT PLUS RECONS   Final Result         1. No acute fracture from C1 through T1 is visualized.         CT-HEAD W/O   Final Result         1. No acute intracranial abnormality. No evidence of acute intracranial hemorrhage or mass lesion.                     DX-CHEST-PORTABLE (1 VIEW)   Final Result      1.  Low lung volumes without definite acute cardiopulmonary  abnormality.           Assessment/Plan  * Acute respiratory failure with hypoxia (HCC)- (present on admission)  Assessment & Plan  Question of aspiration  Extubated 10/17  DNI now per prior MD discussions  Pulmonary hygiene  Aspiration precautions  I have consulted pulmonary 10/22 and appreciate input  10/22 ABG ordered and reviewed CXR.  10/21 Improved O2 sats on HFNC and more compliant.  Willing to have NG tube replaced  Wean O2 as able  SLP following, appreciate recommendations  Palliative following, appreciate recommendations, code status changed to DNR/DNI  Transfer to [facility]    Decubitus skin ulcer  Assessment & Plan  Wound care  Frequent turning    Anemia  Assessment & Plan  10/22/23 Hgb:8.7 <9.1<8.3 <9<9.8  Monitor cbc    Shock ([facility])  Assessment & Plan  Undifferentiated - cardiac due to arrhythmia vs hypovolemia  Shock resolved  Monitor vitals.    Aspiration precautions  Assessment & Plan  Increasing oxygen requirement and chest x-ray showed infiltration possible pneumonia  Failed 10/20 FEEs  Does not want Enteral tube back in  Aspiration precautions.  Continue monitor closely.    Rhabdomyolysis- (present on admission)  Assessment & Plan  Continue IVF resuscitation, monitor CPK  CPK significant trended down.  Wean IV fluids    Atrial fibrillation with RVR (HCC)- (present on admission)  Assessment & Plan  No known history of Afib, RVR with hypotension attempted electrical cardioversion 10/11 without success, put on amio GTT  10/22 on oral amiodarone  Echo EF 65%, no valvular abnormalities  Rate Controlled (daughter states allergies to BP meds).  [facility]  Continuous tele monitoring  Anticoagulation with Eliquis.  If decision to continue care he will need lovenox or heparin until Enteral tube or able to swallow.  Rhythm control with amiodarone, continue loading  Optimize electrolytes     Dehydration- (present on admission)  Assessment & Plan  Continue fluid resuscitation intravenously  and enterally for now  Monitor UOP closely    Erythrocytosis- (present on admission)  Assessment & Plan  Likely d/t hemoconcentration    Hypernatremia- (present on admission)  Assessment & Plan  Hypovolemic hypernatremia  10/22 Na:147  Improved with fluids  Nephrology consulted    ADELAIDE (acute kidney injury) ([facility])- (present on admission)  Assessment & Plan  Improved with fluids  Monitoring bmp, I/O's    Altered mental status- (present on admission)  Assessment & Plan  Per prior notes improving  Still confused to place, city, year.  10/21pm talked to daughter, Nolvia, and she states her dad has never wanted to be DNR and we made him FULL code for now.  I alerted Nolvia that her father was confused and very ill and that family should come see their father to help make in goals of care  10/20 pm friends brought in his phone but needs to be charged and hopeful patient can remember password to get to his kids' phone numbers.    DM2 (diabetes mellitus, type 2) ([facility])- (present on admission)  Assessment & Plan  Glargine 10units q HS  Monitor accuchecks and cover with SSI  Hypoglycemic protocol  A1c 8 in past week  10/20 hold glargine as pulled out NG tube and failed FEEs      Sepsis ([facility])- (present on admission)  Assessment & Plan  No clear etiology of sepsis  But question of aspiration    Diabetes mellitus-Type 2, not on treatment  Assessment & Plan          BPH (benign prostatic hyperplasia)- (present on admission)  Assessment & Plan  Continue tamsulosin.         VTE prophylaxis:    therapeutic anticoagulation with eliquis 5 mg BID      I have performed a physical exam and reviewed and updated ROS and Plan today (10/22/2023). In review of yesterday's note (10/21/2023), there are no changes except as documented above.

## 2023-10-22 NOTE — CARE PLAN
The patient is Watcher - Medium risk of patient condition declining or worsening    Shift Goals  Clinical Goals: wean off highflow  Patient Goals: rest  Family Goals: BA    Progress made toward(s) clinical / shift goals:  patient allows for q2 turns. Patient expressed the need to be turned or position changes when needed. Patient still remains free of pain at this time.       Problem: Knowledge Deficit - Standard  Goal: Patient and family/care givers will demonstrate understanding of plan of care, disease process/condition, diagnostic tests and medications  Outcome: Progressing     Problem: Pain - Standard  Goal: Alleviation of pain or a reduction in pain to the patient’s comfort goal  Outcome: Progressing     Problem: Hemodynamics  Goal: Patient's hemodynamics, fluid balance and neurologic status will be stable or improve  Outcome: Progressing     Problem: Fluid Volume  Goal: Fluid volume balance will be maintained  Outcome: Progressing     Problem: Skin Integrity  Goal: Skin integrity is maintained or improved  Outcome: Progressing     Problem: Fall Risk  Goal: Patient will remain free from falls  Outcome: Progressing     Problem: Safety - Medical Restraint  Goal: Remains free of injury from restraints (Restraint for Interference with Medical Device)  Outcome: Progressing       Patient is not progressing towards the following goals: patient was out of restraints for a few hours tonight but had to get placed back in due to pulling off o2 and iris feeding tube.       Problem: Urinary - Renal Perfusion  Goal: Ability to achieve and maintain adequate renal perfusion and functioning will improve  Outcome: Not Progressing     Problem: Respiratory  Goal: Patient will achieve/maintain optimum respiratory ventilation and gas exchange  Outcome: Not Progressing     Problem: Safety - Medical Restraint  Goal: Free from restraint(s) (Restraint for Interference with Medical Device)  Outcome: Not Progressing

## 2023-10-22 NOTE — CARE PLAN
Problem: Humidified High Flow Nasal Cannula  Goal: Maintain adequate oxygenation dependent on patient condition  Description: Target End Date:  resolve prior to discharge or when underlying condition is resolved/stabilized    1.  Implement humidified high flow oxygen therapy  2.  Titrate high flow oxygen to maintain appropriate SpO2  Outcome: Not Met       Respiratory Update    Treatment modality: HHF 30L/80%  Frequency: Q4    Pt tolerating current treatments well with no adverse reactions.

## 2023-10-22 NOTE — CONSULTS
"Pulmonary Consult    Date of Admission: 10/9/23  Date of Consult: 10/22/23  Consulting: Dr. Young Colin  Reason for consult: AMS/hypoxic respiratory failure     Chief Complaint:  Chief Complaint   Patient presents with    T-5000 GLF     Pt found down.  LKW 2130 on 10/8     HPI:   From Dr. Dominguez's addendum: \"83 y.o. M, PMH T2DM, admitted in transfer from [facility] 10/9/2023 after being found down, LK W 2 days prior, AF with RVR, hypernatremia, uremia and rhabdomyolysis.  Transferred to ICU 10/11, attempted cardioversion, intubation, fluid resuscitation.\"    Hospital course:  10/11 - transfer to ICU, intubated, cardioversion, aggressive fluid resuscitation  10/12 - VD #2, transition to oral amiodarone, free water flushes 300 mL q4h, free water deficit -5.1 L  10/13 - VD #3, Increase free water flushes  10/14 - Extubated   10/15 - FEES, thick mucus, resp distress and re-intubated; bronch with purulent lingula secretions, BAL many WBCs, on Unasyn  10/16 - VD #2, extubated again; HFNC -> 3lpm, NPO, TF, aspiration precautions, stop 1/2 NS, decrease free water  10/17 - Extubated yesterday; somnolent but arouses follows commands; inc free water; SLP  10/18 -fluctuating mental status, high risk for deterioration, still have not found any family, 5 LPM oxygen, likely some ongoing aspiration  10/19 - more awake and oriented today; DNR/I, failed slp  10/20: On empiric abx, improving encephalopathy. Palliative consulted and recommending DNR/DNI and possible hospice.   10/21: Downgraded to IMCU. Dr. Colin spoke to patient's daughter. She indicated the patient would want to be full code.     Pulmonary consulted to help with HFNC management. Has not seen [facility] in the past. No PFTs. No inhalers on intake medication reconciliation.     24h events: CXR with complete opacification of left lung.   Imaging: CXR  Notable lab trends: Na 147  Tmax: afebrile   ProCal: .18 on 10/17  Antibiotics: Received abx from 10/9-10/12. "   Cultures: Sputum culture MSSA from 10/15.     Past Medical History:   Diagnosis Date    Diabetes mellitus, type 2 (HCC)     Prostate disease        Past Surgical History:   Procedure Laterality Date    PIN INSERTION  1/2/2015    Performed by Alon Finn M.D. at SURGERY [facility] ORS    HIP CANNULATED SCREW  1/2/2015    Performed by Alon Finn M.D. at SURGERY [facility] ORS    OTHER ORTHOPEDIC SURGERY      FRACTURE LEFT HIP       Social History     Socioeconomic History    Marital status:      Spouse name: Not on file    Number of children: Not on file    Years of education: Not on file    Highest education level: Not on file   Occupational History    Not on file   Tobacco Use    Smoking status: Never    Smokeless tobacco: Not on file   Substance and Sexual Activity    Alcohol use: No    Drug use: No    Sexual activity: Not on file   Other Topics Concern    Not on file   Social History Narrative    Not on file     Social Determinants of Health     Financial Resource Strain: Not on file   Food Insecurity: Not on file   Transportation Needs: Not on file   Physical Activity: Not on file   Stress: Not on file   Social Connections: Not on file   Intimate Partner Violence: Not on file   Housing Stability: Not on file          No family history on file.    No current facility-administered medications on file prior to encounter.     Current Outpatient Medications on File Prior to Encounter   Medication Sig Dispense Refill    potassium chloride SA (K-DUR) 20 MEQ TBCR Take 1 Tab by mouth every day.      insulin lispro (HUMALOG) 100 UNIT/ML SOLN Inject 2-9 Units as instructed 4 Times a Day,Before Meals and at Bedtime. 151-200 -= 2 units  201-250 = 3 units  251-300 = 5 units  301-350 = 6 units  351-400 = 8 units  401> 9 units call md      lactobacillus rhamnosus, GG, (CULTURELLE) CAPS Take 1 Cap by mouth every day.      vitamin D (CHOLECALCIFEROL) 1000 UNIT TABS Take 2,000 Units by mouth every day.  "     hydrocodone-acetaminophen (NORCO) 5-325 MG TABS per tablet Take 1-2 Tabs by mouth every 6 hours as needed. Indications: Moderate to Moderately Severe Pain      famotidine (PEPCID) 20 MG TABS Take 20 mg by mouth every day.      aspirin (ASA) 325 MG TABS Take 1 Tab by mouth every day. 100 Tab 3    enoxaparin (LOVENOX) 40 MG/0.4ML SOLN inj Inject 40 mg as instructed every day.      tamsulosin (FLOMAX) 0.4 MG capsule Take 1 Cap by mouth ONE-HALF HOUR AFTER BREAKFAST. 30 Cap     Omega-3 Fatty Acids (DOCOSAHEXANOIC ACID) 1000 MG CAPS capsule Take 1 Cap by mouth 3 times a day, with meals. 90 Cap     cyanocobalamin (VITAMIN B12) 500 MCG tablet Take 1 Tab by mouth every day. 30 Tab 3       Allergies: Nicotine      ROS: A 12 point ROS was performed on intake and during my interview. ROS negative unless specifically noted in HPI.     Vitals:  /55   Pulse 67   Temp 36.3 °C (97.4 °F) (Temporal)   Resp (!) 30   Ht 1.803 m (5' 11\")   Wt 76 kg (167 lb 8.8 oz)   SpO2 99%     Physical Exam:  Physical Exam  Vitals reviewed.   Constitutional:       General: He is not in acute distress.     Appearance: He is normal weight. He is not ill-appearing, toxic-appearing or diaphoretic.   HENT:      Mouth/Throat:      Pharynx: Oropharynx is clear.   Eyes:      General:         Right eye: No discharge.         Left eye: No discharge.      Conjunctiva/sclera: Conjunctivae normal.   Cardiovascular:      Rate and Rhythm: Normal rate.   Pulmonary:      Effort: Pulmonary effort is normal.      Breath sounds: Examination of the left-upper field reveals decreased breath sounds. Examination of the left-middle field reveals decreased breath sounds. Examination of the left-lower field reveals decreased breath sounds. Decreased breath sounds present.   Musculoskeletal:      Right lower leg: No edema.      Left lower leg: No edema.   Skin:     General: Skin is warm.      Capillary Refill: Capillary refill takes less than 2 seconds.      " Coloration: Skin is not jaundiced.   Neurological:      General: No focal deficit present.      Mental Status: He is alert and oriented to person, place, and time.   Psychiatric:         Mood and Affect: Mood normal.         Behavior: Behavior normal.         Laboratory Data: I personally reviewed labs including historical lab trends.     Immunization History   Administered Date(s) Administered    Influenza Vaccine Adult HD 01/14/2015    Pneumococcal polysaccharide vaccine (PPSV-23) 01/14/2015       PFTs as reviewed by me personally show: none for review     Imaging as reviewed by me personally show:        Assessment/Plan:    Pulmonary problem list:  #Acute hypoxic respiratory failure secondary to pneumonia  #Left lung atelectasis   #MSSA PNA     Plan/recommendations:  -Ancef ordered. Patient's positive resp cultures are dated after his abx and he has a very abnormal CXR.   -Solucortef 50 mg q6h ordered for severe CAP (Darnell et al, N Engl J Med 2023.)   -Duonebs scheduled and PRN  -Mucomyst with CPT for airway clearance  -Target pulse oximetry 88-90%  -repeat CXR in a.m. If continued atelectasis on left despite aggressive pulmonary toilet, may need an OR time for bronchoscopy.     Total consult time: 80 minutes which included time spent on chart review, personally reviewing pertinent images and labs, time spent counseling and educating the patient and/or family members, and coordinating care with the healthcare team to include consultants.   __________  Vaughn Navarro, DO  Pulmonary and Critical Care Medicine  [facility]    Please note that this dictation was created using voice recognition software. The accuracy of the dictation is limited to the abilities of the software. I have made every reasonable attempt to correct obvious errors, but I expect that there are errors of grammar and possibly content that I did not discover before finalizing the note.

## 2023-10-22 NOTE — CARE PLAN
"The patient is Watcher - Medium risk of patient condition declining or worsening    Shift Goals  Clinical Goals: Wean HiFlow settings; pulmonary hygiene; contact family  Patient Goals: BA - pt stated he has \"no goals\"  Family Goals: BA - no family present    Progress made toward(s) clinical / shift goals:    Problem: Pain - Standard  Goal: Alleviation of pain or a reduction in pain to the patient’s comfort goal  Outcome: Progressing - pt reported no pain throughout shift. Pt pain assessed Q1h.      Problem: Safety - Medical Restraint  Goal: Remains free of injury from restraints (Restraint for Interference with Medical Device)  Outcome: Progressing - No s/sx injury from restraints. ROM performed. Q2h Turns and repositioned arms on pillows.  Goal: Free from restraint(s) (Restraint for Interference with Medical Device)  Outcome: Progressing - Patient becoming more oriented.       Patient is not progressing towards the following goals:      "

## 2023-10-23 ENCOUNTER — APPOINTMENT (OUTPATIENT)
Dept: RADIOLOGY | Facility: MEDICAL CENTER | Age: 83
DRG: 871 | End: 2023-10-23
Attending: INTERNAL MEDICINE
Payer: COMMERCIAL

## 2023-10-23 LAB
ALBUMIN SERPL BCP-MCNC: 2.5 G/DL (ref 3.2–4.9)
ALBUMIN/GLOB SERPL: 0.8 G/DL
ALP SERPL-CCNC: 80 U/L (ref 30–99)
ALT SERPL-CCNC: 8 U/L (ref 2–50)
ANION GAP SERPL CALC-SCNC: 6 MMOL/L (ref 7–16)
ANION GAP SERPL CALC-SCNC: 6 MMOL/L (ref 7–16)
AST SERPL-CCNC: 11 U/L (ref 12–45)
BILIRUB SERPL-MCNC: 0.7 MG/DL (ref 0.1–1.5)
BUN SERPL-MCNC: 24 MG/DL (ref 8–22)
BUN SERPL-MCNC: 27 MG/DL (ref 8–22)
CALCIUM ALBUM COR SERPL-MCNC: 9.1 MG/DL (ref 8.5–10.5)
CALCIUM SERPL-MCNC: 7.8 MG/DL (ref 8.5–10.5)
CALCIUM SERPL-MCNC: 7.9 MG/DL (ref 8.5–10.5)
CHLORIDE SERPL-SCNC: 114 MMOL/L (ref 96–112)
CHLORIDE SERPL-SCNC: 117 MMOL/L (ref 96–112)
CO2 SERPL-SCNC: 28 MMOL/L (ref 20–33)
CO2 SERPL-SCNC: 30 MMOL/L (ref 20–33)
CREAT SERPL-MCNC: 0.76 MG/DL (ref 0.5–1.4)
CREAT SERPL-MCNC: 0.85 MG/DL (ref 0.5–1.4)
CRP SERPL HS-MCNC: 5.43 MG/DL (ref 0–0.75)
ERYTHROCYTE [DISTWIDTH] IN BLOOD BY AUTOMATED COUNT: 49.9 FL (ref 35.9–50)
GFR SERPLBLD CREATININE-BSD FMLA CKD-EPI: 87 ML/MIN/1.73 M 2
GFR SERPLBLD CREATININE-BSD FMLA CKD-EPI: 90 ML/MIN/1.73 M 2
GLOBULIN SER CALC-MCNC: 3 G/DL (ref 1.9–3.5)
GLUCOSE BLD STRIP.AUTO-MCNC: 193 MG/DL (ref 65–99)
GLUCOSE BLD STRIP.AUTO-MCNC: 199 MG/DL (ref 65–99)
GLUCOSE BLD STRIP.AUTO-MCNC: 210 MG/DL (ref 65–99)
GLUCOSE SERPL-MCNC: 209 MG/DL (ref 65–99)
GLUCOSE SERPL-MCNC: 235 MG/DL (ref 65–99)
HCT VFR BLD AUTO: 28.5 % (ref 42–52)
HGB BLD-MCNC: 8.8 G/DL (ref 14–18)
MCH RBC QN AUTO: 29.5 PG (ref 27–33)
MCHC RBC AUTO-ENTMCNC: 30.9 G/DL (ref 32.3–36.5)
MCV RBC AUTO: 95.6 FL (ref 81.4–97.8)
PLATELET # BLD AUTO: 306 K/UL (ref 164–446)
PMV BLD AUTO: 10.3 FL (ref 9–12.9)
POTASSIUM SERPL-SCNC: 3.7 MMOL/L (ref 3.6–5.5)
POTASSIUM SERPL-SCNC: 4 MMOL/L (ref 3.6–5.5)
PREALB SERPL-MCNC: 8.3 MG/DL (ref 18–38)
PROT SERPL-MCNC: 5.5 G/DL (ref 6–8.2)
RBC # BLD AUTO: 2.98 M/UL (ref 4.7–6.1)
SODIUM SERPL-SCNC: 150 MMOL/L (ref 135–145)
SODIUM SERPL-SCNC: 151 MMOL/L (ref 135–145)
WBC # BLD AUTO: 7.8 K/UL (ref 4.8–10.8)

## 2023-10-23 PROCEDURE — 85027 COMPLETE CBC AUTOMATED: CPT

## 2023-10-23 PROCEDURE — 700101 HCHG RX REV CODE 250: Performed by: INTERNAL MEDICINE

## 2023-10-23 PROCEDURE — 99232 SBSQ HOSP IP/OBS MODERATE 35: CPT | Mod: 25 | Performed by: HOSPITALIST

## 2023-10-23 PROCEDURE — 99233 SBSQ HOSP IP/OBS HIGH 50: CPT | Performed by: INTERNAL MEDICINE

## 2023-10-23 PROCEDURE — 94640 AIRWAY INHALATION TREATMENT: CPT

## 2023-10-23 PROCEDURE — A9270 NON-COVERED ITEM OR SERVICE: HCPCS | Performed by: HOSPITALIST

## 2023-10-23 PROCEDURE — A9270 NON-COVERED ITEM OR SERVICE: HCPCS | Performed by: STUDENT IN AN ORGANIZED HEALTH CARE EDUCATION/TRAINING PROGRAM

## 2023-10-23 PROCEDURE — 700102 HCHG RX REV CODE 250 W/ 637 OVERRIDE(OP): Performed by: HOSPITALIST

## 2023-10-23 PROCEDURE — 86140 C-REACTIVE PROTEIN: CPT

## 2023-10-23 PROCEDURE — 99233 SBSQ HOSP IP/OBS HIGH 50: CPT | Performed by: NURSE PRACTITIONER

## 2023-10-23 PROCEDURE — 97530 THERAPEUTIC ACTIVITIES: CPT

## 2023-10-23 PROCEDURE — 80053 COMPREHEN METABOLIC PANEL: CPT

## 2023-10-23 PROCEDURE — 82962 GLUCOSE BLOOD TEST: CPT | Mod: 91

## 2023-10-23 PROCEDURE — 700111 HCHG RX REV CODE 636 W/ 250 OVERRIDE (IP): Mod: JZ | Performed by: INTERNAL MEDICINE

## 2023-10-23 PROCEDURE — 99497 ADVNCD CARE PLAN 30 MIN: CPT | Performed by: HOSPITALIST

## 2023-10-23 PROCEDURE — 770000 HCHG ROOM/CARE - INTERMEDIATE ICU *

## 2023-10-23 PROCEDURE — 700105 HCHG RX REV CODE 258: Performed by: INTERNAL MEDICINE

## 2023-10-23 PROCEDURE — 71045 X-RAY EXAM CHEST 1 VIEW: CPT

## 2023-10-23 PROCEDURE — 80048 BASIC METABOLIC PNL TOTAL CA: CPT

## 2023-10-23 PROCEDURE — 94669 MECHANICAL CHEST WALL OSCILL: CPT

## 2023-10-23 PROCEDURE — 84134 ASSAY OF PREALBUMIN: CPT

## 2023-10-23 PROCEDURE — 700102 HCHG RX REV CODE 250 W/ 637 OVERRIDE(OP): Performed by: STUDENT IN AN ORGANIZED HEALTH CARE EDUCATION/TRAINING PROGRAM

## 2023-10-23 RX ADMIN — INSULIN LISPRO 3 UNITS: 100 INJECTION, SOLUTION INTRAVENOUS; SUBCUTANEOUS at 07:57

## 2023-10-23 RX ADMIN — NYSTATIN: 100000 POWDER TOPICAL at 06:00

## 2023-10-23 RX ADMIN — INSULIN LISPRO 3 UNITS: 100 INJECTION, SOLUTION INTRAVENOUS; SUBCUTANEOUS at 18:14

## 2023-10-23 RX ADMIN — APIXABAN 5 MG: 5 TABLET, FILM COATED ORAL at 18:14

## 2023-10-23 RX ADMIN — AMIODARONE HYDROCHLORIDE 200 MG: 200 TABLET ORAL at 07:57

## 2023-10-23 RX ADMIN — MICONAZOLE NITRATE: 20 CREAM TOPICAL at 18:15

## 2023-10-23 RX ADMIN — ACETYLCYSTEINE 3 ML: 200 SOLUTION ORAL; RESPIRATORY (INHALATION) at 02:37

## 2023-10-23 RX ADMIN — IPRATROPIUM BROMIDE AND ALBUTEROL SULFATE 3 ML: 2.5; .5 SOLUTION RESPIRATORY (INHALATION) at 09:45

## 2023-10-23 RX ADMIN — CEFAZOLIN 2 G: 2 INJECTION, POWDER, FOR SOLUTION INTRAMUSCULAR; INTRAVENOUS at 05:21

## 2023-10-23 RX ADMIN — ACETYLCYSTEINE 3 ML: 200 SOLUTION ORAL; RESPIRATORY (INHALATION) at 13:32

## 2023-10-23 RX ADMIN — MICONAZOLE NITRATE: 20 CREAM TOPICAL at 12:55

## 2023-10-23 RX ADMIN — HYDROCORTISONE SODIUM SUCCINATE 50 MG: 100 INJECTION, POWDER, FOR SOLUTION INTRAMUSCULAR; INTRAVENOUS at 05:22

## 2023-10-23 RX ADMIN — IPRATROPIUM BROMIDE AND ALBUTEROL SULFATE 3 ML: 2.5; .5 SOLUTION RESPIRATORY (INHALATION) at 02:37

## 2023-10-23 RX ADMIN — ACETYLCYSTEINE 3 ML: 200 SOLUTION ORAL; RESPIRATORY (INHALATION) at 09:45

## 2023-10-23 RX ADMIN — IPRATROPIUM BROMIDE AND ALBUTEROL SULFATE 3 ML: 2.5; .5 SOLUTION RESPIRATORY (INHALATION) at 19:12

## 2023-10-23 RX ADMIN — NYSTATIN: 100000 POWDER TOPICAL at 18:14

## 2023-10-23 RX ADMIN — HYDROCORTISONE SODIUM SUCCINATE 50 MG: 100 INJECTION, POWDER, FOR SOLUTION INTRAMUSCULAR; INTRAVENOUS at 12:54

## 2023-10-23 RX ADMIN — CEFAZOLIN 2 G: 2 INJECTION, POWDER, FOR SOLUTION INTRAMUSCULAR; INTRAVENOUS at 14:06

## 2023-10-23 RX ADMIN — INSULIN LISPRO 4 UNITS: 100 INJECTION, SOLUTION INTRAVENOUS; SUBCUTANEOUS at 12:55

## 2023-10-23 RX ADMIN — HYDROCORTISONE SODIUM SUCCINATE 50 MG: 100 INJECTION, POWDER, FOR SOLUTION INTRAMUSCULAR; INTRAVENOUS at 18:15

## 2023-10-23 RX ADMIN — LANSOPRAZOLE 30 MG: 30 TABLET, ORALLY DISINTEGRATING, DELAYED RELEASE ORAL at 07:57

## 2023-10-23 RX ADMIN — IPRATROPIUM BROMIDE AND ALBUTEROL SULFATE 3 ML: 2.5; .5 SOLUTION RESPIRATORY (INHALATION) at 06:33

## 2023-10-23 RX ADMIN — POTASSIUM BICARBONATE 25 MEQ: 978 TABLET, EFFERVESCENT ORAL at 12:54

## 2023-10-23 RX ADMIN — APIXABAN 5 MG: 5 TABLET, FILM COATED ORAL at 07:57

## 2023-10-23 RX ADMIN — IPRATROPIUM BROMIDE AND ALBUTEROL SULFATE 3 ML: 2.5; .5 SOLUTION RESPIRATORY (INHALATION) at 13:32

## 2023-10-23 ASSESSMENT — PAIN DESCRIPTION - PAIN TYPE
TYPE: ACUTE PAIN

## 2023-10-23 ASSESSMENT — COGNITIVE AND FUNCTIONAL STATUS - GENERAL
CLIMB 3 TO 5 STEPS WITH RAILING: TOTAL
STANDING UP FROM CHAIR USING ARMS: A LOT
MOVING FROM LYING ON BACK TO SITTING ON SIDE OF FLAT BED: A LOT
WALKING IN HOSPITAL ROOM: A LOT
SUGGESTED CMS G CODE MODIFIER MOBILITY: CL
TURNING FROM BACK TO SIDE WHILE IN FLAT BAD: A LOT
MOVING TO AND FROM BED TO CHAIR: A LOT
MOBILITY SCORE: 11

## 2023-10-23 ASSESSMENT — GAIT ASSESSMENTS: GAIT LEVEL OF ASSIST: UNABLE TO PARTICIPATE

## 2023-10-23 NOTE — WOUND TEAM
Wound consult placed on 10/23/23 regarding sacrococcygeal/buttock area. Area was just evaluated on Thursday 10/19/23. Wound team has pt on follow up for Tuesday 10/24/23. Chart and images reviewed. Miconazole CAMILA added to orders. Wound consult completed at this time.

## 2023-10-23 NOTE — PROGRESS NOTES
"Pulmonary Progress Note    Date of Admission: 10/9/23  Date of Consult: 10/22/23  Consulting: Dr. Young Colin  Reason for consult: AMS/hypoxic respiratory failure     Chief Complaint:  Chief Complaint   Patient presents with    T-5000 GLF     Pt found down.  LKW 2130 on 10/8     HPI:   From Dr. Dominguez's addendum: \"83 y.o. M, PMH T2DM, admitted in transfer from [place] 10/9/2023 after being found down, LK W 2 days prior, AF with RVR, hypernatremia, uremia and rhabdomyolysis.  Transferred to ICU 10/11, attempted cardioversion, intubation, fluid resuscitation.\"    Pulmonary consulted to help with HFNC management. Has not seen [facility] pulmonary in the past. No PFTs. No inhalers on intake medication reconciliation.     Hospital course:  10/11 - transfer to ICU, intubated, cardioversion, aggressive fluid resuscitation  10/12 - VD #2, transition to oral amiodarone, free water flushes 300 mL q4h, free water deficit -5.1 L  10/13 - VD #3, Increase free water flushes  10/14 - Extubated   10/15 - FEES, thick mucus, resp distress and re-intubated; bronch with purulent lingula secretions, BAL many WBCs, on Unasyn  10/16 - VD #2, extubated again; HFNC -> 3lpm, NPO, TF, aspiration precautions, stop 1/2 NS, decrease free water  10/17 - Extubated yesterday; somnolent but arouses follows commands; inc free water; SLP  10/18 -fluctuating mental status, high risk for deterioration, still have not found any family, 5 LPM oxygen, likely some ongoing aspiration  10/19 - more awake and oriented today; DNR/I, failed slp  10/20: On empiric abx, improving encephalopathy. Palliative consulted and recommending DNR/DNI and possible hospice.   10/21: Downgraded to IMCU. Dr. Colin spoke to patient's daughter. She indicated the patient would want to be full code.   10/22: CXR with complete opacification of left lung. A&Ox3. CPT, Mucomyst, Antibiotics, and steroids ordered.     24h events: Marked improvement in CXR. Weaned from high flow to 4-6 " "lpm nasal cannula.   Imaging: CXR  Notable lab trends: Na 150  Tmax: afebrile   ProCal: .18 on 10/17  Antibiotics: Received abx from 10/9-10/12. Ancef for MSSA PNA 10/22-   Cultures: Sputum culture MSSA from 10/15.     Allergies: Nicotine    Scheduled Medications   Medication Dose Frequency    miconazole   BID    ceFAZolin  2 g Q8HRS    acetylcysteine  3 mL Q4HRS    ipratropium-albuterol  3 mL Q4HRS (RT)    hydrocortisone sodium succinate PF  50 mg Q6HRS    amiodarone  200 mg DAILY    apixaban  5 mg BID    lansoprazole  30 mg DAILY    insulin lispro  3-14 Units Q6HRS    nystatin   BID    [Held by provider] insulin GLARGINE  10 Units Q EVENING    MD Alert...Adult ICU Electrolyte Replacement per Pharmacy   PHARMACY TO DOSE       ROS: A 12 point ROS was performed on intake and during my interview. ROS negative unless specifically noted in HPI.     Vitals:  BP (!) 153/69   Pulse 72   Temp 36.5 °C (97.7 °F) (Temporal)   Resp (!) 26   Ht 1.803 m (5' 11\")   Wt 106 kg (232 lb 12.9 oz)   SpO2 98%     Physical Exam:  Physical Exam  Vitals reviewed.   Constitutional:       General: He is not in acute distress.     Appearance: He is normal weight. He is not ill-appearing, toxic-appearing or diaphoretic.   HENT:      Mouth/Throat:      Pharynx: Oropharynx is clear.   Eyes:      General:         Right eye: No discharge.         Left eye: No discharge.      Conjunctiva/sclera: Conjunctivae normal.   Cardiovascular:      Rate and Rhythm: Normal rate.   Pulmonary:      Effort: Pulmonary effort is normal.      Breath sounds: Rhonchi present.   Musculoskeletal:      Right lower leg: No edema.      Left lower leg: No edema.   Skin:     General: Skin is warm.      Capillary Refill: Capillary refill takes less than 2 seconds.      Coloration: Skin is not jaundiced.   Neurological:      General: No focal deficit present.      Mental Status: He is alert and oriented to person, place, and time.   Psychiatric:         Mood and Affect: " Mood normal.         Behavior: Behavior normal.         Laboratory Data: I personally reviewed labs including historical lab trends.     Immunization History   Administered Date(s) Administered    Influenza Vaccine Adult HD 01/14/2015    Pneumococcal polysaccharide vaccine (PPSV-23) 01/14/2015       PFTs as reviewed by me personally show: none for review     Imaging as reviewed by me personally show:  marked improvement in left lung aeration       Assessment/Plan:    Pulmonary problem list:  #Acute hypoxic respiratory failure secondary to pneumonia  #Left lung atelectasis - resolved   #MSSA PNA     Plan/recommendations:  -Ancef ordered 10/22. Patient's positive resp cultures are dated after his abx and he has a very abnormal CXR. Recommend a full 7 day course of MSSA treatment (stop date: after 10/28 dose)  -Solucortef 50 mg q6h ordered for severe CAP (Darnell et al, N Engl J Med 2023.) - continue for now at this dose.   -Duonebs scheduled and PRN  -Continue Mucomyst with CPT for airway clearance  -Target pulse oximetry 88-90%    Total consult time: 50 minutes which included time spent on chart review, personally reviewing pertinent images and labs, time spent counseling and educating the patient and/or family members, and coordinating care with the healthcare team to include consultants.   __________    [id number]-  50 min     Vaughn Navarro DO  Staff Pulmonologist and Intensivist  [facility]     Please note that this dictation was created using voice recognition software. The accuracy of the dictation is limited to the abilities of the software. I have made every reasonable attempt to correct obvious errors, but I expect that there are errors of grammar and possibly content that I did not discover before finalizing the note.

## 2023-10-23 NOTE — CARE PLAN
The patient is Watcher - Medium risk of patient condition declining or worsening    Shift Goals  Clinical Goals: lower oxygen demand, reorientation  Patient Goals: get out of restraints  Family Goals: updates    Progress made toward(s) clinical / shift goals:    Problem: Respiratory  Goal: Patient will achieve/maintain optimum respiratory ventilation and gas exchange  Outcome: Progressing       Patient is not progressing towards the following goals:      Problem: Skin Integrity  Goal: Skin integrity is maintained or improved  Outcome: Not Progressing  Note: Following all wound protocols and Q2 turns.      Problem: Safety - Medical Restraint  Goal: Remains free of injury from restraints (Restraint for Interference with Medical Device)  Outcome: Not Progressing  Flowsheets (Taken 10/23/2023 1320)  Addressed this shift: Remains free of injury from restraints (restraint for interference with medical device):   Determine that other, less restrictive measures have been tried or would not be effective before applying the restraint   Evaluate the patient's condition at the time of restraint application   Inform patient/family regarding the reason for restraint   Every 2 hours: Monitor safety, psychosocial status, comfort, nutrition and hydration

## 2023-10-23 NOTE — PROGRESS NOTES
"Inpatient Palliative Care     Location: [facility]     HPI:   Vick is seen lying in bed.  Updates appreciated from primary nurses.  He is intermittently appropriate, alert, oriented x3.  He has periods of confusion.  He is currently on tube feeds via iris catheter and nasal cannula.  He remains on IMC.     Summary:  Spoke with Connie Judd, eldest daughter via telephone for over 30 minutes.  Connie states that she is in phone contact with her dad 2-3 times per year last having talked to him in July.  Per her understanding and history with her dad she states that he would not want to be a full code that he would not want \"interventions to save his life.\"  She states that on multiple occasions he has told her he wanted to be a DNR.  In addition she reports that his mental status has always been difficult to obtain as he is bipolar and prone to fits of rage.  She admits her family is disconnected and partially because her dad has kept some of them at arms length and also has not kept them in touch with 1 another.  He has been  multiple times and has children from these marriages.  He had a total of 6 children 1 is .  She states that the children go in the following order Connie, Rodrigo or Ilia, Will or Garland, Nolvia, and Mir.    Con[facility].  She reports she received a phone call from her dad requesting money for hotel as he had checked out of skilled nursing facility and was looking for a place to stay until he could get back to [place].  She did not know what happened to him after that and they did not speak of it again.  She states that Yuriy, who was previously listed on his facesheet as next of kin, is her first cousin and resides next-door to patient patient resides in a trailer in [place] and is often paranoid and has had fits of rage against his family members.  Yuriy does, however, agree that " patient would not want full resuscitative efforts on his behalf.  Patient is the last member of his family to remain living, all of his siblings and his parents are since .    Connie is fairly knowledgeable regarding her dad's current condition.  We explored scenario if patient was made DNR/DNI if this would affect his immediate health.  I did share with her that he seems stable today with waxing and waning mental status and feeding tube replaced.  He is on nasal cannula oxygen and seems to be fairly stable at this time.  She is concerned regarding his discharge plan and reports that he cannot go home to his trailer as this is not safe.  She assures that she will contact her brother who is here, Rodrigo, to discuss patient's current status and meeting with Dr. Colin.  She will contact either myself or primary team for further discussion of CODE STATUS and information as needed.     Active listening, reflection, reminiscing, validation & normalization, and empathic support utilized throughout this encounter.  All questions answered and contact information provided, encouraged to call with any questions or concerns.      Plan:     1) contact information has been updated to reflect patient's 2 eldest children as primary contacts  2) palliative care will continue to follow along provide support as needed  3) patient remains full code pending family decisions.     Thank you for allowing me the opportunity to participate in the care of Vick.     I spent a total of 62 minutes reviewing medical records, direct face-to-face time with the patient and/or family, coordination of care, and documentation. This is separate from the time spent on advance care planning, which is documented above.     Mackenzie Parekh, MSN, APRN, ACNPC-AG.  Palliative Care Nurse Practitioner  [phone]

## 2023-10-23 NOTE — PROGRESS NOTES
This RN not covering this patient today, however RN received call for Vick Marrufo from his daughter Connie Judd. [phone]. Connie states she is the eldest daughter and states she is the executor of his estate. She states Vick would want to be DNR/DNI and transition to comfort measures only. She is upset that his code status was changed and she is confused as to how that was accomplished/by whom. Palliative care RN Mackenzie Parekh updated with contact information and states she will follow up with primary team and family.

## 2023-10-23 NOTE — PROGRESS NOTES
Iris Placement    Tube Team verified patient name and medical record number prior to tube placement. Iris feeding tube (43 inches, 10 Romanian) placed at 85cm in left nare. Per Iris picture, tube appears to be in the small bowel.    Nursing Instructions: Await KUB to confirm placement before use for medications or feeding. Stylet, may be removed, please place in labeled bag with insufflation bulb and save in patient medication drawer.

## 2023-10-23 NOTE — THERAPY
Physical Therapy   Daily Treatment     Patient Name: Perry Marrufo  Age:  83 y.o., Sex:  male  Medical Record #: [id number]  Today's Date: 10/23/2023     Precautions  Precautions: Fall Risk;Swallow Precautions;Nasogastric Tube;Other (See Comments)  Comments: Seizure precautions    Assessment    Patient seen for PT treatment session. In bed, agreeable & motivated to participate. Patient able to perform sit-stand with FWW and Min A X 2 for safety concerns. Will continue to benefit from PT services and recommend post acute placement.     Plan    Treatment Plan Status: Continue Current Treatment Plan  Type of Treatment: Bed Mobility, Gait Training, Neuro Re-Education / Balance, Therapeutic Activities, Therapeutic Exercise  Treatment Frequency: 4 Times per Week (Increased frequency since patient is alert, following directions and motivated)  Treatment Duration: Until Therapy Goals Met    DC Equipment Recommendations: Unable to determine at this time  Discharge Recommendations: Recommend post-acute placement for additional physical therapy services prior to discharge home    Objective       10/23/23 1215   Charge Group   Charges  Yes   PT Therapeutic Activities (Units) 2   Total Time Spent   PT Total Time Yes   PT Therapeutic Activities Time Spent (Mins) 24   PT Total Time Spent (Calculated) 24   Precautions   Precautions Fall Risk;Swallow Precautions;Nasogastric Tube;Other (See Comments)   Comments Seizure precautions   Vitals   Pulse 84   Patient BP Position Supine  (Post activity)   Blood Pressure  119/59   Pulse Oximetry 95 %   O2 (LPM) 6   O2 Delivery Device Nasal Cannula   Vitals Comments Patient able to maintain his SpO2 > 90 during mobility.   Pain   Pain Scales 0 to 10 Scale    Intervention Declines   Cognition    Level of Consciousness Alert   Ability To Follow Commands 2 Step   Comments Pleasant & co-operative   Neuro-Muscular Treatments   Neuro-Muscular Treatments Anterior weight shift;Postural  Facilitation;Verbal Cuing;Weight Shift Right;Weight Shift Left   Comments Seated EOB   Balance   Sitting Balance (Static) Fair -   Sitting Balance (Dynamic) Fair -   Standing Balance (Static) Fair -   Standing Balance (Dynamic) Poor +   Skilled Intervention Verbal Cuing;Sequencing;Postural Facilitation   Comments Seated EOB without support; Standing EOB-with FWW   Bed Mobility    Supine to Sit Maximal Assist   Sit to Supine Moderate Assist   Scooting Contact Guard Assist   Rolling Minimal Assist to Rt.;Minimum Assist to Lt.   Skilled Intervention Verbal Cuing;Sequencing   Comments HOB raised, use of bed rail   Gait Analysis   Gait Level Of Assist Unable to Participate   Comments Due to LE weakness, limited activity tolerance   Functional Mobility   Sit to Stand Minimal Assist  (x2 person assist for safety)   Mobility Standing EOB, with FWW; performed x 2 times   Skilled Intervention Postural Facilitation;Verbal Cuing   Comments Patient tolerated standing EOB, while assisted with change of sheets/linen in the bed   How much difficulty does the patient currently have...   Turning over in bed (including adjusting bedclothes, sheets and blankets)? 2   Sitting down on and standing up from a chair with arms (e.g., wheelchair, bedside commode, etc.) 2   Moving from lying on back to sitting on the side of the bed? 2   How much help from another person does the patient currently need...   Moving to and from a bed to a chair (including a wheelchair)? 2   Need to walk in a hospital room? 2   Climbing 3-5 steps with a railing? 1   6 clicks Mobility Score 11   Activity Tolerance   Sitting Edge of Bed <20 minutes   Standing < 20 seconds, x 2 times   Patient / Family Goals    Patient / Family Goal #1 To get better   Short Term Goals    Short Term Goal # 1 Patient will perform supine-sit with supervision in 6 visits   Goal Outcome # 1 Progressing slower than expected   Short Term Goal # 2 Patient will perform sit-stand & chair  transfers with FWW with supervision in 6 visits   Goal Outcome # 2 Progressing slower than expected   Short Term Goal # 3 Patient will ambulate > 25 feet with FWW with supervision in 6 visits   Physical Therapy Treatment Plan   Physical Therapy Treatment Plan Continue Current Treatment Plan   Treatment Plan  Bed Mobility;Gait Training;Neuro Re-Education / Balance;Therapeutic Activities;Therapeutic Exercise   Treatment Frequency 4 Times per Week  (Increased frequency since patient is alert, following directions and motivated)   Duration Until Therapy Goals Met   Anticipated Discharge Equipment and Recommendations   DC Equipment Recommendations Unable to determine at this time   Discharge Recommendations Recommend post-acute placement for additional physical therapy services prior to discharge home   Interdisciplinary Plan of Care Collaboration   IDT Collaboration with  Nursing;Family / Caregiver   Patient Position at End of Therapy In Bed;Bed Alarm On;Call Light within Reach;Wrist Restraints Applied;Phone within Reach;Family / Friend in Room  (Heel protectors in place, SCDs in place)   Session Information   Date / Session Number  10/23-2(2/4, 10/24)
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
## 2023-10-23 NOTE — CARE PLAN
The patient is Watcher - Medium risk of patient condition declining or worsening    Shift Goals  Clinical Goals: wean oxygen down  Patient Goals: rest  Family Goals: BA    Progress made toward(s) clinical / shift goals:  Patient remains free of pain. Paitent hemodynamics are remaining stable. Patient is having adequate urine output. Patient remains free of any falls. Patient understands the reasons behind him needing continued restraints.     Problem: Pain - Standard  Goal: Alleviation of pain or a reduction in pain to the patient’s comfort goal  Outcome: Progressing     Problem: Hemodynamics  Goal: Patient's hemodynamics, fluid balance and neurologic status will be stable or improve  Outcome: Progressing     Problem: Fluid Volume  Goal: Fluid volume balance will be maintained  Outcome: Progressing     Problem: Urinary - Renal Perfusion  Goal: Ability to achieve and maintain adequate renal perfusion and functioning will improve  Outcome: Progressing     Problem: Respiratory  Goal: Patient will achieve/maintain optimum respiratory ventilation and gas exchange  Outcome: Progressing     Problem: Physical Regulation  Goal: Diagnostic test results will improve  Outcome: Progressing     Problem: Fall Risk  Goal: Patient will remain free from falls  Outcome: Progressing     Problem: Safety - Medical Restraint  Goal: Remains free of injury from restraints (Restraint for Interference with Medical Device)  Outcome: Progressing       Patient is not progressing towards the following goals: patient is showing no signs of education being retained. Patients skin injuries continue to get worse with time. Patient is not compliant  with the turns and staying on the wedges. Paitent remains in restraints because of pulling on tubes and wires.       Problem: Knowledge Deficit - Standard  Goal: Patient and family/care givers will demonstrate understanding of plan of care, disease process/condition, diagnostic tests and  medications  Outcome: Not Progressing     Problem: Skin Integrity  Goal: Skin integrity is maintained or improved  Outcome: Not Progressing     Problem: Safety - Medical Restraint  Goal: Free from restraint(s) (Restraint for Interference with Medical Device)  Outcome: Not Progressing

## 2023-10-23 NOTE — PROGRESS NOTES
"Hospital Medicine Daily Progress Note    Date of Service  10/23/2023    Chief Complaint  Found down    Hospital Course  Perry Marrufo is an 83 y.o. male w/ hx of DMII, prostate disease.  He presented 10/9/2023 with transfer from [place] after being found down and last known at mental baseline 2 days prior.  He was found to have hypernatremia, atrial fibrillation with a rapid ventricular rate, rhabdomyolysis.  He was intubated, cardioversion attempted and transferred to ICU 10/11 he was extubated 10/17.     Interval Problem Update  10/23: Na:150. Still quite confused. Nurse states he coughed up much mucous yesterday.  Now down to 6L via NC.      10/22: Iris feeding tube placed last night. Feeding started.  Worse left lung opacification on CXR.  I have ordered and discussed pulmonary consult.  Check ABG for possible bipap.  I discussed with daughter and updated her last night, she states he is a FULL code.  Na:147.  He is more alert today.  On HFNC:30LPM 50% FiO2. States he is very thirsty and has sponge swabs that nursing has been giving and watch for aspiration.    10/21: On HFNC 30LPM and 90% FiO2 with improved SpO2:100%. Still confused to place, year. Per RN no contact with his children and we are trying to get in touch with family.  Only neighbors arrived yesterday per RN. Patient willing to have NG tube placed for nutrition and medications per RN.    10/20: He has failed FEEs study.  He does not know he is in [place], \"I'm in [facility]\" and doesn't know he is in a hospital.  He was made DNR/DNI previously.  He state he is \"done\" and keeps pulling off his HFNC.  States being  and has several children.  I have asked case management to reach out find his children.  Nurse left a message on the one jessica phone.    I have discussed this patient's plan of care and discharge plan at IDT rounds today with Case Management, Nursing, Nursing leadership, and other members of the IDT " team.    Consultants/Specialty  cardiology, nephrology, and palliative care    Code Status  Full Code    Disposition  The patient is not medically cleared for discharge to home or a post-acute facility.  Anticipate discharge to: home with close outpatient follow-up    I have placed the appropriate orders for post-discharge needs.    Review of Systems  Review of Systems   Unable to perform ROS: Dementia        Physical Exam  Temp:  [36.7 °C (98.1 °F)] 36.7 °C (98.1 °F)  Pulse:  [68-80] 74  Resp:  [20-54] 21  BP: ()/(50-77) 159/77  SpO2:  [90 %-100 %] 94 %    Physical Exam  Vitals reviewed.   Constitutional:       General: He is awake.      Appearance: He is obese. He is ill-appearing. He is not diaphoretic.   HENT:      Head: Normocephalic and atraumatic.      Mouth/Throat:      Mouth: Mucous membranes are dry.      Pharynx: No oropharyngeal exudate.      Comments: Dry skin on lips  Eyes:      General: No scleral icterus.        Right eye: No discharge.         Left eye: No discharge.      Extraocular Movements: Extraocular movements intact.      Conjunctiva/sclera: Conjunctivae normal.   Cardiovascular:      Rate and Rhythm: Normal rate and regular rhythm.      Pulses:           Radial pulses are 2+ on the right side and 2+ on the left side.        Dorsalis pedis pulses are 2+ on the right side and 2+ on the left side.      Heart sounds: No murmur heard.  Pulmonary:      Effort: Pulmonary effort is normal. No respiratory distress.      Breath sounds: Examination of the left-middle field reveals decreased breath sounds. Examination of the right-lower field reveals decreased breath sounds. Examination of the left-lower field reveals decreased breath sounds. Decreased breath sounds present. No wheezing or rales.   Abdominal:      General: Bowel sounds are normal. There is no distension.      Palpations: Abdomen is soft.      Tenderness: There is no abdominal tenderness. There is no rebound.   Genitourinary:      Comments: Penile edema  Musculoskeletal:         General: No swelling or tenderness.      Cervical back: Neck supple. No muscular tenderness.      Right lower leg: Edema present.      Left lower leg: Edema present.   Skin:     Coloration: Skin is not jaundiced or pale.      Findings: Rash (left groin, skin fold) present.   Neurological:      Mental Status: He is disoriented.      Cranial Nerves: No cranial nerve deficit.      Motor: Weakness present.   Psychiatric:         Attention and Perception: Attention normal.         Behavior: Behavior is slowed.         Cognition and Memory: Cognition is impaired.         Fluids    Intake/Output Summary (Last 24 hours) at 10/23/2023 1041  Last data filed at 10/23/2023 0727  Gross per 24 hour   Intake 150 ml   Output 250 ml   Net -100 ml       Laboratory  Recent Labs     10/21/23  0317 10/22/23  0340 10/23/23  0415   WBC 10.7 8.5 7.8   RBC 2.99* 2.91* 2.98*   HEMOGLOBIN 9.1* 8.7* 8.8*   HEMATOCRIT 28.5* 28.1* 28.5*   MCV 95.3 96.6 95.6   MCH 30.4 29.9 29.5   MCHC 31.9* 31.0* 30.9*   RDW 50.2* 51.3* 49.9   PLATELETCT 237 297 306   MPV 10.7 10.3 10.3     Recent Labs     10/21/23  0317 10/22/23  0340 10/23/23  0415   SODIUM 145 147* 150*   POTASSIUM 3.8 3.9 3.7   CHLORIDE 110 112 114*   CO2 28 29 30   GLUCOSE 106* 140* 235*   BUN 23* 24* 27*   CREATININE 0.99 0.86 0.85   CALCIUM 7.6* 7.6* 7.9*                   Imaging  DX-ABDOMEN FOR TUBE PLACEMENT   Final Result         1.  Nonspecific bowel gas pattern in the upper abdomen.   2.  Dobbhoff tube tip overlying the expected location of the pylorus or first duodenal segment.   3.  Hazy bilateral lower lobe infiltrates   4.  Small layering left pleural effusion.      DX-CHEST-PORTABLE (1 VIEW)   Final Result         1.  Decreased opacification the left thorax, likely decreased effusion.   2.  Hazy bilateral pulmonary infiltrates   3.  Cardiomegaly   4.  Right rib fractures      DX-ABDOMEN FOR TUBE PLACEMENT   Final Result          Feeding tube with tip projecting over the expected area of the third portion duodenum.      DX-CHEST-LIMITED (1 VIEW)   Final Result      Interval replacement of gastric drainage tube with feeding tube   Complete opacification of the left lung, similar to prior      DX-ABDOMEN FOR TUBE PLACEMENT   Final Result      Enteric tube has been placed and the tip projects over the gastroduodenal junction      DX-ABDOMEN FOR TUBE PLACEMENT   Final Result         1.  Pulmonary infiltrates, increased on the left vertebral study.   2.  Moderate layering left pleural effusion, increased since prior study.   3.  Atherosclerosis   4.  Right rib fractures      DX-ABDOMEN FOR TUBE PLACEMENT   Final Result         1.  Nonspecific bowel gas pattern in the upper abdomen.   2.  Nasogastric tube terminating just distal to the gastroesophageal junction, recommend advancement.   3.  Hazy left pulmonary infiltrates, stable since prior study.   4.  Moderate layering left pleural effusion, overall stable      DX-ABDOMEN FOR TUBE PLACEMENT   Final Result      1. Appropriate position of the esophagogastric tube.   2. Worsening left lower lobe parenchymal consolidation and interval increase in size of the left pleural effusion.   3. The remainder is stable.      DX-CHEST-PORTABLE (1 VIEW)   Final Result         1.  Bilateral lower lobe infiltrates, stable since prior study.   2.  Trace bilateral pleural effusions   3.  Atherosclerosis   4.  Right rib fractures      DX-ABDOMEN FOR TUBE PLACEMENT   Final Result      1.  Enteric tube tip overlies the gastric antrum.      GA-PSCVSQI-2 VIEW   Final Result      Nonobstructive bowel gas pattern.      DX-ABDOMEN FOR TUBE PLACEMENT   Final Result         1.  Nonspecific bowel gas pattern in the upper abdomen.   2.  Nasogastric tube tip terminates overlying the expected location of the pylorus or first duodenal segment.   3.  Bilateral lower lobe opacities concerning for infiltrate.       DX-CHEST-PORTABLE (1 VIEW)   Final Result         1.  Bilateral lower lobe infiltrates, stable since prior study.   2.  Atherosclerosis      DX-CHEST-PORTABLE (1 VIEW)   Final Result         1.  Bilateral lower lobe infiltrates, decreased since prior study.      DX-CHEST-PORTABLE (1 VIEW)   Final Result      1.  Supportive tubing as described above.   2.  Slight increased inflation.   3.  No other significant change from prior exam.         DX-CHEST-LIMITED (1 VIEW)   Final Result      1.  Ill-defined bibasilar pulmonary opacities, slightly worse on the left compared to prior study. Pneumonia is in the differential.   2.  Small left pleural effusion, new since prior.      DX-ABDOMEN FOR TUBE PLACEMENT   Final Result      Nasogastric tube tip overlies the mid stomach      DX-ABDOMEN FOR TUBE PLACEMENT   Final Result      NG tube turns in the gastric body with tip at the level of the gastric fundus.      EC-ECHOCARDIOGRAM LTD W/O CONT   Final Result      DX-CHEST-PORTABLE (1 VIEW)   Final Result      1.  Mild interstitial pulmonary edema, similar to prior   2.  Bibasilar and perihilar underinflation atelectasis which could obscure an additional process. This is unchanged.      DX-CHEST-PORTABLE (1 VIEW)   Final Result      1.  Mild interstitial pulmonary edema   2.  Bibasilar and perihilar underinflation atelectasis which could obscure an additional process.   3.  Small LEFT pleural effusion      DX-CHEST-LIMITED (1 VIEW)   Final Result      1.  Bilateral basilar atelectasis and/or consolidation. Underlying infection is possible.   2.  Stable enlargement of the cardiomediastinal silhouette.   3.  Interval insertion of a central venous catheter which terminates with the tip projecting over the expected region of the mid superior vena cava.   4.  Interval placement of an endotracheal tube which terminates in satisfactory position at the level of the aortic arch.   5.  Interval placement of an enteric feeding tube  which terminates in the left upper quadrant projecting over the expected location of the stomach.      DX-CHEST-PORTABLE (1 VIEW)   Final Result      No significant change from prior exam.      DX-CHEST-PORTABLE (1 VIEW)   Final Result      Bibasilar underinflation atelectasis. Superimposed pneumonia not excluded.      US-RUQ   Final Result      1.  Gallbladder sludge   2.  RIGHT pleural effusion      EC-ECHOCARDIOGRAM COMPLETE W/ CONT   Final Result      CT-CSPINE WITHOUT PLUS RECONS   Final Result         1. No acute fracture from C1 through T1 is visualized.         CT-HEAD W/O   Final Result         1. No acute intracranial abnormality. No evidence of acute intracranial hemorrhage or mass lesion.                     DX-CHEST-PORTABLE (1 VIEW)   Final Result      1.  Low lung volumes without definite acute cardiopulmonary abnormality.           Assessment/Plan  * Acute respiratory failure with hypoxia (HCC)- (present on admission)  Assessment & Plan  Question of aspiration  Extubated 10/17  DNI now per prior MD discussions  Pulmonary hygiene  Aspiration precautions  I have consulted pulmonary 10/22 and appreciate input  10/23 CXR showed improved left opacifaction. Nurse states patient cough up ~300cc of mucous yesterday.  10/22 ABG ordered and reviewed CXR. Probable mucous plug  10/21 Improved O2 sats on HFNC and more compliant.  Willing to have NG tube replaced  Wean O2 as able  SLP following, appreciate recommendations  Transfer to IM  Suggest palliative reach out to family after they request FULL code status.    Decubitus skin ulcer  Assessment & Plan  Wound care  Frequent turning    Anemia  Assessment & Plan  10/22/23 Hgb:8.7 <9.1<8.3 <9<9.8  Monitor cbc    Shock (HCC)  Assessment & Plan  Undifferentiated - cardiac due to arrhythmia vs hypovolemia  Shock resolved  Monitor vitals.    Aspiration precautions  Assessment & Plan  Increasing oxygen requirement and chest x-ray showed infiltration possible  pneumonia  Failed 10/20 FEEs  Does not want Enteral tube back in  Aspiration precautions.  Continue monitor closely.    Rhabdomyolysis- (present on admission)  Assessment & Plan  Continue IVF resuscitation, monitor CPK  CPK significant trended down.  Wean IV fluids    Atrial fibrillation with RVR (HCC)- (present on admission)  Assessment & Plan  No known history of Afib, RVR with hypotension attempted electrical cardioversion 10/11 without success, put on amio GTT  10/22 on oral amiodarone  Echo EF 65%, no valvular abnormalities  Rate Controlled (daughter states allergies to BP meds).  [facility]  Continuous tele monitoring  Anticoagulation with Eliquis.  If decision to continue care he will need lovenox or heparin until Enteral tube or able to swallow.  Rhythm control with amiodarone, continue loading  Optimize electrolytes     Dehydration- (present on admission)  Assessment & Plan  Continue fluid resuscitation intravenously and enterally for now  Monitor UOP closely    Erythrocytosis- (present on admission)  Assessment & Plan  Likely d/t hemoconcentration    Hypernatremia- (present on admission)  Assessment & Plan  Hypovolemic hypernatremia  10/23 Na:150 <147  Free water via NG  Monitor 10/24 bmp    ADELAIDE (acute kidney injury) ([facility])- (present on admission)  Assessment & Plan  Improved with fluids  Monitoring bmp, I/O's    Altered mental status- (present on admission)  Assessment & Plan  Per prior notes improving  Still confused to place, city, year.  10/21pm talked to daughter, Nolvia, and she states her dad has never wanted to be DNR and we made him FULL code for now.  I alerted Nolvia that her father was confused and very ill and that family should come see their father to help make in goals of care  10/23 watch mentation with recent start on steroids. He is still confused    DM2 (diabetes mellitus, type 2) ([facility])- (present on admission)  Assessment & Plan  Glargine 10units q HS  Monitor  accuchecks and cover with SSI  Hypoglycemic protocol  A1c 8 in past week  10/20 hold glargine as pulled out NG tube and failed FEEs      Sepsis (HCC)- (present on admission)  Assessment & Plan  No clear etiology of sepsis  But question of aspiration    Diabetes mellitus-Type 2, not on treatment  Assessment & Plan          BPH (benign prostatic hyperplasia)- (present on admission)  Assessment & Plan  HOlding tamsulosin. Daughter states allergy to an unknown BP med.  Await records that I have requested         VTE prophylaxis:    therapeutic anticoagulation with eliquis 5 mg BID      I have performed a physical exam and reviewed and updated ROS and Plan today (10/23/2023). In review of yesterday's note (10/22/2023), there are no changes except as documented above.

## 2023-10-24 ENCOUNTER — APPOINTMENT (OUTPATIENT)
Dept: RADIOLOGY | Facility: MEDICAL CENTER | Age: 83
DRG: 871 | End: 2023-10-24
Attending: HOSPITALIST
Payer: COMMERCIAL

## 2023-10-24 LAB
ANION GAP SERPL CALC-SCNC: 7 MMOL/L (ref 7–16)
BUN SERPL-MCNC: 26 MG/DL (ref 8–22)
CALCIUM SERPL-MCNC: 8 MG/DL (ref 8.5–10.5)
CHLORIDE SERPL-SCNC: 117 MMOL/L (ref 96–112)
CO2 SERPL-SCNC: 28 MMOL/L (ref 20–33)
CREAT SERPL-MCNC: 0.76 MG/DL (ref 0.5–1.4)
ERYTHROCYTE [DISTWIDTH] IN BLOOD BY AUTOMATED COUNT: 48.5 FL (ref 35.9–50)
GFR SERPLBLD CREATININE-BSD FMLA CKD-EPI: 90 ML/MIN/1.73 M 2
GLUCOSE BLD STRIP.AUTO-MCNC: 226 MG/DL (ref 65–99)
GLUCOSE BLD STRIP.AUTO-MCNC: 235 MG/DL (ref 65–99)
GLUCOSE BLD STRIP.AUTO-MCNC: 257 MG/DL (ref 65–99)
GLUCOSE BLD STRIP.AUTO-MCNC: 287 MG/DL (ref 65–99)
GLUCOSE BLD STRIP.AUTO-MCNC: 289 MG/DL (ref 65–99)
GLUCOSE SERPL-MCNC: 231 MG/DL (ref 65–99)
HCT VFR BLD AUTO: 28.9 % (ref 42–52)
HGB BLD-MCNC: 9.1 G/DL (ref 14–18)
MCH RBC QN AUTO: 29.5 PG (ref 27–33)
MCHC RBC AUTO-ENTMCNC: 31.5 G/DL (ref 32.3–36.5)
MCV RBC AUTO: 93.8 FL (ref 81.4–97.8)
PLATELET # BLD AUTO: 330 K/UL (ref 164–446)
PMV BLD AUTO: 10.3 FL (ref 9–12.9)
POTASSIUM SERPL-SCNC: 4.1 MMOL/L (ref 3.6–5.5)
RBC # BLD AUTO: 3.08 M/UL (ref 4.7–6.1)
SODIUM SERPL-SCNC: 152 MMOL/L (ref 135–145)
WBC # BLD AUTO: 8.2 K/UL (ref 4.8–10.8)

## 2023-10-24 PROCEDURE — 770020 HCHG ROOM/CARE - TELE (206)

## 2023-10-24 PROCEDURE — 700101 HCHG RX REV CODE 250: Performed by: INTERNAL MEDICINE

## 2023-10-24 PROCEDURE — A9270 NON-COVERED ITEM OR SERVICE: HCPCS | Performed by: NURSE PRACTITIONER

## 2023-10-24 PROCEDURE — 700111 HCHG RX REV CODE 636 W/ 250 OVERRIDE (IP): Performed by: INTERNAL MEDICINE

## 2023-10-24 PROCEDURE — 71045 X-RAY EXAM CHEST 1 VIEW: CPT

## 2023-10-24 PROCEDURE — A9270 NON-COVERED ITEM OR SERVICE: HCPCS | Performed by: HOSPITALIST

## 2023-10-24 PROCEDURE — 700105 HCHG RX REV CODE 258: Performed by: INTERNAL MEDICINE

## 2023-10-24 PROCEDURE — 99233 SBSQ HOSP IP/OBS HIGH 50: CPT | Performed by: INTERNAL MEDICINE

## 2023-10-24 PROCEDURE — 80048 BASIC METABOLIC PNL TOTAL CA: CPT

## 2023-10-24 PROCEDURE — 700102 HCHG RX REV CODE 250 W/ 637 OVERRIDE(OP): Performed by: HOSPITALIST

## 2023-10-24 PROCEDURE — 99233 SBSQ HOSP IP/OBS HIGH 50: CPT | Performed by: HOSPITALIST

## 2023-10-24 PROCEDURE — 82962 GLUCOSE BLOOD TEST: CPT | Mod: 91

## 2023-10-24 PROCEDURE — 92526 ORAL FUNCTION THERAPY: CPT

## 2023-10-24 PROCEDURE — 97167 OT EVAL HIGH COMPLEX 60 MIN: CPT

## 2023-10-24 PROCEDURE — 94640 AIRWAY INHALATION TREATMENT: CPT

## 2023-10-24 PROCEDURE — 700102 HCHG RX REV CODE 250 W/ 637 OVERRIDE(OP): Performed by: NURSE PRACTITIONER

## 2023-10-24 PROCEDURE — 99232 SBSQ HOSP IP/OBS MODERATE 35: CPT | Performed by: NURSE PRACTITIONER

## 2023-10-24 PROCEDURE — 97602 WOUND(S) CARE NON-SELECTIVE: CPT

## 2023-10-24 PROCEDURE — 85027 COMPLETE CBC AUTOMATED: CPT

## 2023-10-24 PROCEDURE — 94669 MECHANICAL CHEST WALL OSCILL: CPT

## 2023-10-24 RX ORDER — TAMSULOSIN HYDROCHLORIDE 0.4 MG/1
0.4 CAPSULE ORAL
Status: DISCONTINUED | OUTPATIENT
Start: 2023-10-24 | End: 2023-10-24

## 2023-10-24 RX ORDER — CHOLECALCIFEROL (VITAMIN D3) 125 MCG
500 CAPSULE ORAL DAILY
Status: DISCONTINUED | OUTPATIENT
Start: 2023-10-24 | End: 2023-10-30

## 2023-10-24 RX ORDER — TRAZODONE HYDROCHLORIDE 100 MG/1
50 TABLET ORAL
Status: DISCONTINUED | OUTPATIENT
Start: 2023-10-24 | End: 2023-10-29

## 2023-10-24 RX ORDER — TRAZODONE HYDROCHLORIDE 50 MG/1
50 TABLET ORAL
Status: DISCONTINUED | OUTPATIENT
Start: 2023-10-24 | End: 2023-10-24

## 2023-10-24 RX ORDER — TERAZOSIN 5 MG/1
5 CAPSULE ORAL EVERY EVENING
Status: DISCONTINUED | OUTPATIENT
Start: 2023-10-24 | End: 2023-10-30 | Stop reason: ALTCHOICE

## 2023-10-24 RX ADMIN — ACETYLCYSTEINE 3 ML: 200 SOLUTION ORAL; RESPIRATORY (INHALATION) at 01:51

## 2023-10-24 RX ADMIN — INSULIN LISPRO 7 UNITS: 100 INJECTION, SOLUTION INTRAVENOUS; SUBCUTANEOUS at 13:03

## 2023-10-24 RX ADMIN — NYSTATIN: 100000 POWDER TOPICAL at 05:50

## 2023-10-24 RX ADMIN — ACETYLCYSTEINE 3 ML: 200 SOLUTION ORAL; RESPIRATORY (INHALATION) at 23:34

## 2023-10-24 RX ADMIN — INSULIN LISPRO 7 UNITS: 100 INJECTION, SOLUTION INTRAVENOUS; SUBCUTANEOUS at 23:26

## 2023-10-24 RX ADMIN — INSULIN LISPRO 4 UNITS: 100 INJECTION, SOLUTION INTRAVENOUS; SUBCUTANEOUS at 01:01

## 2023-10-24 RX ADMIN — ACETYLCYSTEINE 3 ML: 200 SOLUTION ORAL; RESPIRATORY (INHALATION) at 06:12

## 2023-10-24 RX ADMIN — ACETYLCYSTEINE 3 ML: 200 SOLUTION ORAL; RESPIRATORY (INHALATION) at 14:19

## 2023-10-24 RX ADMIN — CEFAZOLIN 2 G: 2 INJECTION, POWDER, FOR SOLUTION INTRAMUSCULAR; INTRAVENOUS at 21:13

## 2023-10-24 RX ADMIN — IPRATROPIUM BROMIDE AND ALBUTEROL SULFATE 3 ML: 2.5; .5 SOLUTION RESPIRATORY (INHALATION) at 14:18

## 2023-10-24 RX ADMIN — ACETYLCYSTEINE 3 ML: 200 SOLUTION ORAL; RESPIRATORY (INHALATION) at 19:57

## 2023-10-24 RX ADMIN — CYANOCOBALAMIN TAB 500 MCG 500 MCG: 500 TAB at 18:35

## 2023-10-24 RX ADMIN — HYDROCORTISONE SODIUM SUCCINATE 50 MG: 100 INJECTION, POWDER, FOR SOLUTION INTRAMUSCULAR; INTRAVENOUS at 05:37

## 2023-10-24 RX ADMIN — ACETYLCYSTEINE 3 ML: 200 SOLUTION ORAL; RESPIRATORY (INHALATION) at 10:15

## 2023-10-24 RX ADMIN — HYDROCORTISONE SODIUM SUCCINATE 50 MG: 100 INJECTION, POWDER, FOR SOLUTION INTRAMUSCULAR; INTRAVENOUS at 23:21

## 2023-10-24 RX ADMIN — IPRATROPIUM BROMIDE AND ALBUTEROL SULFATE 3 ML: 2.5; .5 SOLUTION RESPIRATORY (INHALATION) at 19:57

## 2023-10-24 RX ADMIN — HYDROCORTISONE SODIUM SUCCINATE 50 MG: 100 INJECTION, POWDER, FOR SOLUTION INTRAMUSCULAR; INTRAVENOUS at 13:02

## 2023-10-24 RX ADMIN — CEFAZOLIN 2 G: 2 INJECTION, POWDER, FOR SOLUTION INTRAMUSCULAR; INTRAVENOUS at 13:16

## 2023-10-24 RX ADMIN — AMIODARONE HYDROCHLORIDE 200 MG: 200 TABLET ORAL at 05:37

## 2023-10-24 RX ADMIN — IPRATROPIUM BROMIDE AND ALBUTEROL SULFATE 3 ML: 2.5; .5 SOLUTION RESPIRATORY (INHALATION) at 23:34

## 2023-10-24 RX ADMIN — HYDROCORTISONE SODIUM SUCCINATE 50 MG: 100 INJECTION, POWDER, FOR SOLUTION INTRAMUSCULAR; INTRAVENOUS at 18:31

## 2023-10-24 RX ADMIN — INSULIN LISPRO 4 UNITS: 100 INJECTION, SOLUTION INTRAVENOUS; SUBCUTANEOUS at 06:04

## 2023-10-24 RX ADMIN — IPRATROPIUM BROMIDE AND ALBUTEROL SULFATE 3 ML: 2.5; .5 SOLUTION RESPIRATORY (INHALATION) at 01:51

## 2023-10-24 RX ADMIN — TRAZODONE HYDROCHLORIDE 50 MG: 100 TABLET ORAL at 20:59

## 2023-10-24 RX ADMIN — TERAZOSIN 5 MG: 5 CAPSULE ORAL at 18:35

## 2023-10-24 RX ADMIN — CEFAZOLIN 2 G: 2 INJECTION, POWDER, FOR SOLUTION INTRAMUSCULAR; INTRAVENOUS at 05:41

## 2023-10-24 RX ADMIN — MICONAZOLE NITRATE: 20 CREAM TOPICAL at 20:59

## 2023-10-24 RX ADMIN — IPRATROPIUM BROMIDE AND ALBUTEROL SULFATE 3 ML: 2.5; .5 SOLUTION RESPIRATORY (INHALATION) at 10:26

## 2023-10-24 RX ADMIN — IPRATROPIUM BROMIDE AND ALBUTEROL SULFATE 3 ML: 2.5; .5 SOLUTION RESPIRATORY (INHALATION) at 06:12

## 2023-10-24 RX ADMIN — LANSOPRAZOLE 30 MG: 30 TABLET, ORALLY DISINTEGRATING, DELAYED RELEASE ORAL at 05:37

## 2023-10-24 RX ADMIN — HYDROCORTISONE SODIUM SUCCINATE 50 MG: 100 INJECTION, POWDER, FOR SOLUTION INTRAMUSCULAR; INTRAVENOUS at 00:12

## 2023-10-24 RX ADMIN — APIXABAN 5 MG: 5 TABLET, FILM COATED ORAL at 05:37

## 2023-10-24 RX ADMIN — INSULIN LISPRO 7 UNITS: 100 INJECTION, SOLUTION INTRAVENOUS; SUBCUTANEOUS at 18:00

## 2023-10-24 RX ADMIN — MICONAZOLE NITRATE: 20 CREAM TOPICAL at 08:03

## 2023-10-24 RX ADMIN — CEFAZOLIN 2 G: 2 INJECTION, POWDER, FOR SOLUTION INTRAMUSCULAR; INTRAVENOUS at 00:11

## 2023-10-24 RX ADMIN — APIXABAN 5 MG: 5 TABLET, FILM COATED ORAL at 18:35

## 2023-10-24 ASSESSMENT — COGNITIVE AND FUNCTIONAL STATUS - GENERAL
DAILY ACTIVITIY SCORE: 10
DRESSING REGULAR LOWER BODY CLOTHING: A LOT
PERSONAL GROOMING: A LOT
HELP NEEDED FOR BATHING: A LOT
SUGGESTED CMS G CODE MODIFIER DAILY ACTIVITY: CL
EATING MEALS: TOTAL
TOILETING: TOTAL
DRESSING REGULAR UPPER BODY CLOTHING: A LOT

## 2023-10-24 ASSESSMENT — PAIN DESCRIPTION - PAIN TYPE
TYPE: ACUTE PAIN

## 2023-10-24 ASSESSMENT — GAIT ASSESSMENTS: DISTANCE (FEET): 1.5

## 2023-10-24 ASSESSMENT — ACTIVITIES OF DAILY LIVING (ADL): TOILETING: INDEPENDENT

## 2023-10-24 ASSESSMENT — FIBROSIS 4 INDEX
FIB4 SCORE: 0.97
FIB4 SCORE: 0.97

## 2023-10-24 NOTE — CARE PLAN
The patient is Watcher - Medium risk of patient condition declining or worsening    Shift Goals  Clinical Goals: monitor respiratory status  Patient Goals: rest  Family Goals: BA    Progress made toward(s) clinical / shift goals:    Problem: Hemodynamics  Goal: Patient's hemodynamics, fluid balance and neurologic status will be stable or improve  Outcome: Progressing     Problem: Respiratory  Goal: Patient will achieve/maintain optimum respiratory ventilation and gas exchange  Outcome: Progressing  Flowsheets  Taken 10/24/2023 0800 by Naina Hernandez R.N.  Suction Frequency: Suctioned Once or Twice Per Encounter  Taken 10/24/2023 0622 by Osmani Goodrich, RRT  Incentive Spirometer Volume: 500 mL       Patient is not progressing towards the following goals:      Problem: Knowledge Deficit - Standard  Goal: Patient and family/care givers will demonstrate understanding of plan of care, disease process/condition, diagnostic tests and medications  Outcome: Not Progressing  Note: Patient continues to be confused about his state of health. Education continues to be provided.      Problem: Safety - Medical Restraint  Goal: Remains free of injury from restraints (Restraint for Interference with Medical Device)  Outcome: Not Progressing  Goal: Free from restraint(s) (Restraint for Interference with Medical Device)  Outcome: Not Progressing  Note: Patient is restrained due to attempts to pull out medical lines and devices

## 2023-10-24 NOTE — PROGRESS NOTES
Inpatient Palliative Care     Location: [facility]     HPI:   Vick is seen lying in bed nursing reports he has not slept in 48 hours.  Increasing confusion per bedside RNs.  No other changes.     Summary:  Appreciate patient updates per primary team.  Telephone call received from Connie Judd, patient's eldest daughter this a.m. indicating she has spoken with all of patient's children and they are all in agreement that patient, if he were in his usual state of mind and health, would want to be a DO NOT RESUSCITATE/DO NOT INTUBATE.  She also reports that she would prefer to be primary contact for her dad's care and that her brother Kymberly should be in the second position.  Contact changes are made and reflected in patient's EMR.  In addition, Connie is okay with signing POLST indicating DO NOT RESUSCITATE/DO NOT INTUBATE.  She has provided me her email address as well as home address, I will email a copy of the POLST for her signature to email back to me.  In addition we discussed patient's current clinical status.  At the request that primary nurse call with update.  This information is all conveyed to primary team via Voalte.    For future reference, Connie Judd contact information is as follows:  [address] [place] [address]  Phone number [phone]  Email: [email or web address]      Active listening, reflection, reminiscing, validation & normalization, and empathic support utilized throughout this encounter.  All questions answered and contact information provided, encouraged to call with any questions or concerns.      Plan:     1) CODE STATUS changed to DNR/DNI  2) palliative care to continue to follow  3)        Thank you for allowing me the opportunity to participate in the care of Vick.     I spent a total of 45 minutes reviewing medical records, direct face-to-face time with the patient and/or family, coordination of care, and documentation. This is separate from the time spent on advance  care planning, which is documented above.     Mackenzie Parekh, MSN, APRN, ACN-AG.  Palliative Care Nurse Practitioner  [phone]

## 2023-10-24 NOTE — PROGRESS NOTES
"Pulmonary Progress Note    Date of Admission: 10/9/23  Date of Consult: 10/22/23  Consulting: Dr. Young Colin  Reason for consult: AMS/hypoxic respiratory failure     Chief Complaint:  Chief Complaint   Patient presents with    T-5000 GLF     Pt found down.  LKW 2130 on 10/8     HPI:   From Dr. Dominguez's addendum: \"83 y.o. M, PMH T2DM, admitted in transfer from [place] 10/9/2023 after being found down, LK W 2 days prior, AF with RVR, hypernatremia, uremia and rhabdomyolysis.  Transferred to ICU 10/11, attempted cardioversion, intubation, fluid resuscitation.\"    Pulmonary consulted to help with HFNC management. Has not seen [facility] in the past. No PFTs. No inhalers on intake medication reconciliation.     Hospital course:  10/11 - transfer to ICU, intubated, cardioversion, aggressive fluid resuscitation  10/12 - VD #2, transition to oral amiodarone, free water flushes 300 mL q4h, free water deficit -5.1 L  10/13 - VD #3, Increase free water flushes  10/14 - Extubated   10/15 - FEES, thick mucus, resp distress and re-intubated; bronch with purulent lingula secretions, BAL many WBCs, on Unasyn  10/16 - VD #2, extubated again; HFNC -> 3lpm, NPO, TF, aspiration precautions, stop 1/2 NS, decrease free water  10/17 - Extubated yesterday; somnolent but arouses follows commands; inc free water; SLP  10/18 -fluctuating mental status, high risk for deterioration, still have not found any family, 5 LPM oxygen, likely some ongoing aspiration  10/19 - more awake and oriented today; DNR/I, failed slp  10/20: On empiric abx, improving encephalopathy. Palliative consulted and recommending DNR/DNI and possible hospice.   10/21: Downgraded to IMCU. Dr. Colin spoke to patient's daughter. She indicated the patient would want to be full code.   10/22: CXR with complete opacification of left lung. A&Ox3. CPT, Mucomyst, Antibiotics, and steroids ordered.   10/23: Marked improvement in CXR. Weaned from high flow to 4-6 lpm nasal " "cannula.     24h events: Remains on 4 lpm nasal cannula with oxygen saturations 96-97%.   Imaging: no new imaging today  Notable lab trends: Na 152  Tmax: afebrile   ProCal: .18 on 10/17  Antibiotics: Received abx from 10/9-10/12. Ancef for MSSA PNA 10/22-   Cultures: Sputum culture MSSA from 10/15.     Allergies: Nicotine    Scheduled Medications   Medication Dose Frequency    miconazole   BID    ceFAZolin  2 g Q8HRS    acetylcysteine  3 mL Q4HRS    ipratropium-albuterol  3 mL Q4HRS (RT)    hydrocortisone sodium succinate PF  50 mg Q6HRS    amiodarone  200 mg DAILY    apixaban  5 mg BID    lansoprazole  30 mg DAILY    insulin lispro  3-14 Units Q6HRS    nystatin   BID    [Held by provider] insulin GLARGINE  10 Units Q EVENING    MD Alert...Adult ICU Electrolyte Replacement per Pharmacy   PHARMACY TO DOSE       ROS: A 12 point ROS was performed on intake and during my interview. ROS negative unless specifically noted in HPI.     Vitals:  BP (!) 144/65   Pulse 81   Temp 36.7 °C (98.1 °F) (Bladder)   Resp (!) 24   Ht 1.803 m (5' 11\")   Wt 106 kg (232 lb 12.9 oz)   SpO2 96%     Physical Exam:  Physical Exam  Vitals reviewed.   Constitutional:       General: He is not in acute distress.     Appearance: He is normal weight. He is not ill-appearing, toxic-appearing or diaphoretic.   HENT:      Mouth/Throat:      Pharynx: Oropharynx is clear.   Eyes:      General:         Right eye: No discharge.         Left eye: No discharge.      Conjunctiva/sclera: Conjunctivae normal.   Cardiovascular:      Rate and Rhythm: Normal rate.   Pulmonary:      Effort: Pulmonary effort is normal.      Breath sounds: Rhonchi present.   Musculoskeletal:      Right lower leg: No edema.      Left lower leg: No edema.   Skin:     General: Skin is warm.      Capillary Refill: Capillary refill takes less than 2 seconds.      Coloration: Skin is not jaundiced.   Neurological:      General: No focal deficit present.      Mental Status: He is " alert and oriented to person, place, and time.   Psychiatric:         Mood and Affect: Mood normal.         Behavior: Behavior normal.         Laboratory Data: I personally reviewed labs including historical lab trends.     Immunization History   Administered Date(s) Administered    Influenza Vaccine Adult HD 01/14/2015    Pneumococcal polysaccharide vaccine (PPSV-23) 01/14/2015       PFTs as reviewed by me personally show: none for review     Imaging as reviewed by me personally show:  marked improvement in left lung aeration       Assessment/Plan:    Pulmonary problem list:  #Acute hypoxic respiratory failure secondary to pneumonia  #Left lung atelectasis - resolved   #MSSA PNA     Plan/recommendations:  -Ancef ordered 10/22. Patient's positive resp cultures are dated after his abx and he has a very abnormal CXR. Recommend a full 7 day course of MSSA treatment (stop date: after 10/28 dose)  -Solucortef 50 mg q6h ordered for severe CAP (Darnell et al, N Engl J Med 2023.) - continue for now at this dose. May start weaning 10/25 if continues clinical improvement  -Duonebs scheduled and PRN  -Continue Mucomyst with CPT for airway clearance  -Target pulse oximetry 88-90%  -consider Trazodone or Seroquel while on high dose steroids to help with sleep/sundowning.     Plan of care d/w Dr. Leyva.     Total consult time: 52 minutes which included time spent on chart review, personally reviewing pertinent images and labs, time spent counseling and educating the patient and/or family members, and coordinating care with the healthcare team to include consultants.   __________      Vaughn Navarro,   Staff Pulmonologist and Intensivist  [facility]     Please note that this dictation was created using voice recognition software. The accuracy of the dictation is limited to the abilities of the software. I have made every reasonable attempt to correct obvious errors, but I expect that there are errors of grammar and  possibly content that I did not discover before finalizing the note.

## 2023-10-24 NOTE — THERAPY
"Speech Language Pathology   Daily Treatment     Patient Name: Perry Marrufo  AGE:  83 y.o., SEX:  male  Medical Record #: [id number]  Date of Service: 10/24/2023      Precautions:  Precautions: Fall Risk, Swallow Precautions, Nasogastric Tube         Subjective  Patient seen on this date for dysphagia management with focus on swallowing exercises. Pt cleared by RN for session. Pt did not have recollection of FEES study completed on 10/20.      Assessment  Patient in agreement to participate in swallow exercises. He was placed in high fowlers. Appreciated to have wet vocal quality and endorses difficulty managing his secretions. Patient completed effortful swallow to improve base of tongue propulsion, increase swallow efficiency and chin tuck against resistance (CTAR) exercises can strengthen suprahyoid and submental musculature for improved hyolaryngeal elevation and excursion.     Tx: Pt completed 15 reps of sustained chin tuck against resistance exercise with a 30 seconds hold x1 with \"fair\" accuracy and max verbal/tactile cues for form.     Pt completed ~15 reps of effortful swallow with \"fair\" accuracy and max verbal cues for form. Patient with increased coughing and throat clearing with ice chips, concerning for airway invasion.        Clinical Impressions  Patient presents with a moderate-severe oropharyngeal dysphagia with suspect component of esophageal dysphagia per FEES study completed 10/20/2023. The pt presents with \"good\" participation during tx session targeting swallow exercises. Service will continue to follow for dysphagia management and re-peat diagnostic swallow study as appropriate.      Recommendations  Treatment Completed: Dysphagia Treatment       Dysphagia Treatment  Diet Consistency: NPO/TF, pre-feeding trials with SLP only   -Clear for minimal ice chips/hour 1:1 w/RN, when alert, after oral care to reduce xerostomia and mitigate disuse atrophy of swallow musculature  Medication: Non " "Oral  Oral Care: Q4h  Additional Instrumentation: Repeat diagnostic study when clinically appropriate                     SLP Treatment Plan  Treatment Plan: Dysphagia Treatment, Patient/Family/Caregiver Training  SLP Frequency: 3x Per Week  Estimated Duration: Until Therapy Goals Met      Anticipated Discharge Needs  Discharge Recommendations: Recommend post-acute placement for additional speech therapy services prior to discharge home  Therapy Recommendations Upon DC: Dysphagia Training, Patient / Family / Caregiver Education      Patient / Family Goals  Patient / Family Goal #1: \"water\"  Goal #1 Outcome: Progressing slower than expected  Short Term Goals  Short Term Goal # 1: Pt will consume prefeeding trials with SLP with no overt s/sx of aspiration  Goal Outcome # 1: Progressing slower than expected  Short Term Goal # 2: Pt will complete instrumental swallow assessment to determine physiology of swallow.  Goal Outcome # 2 : Goal met, new goal aded  Short Term Goal # 2 B : Pt will complete 20 reps each of swallow exercises targeting pharyngeal shortening, laryngeal vestibule closure and base of tongue retraction with \"good\" acuracy  Goal Outcome  # 2 B: Progressing slower than expected      Yvette Walls MS,CCC-SLP    "

## 2023-10-24 NOTE — THERAPY
"Occupational Therapy   Initial Evaluation     Patient Name: Perry Marrufo  Age:  83 y.o., Sex:  male  Medical Record #: [id number]  Today's Date: 10/24/2023     Precautions: Fall Risk, Nasogastric Tube, Swallow Precautions  Comments: seizure prec    Assessment    Patient is 83 y.o. male with h/o BPH, DMII, CKDII, HTN, esophageal mass, basal cell carcinoma to face, bipolar d/o, found down near home in [place]. Pt dx with a-fib with RVR, mild rhabdomyolysis, acute respiratory failure (requiring intubation). Pt's MS has waxed & waned since admit. Seen now for OT eval. See grid below for details. Pt agreeable this session, following simple directions, but with mild/moderate confusion. Unclear historian. Pt able to stand x 2 with assist, but transfer to chair deferred due to impulsivity. Limited by: balance impairment, generalized weakness, motor incoordination, cognitive deficits. Per chart pt lives alone with little to no support. Appears to be below functional baseline from OT standpoint. Will benefit from ongoing acute OT to maximize functional independence and safety, however anticipate pt will need increased support on DC. Will follow.     Plan    Occupational Therapy Initial Treatment Plan   Treatment Interventions: Self Care / Activities of Daily Living, Adaptive Equipment, Neuro Re-Education / Balance, Therapeutic Exercises, Cognitive Skill Development, Therapeutic Activity  Treatment Frequency: 3 Times per Week  Duration: Until Therapy Goals Met    DC Equipment Recommendations: Unable to determine at this time  Discharge Recommendations: Recommend post-acute placement for additional occupational therapy services prior to discharge home     Subjective    \"When can I go home?\"     Objective       10/24/23 1520   Prior Living Situation   Prior Services None   Housing / Facility Mobile Home   Steps Into Home 5   Steps In Home 0   Rail Left Rail  (Steps into Home)   Bathroom Set up Bathtub / Shower " "Combination;Shower Curtain;Grab Bars;Shower Chair   Equipment Owned 4-Wheel Walker;Tub / Shower Seat;Grab Bar(s) In Tub / Shower;Grab Bar(s) By Toilet;Hand Held Shower   Lives with - Patient's Self Care Capacity Alone and Unable to Care For Self   Comments Pt states he lives alone and is normally independent. Palliative notes indicated pt's children have concern as he is unable to care for himself. Need to clarify above details. No clear support on DC identified.   Prior Level of ADL Function   Self Feeding Independent   Grooming / Hygiene Independent   Bathing Independent  (seated)   Dressing Independent   Toileting Independent   Comments Pt reports being independent with BADL PTA; unclear accuracy   Prior Level of IADL Function   Medication Management Independent   Laundry Independent   Kitchen Mobility Independent   Finances Independent  (\"all my bills are payed for\")   Home Management Independent   Shopping Independent   Prior Level Of Mobility Independent With Device in Community;Independent With Device in Home  (reports using 4WW)   Driving / Transportation Driving Independent   Occupation (Pre-Hospital Vocational) Retired Due To Age   Leisure Interests Television   Comments Pt reports being independent with I-ADL and using 4WW PTA; unclear accuracy   Cognition    Orientation Level Not Oriented to Month;Not Oriented to Day;Not Oriented to Reason  (oriented to \"hospital\" in \"[place]\" and current year)   Level of Consciousness Alert   Ability To Follow Commands 1 Step   Safety Awareness Impaired;Impulsive   New Learning Impaired   Sequencing Impaired   Comments pleasant & cooperative this session   Active ROM Upper Body   Active ROM Upper Body  WDL   Dominant Hand Left   Strength Upper Body   Gross Strength Generalized Weakness, Equal Bilaterally.    Sensation Upper Body   Upper Extremity Sensation  WDL   Comments denies sensory changes   Coordination Upper Body   Comments mild-moderate incoordination BUE, " symmetrical (appears due to non-use)   Balance Assessment   Sitting Balance (Static) Fair   Sitting Balance (Dynamic) Fair -   Standing Balance (Static) Fair -   Standing Balance (Dynamic) Poor +   Weight Shift Sitting Fair   Weight Shift Standing Fair   Comments FWW for standing   Bed Mobility    Supine to Sit Moderate Assist   Sit to Supine Moderate Assist   Scooting Contact Guard Assist  (seated)   Comments HOB elevated, used rails   ADL Assessment   Eating   (NG)   Grooming Minimal Assist  (all-in-one oral care)   Upper Body Dressing Maximal Assist  (gown change; lines impact)   Lower Body Dressing Total Assist  (don B socks)   Toileting   ([place], no BM)   Functional Mobility   Sit to Stand Minimal Assist   Bed, Chair, Wheelchair Transfer Unable to Participate   Mobility Supine > < EOB, STS x 2, side stepping towards HOB   Activity Tolerance   Sitting in Chair NT due to impulsivity   Sitting Edge of Bed >10 min   Standing 1-2 min total   Comments limited by weakness   Short Term Goals   Short Term Goal # 1 Pt will complete ADL transfers with SBA   Short Term Goal # 2 Pt will complete seated grooming with set-up   Short Term Goal # 3 Pt will complete toileting with SBA

## 2023-10-24 NOTE — CARE PLAN
The patient is Watcher - Medium risk of patient condition declining or worsening    Shift Goals  Clinical Goals: lower oxygen demand reorient  Patient Goals: get out of the restraints  Family Goals: BA    Progress made toward(s) clinical / shift goals:        Problem: Pain - Standard  Goal: Alleviation of pain or a reduction in pain to the patient’s comfort goal  Outcome: Progressing     Problem: Hemodynamics  Goal: Patient's hemodynamics, fluid balance and neurologic status will be stable or improve  Outcome: Progressing     Problem: Fluid Volume  Goal: Fluid volume balance will be maintained  Outcome: Progressing     Problem: Urinary - Renal Perfusion  Goal: Ability to achieve and maintain adequate renal perfusion and functioning will improve  Outcome: Progressing     Problem: Respiratory  Goal: Patient will achieve/maintain optimum respiratory ventilation and gas exchange  Outcome: Progressing     Problem: Safety - Medical Restraint  Goal: Remains free of injury from restraints (Restraint for Interference with Medical Device)  Outcome: Progressing       Patient is not progressing towards the following goals:      Problem: Knowledge Deficit - Standard  Goal: Patient and family/care givers will demonstrate understanding of plan of care, disease process/condition, diagnostic tests and medications  Outcome: Not Progressing     Problem: Skin Integrity  Goal: Skin integrity is maintained or improved  Outcome: Not Progressing     Problem: Fall Risk  Goal: Patient will remain free from falls  Outcome: Not Progressing     Problem: Safety - Medical Restraint  Goal: Free from restraint(s) (Restraint for Interference with Medical Device)  Outcome: Not Progressing

## 2023-10-24 NOTE — PROGRESS NOTES
[facility] Medicine Daily Progress Note    Date of Service  10/24/2023    Chief Complaint  Found down    Hospital Course  Perry Marrufo is an 83 y.o. male w/ hx of DMII, prostate disease.  He presented 10/9/2023 with transfer from [place] after being found down and last known at mental baseline 2 days prior.  He was found to have hypernatremia, atrial fibrillation with a rapid ventricular rate, rhabdomyolysis.  He was intubated, cardioversion attempted and transferred to ICU 10/11 he was extubated 10/17.     Interval Problem Update  Patient seen and examined today.  Data, Medication data reviewed.  Case discussed with nursing as available.  Plan of Care reviewed with patient and notified of changes.   10/24 per neurosurgery report the patient with great fluctuation of mentation, at times hallucinating,  agitated, my evaluation today the patient appears calm, he is redirectable, he knows where he is currently, discussed about his family situation, he is apparently estranged from family per his report, he admits that he is unable to live by himself anymore for the future.  Wet cough, denies currently pain, vital signs currently afebrile, heart in the 70s and 80s, respiration in mid 20s, unlabored, with cough, saturating in the mid to high 90s on 4 L nasal can oxygen, blood pressure fluctuating in the 130s to 150s over 60s and 70s,  Sodium at 152, glucose elevated, restarting long-acting insulin, chest x-ray with right-sided rib fractures, hiatal hernia, bibasilar subsegmental atelectatic opacities.  Much improved from previous imaging  10/23: Na:150. Still quite confused. Nurse states he coughed up much mucous yesterday.  Now down to 6L via NC.    10/22: Iris feeding tube placed last night. Feeding started.  Worse left lung opacification on CXR.  I have ordered and discussed pulmonary consult.  Check ABG for possible bipap.  I discussed with daughter and updated her last night, she states he is a FULL code.  Na:147.  " He is more alert today.  On HFNC:30LPM 50% FiO2. States he is very thirsty and has sponge swabs that nursing has been giving and watch for aspiration.  10/21: On HFNC 30LPM and 90% FiO2 with improved SpO2:100%. Still confused to place, year. Per RN no contact with his children and we are trying to get in touch with family.  Only neighbors arrived yesterday per RN. Patient willing to have NG tube placed for nutrition and medications per RN.  10/20: He has failed FEEs study.  He does not know he is in [place], [facility] and doesn't know he is in a hospital.  He was made DNR/DNI previously.  He state he is \"done\" and keeps pulling off his HFNC.  States being  and has several children.  I have asked case management to reach out find his children.  Nurse left a message on the one jessica phone.    I have discussed this patient's plan of care and discharge plan at IDT rounds today with Case Management, Nursing, Nursing leadership, and other members of the IDT team.    Consultants/Specialty  cardiology, nephrology, and palliative care  Pulmonary medicine  Code Status  Full Code    Disposition  The patient is not medically cleared for discharge to home or a post-acute facility.  Anticipate discharge to: skilled nursing facility    I have placed the appropriate orders for post-discharge needs.    Review of Systems  Review of Systems   Unable to perform ROS: Dementia        Physical Exam  Temp:  [36.7 °C (98.1 °F)] 36.7 °C (98.1 °F)  Pulse:  [74-89] 81  Resp:  [14-41] 24  BP: (108-159)/() 144/65  SpO2:  [92 %-100 %] 96 %    Physical Exam  Vitals reviewed.   Constitutional:       General: He is awake.      Appearance: He is obese. He is not diaphoretic.   HENT:      Head: Normocephalic and atraumatic.      Mouth/Throat:      Mouth: Mucous membranes are dry.      Pharynx: No oropharyngeal exudate.      Comments: Dry skin on lips  Eyes:      General: No scleral icterus.        Right eye: No discharge.         " Left eye: No discharge.      Extraocular Movements: Extraocular movements intact.      Conjunctiva/sclera: Conjunctivae normal.   Cardiovascular:      Rate and Rhythm: Normal rate and regular rhythm.      Pulses:           Radial pulses are 2+ on the right side and 2+ on the left side.        Dorsalis pedis pulses are 2+ on the right side and 2+ on the left side.      Heart sounds: No murmur heard.  Pulmonary:      Effort: Pulmonary effort is normal. No respiratory distress.      Breath sounds: Examination of the left-middle field reveals decreased breath sounds. Examination of the right-lower field reveals decreased breath sounds. Examination of the left-lower field reveals decreased breath sounds. Decreased breath sounds, rhonchi and rales present. No wheezing.   Abdominal:      General: Bowel sounds are normal. There is no distension.      Palpations: Abdomen is soft.      Tenderness: There is no abdominal tenderness. There is no rebound.   Genitourinary:     Comments: Penile edema  Musculoskeletal:         General: No swelling or tenderness.      Cervical back: Neck supple. No muscular tenderness.      Right lower leg: Edema present.      Left lower leg: Edema present.   Skin:     Coloration: Skin is pale. Skin is not jaundiced.      Findings: Rash (left groin, skin fold) present.   Neurological:      Mental Status: He is disoriented.      Cranial Nerves: No cranial nerve deficit.      Motor: Weakness present.   Psychiatric:         Attention and Perception: Attention normal.         Behavior: Behavior is slowed.         Cognition and Memory: Cognition is impaired.         Fluids    Intake/Output Summary (Last 24 hours) at 10/24/2023 0733  Last data filed at 10/24/2023 0127  Gross per 24 hour   Intake 90 ml   Output --   Net 90 ml         Laboratory  Recent Labs     10/22/23  0340 10/23/23  0415 10/24/23  0442   WBC 8.5 7.8 8.2   RBC 2.91* 2.98* 3.08*   HEMOGLOBIN 8.7* 8.8* 9.1*   HEMATOCRIT 28.1* 28.5* 28.9*    MCV 96.6 95.6 93.8   MCH 29.9 29.5 29.5   MCHC 31.0* 30.9* 31.5*   RDW 51.3* 49.9 48.5   PLATELETCT 297 306 330   MPV 10.3 10.3 10.3       Recent Labs     10/23/23  0415 10/23/23  1825 10/24/23  0442   SODIUM 150* 151* 152*   POTASSIUM 3.7 4.0 4.1   CHLORIDE 114* 117* 117*   CO2 30 28 28   GLUCOSE 235* 209* 231*   BUN 27* 24* 26*   CREATININE 0.85 0.76 0.76   CALCIUM 7.9* 7.8* 8.0*                     Imaging  DX-ABDOMEN FOR TUBE PLACEMENT   Final Result         1.  Nonspecific bowel gas pattern in the upper abdomen.   2.  Dobbhoff tube tip overlying the expected location of the pylorus or first duodenal segment.   3.  Hazy bilateral lower lobe infiltrates   4.  Small layering left pleural effusion.      DX-CHEST-PORTABLE (1 VIEW)   Final Result         1.  Decreased opacification the left thorax, likely decreased effusion.   2.  Hazy bilateral pulmonary infiltrates   3.  Cardiomegaly   4.  Right rib fractures      DX-ABDOMEN FOR TUBE PLACEMENT   Final Result         Feeding tube with tip projecting over the expected area of the third portion duodenum.      DX-CHEST-LIMITED (1 VIEW)   Final Result      Interval replacement of gastric drainage tube with feeding tube   Complete opacification of the left lung, similar to prior      DX-ABDOMEN FOR TUBE PLACEMENT   Final Result      Enteric tube has been placed and the tip projects over the gastroduodenal junction      DX-ABDOMEN FOR TUBE PLACEMENT   Final Result         1.  Pulmonary infiltrates, increased on the left vertebral study.   2.  Moderate layering left pleural effusion, increased since prior study.   3.  Atherosclerosis   4.  Right rib fractures      DX-ABDOMEN FOR TUBE PLACEMENT   Final Result         1.  Nonspecific bowel gas pattern in the upper abdomen.   2.  Nasogastric tube terminating just distal to the gastroesophageal junction, recommend advancement.   3.  Hazy left pulmonary infiltrates, stable since prior study.   4.  Moderate layering left  pleural effusion, overall stable      DX-ABDOMEN FOR TUBE PLACEMENT   Final Result      1. Appropriate position of the esophagogastric tube.   2. Worsening left lower lobe parenchymal consolidation and interval increase in size of the left pleural effusion.   3. The remainder is stable.      DX-CHEST-PORTABLE (1 VIEW)   Final Result         1.  Bilateral lower lobe infiltrates, stable since prior study.   2.  Trace bilateral pleural effusions   3.  Atherosclerosis   4.  Right rib fractures      DX-ABDOMEN FOR TUBE PLACEMENT   Final Result      1.  Enteric tube tip overlies the gastric antrum.      [id number] VIEW   Final Result      Nonobstructive bowel gas pattern.      DX-ABDOMEN FOR TUBE PLACEMENT   Final Result         1.  Nonspecific bowel gas pattern in the upper abdomen.   2.  Nasogastric tube tip terminates overlying the expected location of the pylorus or first duodenal segment.   3.  Bilateral lower lobe opacities concerning for infiltrate.      DX-CHEST-PORTABLE (1 VIEW)   Final Result         1.  Bilateral lower lobe infiltrates, stable since prior study.   2.  Atherosclerosis      DX-CHEST-PORTABLE (1 VIEW)   Final Result         1.  Bilateral lower lobe infiltrates, decreased since prior study.      DX-CHEST-PORTABLE (1 VIEW)   Final Result      1.  Supportive tubing as described above.   2.  Slight increased inflation.   3.  No other significant change from prior exam.         DX-CHEST-LIMITED (1 VIEW)   Final Result      1.  Ill-defined bibasilar pulmonary opacities, slightly worse on the left compared to prior study. Pneumonia is in the differential.   2.  Small left pleural effusion, new since prior.      DX-ABDOMEN FOR TUBE PLACEMENT   Final Result      Nasogastric tube tip overlies the mid stomach      DX-ABDOMEN FOR TUBE PLACEMENT   Final Result      NG tube turns in the gastric body with tip at the level of the gastric fundus.      EC-ECHOCARDIOGRAM LTD W/O CONT   Final Result       DX-CHEST-PORTABLE (1 VIEW)   Final Result      1.  Mild interstitial pulmonary edema, similar to prior   2.  Bibasilar and perihilar underinflation atelectasis which could obscure an additional process. This is unchanged.      DX-CHEST-PORTABLE (1 VIEW)   Final Result      1.  Mild interstitial pulmonary edema   2.  Bibasilar and perihilar underinflation atelectasis which could obscure an additional process.   3.  Small LEFT pleural effusion      DX-CHEST-LIMITED (1 VIEW)   Final Result      1.  Bilateral basilar atelectasis and/or consolidation. Underlying infection is possible.   2.  Stable enlargement of the cardiomediastinal silhouette.   3.  Interval insertion of a central venous catheter which terminates with the tip projecting over the expected region of the mid superior vena cava.   4.  Interval placement of an endotracheal tube which terminates in satisfactory position at the level of the aortic arch.   5.  Interval placement of an enteric feeding tube which terminates in the left upper quadrant projecting over the expected location of the stomach.      DX-CHEST-PORTABLE (1 VIEW)   Final Result      No significant change from prior exam.      DX-CHEST-PORTABLE (1 VIEW)   Final Result      Bibasilar underinflation atelectasis. Superimposed pneumonia not excluded.      US-RUQ   Final Result      1.  Gallbladder sludge   2.  RIGHT pleural effusion      EC-ECHOCARDIOGRAM COMPLETE W/ CONT   Final Result      CT-CSPINE WITHOUT PLUS RECONS   Final Result         1. No acute fracture from C1 through T1 is visualized.         CT-HEAD W/O   Final Result         1. No acute intracranial abnormality. No evidence of acute intracranial hemorrhage or mass lesion.                     DX-CHEST-PORTABLE (1 VIEW)   Final Result      1.  Low lung volumes without definite acute cardiopulmonary abnormality.           Assessment/Plan  * Acute respiratory failure with hypoxia (HCC)- (present on admission)  Assessment &  Plan  Question of aspiration  Extubated 10/17  Pulmonary hygiene  Aspiration precautions  Pulmonary medicine following  Chest x-ray has cleared significantly, continue current regimen  Wean O2 as able  SLP following, appreciate recommendations   IMCU  Ongoing case management support, /palliative care    Decubitus skin ulcer  Assessment & Plan  Wound care  Frequent turning    Anemia  Assessment & Plan  No overt bleeding, monitor  Monitor cbc    Shock ([facility])  Assessment & Plan  Undifferentiated - cardiac due to arrhythmia vs hypovolemia  Shock resolved  Monitor vitals.    Aspiration precautions  Assessment & Plan  Increasing oxygen requirement and chest x-ray showed infiltration possible pneumonia  Failed 10/20 FEEs  Aspiration precautions.  Continue monitor closely.    Rhabdomyolysis- (present on admission)  Assessment & Plan  Continue IVF resuscitation, monitor CPK  CPK significant trended down.  Wean IV fluids    Atrial fibrillation with RVR ([facility])- (present on admission)  Assessment & Plan  No known history of Afib, RVR with hypotension attempted electrical cardioversion 10/11 without success, put on amio GTT  10/22 on oral amiodarone  Echo EF 65%, no valvular abnormalities  Rate Controlled  Continuous tele monitoring  Anticoagulation with Eliquis  Rhythm control with amiodarone, continue loading  Optimize electrolytes     Dehydration- (present on admission)  Assessment & Plan  Continue fluid resuscitation intravenously and enterally for now  Monitor UOP closely    Hypernatremia- (present on admission)  Assessment & Plan  Hypovolemic hypernatremia  Free water via NG      ADELAIDE (acute kidney injury) ([facility])- (present on admission)  Assessment & Plan  Improved with fluids  Monitoring bmp, I/O's    Altered mental status- (present on admission)  Assessment & Plan  Fluctuating mental status  Avoid offending agents  The patient apparently with significant insomnia    DM2 (diabetes mellitus, type 2) ([facility])-  (present on admission)  Assessment & Plan  Glargine 10units q HS  Monitor accuchecks and cover with SSI  Hypoglycemic protocol  A1c 8 in past week        Sepsis (HCC)- (present on admission)  Assessment & Plan  Possibly related to pneumonia    Diabetes mellitus-Type 2, not on treatment  Assessment & Plan          BPH (benign prostatic hyperplasia)- (present on admission)  Assessment & Plan  Restart Flomax      Plan  Continue with antibiotic therapy for the patient's pneumonia  Secretion control and aspiration precautions  Continue rate and rhythm control with amiodarone  Anticoagulation  Glycemic control currently with need for long-acting insulin secondary to enteral feeding again  Trial of trazodone tonight to assist with the patient's insomnia and hopefully will improve his mental state  Follow labs closely  Balance electrolytes  Restart BPH medication regimen  See orders  My total time spent caring for the patient on the day of the encounter was 59 minutes.   This does not include time spent on separately billable procedures/tests.  Patient is has a high medical complexity, complex decision making and is at high risk for complication, morbidity, and mortality.    VTE prophylaxis:    therapeutic anticoagulation with eliquis 5 mg BID      I have performed a physical exam and reviewed and updated ROS and Plan today (10/24/2023). In review of yesterday's note (10/23/2023), there are no changes except as documented above.      Please note that this dictation was created using voice recognition software. I have made every reasonable attempt to correct obvious errors, but I expect that there are errors of grammar and possibly context that I did not discover before finalizing the note.

## 2023-10-24 NOTE — CARE PLAN
Problem: Hyperinflation  Goal: Prevent or improve atelectasis  Description: Target End Date:  3 to 4 days    1. Instruct incentive spirometry usage  2.  Perform hyperinflation therapy as indicated  10/24/2023 0502 by Remington Lantigua, RRT  Outcome: Not Progressing     Problem: Bronchoconstriction  Goal: Improve in air movement and diminished wheezing  Description: Target End Date:  2 to 3 days    1.  Implement inhaled treatments  2.  Evaluate and manage medication effects  10/24/2023 0502 by Remington Lantigua, RRT  Outcome: Progressing     Problem: Bronchopulmonary Hygiene  Goal: Increase mobilization of retained secretions  Description: Target End Date:  2 to 3 days    1.  Perform bronchopulmonary therapy as indicated by assessment  2.  Perform airway suctioning  3.  Perform actions to maintain patient airway  Outcome: Progressing       Poor pt effort despite multiple instructions, 500mls ,

## 2023-10-24 NOTE — DIETARY
Nutrition Services Brief Update:    Problem: Nutritional:  Goal: Nutrition support tolerated and meeting greater than 85% of estimated needs  Outcome: met    Per RD visual assessment pt currently receiving Impact Peptide 1.5 @ 55 mL/hr providing 1980 kcals, 124 grams protein and 1016 mL free water.     RD continues to follow

## 2023-10-25 PROBLEM — Z71.89 ADVANCE CARE PLANNING: Status: ACTIVE | Noted: 2023-10-25

## 2023-10-25 LAB
ALBUMIN SERPL BCP-MCNC: 2.2 G/DL (ref 3.2–4.9)
ALBUMIN/GLOB SERPL: 0.7 G/DL
ALP SERPL-CCNC: 71 U/L (ref 30–99)
ALT SERPL-CCNC: <5 U/L (ref 2–50)
ANION GAP SERPL CALC-SCNC: 3 MMOL/L (ref 7–16)
AST SERPL-CCNC: 13 U/L (ref 12–45)
BILIRUB SERPL-MCNC: 0.6 MG/DL (ref 0.1–1.5)
BUN SERPL-MCNC: 34 MG/DL (ref 8–22)
CALCIUM ALBUM COR SERPL-MCNC: 9.4 MG/DL (ref 8.5–10.5)
CALCIUM SERPL-MCNC: 8 MG/DL (ref 8.5–10.5)
CHLORIDE SERPL-SCNC: 116 MMOL/L (ref 96–112)
CO2 SERPL-SCNC: 32 MMOL/L (ref 20–33)
CREAT SERPL-MCNC: 0.82 MG/DL (ref 0.5–1.4)
ERYTHROCYTE [DISTWIDTH] IN BLOOD BY AUTOMATED COUNT: 51 FL (ref 35.9–50)
GFR SERPLBLD CREATININE-BSD FMLA CKD-EPI: 88 ML/MIN/1.73 M 2
GLOBULIN SER CALC-MCNC: 3.1 G/DL (ref 1.9–3.5)
GLUCOSE BLD STRIP.AUTO-MCNC: 280 MG/DL (ref 65–99)
GLUCOSE BLD STRIP.AUTO-MCNC: 284 MG/DL (ref 65–99)
GLUCOSE BLD STRIP.AUTO-MCNC: 300 MG/DL (ref 65–99)
GLUCOSE SERPL-MCNC: 307 MG/DL (ref 65–99)
HCT VFR BLD AUTO: 30.4 % (ref 42–52)
HGB BLD-MCNC: 9.3 G/DL (ref 14–18)
MAGNESIUM SERPL-MCNC: 2.4 MG/DL (ref 1.5–2.5)
MCH RBC QN AUTO: 29.8 PG (ref 27–33)
MCHC RBC AUTO-ENTMCNC: 30.6 G/DL (ref 32.3–36.5)
MCV RBC AUTO: 97.4 FL (ref 81.4–97.8)
NT-PROBNP SERPL IA-MCNC: 3492 PG/ML (ref 0–125)
PHOSPHATE SERPL-MCNC: 2.2 MG/DL (ref 2.5–4.5)
PLATELET # BLD AUTO: 246 K/UL (ref 164–446)
PMV BLD AUTO: 11 FL (ref 9–12.9)
POTASSIUM SERPL-SCNC: 4.1 MMOL/L (ref 3.6–5.5)
PROT SERPL-MCNC: 5.3 G/DL (ref 6–8.2)
RBC # BLD AUTO: 3.12 M/UL (ref 4.7–6.1)
SODIUM SERPL-SCNC: 151 MMOL/L (ref 135–145)
T4 FREE SERPL-MCNC: 1.28 NG/DL (ref 0.93–1.7)
TSH SERPL DL<=0.005 MIU/L-ACNC: 0.56 UIU/ML (ref 0.38–5.33)
WBC # BLD AUTO: 6.3 K/UL (ref 4.8–10.8)

## 2023-10-25 PROCEDURE — 99233 SBSQ HOSP IP/OBS HIGH 50: CPT | Performed by: INTERNAL MEDICINE

## 2023-10-25 PROCEDURE — 36415 COLL VENOUS BLD VENIPUNCTURE: CPT

## 2023-10-25 PROCEDURE — 94669 MECHANICAL CHEST WALL OSCILL: CPT

## 2023-10-25 PROCEDURE — A9270 NON-COVERED ITEM OR SERVICE: HCPCS | Performed by: NURSE PRACTITIONER

## 2023-10-25 PROCEDURE — 84439 ASSAY OF FREE THYROXINE: CPT

## 2023-10-25 PROCEDURE — 84443 ASSAY THYROID STIM HORMONE: CPT

## 2023-10-25 PROCEDURE — 83880 ASSAY OF NATRIURETIC PEPTIDE: CPT

## 2023-10-25 PROCEDURE — 700102 HCHG RX REV CODE 250 W/ 637 OVERRIDE(OP): Performed by: NURSE PRACTITIONER

## 2023-10-25 PROCEDURE — 97110 THERAPEUTIC EXERCISES: CPT

## 2023-10-25 PROCEDURE — 700111 HCHG RX REV CODE 636 W/ 250 OVERRIDE (IP): Performed by: INTERNAL MEDICINE

## 2023-10-25 PROCEDURE — 83735 ASSAY OF MAGNESIUM: CPT

## 2023-10-25 PROCEDURE — 82962 GLUCOSE BLOOD TEST: CPT

## 2023-10-25 PROCEDURE — A9270 NON-COVERED ITEM OR SERVICE: HCPCS | Performed by: HOSPITALIST

## 2023-10-25 PROCEDURE — 99497 ADVNCD CARE PLAN 30 MIN: CPT | Performed by: INTERNAL MEDICINE

## 2023-10-25 PROCEDURE — 700105 HCHG RX REV CODE 258: Performed by: INTERNAL MEDICINE

## 2023-10-25 PROCEDURE — 84100 ASSAY OF PHOSPHORUS: CPT

## 2023-10-25 PROCEDURE — 85027 COMPLETE CBC AUTOMATED: CPT

## 2023-10-25 PROCEDURE — 92526 ORAL FUNCTION THERAPY: CPT

## 2023-10-25 PROCEDURE — 80053 COMPREHEN METABOLIC PANEL: CPT

## 2023-10-25 PROCEDURE — 770020 HCHG ROOM/CARE - TELE (206)

## 2023-10-25 PROCEDURE — A9270 NON-COVERED ITEM OR SERVICE: HCPCS | Performed by: INTERNAL MEDICINE

## 2023-10-25 PROCEDURE — 700102 HCHG RX REV CODE 250 W/ 637 OVERRIDE(OP): Performed by: HOSPITALIST

## 2023-10-25 PROCEDURE — 94640 AIRWAY INHALATION TREATMENT: CPT

## 2023-10-25 PROCEDURE — 99232 SBSQ HOSP IP/OBS MODERATE 35: CPT | Performed by: INTERNAL MEDICINE

## 2023-10-25 PROCEDURE — 700102 HCHG RX REV CODE 250 W/ 637 OVERRIDE(OP): Performed by: INTERNAL MEDICINE

## 2023-10-25 PROCEDURE — 700101 HCHG RX REV CODE 250: Performed by: INTERNAL MEDICINE

## 2023-10-25 RX ORDER — QUETIAPINE FUMARATE 25 MG/1
25 TABLET, FILM COATED ORAL NIGHTLY
Status: DISCONTINUED | OUTPATIENT
Start: 2023-10-25 | End: 2023-10-26

## 2023-10-25 RX ORDER — IPRATROPIUM BROMIDE AND ALBUTEROL SULFATE 2.5; .5 MG/3ML; MG/3ML
3 SOLUTION RESPIRATORY (INHALATION)
Status: DISCONTINUED | OUTPATIENT
Start: 2023-10-26 | End: 2023-10-25

## 2023-10-25 RX ORDER — PREDNISONE 50 MG/1
50 TABLET ORAL DAILY
Status: DISCONTINUED | OUTPATIENT
Start: 2023-10-25 | End: 2023-10-26

## 2023-10-25 RX ORDER — IPRATROPIUM BROMIDE AND ALBUTEROL SULFATE 2.5; .5 MG/3ML; MG/3ML
3 SOLUTION RESPIRATORY (INHALATION)
Status: DISCONTINUED | OUTPATIENT
Start: 2023-10-25 | End: 2023-11-06 | Stop reason: HOSPADM

## 2023-10-25 RX ADMIN — INSULIN LISPRO 7 UNITS: 100 INJECTION, SOLUTION INTRAVENOUS; SUBCUTANEOUS at 12:35

## 2023-10-25 RX ADMIN — IPRATROPIUM BROMIDE AND ALBUTEROL SULFATE 3 ML: 2.5; .5 SOLUTION RESPIRATORY (INHALATION) at 03:34

## 2023-10-25 RX ADMIN — MICONAZOLE NITRATE: 20 CREAM TOPICAL at 18:10

## 2023-10-25 RX ADMIN — ACETYLCYSTEINE 3 ML: 200 SOLUTION ORAL; RESPIRATORY (INHALATION) at 03:34

## 2023-10-25 RX ADMIN — LANSOPRAZOLE 30 MG: 30 TABLET, ORALLY DISINTEGRATING, DELAYED RELEASE ORAL at 05:55

## 2023-10-25 RX ADMIN — APIXABAN 5 MG: 5 TABLET, FILM COATED ORAL at 17:59

## 2023-10-25 RX ADMIN — MICONAZOLE NITRATE: 20 CREAM TOPICAL at 05:51

## 2023-10-25 RX ADMIN — TRAZODONE HYDROCHLORIDE 50 MG: 100 TABLET ORAL at 21:11

## 2023-10-25 RX ADMIN — PREDNISONE 50 MG: 50 TABLET ORAL at 12:32

## 2023-10-25 RX ADMIN — CEFAZOLIN 2 G: 2 INJECTION, POWDER, FOR SOLUTION INTRAMUSCULAR; INTRAVENOUS at 05:51

## 2023-10-25 RX ADMIN — AMIODARONE HYDROCHLORIDE 200 MG: 200 TABLET ORAL at 05:55

## 2023-10-25 RX ADMIN — INSULIN LISPRO 7 UNITS: 100 INJECTION, SOLUTION INTRAVENOUS; SUBCUTANEOUS at 05:46

## 2023-10-25 RX ADMIN — DIBASIC SODIUM PHOSPHATE, MONOBASIC POTASSIUM PHOSPHATE AND MONOBASIC SODIUM PHOSPHATE 500 MG: 852; 155; 130 TABLET ORAL at 17:58

## 2023-10-25 RX ADMIN — INSULIN GLARGINE-YFGN 20 UNITS: 100 INJECTION, SOLUTION SUBCUTANEOUS at 17:54

## 2023-10-25 RX ADMIN — APIXABAN 5 MG: 5 TABLET, FILM COATED ORAL at 05:55

## 2023-10-25 RX ADMIN — INSULIN LISPRO 7 UNITS: 100 INJECTION, SOLUTION INTRAVENOUS; SUBCUTANEOUS at 23:49

## 2023-10-25 RX ADMIN — IPRATROPIUM BROMIDE AND ALBUTEROL SULFATE 3 ML: 2.5; .5 SOLUTION RESPIRATORY (INHALATION) at 07:36

## 2023-10-25 RX ADMIN — QUETIAPINE FUMARATE 25 MG: 25 TABLET ORAL at 21:11

## 2023-10-25 RX ADMIN — CEFAZOLIN 2 G: 2 INJECTION, POWDER, FOR SOLUTION INTRAMUSCULAR; INTRAVENOUS at 21:13

## 2023-10-25 RX ADMIN — HYDROCORTISONE SODIUM SUCCINATE 50 MG: 100 INJECTION, POWDER, FOR SOLUTION INTRAMUSCULAR; INTRAVENOUS at 05:48

## 2023-10-25 RX ADMIN — TERAZOSIN 5 MG: 5 CAPSULE ORAL at 17:59

## 2023-10-25 RX ADMIN — INSULIN LISPRO 7 UNITS: 100 INJECTION, SOLUTION INTRAVENOUS; SUBCUTANEOUS at 17:54

## 2023-10-25 RX ADMIN — CYANOCOBALAMIN TAB 500 MCG 500 MCG: 500 TAB at 05:55

## 2023-10-25 RX ADMIN — CEFAZOLIN 2 G: 2 INJECTION, POWDER, FOR SOLUTION INTRAMUSCULAR; INTRAVENOUS at 15:01

## 2023-10-25 ASSESSMENT — ENCOUNTER SYMPTOMS: SHORTNESS OF BREATH: 0

## 2023-10-25 ASSESSMENT — FIBROSIS 4 INDEX: FIB4 SCORE: 2.042752923427803959

## 2023-10-25 NOTE — DISCHARGE PLANNING
Referral sent to [facility] (Robbie)Mario/Elizabeth and [facility]    0930- Per Daniel at the [facility], I may stop by and see pt.     1000-Per epic link, [facility] accepted

## 2023-10-25 NOTE — CARE PLAN
Problem: Knowledge Deficit - Standard  Goal: Patient and family/care givers will demonstrate understanding of plan of care, disease process/condition, diagnostic tests and medications  Outcome: Progressing     Problem: Pain - Standard  Goal: Alleviation of pain or a reduction in pain to the patient’s comfort goal  Outcome: Progressing     Problem: Hemodynamics  Goal: Patient's hemodynamics, fluid balance and neurologic status will be stable or improve  Outcome: Progressing     Problem: Fluid Volume  Goal: Fluid volume balance will be maintained  Outcome: Progressing     Problem: Safety - Medical Restraint  Goal: Remains free of injury from restraints (Restraint for Interference with Medical Device)  Outcome: Progressing  Flowsheets (Taken 10/25/2023 0246)  Addressed this shift: Remains free of injury from restraints (restraint for interference with medical device):   Determine that other, less restrictive measures have been tried or would not be effective before applying the restraint   Evaluate the patient's condition at the time of restraint application   Inform patient/family regarding the reason for restraint   Every 2 hours: Monitor safety, psychosocial status, comfort, nutrition and hydration   The patient is Watcher - Medium risk of patient condition declining or worsening    Shift Goals  Clinical Goals: improve respiratory status  Patient Goals: rest  Family Goals: BA    Progress made toward(s) clinical / shift goals:  Progressing    Patient is not progressing towards the following goals:

## 2023-10-25 NOTE — THERAPY
"Speech Language Pathology   Daily Treatment     Patient Name: Perry Marrufo  AGE:  83 y.o., SEX:  male  Medical Record #: [id number]  Date of Service: 10/25/2023      Precautions:  Precautions: Fall Risk, Nasogastric Tube, Swallow Precautions         Subjective  RN cleared patient for dysphagia management. Pt alert and oriented x2. Did not recall swallow exercises completed previous date. Endorsed xerostomia.       Assessment  Reviewed swallow exercises of effortful swallow and chin tuck against resistance (CTAR), pt stated understanding however unable to provide teach back and required reinforcement. PO trials presented for implementation of effortful swallow. Effortful swallow completed x20 with fair accuracy and max cues. Wet, congested cough responsed appreciated prior to and during PO trials. Oral suctioning provided x1 by this clinician, mild amount of secretions removed. CTAR completed x3, pt deferred further trials stating, \"we can call it for today.\"      Clinical Impressions  Patient presents with a moderate-severe oropharyngeal dysphagia with suspected component of esophageal dysphagia per FEES (10/20). Service will continue to follow for continued swallow rehabilitation and repeat diagnostic swallow study as pt is appropriate.       Recommendations  Treatment Completed: Dysphagia Treatment    Dysphagia Treatment  Diet Consistency: NPO/TF, pre-feeding trials with SLP only  Instrumentation: Instrumental swallow study pending clinical progress  Medication: Non Oral  Oral Care: Q4h      SLP Treatment Plan  Treatment Plan: Dysphagia Treatment  SLP Frequency: 3x Per Week  Estimated Duration: Until Therapy Goals Met      Anticipated Discharge Needs  Discharge Recommendations: Recommend post-acute placement for additional speech therapy services prior to discharge home  Therapy Recommendations Upon DC: Dysphagia Training, Patient / Family / Caregiver Education      Patient / Family Goals  Patient / Family Goal #1: " "\"water\"  Goal #1 Outcome: Progressing slower than expected  Short Term Goals  Short Term Goal # 1: Pt will consume prefeeding trials with SLP with no overt s/sx of aspiration  Goal Outcome # 1: Progressing slower than expected  Short Term Goal # 2: Pt will complete instrumental swallow assessment to determine physiology of swallow.  Goal Outcome # 2 : Goal met, new goal aded  Short Term Goal # 2 B : Pt will complete 20 reps each of swallow exercises targeting pharyngeal shortening, laryngeal vestibule closure and base of tongue retraction with \"good\" acuracy  Goal Outcome  # 2 B: Progressing slower than expected      YESENIA Hurst  "

## 2023-10-25 NOTE — THERAPY
"Physical Therapy   Daily Treatment     Patient Name: Perry Marrufo  Age:  83 y.o., Sex:  male  Medical Record #: [id number]  Today's Date: 10/25/2023     Precautions  Precautions: Fall Risk;Nasogastric Tube;Swallow Precautions  Comments: seizure precautions    Assessment    Pt continues to present with generalized weakness, impaired cognition, activity tolerance and endurance. Pt declined mobility this session, however, agreeable to supine therex (see details below). Continue to recommend placement upon d/c and acute IP PT services while in house to address said deficits.     Plan    Treatment Plan Status: Continue Current Treatment Plan  Type of Treatment: Bed Mobility, Gait Training, Neuro Re-Education / Balance, Therapeutic Activities, Therapeutic Exercise  Treatment Frequency: 4 Times per Week (Increased frequency since patient is alert, following directions and motivated)  Treatment Duration: Until Therapy Goals Met    DC Equipment Recommendations: Unable to determine at this time  Discharge Recommendations: Recommend post-acute placement for additional physical therapy services prior to discharge home      Subjective    \"Would you  me?\"     Objective     10/25/23 1311   Vitals   O2 (LPM) 4   O2 Delivery Device Silicone Nasal Cannula   Pain 0 - 10 Group   Therapist Pain Assessment Nurse Notified;0   Cognition    Cognition / Consciousness X   Speech/ Communication Hard of Hearing   Orientation Level   (Oriented to place, time, and self. Did not assess ortientation to reason)   Level of Consciousness Alert   Ability To Follow Commands 1 Step   Safety Awareness Impaired;Impulsive   New Learning Impaired   Attention Impaired   Sequencing Impaired   Comments Pleasant and cooperative. Tangential. Nonsensical speech at times. Poor carryover   Passive ROM Lower Body   Passive ROM Lower Body WDL   Active ROM Lower Body    Active ROM Lower Body  X   Comments difficulty with supine heel slides Bilaterally, however, " able to complete   Strength Lower Body   Lower Body Strength  X   Comments Grossly 3/5 BLE   Supine Lower Body Exercise   Supine Lower Body Exercises Yes   Hip Abduction 2 sets of 10;Bilateral   Straight Leg Raises 2 sets of 10;Bilateral   Heel Slide 2 sets of 10;Bilateral   Ankle Pumps 2 sets of 15;Bilateral   Quadriceps Isometrics 2 sets of 10;Bilateral   Comments Pt fatigued with therex, requiring encouragement to complete final sets. Able to stay on task throughout each set if counting out loud, otherwise required cues to attend to exercises   Balance   Comments not assessed, pt declining all mobility this session. Fatigued with supine therex and fixated on getting something to drink   Activity Tolerance   Comments declined mobility this session   Short Term Goals    Short Term Goal # 1 Patient will perform supine-sit with supervision in 6 visits   Goal Outcome # 1 goal not met   Short Term Goal # 2 Patient will perform sit-stand & chair transfers with FWW with supervision in 6 visits   Goal Outcome # 2 Goal not met   Short Term Goal # 3 Patient will ambulate > 25 feet with FWW with supervision in 6 visits   Goal Outcome # 3 Goal not met   Education Group   Education Provided Exercises - Supine   Exercises - Supine Patient Response Patient;Acceptance;Explanation;Demonstration;Teach Back;Verbal Demonstration;Action Demonstration;Reinforcement Needed   Physical Therapy Treatment Plan   Physical Therapy Treatment Plan Continue Current Treatment Plan   Anticipated Discharge Equipment and Recommendations   DC Equipment Recommendations Unable to determine at this time   Discharge Recommendations Recommend post-acute placement for additional physical therapy services prior to discharge home   Interdisciplinary Plan of Care Collaboration   IDT Collaboration with  Nursing   Patient Position at End of Therapy In Bed;Wrist Restraints Applied;Call Light within Reach   Collaboration Comments RN updated   Session Information    Date / Session Number  10/25 - 3 (1/4, 10/31)

## 2023-10-25 NOTE — PROGRESS NOTES
Monitor Summary:   Rhythm: sr  Rate: 70-80  Measurement: .18/.10/.40  Ectopy: pvc's, pac's

## 2023-10-25 NOTE — PROGRESS NOTES
4 Eyes Skin Assessment Completed by ALVARADO Alejandro and ALVARADO Bermudez.    Head WDL  Ears WDL  Nose WDL  Mouth Ulcer(s), Right lip black scab  Neck WDL  Breast/Chest Redness and Abrasion  Shoulder Blades WDL  Spine WDL  (R) Arm/Elbow/Hand Redness, Blanching, Bruising, Abrasion, and Scab, elbow skin tear  (L) Arm/Elbow/Hand Redness, Blanching, Bruising, Abrasion, and Scab, elbow skin tear  Abdomen WDL  Groin Redness and Excoriation  Scrotum/Coccyx/Buttocks Redness and Excoriation  (R) Leg Redness, Skin Tear, R hip DTI, Knee Unstageable pressure injury  (L) Leg Redness, skin tear,   (R) Heel/Foot/Toe Redness and Blanching  (L) Heel/Foot/Toe Redness and Blanching, DTI medial foot          Devices In Places Tele Box, Blood Pressure Cuff, Pulse Ox, Triplett, OG/NG, and Nasal Cannula      Interventions In Place Gray Ear Foams, Heel Mepilex, Heel Float Boots, Waffle Overlay, TAP System, Pillows, Elbow Mepilex, Q2 Turns, Low Air Loss Mattress, and Barrier Cream    Possible Skin Injury Yes    Pictures Uploaded Into Epic Yes  Wound Consult Placed Yes  RN Wound Prevention Protocol Ordered Yes

## 2023-10-25 NOTE — CARE PLAN
Problem: Hyperinflation  Goal: Prevent or improve atelectasis  Description: Target End Date:  3 to 4 days    1. Instruct incentive spirometry usage  2.  Perform hyperinflation therapy as indicated  Outcome: Progressing   PEP QID    Problem: Humidified High Flow Nasal Cannula  Goal: Maintain adequate oxygenation dependent on patient condition  Description: Target End Date:  resolve prior to discharge or when underlying condition is resolved/stabilized    1.  Implement humidified high flow oxygen therapy  2.  Titrate high flow oxygen to maintain appropriate SpO2  Outcome: Met    4L NC O2      Problem: Bronchoconstriction  Goal: Improve in air movement and diminished wheezing  Description: Target End Date:  2 to 3 days    1.  Implement inhaled treatments  2.  Evaluate and manage medication effects  Outcome: Progressing     Duo Q4      Problem: Bronchopulmonary Hygiene  Goal: Increase mobilization of retained secretions  Description: Target End Date:  2 to 3 days    1.  Perform bronchopulmonary therapy as indicated by assessment  2.  Perform airway suctioning  3.  Perform actions to maintain patient airway  Outcome: Progressing    MM 3ml Q4

## 2023-10-25 NOTE — PROGRESS NOTES
"Pulmonary Progress Note    Date of Admission: 10/9/23  Date of Consult: 10/22/23  Consulting: Dr. Young Colin  Reason for consult: AMS/hypoxic respiratory failure     Chief Complaint:  Chief Complaint   Patient presents with    T-5000 GLF     Pt found down.  LKW 2130 on 10/8     HPI:   From Dr. Dominguez's addendum: \"83 y.o. M, PMH T2DM, admitted in transfer from [facility] 10[facility]9/20[facility] after being found down, LK W 2 days prior, AF with RVR, hypernatremia, uremia and rhabdomyolysis.  Transferred to ICU 10/11, attempted cardioversion, intubation, fluid resuscitation.\"    Pulmonary consulted to help with HFNC management. Has not seen [facility] in the past. No PFTs. No inhalers on intake medication reconciliation.     Hospital course:  10/11 - transfer to ICU, intubated, cardioversion, aggressive fluid resuscitation  10/12 - VD #2, transition to oral amiodarone, free water flushes 300 mL q4h, free water deficit -5.1 L  10/13 - VD #3, Increase free water flushes  10/14 - Extubated   10/15 - FEES, thick mucus, resp distress and re-intubated; bronch with purulent lingula secretions, BAL many WBCs, on Unasyn  10/16 - VD #2, extubated again; HFNC -> 3lpm, NPO, TF, aspiration precautions, stop 1/2 NS, decrease free water  10/17 - Extubated yesterday; somnolent but arouses follows commands; inc free water; SLP  10/18 -fluctuating mental status, high risk for deterioration, still have not found any family, 5 LPM oxygen, likely some ongoing aspiration  10/19 - more awake and oriented today; DNR/I, failed slp  10/20: On empiric abx, improving encephalopathy. Palliative consulted and recommending DNR/DNI and possible hospice.   10/21: Downgraded to IMCU. Dr. Colin spoke to patient's daughter. She indicated the patient would want to be full code.   10/22: CXR with complete opacification of left lung. A&Ox3. CPT, Mucomyst, Antibiotics, and steroids ordered.   10/23: Marked improvement in CXR. Weaned from high flow to 4-6 lpm nasal " "cannula.   10/24:Remains on 4 lpm nasal cannula with oxygen saturations 96-97%.     24h events: No significant respiratory events.   Imaging: no new imaging today  Notable lab trends: Na 151  Tmax: afebrile   ProCal: .18 on 10/17  Antibiotics: Received abx from 10/9-10/12. Ancef for MSSA PNA 10/22-   Cultures: Sputum culture MSSA from 10/15.     Allergies: Nicotine    Scheduled Medications   Medication Dose Frequency    cyanocobalamin  500 mcg DAILY    terazosin  5 mg Q EVENING    traZODone  50 mg QHS    miconazole   BID    ceFAZolin  2 g Q8HRS    acetylcysteine  3 mL Q4HRS    ipratropium-albuterol  3 mL Q4HRS (RT)    hydrocortisone sodium succinate PF  50 mg Q6HRS    amiodarone  200 mg DAILY    apixaban  5 mg BID    lansoprazole  30 mg DAILY    insulin lispro  3-14 Units Q6HRS    insulin GLARGINE  10 Units Q EVENING    MD Alert...Adult ICU Electrolyte Replacement per Pharmacy   PHARMACY TO DOSE       ROS: A 12 point ROS was performed on intake and during my interview. ROS negative unless specifically noted in HPI.     Vitals:  /58   Pulse 78   Temp 36.7 °C (98.1 °F) (Temporal)   Resp 16   Ht 1.803 m (5' 11\")   Wt 102 kg (225 lb 15.5 oz)   SpO2 99%     Physical Exam:  Physical Exam  Vitals reviewed.   Constitutional:       General: He is not in acute distress.     Appearance: He is normal weight. He is not ill-appearing, toxic-appearing or diaphoretic.   HENT:      Mouth/Throat:      Pharynx: Oropharynx is clear.   Eyes:      General:         Right eye: No discharge.         Left eye: No discharge.      Conjunctiva/sclera: Conjunctivae normal.   Cardiovascular:      Rate and Rhythm: Normal rate.   Pulmonary:      Effort: Pulmonary effort is normal.      Breath sounds: Rhonchi present.   Musculoskeletal:      Right lower leg: No edema.      Left lower leg: No edema.   Skin:     General: Skin is warm.      Capillary Refill: Capillary refill takes less than 2 seconds.      Coloration: Skin is not jaundiced. "   Neurological:      General: No focal deficit present.      Mental Status: He is alert and oriented to person, place, and time.   Psychiatric:         Mood and Affect: Mood normal.         Behavior: Behavior normal.         Laboratory Data: I personally reviewed labs including historical lab trends.     Immunization History   Administered Date(s) Administered    Influenza Vaccine Adult HD 01/14/2015    Pneumococcal polysaccharide vaccine (PPSV-23) 01/14/2015       PFTs as reviewed by me personally show: none for review     Imaging as reviewed by me personally show:  marked improvement in left lung aeration       Assessment/Plan:    Pulmonary problem list:  #Acute hypoxic respiratory failure secondary to pneumonia  #Left lung atelectasis - resolved   #MSSA PNA     Plan/recommendations:  -Ancef ordered 10/22. Patient's positive resp cultures are dated after his abx and he has a very abnormal CXR. Recommend a full 7 day course of MSSA treatment (stop date: after 10/28 dose). Can transition to PO.   -Solucortef 50 mg q6h ordered for severe CAP (Deyaya et al, N Engl J Med 2023.) - continue for now at this dose. Transitioned to PO Prednisone 50 mg for 3 additional days.   -Duonebs scheduled and PRN  -Target pulse oximetry 88-90%. Can be more aggressive with weaning FIO2.   -consider Trazodone or Seroquel while on high dose steroids to help with sleep/sundowning.     Plan of care d/w Dr. Rodrigez.     We will sign off. Please do not hesitate to contact us if we can be of any further assistance. I am most easily reached via [email or web address] secure messaging application.      Total consult time: 35 minutes which included time spent on chart review, personally reviewing pertinent images and labs, time spent counseling and educating the patient and/or family members, and coordinating care with the healthcare team to include consultants.   __________      Vaughn Navarro, DO  Staff Pulmonologist and Intensivist  [facility]     Please  note that this dictation was created using voice recognition software. The accuracy of the dictation is limited to the abilities of the software. I have made every reasonable attempt to correct obvious errors, but I expect that there are errors of grammar and possibly content that I did not discover before finalizing the note.

## 2023-10-25 NOTE — PROGRESS NOTES
Hospital Medicine Daily Progress Note    Date of Service  10/25/2023    Chief Complaint  Found down    Hospital Course  Perry Marrufo is an 83 y.o. male w/ hx of DMII, prostate disease, bipolar disorder.  He presented 10/9/2023 with transfer from [place] after being found down and last known at mental baseline 2 days prior.  He was found to have hypernatremia, atrial fibrillation with a rapid ventricular rate, rhabdomyolysis.  He was intubated, cardioversion attempted and transferred to ICU 10/11 he was extubated 10/17.     Interval Problem Update  10/25 On 4 L NC satting 99% this morning.  WBC still within normal limits. Sodium 151, mildly improving increase free water flushes.  Glucose still uncontrolled in the 200s, increase glargine 20 units.  Phosphorus 2.2, replacement ordered.  Patient still confused and agitated currently in soft restraints.  Started Seroquel 25 mg nightly.  Discussed with pulmonary they have changed patient's IV steroid to oral prednisone 50 mg 3 times a day, recommending Ancef end date 10/28. Telemetry reviewed patient in sinus rhythm. PT recommended post acute placement. SLP recommended to continue NPO. Spoke to patient's daughter at length, hx of bipolar disorder. She would not want a permanent feeding tube as she knows this would not align with her father's wishes. Discussed that if his mentation/swallowing status do not improve in the next few days could consider comfort focused care instead.     10/24 per neurosurgery report the patient with great fluctuation of mentation, at times hallucinating,  agitated, my evaluation today the patient appears calm, he is redirectable, he knows where he is currently, discussed about his family situation, he is apparently estranged from family per his report, he admits that he is unable to live by himself anymore for the future.  Wet cough, denies currently pain, vital signs currently afebrile, heart in the 70s and 80s, respiration in mid 20s,  "unlabored, with cough, saturating in the mid to high 90s on 4 L nasal can oxygen, blood pressure fluctuating in the 130s to 150s over 60s and 70s,  Sodium at 152, glucose elevated, restarting long-acting insulin, chest x-ray with right-sided rib fractures, hiatal hernia, bibasilar subsegmental atelectatic opacities.  Much improved from previous imaging  10/23: Na:150. Still quite confused. Nurse states he coughed up much mucous yesterday.  Now down to 6L via NC.    10/22: Iris feeding tube placed last night. Feeding started.  Worse left lung opacification on CXR.  I have ordered and discussed pulmonary consult.  Check ABG for possible bipap.  I discussed with daughter and updated her last night, she states he is a FULL code.  Na:147.  He is more alert today.  On HFNC:30LPM 50% FiO2. States he is very thirsty and has sponge swabs that nursing has been giving and watch for aspiration.  10/21: On HFNC 30LPM and 90% FiO2 with improved SpO2:100%. Still confused to place, year. Per RN no contact with his children and we are trying to get in touch with family.  Only neighbors arrived yesterday per RN. Patient willing to have NG tube placed for nutrition and medications per RN.  10/20: He has failed FEEs study.  He does not know he is in [place], \"I'm in [facility]\" and doesn't know he is in a hospital.  He was made DNR/DNI previously.  He state he is \"done\" and keeps pulling off his HFNC.  States being  and has several children.  I have asked case management to reach out find his children.  Nurse left a message on the one jessica phone.    I have discussed this patient's plan of care and discharge plan at IDT rounds today with Case Management, Nursing, Nursing leadership, and other members of the IDT team.    Consultants/Specialty  cardiology, nephrology, and palliative care  Pulmonary medicine  Code Status  DNAR/DNI    Disposition  The patient is not medically cleared for discharge to home or a post-acute " facility.  Anticipate discharge to: skilled nursing facility    I have placed the appropriate orders for post-discharge needs.    Review of Systems  Review of Systems   Unable to perform ROS: Dementia   Constitutional:  Positive for malaise/fatigue.   Respiratory:  Negative for shortness of breath.         Physical Exam  Temp:  [36.6 °C (97.8 °F)-36.7 °C (98.1 °F)] 36.6 °C (97.9 °F)  Pulse:  [68-78] 74  Resp:  [16-26] 16  BP: ()/(46-66) 119/57  SpO2:  [91 %-100 %] 92 %    Physical Exam  Vitals reviewed.   Constitutional:       General: He is awake.      Appearance: He is obese. He is not diaphoretic.   HENT:      Head: Normocephalic and atraumatic.      Mouth/Throat:      Mouth: Mucous membranes are dry.   Eyes:      Conjunctiva/sclera: Conjunctivae normal.   Cardiovascular:      Rate and Rhythm: Normal rate and regular rhythm.      Heart sounds: No murmur heard.  Pulmonary:      Effort: Pulmonary effort is normal. No respiratory distress.      Breath sounds: Rales present. No wheezing.   Abdominal:      General: Bowel sounds are normal. There is no distension.      Palpations: Abdomen is soft.      Tenderness: There is no abdominal tenderness.   Musculoskeletal:         General: No swelling or tenderness.      Cervical back: Neck supple. No muscular tenderness.      Right lower leg: Edema present.      Left lower leg: Edema present.   Skin:     Coloration: Skin is pale. Skin is not jaundiced.      Findings: Rash (left groin, skin fold) present.   Neurological:      Mental Status: He is disoriented.      Cranial Nerves: No cranial nerve deficit.      Motor: Weakness present.   Psychiatric:         Behavior: Behavior is agitated.         Cognition and Memory: Cognition is impaired.         Fluids    Intake/Output Summary (Last 24 hours) at 10/25/2023 1643  Last data filed at 10/25/2023 1506  Gross per 24 hour   Intake 725 ml   Output 1000 ml   Net -275 ml       Laboratory  Recent Labs     10/23/23  0517  10/24/23  0442 10/25/23  0101   WBC 7.8 8.2 6.3   RBC 2.98* 3.08* 3.12*   HEMOGLOBIN 8.8* 9.1* 9.3*   HEMATOCRIT 28.5* 28.9* 30.4*   MCV 95.6 93.8 97.4   MCH 29.5 29.5 29.8   MCHC 30.9* 31.5* 30.6*   RDW 49.9 48.5 51.0*   PLATELETCT 306 330 246   MPV 10.3 10.3 11.0     Recent Labs     10/23/23  1825 10/24/23  0442 10/25/23  0101   SODIUM 151* 152* 151*   POTASSIUM 4.0 4.1 4.1   CHLORIDE 117* 117* 116*   CO2 28 28 32   GLUCOSE 209* 231* 307*   BUN 24* 26* 34*   CREATININE 0.76 0.76 0.82   CALCIUM 7.8* 8.0* 8.0*                   Imaging  DX-CHEST-PORTABLE (1 VIEW)   Final Result      1.  Right-sided rib fractures.   2.  Suspect hiatus hernia.   3.  Bibasilar subsegmental atelectatic opacities.      DX-ABDOMEN FOR TUBE PLACEMENT   Final Result         1.  Nonspecific bowel gas pattern in the upper abdomen.   2.  Dobbhoff tube tip overlying the expected location of the pylorus or first duodenal segment.   3.  Hazy bilateral lower lobe infiltrates   4.  Small layering left pleural effusion.      DX-CHEST-PORTABLE (1 VIEW)   Final Result         1.  Decreased opacification the left thorax, likely decreased effusion.   2.  Hazy bilateral pulmonary infiltrates   3.  Cardiomegaly   4.  Right rib fractures      DX-ABDOMEN FOR TUBE PLACEMENT   Final Result         Feeding tube with tip projecting over the expected area of the third portion duodenum.      DX-CHEST-LIMITED (1 VIEW)   Final Result      Interval replacement of gastric drainage tube with feeding tube   Complete opacification of the left lung, similar to prior      DX-ABDOMEN FOR TUBE PLACEMENT   Final Result      Enteric tube has been placed and the tip projects over the gastroduodenal junction      DX-ABDOMEN FOR TUBE PLACEMENT   Final Result         1.  Pulmonary infiltrates, increased on the left vertebral study.   2.  Moderate layering left pleural effusion, increased since prior study.   3.  Atherosclerosis   4.  Right rib fractures      DX-ABDOMEN FOR TUBE  PLACEMENT   Final Result         1.  Nonspecific bowel gas pattern in the upper abdomen.   2.  Nasogastric tube terminating just distal to the gastroesophageal junction, recommend advancement.   3.  Hazy left pulmonary infiltrates, stable since prior study.   4.  Moderate layering left pleural effusion, overall stable      DX-ABDOMEN FOR TUBE PLACEMENT   Final Result      1. Appropriate position of the esophagogastric tube.   2. Worsening left lower lobe parenchymal consolidation and interval increase in size of the left pleural effusion.   3. The remainder is stable.      DX-CHEST-PORTABLE (1 VIEW)   Final Result         1.  Bilateral lower lobe infiltrates, stable since prior study.   2.  Trace bilateral pleural effusions   3.  Atherosclerosis   4.  Right rib fractures      DX-ABDOMEN FOR TUBE PLACEMENT   Final Result      1.  Enteric tube tip overlies the gastric antrum.      FY-RORSUYZ-0 VIEW   Final Result      Nonobstructive bowel gas pattern.      DX-ABDOMEN FOR TUBE PLACEMENT   Final Result         1.  Nonspecific bowel gas pattern in the upper abdomen.   2.  Nasogastric tube tip terminates overlying the expected location of the pylorus or first duodenal segment.   3.  Bilateral lower lobe opacities concerning for infiltrate.      DX-CHEST-PORTABLE (1 VIEW)   Final Result         1.  Bilateral lower lobe infiltrates, stable since prior study.   2.  Atherosclerosis      DX-CHEST-PORTABLE (1 VIEW)   Final Result         1.  Bilateral lower lobe infiltrates, decreased since prior study.      DX-CHEST-PORTABLE (1 VIEW)   Final Result      1.  Supportive tubing as described above.   2.  Slight increased inflation.   3.  No other significant change from prior exam.         DX-CHEST-LIMITED (1 VIEW)   Final Result      1.  Ill-defined bibasilar pulmonary opacities, slightly worse on the left compared to prior study. Pneumonia is in the differential.   2.  Small left pleural effusion, new since prior.       DX-ABDOMEN FOR TUBE PLACEMENT   Final Result      Nasogastric tube tip overlies the mid stomach      DX-ABDOMEN FOR TUBE PLACEMENT   Final Result      NG tube turns in the gastric body with tip at the level of the gastric fundus.      EC-ECHOCARDIOGRAM LTD W/O CONT   Final Result      DX-CHEST-PORTABLE (1 VIEW)   Final Result      1.  Mild interstitial pulmonary edema, similar to prior   2.  Bibasilar and perihilar underinflation atelectasis which could obscure an additional process. This is unchanged.      DX-CHEST-PORTABLE (1 VIEW)   Final Result      1.  Mild interstitial pulmonary edema   2.  Bibasilar and perihilar underinflation atelectasis which could obscure an additional process.   3.  Small LEFT pleural effusion      DX-CHEST-LIMITED (1 VIEW)   Final Result      1.  Bilateral basilar atelectasis and/or consolidation. Underlying infection is possible.   2.  Stable enlargement of the cardiomediastinal silhouette.   3.  Interval insertion of a central venous catheter which terminates with the tip projecting over the expected region of the mid superior vena cava.   4.  Interval placement of an endotracheal tube which terminates in satisfactory position at the level of the aortic arch.   5.  Interval placement of an enteric feeding tube which terminates in the left upper quadrant projecting over the expected location of the stomach.      DX-CHEST-PORTABLE (1 VIEW)   Final Result      No significant change from prior exam.      DX-CHEST-PORTABLE (1 VIEW)   Final Result      Bibasilar underinflation atelectasis. Superimposed pneumonia not excluded.      US-RUQ   Final Result      1.  Gallbladder sludge   2.  RIGHT pleural effusion      EC-ECHOCARDIOGRAM COMPLETE W/ CONT   Final Result      CT-CSPINE WITHOUT PLUS RECONS   Final Result         1. No acute fracture from C1 through T1 is visualized.         CT-HEAD W/O   Final Result         1. No acute intracranial abnormality. No evidence of acute intracranial  hemorrhage or mass lesion.                     DX-CHEST-PORTABLE (1 VIEW)   Final Result      1.  Low lung volumes without definite acute cardiopulmonary abnormality.           Assessment/Plan  * Acute respiratory failure with hypoxia (HCC)- (present on admission)  Assessment & Plan  Question of aspiration  Extubated 10/17  Pulmonary hygiene  Aspiration precautions  Pulmonary following   -change to prednisone   -abx end date 10/28   Chest x-ray has cleared significantly, continue current regimen  Wean O2 as able  SLP following, continue npo   Ongoing case management support, /palliative care    Advance care planning- (present on admission)  Assessment & Plan  I discussed advance care planning for at least 25 minutes with the patient's daughter, including diagnosis, prognosis, plan of care, risks and benefits of any therapies that could be offered, as well as alternatives including palliation and hospice as appropriate.  Confirmed DNR/DNI status.  She reports patient would not want PEG tube placement.  Discussed that if his swallowing/mentation does not improve in the coming days, may need to consider comfort care.  She is in agreement with this, will continue to discuss depending on progress.  Patient has been accepted to SNF when medically clear, currently he is on restraints which is a barrier to his discharge as well as the NG tube.  Time spent was exclusive of evaluation and management of other separately billable procedures.  Start time: 405 pm  End time: 430 pm     Decubitus skin ulcer  Assessment & Plan  Wound care  Frequent turning    Anemia  Assessment & Plan  No overt bleeding, monitor  Monitor cbc    Shock (HCC)  Assessment & Plan  Undifferentiated - cardiac due to arrhythmia vs hypovolemia  Shock resolved  Monitor vitals.    Aspiration precautions  Assessment & Plan  Increasing oxygen requirement and chest x-ray showed infiltration possible pneumonia  Failed 10/20 FEEs  Aspiration  precautions.  Continue monitor closely.    Rhabdomyolysis- (present on admission)  Assessment & Plan  Continue IVF resuscitation, monitor CPK  CPK significant trended down.  S/p IVF    Atrial fibrillation with RVR ([facility])- (present on admission)  Assessment & Plan  No known history of Afib, RVR with hypotension attempted electrical cardioversion 10/11 without success, put on amio GTT  10/22 on oral amiodarone  Echo EF 65%, no valvular abnormalities  Rate Controlled  Continuous tele monitoring  Anticoagulation with Eliquis  Rhythm control with amiodarone, continue loading  Optimize electrolytes     Dehydration- (present on admission)  Assessment & Plan  Continue fluid resuscitation intravenously and enterally for now  Monitor UOP closely    Hypernatremia- (present on admission)  Assessment & Plan  Hypovolemic hypernatremia  Free water via NG      ADELAIDE (acute kidney injury) ([facility])- (present on admission)  Assessment & Plan  Improved with fluids  Monitoring bmp, I/O's    Altered mental status- (present on admission)  Assessment & Plan  Fluctuating mental status  Avoid offending agents  The patient apparently with significant insomnia  Daughter reports hx bipolar     Start seroquel    DM2 (diabetes mellitus, type 2) ([facility])- (present on admission)  Assessment & Plan  Glargine 20 units q HS  Monitor accuchecks and cover with SSI  Hypoglycemic protocol  A1c 8 in past week        Sepsis ([facility])- (present on admission)  Assessment & Plan  Possibly related to pneumonia    Diabetes mellitus-Type 2, not on treatment  Assessment & Plan          BPH (benign prostatic hyperplasia)- (present on admission)  Assessment & Plan  Restart Flomax          VTE prophylaxis: Eliquis     I have performed a physical exam and reviewed and updated ROS and Plan today (10/25/2023). In review of yesterday's note (10/24/2023), there are no changes except as documented above.      Please note that this dictation was created using voice recognition software.  I have made every reasonable attempt to correct obvious errors, but I expect that there are errors of grammar and possibly context that I did not discover before finalizing the note.

## 2023-10-25 NOTE — DISCHARGE PLANNING
Case Management Discharge Planning    Admission Date: 10/9/2023  GMLOS: 5.1  ALOS: 16    6-Clicks ADL Score: 10  6-Clicks Mobility Score: 11  PT and/or OT Eval ordered: Yes  PT/OT: Recommended post acute placement  Post-acute Referrals Ordered: Yes  Post-acute Choice Obtained: No  Has referral(s) been sent to post-acute provider:  No      Anticipated Discharge Dispo: Discharge Disposition: D/T to SNF with Medicare cert in anticipation of skilled care (03)    Family Support:  Connie Judd- Oldest daughter- [phone]   Nolviaautumn Stone- Daughter. [phone]    DME Needed: No    Action(s) Taken: chart reviewed.Pt discussed during IDT rounds. SNF blanket sent out.     Escalations Completed: None    Medically Clear: No    Next Steps: f/u with pt and medical team to discuss dc needs and barriers.    Barriers to Discharge: Medical clearance    Is the patient up for discharge tomorrow: No

## 2023-10-25 NOTE — ASSESSMENT & PLAN NOTE
DNR/DNI  Oldest daughter is primary contact, does not want permanent feeding tube  Palliative care following

## 2023-10-25 NOTE — WOUND TEAM
[facility]  Inpatient Services  Wound and Skin Care Follow-up    Admission Date: 10/9/2023     Last order of IP CONSULT TO WOUND CARE was found on 10/23/2023 from Hospital Encounter on 10/9/2023     HPI, PMH, SH: Reviewed    Past Surgical History:   Procedure Laterality Date    PIN INSERTION  1/2/2015    Performed by Alon Finn M.D. at SURGERY [facility] ORS    HIP CANNULATED SCREW  1/2/2015    Performed by Alon Finn M.D. at SURGERY [facility] ORS    OTHER ORTHOPEDIC SURGERY      FRACTURE LEFT HIP     Social History     Tobacco Use    Smoking status: Never    Smokeless tobacco: Not on file   Substance Use Topics    Alcohol use: No     Chief Complaint   Patient presents with    T-5000 GLF     Pt found down.  LKW 2130 on 10/8     Diagnosis: AMS (altered mental status) [R41.82]    Unit where seen by Wound Team: [id number]     WOUND FOLLOW UP RELATED TO:  Sacrococcygeal        WOUND TEAM PLAN OF CARE - Frequency of Follow-up:   Nursing to follow dressing orders written for wound care. Contact wound team if area fails to progress, deteriorates or with any questions/concerns if something comes up before next scheduled follow up (See below as to whether wound is following and frequency of wound follow up)  Dressing changes by wound team:                   Not following, consult as needed  - sacrococcygeal     WOUND HISTORY:       83 y.o. male who presented 10/9/2023 with a PMHx of DM and prostate disease admitted 10/9 from [place] after being found down x 2 days. Found to be in Afib with RVR which converted with metoprolol and altered with hypernatremia and uremia and mild rhabo. Admitted to telemetry under hospitalist services. This afternoon developed worsening hypoxia requiring escalation to maximum HFNC. Also went back into Afib with RVR but this time was hemodynamically significant with a BP that dropped to 50/30 requiring attempted electrical cardioversion. Cardiology  consulted. Na increasing despite IVF resuscitation. Nephrology consulted by hospitalist.     10/12 - VD #2, transition to oral amiodarone, free water flushes 300 mL q4h, free water deficit -5.1 L  10/13 - VD #3, Increase free water flushes  10/14 - Extubated, continues to be encephalopathic  10/15 - Re intubated due to increasing AMS and hypoxia, bronchoscopy with L mainstem occlusion with thick, white secretions that were suctioned  10/16 - Extubated, decrease free water flushes 400 ml q2h to q8h, d/c 1/2 NS, attempted to contact 4 different family members without success  10/17 - Increase free water flushes, palliative unable to find family, positive         WOUND ASSESSMENT/LDA       Wound 10/09/23 Pressure Injury Sacrum;Buttocks Bilateral POA evolving DTI (Active)   Wound Image    10/24/23 1700   Site Assessment Pink;Red 10/24/23 1700   Periwound Assessment Denuded 10/24/23 1700   Margins Defined edges 10/24/23 1700   Closure Secondary intention;Open to air 10/24/23 1700   Drainage Amount None 10/24/23 1700   Drainage Description Serous 10/24/23 1700   Treatments Cleansed;Site care;Offloading 10/24/23 1700   Offloading/DME Other (comment) 10/20/23 0800   Wound Cleansing Soap and Water 10/24/23 1700   Periwound Protectant Antifungal Therapy;Barrier Paste 10/24/23 1700   Dressing Status Open to Air 10/24/23 1700   Dressing Changed Reapplied 10/24/23 1700   Dressing Cleansing/Solutions Not Applicable 10/24/23 1700   Dressing Options Open to Air 10/24/23 1700   Dressing Change/Treatment Frequency As Needed 10/24/23 1700   NEXT Weekly Photo (Inpatient Only) 10/31/23 10/24/23 1700   Wound Team Following Weekly 10/19/23 1500   WOUND NURSE ONLY - Pressure Injury Stage DTPI 10/19/23 1500   Wound Length (cm) 20 cm 10/19/23 1500   Wound Width (cm) 20 cm 10/19/23 1500   Wound Depth (cm) 0.05 cm 10/19/23 1500   Wound Surface Area (cm^2) 400 cm^2 10/19/23 1500   Wound Volume (cm^3) 20 cm^3 10/19/23 1500   Shape partial  thickness 10/24/23 1700   Wound Odor None 10/24/23 1700   WOUND NURSE ONLY - Time Spent with Patient (mins) 30 10/24/23 1700       Vascular:    LENI:   No results found.    Lab Values:    Lab Results   Component Value Date/Time    WBC 8.2 10/24/2023 04:42 AM    RBC 3.08 (L) 10/24/2023 04:42 AM    HEMOGLOBIN 9.1 (L) 10/24/2023 04:42 AM    HEMATOCRIT 28.9 (L) 10/24/2023 04:42 AM    CREACTPROT 5.43 (H) 10/23/2023 06:25 PM    HBA1C 8.0 (H) 10/10/2023 12:28 AM         Culture Results show:  No results found for this or any previous visit (from the past 720 hour(s)).    Pain Level/Medicated:  Patient denies pain       INTERVENTIONS BY WOUND TEAM:  Chart and images reviewed. Discussed with bedside RN. All areas of concern (based on picture review, LDA review and discussion with bedside RN) have been thoroughly assessed. Documentation of areas based on significant findings. This RN in to assess patient. Performed standard wound care which includes appropriate positioning, dressing removal and non-selective debridement. Pictures and measurements obtained weekly if/when required.    Wound:  Sacrococcygeal  Preparation for Dressing removal: Removed without difficulty  Cleansed/Non-selectively Debrided with:  Moist warm washcloth  Latesha wound: Cleansed with Moist warm washcloth, Prepped with Miconazole CAMILA  Primary Dressing:  miconazole and MIRIAM    Advanced Wound Care Discharge Planning  Number of Clinicians necessary to complete wound care: 1  Is patient requiring IV pain medications for dressing changes:  No   Length of time for dressing change 5 min. (This does not include chart review, pre-medication time, set up, clean up or time spent charting.)    Interdisciplinary consultation: Patient, Bedside RN (Naina), Viv ROGERS (Wound RN).    EVALUATION / RATIONALE FOR TREATMENT:     Date:  10/24/23  Wound Status:  Wound improving    Pressure injuries resolving to the sacrococcygeal area, partial thickness, starting to alex.   Denuded tissue likely from yeast infection, miconazole ordered.     Date:  10/19/23  Wound Status:  Wound deteriorating    Sacrum deep tissue pressure injury to sacrum opening with pink and yellow excoriated tissue, magdiel-wound is fragile and flaking. Viscopatch zinc impregnated gauze applied to encourage re-epithelialization of superficial 100% viable wound bed, and to provide a non-stick wound contact layer. Right hip deep tissue injury evolved -macerated with moist yellow slough to wound bed, periwound is dry and intact with no discoloration at this time. Hydrocolloid applied to allow rapid epithelialization at this phase of wound healing, maintain moist wound bed, to lower pH for wound healing and to facilitate autolytic debridement.          Goals: Steady decrease in wound area and depth weekly.    NURSING PLAN OF CARE ORDERS:  Dressing changes: See Dressing Care orders    NUTRITION RECOMMENDATIONS   Wound Team Recommendations:  Protein supplements  Arginine powder     DIET ORDERS (From admission to next 24h)       Start     Ordered    10/15/23 1547  Diet NPO Restrict to: Strict (okay to receive meds through the NG/OG tube)  ALL MEALS        Question Answer Comment   Type: Now    Diet NPO Restrict to: Strict okay to receive meds through the NG/OG tube       10/15/23 1547    10/12/23 1306  Diet: Diet Tube Feed; Formula: Impact Peptide 1.5; Goal Rate (mL/Hour): 55  ALL MEALS        Question Answer Comment   Diet Diet Tube Feed    Formula: Impact Peptide 1.5    Goal Rate (mL/Hour) 55        10/12/23 1306                    PREVENTATIVE INTERVENTIONS:   Q shift Steven - performed per nursing policy  Q shift pressure point assessments - performed per nursing policy    Surface/Positioning  ICU Low Airloss - Currently in Place  Reposition q 2 hours - Currently in Place  TAPs Turning system - Currently in Place  Waffle overlay  - Currently in Place    Offloading/Redistribution  Heel offloading dressing (Silicone  dressing) - Currently in Place  Heel float boots (Prevalon boot) - Currently in Place      Respiratory  Silicone O2 tubing - Currently in Place  Gray Foam Ear protectors - Currently in Place    Containment/Moisture Prevention    Barrier paste - Applied this Visit  Antifungal treatment - Applied this Visit    Mobilization      Unable to assess     Anticipated discharge plans:  TBD        Vac Discharge Needs:  Vac Discharge plan is purely a recommendation from wound team and not a requirement for discharge unless otherwise stated by physician.  Not Applicable Pt not on a wound vac

## 2023-10-26 ENCOUNTER — APPOINTMENT (OUTPATIENT)
Dept: RADIOLOGY | Facility: MEDICAL CENTER | Age: 83
DRG: 871 | End: 2023-10-26
Payer: COMMERCIAL

## 2023-10-26 LAB
ANION GAP SERPL CALC-SCNC: 4 MMOL/L (ref 7–16)
BUN SERPL-MCNC: 41 MG/DL (ref 8–22)
CALCIUM SERPL-MCNC: 7.7 MG/DL (ref 8.5–10.5)
CHLORIDE SERPL-SCNC: 117 MMOL/L (ref 96–112)
CO2 SERPL-SCNC: 30 MMOL/L (ref 20–33)
CREAT SERPL-MCNC: 0.78 MG/DL (ref 0.5–1.4)
ERYTHROCYTE [DISTWIDTH] IN BLOOD BY AUTOMATED COUNT: 53 FL (ref 35.9–50)
GFR SERPLBLD CREATININE-BSD FMLA CKD-EPI: 89 ML/MIN/1.73 M 2
GLUCOSE BLD STRIP.AUTO-MCNC: 249 MG/DL (ref 65–99)
GLUCOSE BLD STRIP.AUTO-MCNC: 255 MG/DL (ref 65–99)
GLUCOSE BLD STRIP.AUTO-MCNC: 285 MG/DL (ref 65–99)
GLUCOSE BLD STRIP.AUTO-MCNC: 328 MG/DL (ref 65–99)
GLUCOSE SERPL-MCNC: 264 MG/DL (ref 65–99)
HCT VFR BLD AUTO: 31.1 % (ref 42–52)
HGB BLD-MCNC: 9.3 G/DL (ref 14–18)
MCH RBC QN AUTO: 29.3 PG (ref 27–33)
MCHC RBC AUTO-ENTMCNC: 29.9 G/DL (ref 32.3–36.5)
MCV RBC AUTO: 98.1 FL (ref 81.4–97.8)
PHOSPHATE SERPL-MCNC: 2.3 MG/DL (ref 2.5–4.5)
PLATELET # BLD AUTO: 292 K/UL (ref 164–446)
PMV BLD AUTO: 10.2 FL (ref 9–12.9)
POTASSIUM SERPL-SCNC: 3.9 MMOL/L (ref 3.6–5.5)
RBC # BLD AUTO: 3.17 M/UL (ref 4.7–6.1)
SODIUM SERPL-SCNC: 151 MMOL/L (ref 135–145)
WBC # BLD AUTO: 7.6 K/UL (ref 4.8–10.8)

## 2023-10-26 PROCEDURE — A9270 NON-COVERED ITEM OR SERVICE: HCPCS | Performed by: INTERNAL MEDICINE

## 2023-10-26 PROCEDURE — 84100 ASSAY OF PHOSPHORUS: CPT

## 2023-10-26 PROCEDURE — 700102 HCHG RX REV CODE 250 W/ 637 OVERRIDE(OP): Performed by: HOSPITALIST

## 2023-10-26 PROCEDURE — 99233 SBSQ HOSP IP/OBS HIGH 50: CPT | Performed by: INTERNAL MEDICINE

## 2023-10-26 PROCEDURE — 85027 COMPLETE CBC AUTOMATED: CPT

## 2023-10-26 PROCEDURE — 700102 HCHG RX REV CODE 250 W/ 637 OVERRIDE(OP): Performed by: INTERNAL MEDICINE

## 2023-10-26 PROCEDURE — 700105 HCHG RX REV CODE 258: Performed by: INTERNAL MEDICINE

## 2023-10-26 PROCEDURE — A9270 NON-COVERED ITEM OR SERVICE: HCPCS | Performed by: HOSPITALIST

## 2023-10-26 PROCEDURE — 92526 ORAL FUNCTION THERAPY: CPT

## 2023-10-26 PROCEDURE — 700111 HCHG RX REV CODE 636 W/ 250 OVERRIDE (IP): Performed by: INTERNAL MEDICINE

## 2023-10-26 PROCEDURE — A9270 NON-COVERED ITEM OR SERVICE: HCPCS | Performed by: NURSE PRACTITIONER

## 2023-10-26 PROCEDURE — 82962 GLUCOSE BLOOD TEST: CPT | Mod: 91

## 2023-10-26 PROCEDURE — 770001 HCHG ROOM/CARE - MED/SURG/GYN PRIV*

## 2023-10-26 PROCEDURE — 74230 X-RAY XM SWLNG FUNCJ C+: CPT

## 2023-10-26 PROCEDURE — 80048 BASIC METABOLIC PNL TOTAL CA: CPT

## 2023-10-26 PROCEDURE — 92611 MOTION FLUOROSCOPY/SWALLOW: CPT

## 2023-10-26 PROCEDURE — 36415 COLL VENOUS BLD VENIPUNCTURE: CPT

## 2023-10-26 PROCEDURE — 700102 HCHG RX REV CODE 250 W/ 637 OVERRIDE(OP): Performed by: NURSE PRACTITIONER

## 2023-10-26 RX ORDER — PREDNISONE 50 MG/1
50 TABLET ORAL DAILY
Status: COMPLETED | OUTPATIENT
Start: 2023-10-27 | End: 2023-10-27

## 2023-10-26 RX ORDER — QUETIAPINE FUMARATE 25 MG/1
25 TABLET, FILM COATED ORAL NIGHTLY
Status: DISCONTINUED | OUTPATIENT
Start: 2023-10-26 | End: 2023-10-30

## 2023-10-26 RX ORDER — INSULIN LISPRO 100 [IU]/ML
5 INJECTION, SOLUTION INTRAVENOUS; SUBCUTANEOUS 3 TIMES DAILY
Status: DISCONTINUED | OUTPATIENT
Start: 2023-10-26 | End: 2023-10-31

## 2023-10-26 RX ADMIN — INSULIN LISPRO 10 UNITS: 100 INJECTION, SOLUTION INTRAVENOUS; SUBCUTANEOUS at 12:43

## 2023-10-26 RX ADMIN — LANSOPRAZOLE 30 MG: 30 TABLET, ORALLY DISINTEGRATING, DELAYED RELEASE ORAL at 04:12

## 2023-10-26 RX ADMIN — TRAZODONE HYDROCHLORIDE 50 MG: 100 TABLET ORAL at 21:58

## 2023-10-26 RX ADMIN — INSULIN LISPRO 7 UNITS: 100 INJECTION, SOLUTION INTRAVENOUS; SUBCUTANEOUS at 17:51

## 2023-10-26 RX ADMIN — APIXABAN 5 MG: 5 TABLET, FILM COATED ORAL at 17:50

## 2023-10-26 RX ADMIN — INSULIN LISPRO 5 UNITS: 100 INJECTION, SOLUTION INTRAVENOUS; SUBCUTANEOUS at 17:51

## 2023-10-26 RX ADMIN — APIXABAN 5 MG: 5 TABLET, FILM COATED ORAL at 04:12

## 2023-10-26 RX ADMIN — MICONAZOLE NITRATE: 20 CREAM TOPICAL at 04:13

## 2023-10-26 RX ADMIN — CYANOCOBALAMIN TAB 500 MCG 500 MCG: 500 TAB at 04:12

## 2023-10-26 RX ADMIN — TERAZOSIN 5 MG: 5 CAPSULE ORAL at 17:50

## 2023-10-26 RX ADMIN — CEFAZOLIN 2 G: 2 INJECTION, POWDER, FOR SOLUTION INTRAMUSCULAR; INTRAVENOUS at 05:11

## 2023-10-26 RX ADMIN — CEFAZOLIN 2 G: 2 INJECTION, POWDER, FOR SOLUTION INTRAMUSCULAR; INTRAVENOUS at 14:28

## 2023-10-26 RX ADMIN — PREDNISONE 50 MG: 50 TABLET ORAL at 04:12

## 2023-10-26 RX ADMIN — INSULIN LISPRO 4 UNITS: 100 INJECTION, SOLUTION INTRAVENOUS; SUBCUTANEOUS at 05:14

## 2023-10-26 RX ADMIN — AMIODARONE HYDROCHLORIDE 200 MG: 200 TABLET ORAL at 04:12

## 2023-10-26 RX ADMIN — MICONAZOLE NITRATE: 20 CREAM TOPICAL at 22:02

## 2023-10-26 RX ADMIN — QUETIAPINE FUMARATE 25 MG: 25 TABLET ORAL at 21:58

## 2023-10-26 RX ADMIN — CEFAZOLIN 2 G: 2 INJECTION, POWDER, FOR SOLUTION INTRAMUSCULAR; INTRAVENOUS at 22:00

## 2023-10-26 ASSESSMENT — COGNITIVE AND FUNCTIONAL STATUS - GENERAL
WALKING IN HOSPITAL ROOM: A LOT
MOVING TO AND FROM BED TO CHAIR: A LOT
SUGGESTED CMS G CODE MODIFIER DAILY ACTIVITY: CL
DRESSING REGULAR UPPER BODY CLOTHING: A LOT
DRESSING REGULAR LOWER BODY CLOTHING: A LOT
HELP NEEDED FOR BATHING: A LOT
MOVING FROM LYING ON BACK TO SITTING ON SIDE OF FLAT BED: A LOT
EATING MEALS: A LOT
STANDING UP FROM CHAIR USING ARMS: TOTAL
DAILY ACTIVITIY SCORE: 12
MOBILITY SCORE: 11
PERSONAL GROOMING: A LOT
SUGGESTED CMS G CODE MODIFIER MOBILITY: CL
CLIMB 3 TO 5 STEPS WITH RAILING: A LOT
TOILETING: A LOT
TURNING FROM BACK TO SIDE WHILE IN FLAT BAD: A LOT

## 2023-10-26 ASSESSMENT — ENCOUNTER SYMPTOMS: SHORTNESS OF BREATH: 0

## 2023-10-26 ASSESSMENT — FIBROSIS 4 INDEX: FIB4 SCORE: 2.042752923427803959

## 2023-10-26 NOTE — THERAPY
Speech Language Pathology   Videofluoroscopic Swallow Study Evaluation     Patient Name: Perry Marrufo  AGE:  83 y.o., SEX:  male  Medical Record #: [id number]  Date of Service: 10/26/2023      History of Present Illness  83 y.o. male who presented on 10/9/2023 after being found down by neighbors. PT was found confused, thirsty.      10/14 - Extubated, continues to be encephalopathic  10/15 - Re intubated due to increasing AMS and hypoxia, bronchoscopy with L mainstem occlusion with thick, white secretions that were suctioned  10/16 - Extubated, decrease free water flushes 400 ml q2h to q8h, d/c 1/2 NS, attempted to contact 4 different family members without success     PMHx DM type II, prostate disease  No previous h/o SLP services at [facility]     CMHx Acute respiratory failure w/ hypoxia, Hypernatremia, AMS, Sepsis, Afib w/ RVR, ADELAIDE, Shock, Rhabdomyolysis, dehydration    CXR 10/24/2023  1.  Right-sided rib fractures.  2.  Suspect hiatus hernia.  3.  Bibasilar subsegmental atelectatic opacities.      Pertinent Information  Current Method of Nutrition: NPO until cleared by speech pathology, NGT  Patient Behaviors: Confused  Dentition: Edentulous, Upper partial (was not able to wear dentures for study, no adhesive at bedside)  Secretion Management: Excess secretions  Feeding Tube: NGT intact to left nare  Tracheostomy: No       Factor(s) Affecting Performance: Impaired endurance      Discussed the risks, benefits, and alternatives of the VFSS procedure. Patient/family acknowledged and agreed to proceed.      Assessment  Videofluoroscopic Swallow Study was conducted in the Lateral projection(s) to evaluate oropharyngeal swallow function. A radiology tech was present to assist with the procedure.      Positioning: Seated upright in fluoroscopy chair  Anatomic View: WFL  Bolus Administration: SLP, Patient  PO Barium Contrast Trials: Varibar thin, Varibar nectar (mildly thick), Liquidised mixed with Varibar powder, Varibar  pudding, Soft & Bite sized coated with Varibar powder      Consistency PAS Score Timing Residue Comments   Thin Liquid 8 During swallow Vallecular Residue: Mild (5%-25%)  Pyriform Sinus Residue: Trace (1%-5%) Tsp, cup, w/3 sec prep   Mildly Thick 8 During Swallow Vallecular Residue: Mild (5%-25%)  Pyriform Sinus Residue: Trace (1%-5%) PAS 8 tsp x1  PAS 3 tsp w/ chin tuck  PAS 2 cup w/ chin tuck  PAS 1 tsp w/ chin tuck   Liquidised 1 N/A Vallecular Residue: Moderate (25%-50%)  Pyriform Sinus Residue: Trace (1%-5%)    Pudding 1 N/A Vallecular Residue: Moderate (25%-50%)  Pyriform Sinus Residue: Trace (1%-5%)    Soft & Bite Sized 1 N/A Vallecular Residue: Severe (>50%)  Pyriform Sinus Residue: Mild (5%-25%)      Penetration-Aspiration Scale (PAS)  1     No contrast enters airway  2     Contrast enters the airway, remains above the vocal folds, and is ejected from the airway (not seen in the airway at the end of the swallow).  3     Contrast enters the airway, remains above the vocal folds, and is not ejected from the airway (is seen in the airway after the swallow).  4     Contrast enters the airway, contacts the vocal folds, and is ejected from the airway.  5     Contrast enters the airway, contacts the vocal folds, and is not ejected from the airway  6     Contrast enters the airway, crosses the plane of the vocal folds, and is ejected from the airway.  7     Contrast enters the airway, crosses the plane of the vocal folds, and is not ejected from the airway despite effort.  8     Contrast enters the airway, crosses the plane of the vocal folds, is not ejected from the airway and there is no response to aspiration.       Oral phase: Adequate oral bolus acceptance/containment. Suspected piecemeal deglutition with more viscous consistencies, evidenced by continued oral manipulation after initial swallow followed by additional swallow. Prolonged mastication with trials of soft/bite sized solids.      Pharyngeal  phase:  - Incomplete laryngeal vestibule closure resulted in aspiration during the swallow with trials of thin liquids and with trials of mildly thick liquids x1 via tsp. Pt was not sensate to aspiration events.  -Incomplete laryngeal vestibule closure resulted in penetration above the vocal folds with trials of mildly thick liquids. Chin tuck and bolus via tsp were effective to prevent aspiration/penetration with trials of mildly thick liquids only.  - Reduced base of tongue retraction resulted in mild vallecular and BoT residue with trials of thin liquids and mildly thick liquids. Moderate - severe with more viscous consistencies.  - Impaired pharyngeal constriction/shortening and UES opening resulted in trace-mild diffused residue across all trials.       Compensatory Strategies:  -Cued cough - not effective to clear aspirate  -Cued cough/ throat clear - effective to clear penetrate  -Cleansing swallow - somewhat effective to clear residue  -Mildly thick liquid rinse - somewhat effective to clear residue  -Chin tuck w/ small bolus (MT2) - effective to prevent aspiration, not penetration      Severity Rating:   Severity Rating: BARNEY     BARNEY: Moderate      Clinical Impressions  The patient presents with a moderate oropharyngeal dysphagia with suspect component of esophageal dysphagia. Pt's risk for ascending and descending aspiration and related sequelae is elevated. Dysphagia is likely acute related to endotracheal intubation, prolonged NPO status and generalized weakness. Would recommend NGT as primary source of nutrition/hydration/meds with cautious initiation of PO snacks. Pt will likely need repeat diagnostic evaluation prior to diet upgrade given silent aspiration of thin liquids and significant residue. Pt appears to be a fair candidate for ongoing behavorial and exercise-based swallow rehabilitation. Dysphagia outcomes can be maximized with use of mobility as pt is able and frequent, thorough oral care.  "Consider training the following evidence-based swallowing exercises in therapy based on patient-specific pathophysiology: effortful swallow, chin tuck against resistance (CTAR), and tongue pull back.          Recommendations  Diet Consistency: LQ3/MT2 snacks with chin tuck - NGT primary source of nutrition/hydration/meds  Medication: Non Oral, Crush with applesauce, as appropriate  Supervision: 1:1 feeding with constant supervision, Encourage self-feeding  Positioning: Fully upright and midline during oral intake  Strategies: No straws, Liquids by teaspoon only, Multiple swallows (3) per bite/sips, Chin tuck, Slow rate of intake, Alternate bites and sips, Small bites/sips  Oral Care: Q4h  Additional Instrumentation: Repeat diagnostic study when clinically appropriate         SLP Treatment Plan  Treatment Plan: Dysphagia Treatment, Patient/Family/Caregiver Training  SLP Frequency: 3x Per Week  Estimated Duration: Until Therapy Goals Met      Anticipated Discharge Needs  Discharge Recommendations: Recommend post-acute placement for additional speech therapy services prior to discharge home   Therapy Recommendations Upon DC: Dysphagia Training, Patient / Family / Caregiver Education        Patient / Family Goals  Patient / Family Goal #1: \"water\"  Goal #1 Outcome: Progressing slower than expected  Short Term Goal # 1: Pt will consume prefeeding trials with SLP with no overt s/sx of aspiration  Goal Outcome # 1: Goal met, new goal added  Short Term Goal # 1 B : Patient will consume PU/MT snacks with strict adherence to swallow precautions.  Short Term Goal # 2: Pt will complete instrumental swallow assessment to determine physiology of swallow.  Goal Outcome # 2 : Goal met  Short Term Goal # 2 B : Pt will complete 20 reps each of swallow exercises targeting pharyngeal shortening, laryngeal vestibule closure and base of tongue retraction with \"good\" acuracy  Goal Outcome  # 2 B: Progressing as expected      Yvette " MS Kavita,CCC-SLP

## 2023-10-26 NOTE — THERAPY
"Speech Language Pathology   Daily Treatment     Patient Name: Perry Marrufo  AGE:  83 y.o., SEX:  male  Medical Record #: [id number]  Date of Service: 10/26/2023      Precautions:  Precautions: Fall Risk, Nasogastric Tube, Swallow Precautions      Subjective  RN cleared pt for session. Pt received awake, alert, upright in bed. Agreeable to PO trials.       Assessment  PO trials of ice chips utilized for swallow exercise implementation. Pt completed effortful swallow x15 with fair accuracy and max cues. Single wet cough response appreciated with ice chips. Vocal quality remained stable throughout oral intake. Discussed repeat diagnostic swallow study to assess current swallow function, pt agreeable to participate, \"let's do it.\"       Clinical Impressions  Patient presents with good participation in oral intake this date, thus pt is appropriate for a repeat instrumental swallow study. Pt to participate in MBSS to further assess oropharyngeal swallow function this date as schedule permits.       Recommendations  Treatment Completed: Dysphagia Treatment     Dysphagia Treatment  Diet Consistency: NPO/TF, pre-feeding trials with SLP only  Instrumentation: VFSS (MBSS)  Medication: Non Oral  Oral Care: Q4h         SLP Treatment Plan  Treatment Plan: Dysphagia Treatment  SLP Frequency: 3x Per Week  Estimated Duration: Until Therapy Goals Met      Anticipated Discharge Needs  Discharge Recommendations: Recommend post-acute placement for additional speech therapy services prior to discharge home  Therapy Recommendations Upon DC: Dysphagia Training, Patient / Family / Caregiver Education      Patient / Family Goals  Patient / Family Goal #1: \"water\"  Goal #1 Outcome: Progressing slower than expected  Short Term Goals  Short Term Goal # 1: Pt will consume prefeeding trials with SLP with no overt s/sx of aspiration  Goal Outcome # 1: Progressing slower than expected  Short Term Goal # 2: Pt will complete instrumental swallow " "assessment to determine physiology of swallow.  Goal Outcome # 2 : Goal met, new goal aded  Short Term Goal # 2 B : Pt will complete 20 reps each of swallow exercises targeting pharyngeal shortening, laryngeal vestibule closure and base of tongue retraction with \"good\" acuracy  Goal Outcome  # 2 B: Progressing as expected      YESENIA Hurst  "

## 2023-10-26 NOTE — DIETARY
Nutrition support weekly update:  Day 17 of admit.  Perry Marrufo is a 83 y.o. male with admitting DX of AMS (altered mental status).      Tube feeding/TPN initiated on 10/12. Current TF via NG tube is Impact Peptide 1.5 @ goal rate of 55 mL/hr to provide 1980 kcal, 124 g protein, and 1016 mL free water.    Assessment:  Weight 105.3 kg (232 lb 2.3 oz) taken via bed scale on 10/26    Wt increased by 30 kg from 10/19-10/22. Comments left in flow sheets state that bed scale not accurate, will continue w/ kcal and protein calculations previously made w/ 73 kg wt from 10/18.    Evaluation:   Pt seen by SLP this a.m., planning for MBSS to reassess swallow.   MD discussion w/ pt's daughter yesterday revealed PEG would not align w/ pt's wishes.  Edema: Generalized general, abdominal, back, and flank; 1+ BLE and BUE  Wounds: pressure injury to sacrum, knee, foot, hip, heel. Wound care continues to follow  Labs (10/25): sodium 151, chloride 116, glucose 307, BUN 34, phosphorous 2.3 (today)  Meds: Vitamin B-12, Humalog, prevacid  Last BM: 10/23  Current feeding remains appropriate to meet pt needs.     Recommendations/Plan:  Continue Impact Peptide 1.5 @ goal rate of 55 mL/hr.   Fluids per MD.  Monitor wt and obtain accurate scale wt as able.      RD following

## 2023-10-26 NOTE — PROGRESS NOTES
Hospital Medicine Daily Progress Note    Date of Service  10/26/2023    Chief Complaint  Found down    Hospital Course  Perry Marrufo is an 83 y.o. male w/ hx of DMII, prostate disease, bipolar disorder.  He presented 10/9/2023 with transfer from [facility] after being found down and last known at mental baseline 2 days prior.  He was found to have hypernatremia, atrial fibrillation with a rapid ventricular rate, rhabdomyolysis.  He was intubated, cardioversion attempted and transferred to ICU 10/11 he was extubated 10/17.     Interval Problem Update  10/26 Continues on 4 L NC.  Glucose continues to be uncontrolled despite increasing glargine last night, increase glargine 25 U QHS, start 5 units of lispro with meals in addition to sliding scale.  Sodium continues at 151, increase free water flushes. Patient still confused and requiring restraints.  No aggressive behavior.  SLP evaluated the patient this morning recommending MBS to possibly start diet.    10/25 On 4 L NC satting 99% this morning.  WBC still within normal limits. Sodium 151, mildly improving increase free water flushes.  Glucose still uncontrolled in the 200s, increase glargine 20 units.  Phosphorus 2.2, replacement ordered.  Patient still confused and agitated currently in soft restraints.  Started Seroquel 25 mg nightly.  Discussed with pulmonary they have changed patient's IV steroid to oral prednisone 50 mg 3 times a day, recommending Ancef end date 10/28. Telemetry reviewed patient in sinus rhythm. PT recommended post acute placement. SLP recommended to continue NPO. Spoke to patient's daughter at length, hx of bipolar disorder. She would not want a permanent feeding tube as she knows this would not align with her father's wishes. Discussed that if his mentation/swallowing status do not improve in the next few days could consider comfort focused care instead.   10/24 per neurosurgery report the patient with great fluctuation of mentation, at  "times hallucinating,  agitated, my evaluation today the patient appears calm, he is redirectable, he knows where he is currently, discussed about his family situation, he is apparently estranged from family per his report, he admits that he is unable to live by himself anymore for the future.  Wet cough, denies currently pain, vital signs currently afebrile, heart in the 70s and 80s, respiration in mid 20s, unlabored, with cough, saturating in the mid to high 90s on 4 L nasal can oxygen, blood pressure fluctuating in the 130s to 150s over 60s and 70s,  Sodium at 152, glucose elevated, restarting long-acting insulin, chest x-ray with right-sided rib fractures, hiatal hernia, bibasilar subsegmental atelectatic opacities.  Much improved from previous imaging  10/23: Na:150. Still quite confused. Nurse states he coughed up much mucous yesterday.  Now down to 6L via NC.    10/22: Iris feeding tube placed last night. Feeding started.  Worse left lung opacification on CXR.  I have ordered and discussed pulmonary consult.  Check ABG for possible bipap.  I discussed with daughter and updated her last night, she states he is a FULL code.  Na:147.  He is more alert today.  On HFNC:30LPM 50% FiO2. States he is very thirsty and has sponge swabs that nursing has been giving and watch for aspiration.  10/21: On HFNC 30LPM and 90% FiO2 with improved SpO2:100%. Still confused to place, year. Per RN no contact with his children and we are trying to get in touch with family.  Only neighbors arrived yesterday per RN. Patient willing to have NG tube placed for nutrition and medications per RN.  10/20: He has failed FEEs study.  He does not know he is in [place], \"I'm in [facility]\" and doesn't know he is in a hospital.  He was made DNR/DNI previously.  He state he is \"done\" and keeps pulling off his HFNC.  States being  and has several children.  I have asked case management to reach out find his children.  Nurse left a message on " the one jessica phone.    I have discussed this patient's plan of care and discharge plan at IDT rounds today with Case Management, Nursing, Nursing leadership, and other members of the IDT team.    Consultants/Specialty  cardiology, nephrology, and palliative care  Pulmonary medicine  Code Status  DNAR/DNI    Disposition  The patient is not medically cleared for discharge to home or a post-acute facility.  Anticipate discharge to: skilled nursing facility    I have placed the appropriate orders for post-discharge needs.    Review of Systems  Review of Systems   Unable to perform ROS: Dementia   Constitutional:  Positive for malaise/fatigue.   Respiratory:  Negative for shortness of breath.         Physical Exam  Temp:  [36.5 °C (97.7 °F)-36.7 °C (98.1 °F)] 36.7 °C (98.1 °F)  Pulse:  [72-78] 76  Resp:  [16-18] 18  BP: (119-137)/(57-76) 134/62  SpO2:  [92 %-98 %] 96 %    Physical Exam  Vitals reviewed.   Constitutional:       General: He is awake.      Appearance: He is obese. He is not diaphoretic.   HENT:      Head: Normocephalic and atraumatic.      Mouth/Throat:      Mouth: Mucous membranes are dry.   Eyes:      Conjunctiva/sclera: Conjunctivae normal.   Cardiovascular:      Rate and Rhythm: Normal rate and regular rhythm.      Heart sounds: No murmur heard.  Pulmonary:      Effort: Pulmonary effort is normal. No respiratory distress.      Breath sounds: Rales present. No wheezing.   Abdominal:      General: Bowel sounds are normal. There is no distension.      Palpations: Abdomen is soft.      Tenderness: There is no abdominal tenderness.   Musculoskeletal:         General: No swelling or tenderness.      Cervical back: Neck supple. No muscular tenderness.      Right lower leg: Edema present.      Left lower leg: Edema present.   Skin:     Coloration: Skin is pale. Skin is not jaundiced.      Findings: Rash (left groin, skin fold) present.   Neurological:      Mental Status: He is disoriented.      Cranial  Nerves: No cranial nerve deficit.      Motor: Weakness present.   Psychiatric:         Behavior: Behavior is agitated.         Cognition and Memory: Cognition is impaired.         Fluids    Intake/Output Summary (Last 24 hours) at 10/26/2023 1312  Last data filed at 10/26/2023 0355  Gross per 24 hour   Intake 610 ml   Output 1345 ml   Net -735 ml       Laboratory  Recent Labs     10/24/23  0442 10/25/23  0101 10/26/23  0904   WBC 8.2 6.3 7.6   RBC 3.08* 3.12* 3.17*   HEMOGLOBIN 9.1* 9.3* 9.3*   HEMATOCRIT 28.9* 30.4* 31.1*   MCV 93.8 97.4 98.1*   MCH 29.5 29.8 29.3   MCHC 31.5* 30.6* 29.9*   RDW 48.5 51.0* 53.0*   PLATELETCT 330 246 292   MPV 10.3 11.0 10.2     Recent Labs     10/24/23  0442 10/25/23  0101 10/26/23  0904   SODIUM 152* 151* 151*   POTASSIUM 4.1 4.1 3.9   CHLORIDE 117* 116* 117*   CO2 28 32 30   GLUCOSE 231* 307* 264*   BUN 26* 34* 41*   CREATININE 0.76 0.82 0.78   CALCIUM 8.0* 8.0* 7.7*                   Imaging  DX-CHEST-PORTABLE (1 VIEW)   Final Result      1.  Right-sided rib fractures.   2.  Suspect hiatus hernia.   3.  Bibasilar subsegmental atelectatic opacities.      DX-ABDOMEN FOR TUBE PLACEMENT   Final Result         1.  Nonspecific bowel gas pattern in the upper abdomen.   2.  Dobbhoff tube tip overlying the expected location of the pylorus or first duodenal segment.   3.  Hazy bilateral lower lobe infiltrates   4.  Small layering left pleural effusion.      DX-CHEST-PORTABLE (1 VIEW)   Final Result         1.  Decreased opacification the left thorax, likely decreased effusion.   2.  Hazy bilateral pulmonary infiltrates   3.  Cardiomegaly   4.  Right rib fractures      DX-ABDOMEN FOR TUBE PLACEMENT   Final Result         Feeding tube with tip projecting over the expected area of the third portion duodenum.      DX-CHEST-LIMITED (1 VIEW)   Final Result      Interval replacement of gastric drainage tube with feeding tube   Complete opacification of the left lung, similar to prior       DX-ABDOMEN FOR TUBE PLACEMENT   Final Result      Enteric tube has been placed and the tip projects over the gastroduodenal junction      DX-ABDOMEN FOR TUBE PLACEMENT   Final Result         1.  Pulmonary infiltrates, increased on the left vertebral study.   2.  Moderate layering left pleural effusion, increased since prior study.   3.  Atherosclerosis   4.  Right rib fractures      DX-ABDOMEN FOR TUBE PLACEMENT   Final Result         1.  Nonspecific bowel gas pattern in the upper abdomen.   2.  Nasogastric tube terminating just distal to the gastroesophageal junction, recommend advancement.   3.  Hazy left pulmonary infiltrates, stable since prior study.   4.  Moderate layering left pleural effusion, overall stable      DX-ABDOMEN FOR TUBE PLACEMENT   Final Result      1. Appropriate position of the esophagogastric tube.   2. Worsening left lower lobe parenchymal consolidation and interval increase in size of the left pleural effusion.   3. The remainder is stable.      DX-CHEST-PORTABLE (1 VIEW)   Final Result         1.  Bilateral lower lobe infiltrates, stable since prior study.   2.  Trace bilateral pleural effusions   3.  Atherosclerosis   4.  Right rib fractures      DX-ABDOMEN FOR TUBE PLACEMENT   Final Result      1.  Enteric tube tip overlies the gastric antrum.      [id number] VIEW   Final Result      Nonobstructive bowel gas pattern.      DX-ABDOMEN FOR TUBE PLACEMENT   Final Result         1.  Nonspecific bowel gas pattern in the upper abdomen.   2.  Nasogastric tube tip terminates overlying the expected location of the pylorus or first duodenal segment.   3.  Bilateral lower lobe opacities concerning for infiltrate.      DX-CHEST-PORTABLE (1 VIEW)   Final Result         1.  Bilateral lower lobe infiltrates, stable since prior study.   2.  Atherosclerosis      DX-CHEST-PORTABLE (1 VIEW)   Final Result         1.  Bilateral lower lobe infiltrates, decreased since prior study.      DX-CHEST-PORTABLE  (1 VIEW)   Final Result      1.  Supportive tubing as described above.   2.  Slight increased inflation.   3.  No other significant change from prior exam.         DX-CHEST-LIMITED (1 VIEW)   Final Result      1.  Ill-defined bibasilar pulmonary opacities, slightly worse on the left compared to prior study. Pneumonia is in the differential.   2.  Small left pleural effusion, new since prior.      DX-ABDOMEN FOR TUBE PLACEMENT   Final Result      Nasogastric tube tip overlies the mid stomach      DX-ABDOMEN FOR TUBE PLACEMENT   Final Result      NG tube turns in the gastric body with tip at the level of the gastric fundus.      EC-ECHOCARDIOGRAM LTD W/O CONT   Final Result      DX-CHEST-PORTABLE (1 VIEW)   Final Result      1.  Mild interstitial pulmonary edema, similar to prior   2.  Bibasilar and perihilar underinflation atelectasis which could obscure an additional process. This is unchanged.      DX-CHEST-PORTABLE (1 VIEW)   Final Result      1.  Mild interstitial pulmonary edema   2.  Bibasilar and perihilar underinflation atelectasis which could obscure an additional process.   3.  Small LEFT pleural effusion      DX-CHEST-LIMITED (1 VIEW)   Final Result      1.  Bilateral basilar atelectasis and/or consolidation. Underlying infection is possible.   2.  Stable enlargement of the cardiomediastinal silhouette.   3.  Interval insertion of a central venous catheter which terminates with the tip projecting over the expected region of the mid superior vena cava.   4.  Interval placement of an endotracheal tube which terminates in satisfactory position at the level of the aortic arch.   5.  Interval placement of an enteric feeding tube which terminates in the left upper quadrant projecting over the expected location of the stomach.      DX-CHEST-PORTABLE (1 VIEW)   Final Result      No significant change from prior exam.      DX-CHEST-PORTABLE (1 VIEW)   Final Result      Bibasilar underinflation atelectasis.  Superimposed pneumonia not excluded.      US-RUQ   Final Result      1.  Gallbladder sludge   2.  RIGHT pleural effusion      EC-ECHOCARDIOGRAM COMPLETE W/ CONT   Final Result      CT-CSPINE WITHOUT PLUS RECONS   Final Result         1. No acute fracture from C1 through T1 is visualized.         CT-HEAD W/O   Final Result         1. No acute intracranial abnormality. No evidence of acute intracranial hemorrhage or mass lesion.                     DX-CHEST-PORTABLE (1 VIEW)   Final Result      1.  Low lung volumes without definite acute cardiopulmonary abnormality.      DX-ESOPHAGUS - [id number]-VW    (Results Pending)        Assessment/Plan  * Acute respiratory failure with hypoxia (HCC)- (present on admission)  Assessment & Plan  Question of aspiration  Extubated 10/17  Pulmonary hygiene  Aspiration precautions  Pulmonary following   -change to prednisone x3 days  -abx end date 10/28   Chest x-ray has cleared significantly, continue current regimen  Wean O2 as able  SLP following, continue npo   Ongoing case management support, /palliative care    Advance care planning- (present on admission)  Assessment & Plan  I discussed advance care planning for at least 25 minutes with the patient's daughter, including diagnosis, prognosis, plan of care, risks and benefits of any therapies that could be offered, as well as alternatives including palliation and hospice as appropriate.  Confirmed DNR/DNI status.  She reports patient would not want PEG tube placement.  Discussed that if his swallowing/mentation does not improve in the coming days, may need to consider comfort care.  She is in agreement with this, will continue to discuss depending on progress.  Patient has been accepted to SNF when medically clear, currently he is on restraints which is a barrier to his discharge as well as the NG tube.  Time spent was exclusive of evaluation and management of other separately billable procedures.  Start time: 405  pm  End time: 430 pm     Decubitus skin ulcer  Assessment & Plan  Wound care  Frequent turning    Anemia  Assessment & Plan  No overt bleeding, monitor  Monitor cbc    Shock ([facility])  Assessment & Plan  Undifferentiated - cardiac due to arrhythmia vs hypovolemia  Shock resolved  Monitor vitals.    Aspiration precautions  Assessment & Plan  Increasing oxygen requirement and chest x-ray showed infiltration possible pneumonia  Failed 10/20 FEEs  Aspiration precautions.  Continue monitor closely.    Rhabdomyolysis- (present on admission)  Assessment & Plan  Continue IVF resuscitation, monitor CPK  CPK significant trended down.  S/p IVF    Atrial fibrillation with RVR ([facility])- (present on admission)  Assessment & Plan  No known history of Afib, RVR with hypotension attempted electrical cardioversion 10/11 without success, put on amio GTT  10/22 on oral amiodarone  Echo EF 65%, no valvular abnormalities  Rate Controlled  Continuous tele monitoring  Anticoagulation with Eliquis  Rhythm control with amiodarone, continue loading  Optimize electrolytes     Dehydration- (present on admission)  Assessment & Plan  Continue fluid resuscitation intravenously and enterally for now  Monitor UOP closely    Hypernatremia- (present on admission)  Assessment & Plan  Hypovolemic hypernatremia  Increase Free water via NG      ADLEAIDE (acute kidney injury) ([facility])- (present on admission)  Assessment & Plan  Improved with fluids  Monitoring bmp, I/O's    Altered mental status- (present on admission)  Assessment & Plan  Fluctuating mental status  Avoid offending agents  The patient apparently with significant insomnia  Daughter reports hx bipolar     Start seroquel    DM2 (diabetes mellitus, type 2) ([facility])- (present on admission)  Assessment & Plan  Glargine 25 units qHS, 5 units lispro with meals  Monitor accuchecks and cover with SSI  Hypoglycemic protocol  A1c 8 in past week        Sepsis ([facility])- (present on admission)  Assessment & Plan  Possibly  related to pneumonia    Diabetes mellitus-Type 2, not on treatment  Assessment & Plan          BPH (benign prostatic hyperplasia)- (present on admission)  Assessment & Plan  Restart Flomax        Total time spent in chart review, at bedside with the patient, discussing with consultants, nursing and case management: 53 minutes    VTE prophylaxis: Eliquis     I have performed a physical exam and reviewed and updated ROS and Plan today (10/26/2023). In review of yesterday's note (10/25/2023), there are no changes except as documented above.      Please note that this dictation was created using voice recognition software. I have made every reasonable attempt to correct obvious errors, but I expect that there are errors of grammar and possibly context that I did not discover before finalizing the note.

## 2023-10-26 NOTE — PROGRESS NOTES
Monitor Summary    SR rates 60s-70s with rare PVCs     0.16/0.08/0.38      12 hour chart check

## 2023-10-26 NOTE — CARE PLAN
Problem: Knowledge Deficit - Standard  Goal: Patient and family/care givers will demonstrate understanding of plan of care, disease process/condition, diagnostic tests and medications  10/26/2023 0305 by Ana Harris R.N.  Outcome: Progressing  10/26/2023 0305 by Ana Harris R.N.  Outcome: Progressing     Problem: Pain - Standard  Goal: Alleviation of pain or a reduction in pain to the patient’s comfort goal  10/26/2023 0305 by Ana Harris R.N.  Outcome: Progressing  10/26/2023 0305 by Ana Harris R.N.  Outcome: Progressing     Problem: Hemodynamics  Goal: Patient's hemodynamics, fluid balance and neurologic status will be stable or improve  Outcome: Progressing     Problem: Fluid Volume  Goal: Fluid volume balance will be maintained  Outcome: Progressing     Problem: Respiratory  Goal: Patient will achieve/maintain optimum respiratory ventilation and gas exchange  Outcome: Progressing   The patient is Watcher - Medium risk of patient condition declining or worsening    Shift Goals  Clinical Goals: improve/ maintiang O2 status  Patient Goals: sleep  Family Goals: layla    Progress made toward(s) clinical / shift goals:      Patient is not progressing towards the following goals:

## 2023-10-27 LAB
ALBUMIN SERPL BCP-MCNC: 2.3 G/DL (ref 3.2–4.9)
ANION GAP SERPL CALC-SCNC: 3 MMOL/L (ref 7–16)
ANION GAP SERPL CALC-SCNC: 4 MMOL/L (ref 7–16)
BASE EXCESS BLDA CALC-SCNC: 5 MMOL/L (ref -4–3)
BODY TEMPERATURE: 36.6 CENTIGRADE
BUN SERPL-MCNC: 38 MG/DL (ref 8–22)
BUN SERPL-MCNC: 42 MG/DL (ref 8–22)
BUN SERPL-MCNC: 43 MG/DL (ref 8–22)
CALCIUM ALBUM COR SERPL-MCNC: 9.7 MG/DL (ref 8.5–10.5)
CALCIUM SERPL-MCNC: 7.8 MG/DL (ref 8.5–10.5)
CALCIUM SERPL-MCNC: 7.9 MG/DL (ref 8.5–10.5)
CALCIUM SERPL-MCNC: 8.3 MG/DL (ref 8.5–10.5)
CHLORIDE SERPL-SCNC: 116 MMOL/L (ref 96–112)
CHLORIDE SERPL-SCNC: 117 MMOL/L (ref 96–112)
CHLORIDE SERPL-SCNC: 122 MMOL/L (ref 96–112)
CO2 SERPL-SCNC: 30 MMOL/L (ref 20–33)
CO2 SERPL-SCNC: 32 MMOL/L (ref 20–33)
CO2 SERPL-SCNC: 33 MMOL/L (ref 20–33)
CREAT SERPL-MCNC: 0.76 MG/DL (ref 0.5–1.4)
CREAT SERPL-MCNC: 0.84 MG/DL (ref 0.5–1.4)
CREAT SERPL-MCNC: 0.89 MG/DL (ref 0.5–1.4)
GFR SERPLBLD CREATININE-BSD FMLA CKD-EPI: 85 ML/MIN/1.73 M 2
GFR SERPLBLD CREATININE-BSD FMLA CKD-EPI: 87 ML/MIN/1.73 M 2
GFR SERPLBLD CREATININE-BSD FMLA CKD-EPI: 90 ML/MIN/1.73 M 2
GLUCOSE BLD STRIP.AUTO-MCNC: 157 MG/DL (ref 65–99)
GLUCOSE BLD STRIP.AUTO-MCNC: 171 MG/DL (ref 65–99)
GLUCOSE BLD STRIP.AUTO-MCNC: 259 MG/DL (ref 65–99)
GLUCOSE BLD STRIP.AUTO-MCNC: 281 MG/DL (ref 65–99)
GLUCOSE SERPL-MCNC: 189 MG/DL (ref 65–99)
GLUCOSE SERPL-MCNC: 281 MG/DL (ref 65–99)
GLUCOSE SERPL-MCNC: 287 MG/DL (ref 65–99)
HCO3 BLDA-SCNC: 30 MMOL/L (ref 17–25)
INHALED O2 FLOW RATE: 8 L/MIN
INHALED O2 FLOW RATE: 8 L/MIN (ref 2–10)
PCO2 BLDA: 41.9 MMHG (ref 26–37)
PCO2 TEMP ADJ BLDA: 41.2 MMHG (ref 26–37)
PH BLDA: 7.47 [PH] (ref 7.4–7.5)
PH TEMP ADJ BLDA: 7.47 [PH] (ref 7.4–7.5)
PHOSPHATE SERPL-MCNC: 2.8 MG/DL (ref 2.5–4.5)
PO2 BLDA: 77.2 MMHG (ref 64–87)
PO2 TEMP ADJ BLDA: 75.2 MMHG (ref 64–87)
POTASSIUM SERPL-SCNC: 3.7 MMOL/L (ref 3.6–5.5)
POTASSIUM SERPL-SCNC: 3.7 MMOL/L (ref 3.6–5.5)
POTASSIUM SERPL-SCNC: 4 MMOL/L (ref 3.6–5.5)
SAO2 % BLDA: 94.2 % (ref 93–99)
SODIUM SERPL-SCNC: 151 MMOL/L (ref 135–145)
SODIUM SERPL-SCNC: 151 MMOL/L (ref 135–145)
SODIUM SERPL-SCNC: 158 MMOL/L (ref 135–145)

## 2023-10-27 PROCEDURE — 36415 COLL VENOUS BLD VENIPUNCTURE: CPT

## 2023-10-27 PROCEDURE — A9270 NON-COVERED ITEM OR SERVICE: HCPCS | Performed by: NURSE PRACTITIONER

## 2023-10-27 PROCEDURE — 770020 HCHG ROOM/CARE - TELE (206)

## 2023-10-27 PROCEDURE — 700102 HCHG RX REV CODE 250 W/ 637 OVERRIDE(OP): Performed by: NURSE PRACTITIONER

## 2023-10-27 PROCEDURE — 700111 HCHG RX REV CODE 636 W/ 250 OVERRIDE (IP): Performed by: INTERNAL MEDICINE

## 2023-10-27 PROCEDURE — 700102 HCHG RX REV CODE 250 W/ 637 OVERRIDE(OP): Performed by: HOSPITALIST

## 2023-10-27 PROCEDURE — A9270 NON-COVERED ITEM OR SERVICE: HCPCS | Performed by: HOSPITALIST

## 2023-10-27 PROCEDURE — 700105 HCHG RX REV CODE 258: Performed by: INTERNAL MEDICINE

## 2023-10-27 PROCEDURE — 80048 BASIC METABOLIC PNL TOTAL CA: CPT

## 2023-10-27 PROCEDURE — 85027 COMPLETE CBC AUTOMATED: CPT

## 2023-10-27 PROCEDURE — 82803 BLOOD GASES ANY COMBINATION: CPT

## 2023-10-27 PROCEDURE — A9270 NON-COVERED ITEM OR SERVICE: HCPCS | Performed by: INTERNAL MEDICINE

## 2023-10-27 PROCEDURE — 82962 GLUCOSE BLOOD TEST: CPT | Mod: 91

## 2023-10-27 PROCEDURE — 99233 SBSQ HOSP IP/OBS HIGH 50: CPT | Performed by: INTERNAL MEDICINE

## 2023-10-27 PROCEDURE — 700102 HCHG RX REV CODE 250 W/ 637 OVERRIDE(OP): Performed by: INTERNAL MEDICINE

## 2023-10-27 PROCEDURE — 92526 ORAL FUNCTION THERAPY: CPT

## 2023-10-27 PROCEDURE — 80069 RENAL FUNCTION PANEL: CPT

## 2023-10-27 RX ORDER — DEXTROSE MONOHYDRATE 50 MG/ML
INJECTION, SOLUTION INTRAVENOUS CONTINUOUS
Status: DISCONTINUED | OUTPATIENT
Start: 2023-10-27 | End: 2023-10-27

## 2023-10-27 RX ADMIN — QUETIAPINE FUMARATE 25 MG: 25 TABLET ORAL at 21:22

## 2023-10-27 RX ADMIN — INSULIN LISPRO 5 UNITS: 100 INJECTION, SOLUTION INTRAVENOUS; SUBCUTANEOUS at 12:42

## 2023-10-27 RX ADMIN — INSULIN LISPRO 5 UNITS: 100 INJECTION, SOLUTION INTRAVENOUS; SUBCUTANEOUS at 16:43

## 2023-10-27 RX ADMIN — TERAZOSIN 5 MG: 5 CAPSULE ORAL at 16:36

## 2023-10-27 RX ADMIN — INSULIN LISPRO 3 UNITS: 100 INJECTION, SOLUTION INTRAVENOUS; SUBCUTANEOUS at 05:59

## 2023-10-27 RX ADMIN — INSULIN LISPRO 5 UNITS: 100 INJECTION, SOLUTION INTRAVENOUS; SUBCUTANEOUS at 05:59

## 2023-10-27 RX ADMIN — TRAZODONE HYDROCHLORIDE 50 MG: 100 TABLET ORAL at 21:22

## 2023-10-27 RX ADMIN — APIXABAN 5 MG: 5 TABLET, FILM COATED ORAL at 05:52

## 2023-10-27 RX ADMIN — CYANOCOBALAMIN TAB 500 MCG 500 MCG: 500 TAB at 05:52

## 2023-10-27 RX ADMIN — APIXABAN 5 MG: 5 TABLET, FILM COATED ORAL at 16:36

## 2023-10-27 RX ADMIN — AMIODARONE HYDROCHLORIDE 200 MG: 200 TABLET ORAL at 05:52

## 2023-10-27 RX ADMIN — INSULIN LISPRO 7 UNITS: 100 INJECTION, SOLUTION INTRAVENOUS; SUBCUTANEOUS at 16:43

## 2023-10-27 RX ADMIN — MICONAZOLE NITRATE: 20 CREAM TOPICAL at 05:55

## 2023-10-27 RX ADMIN — INSULIN LISPRO 7 UNITS: 100 INJECTION, SOLUTION INTRAVENOUS; SUBCUTANEOUS at 12:41

## 2023-10-27 RX ADMIN — INSULIN LISPRO 3 UNITS: 100 INJECTION, SOLUTION INTRAVENOUS; SUBCUTANEOUS at 00:18

## 2023-10-27 RX ADMIN — PREDNISONE 50 MG: 50 TABLET ORAL at 05:52

## 2023-10-27 RX ADMIN — MICONAZOLE NITRATE: 20 CREAM TOPICAL at 18:00

## 2023-10-27 RX ADMIN — LANSOPRAZOLE 30 MG: 30 TABLET, ORALLY DISINTEGRATING, DELAYED RELEASE ORAL at 05:59

## 2023-10-27 RX ADMIN — CEFAZOLIN 2 G: 2 INJECTION, POWDER, FOR SOLUTION INTRAMUSCULAR; INTRAVENOUS at 05:54

## 2023-10-27 RX ADMIN — CEFAZOLIN 2 G: 2 INJECTION, POWDER, FOR SOLUTION INTRAMUSCULAR; INTRAVENOUS at 13:59

## 2023-10-27 RX ADMIN — DEXTROSE MONOHYDRATE: 50 INJECTION, SOLUTION INTRAVENOUS at 07:58

## 2023-10-27 RX ADMIN — CEFAZOLIN 2 G: 2 INJECTION, POWDER, FOR SOLUTION INTRAMUSCULAR; INTRAVENOUS at 21:30

## 2023-10-27 ASSESSMENT — PAIN DESCRIPTION - PAIN TYPE: TYPE: ACUTE PAIN

## 2023-10-27 ASSESSMENT — ENCOUNTER SYMPTOMS
SHORTNESS OF BREATH: 0
CHILLS: 0
ABDOMINAL PAIN: 0
FEVER: 0

## 2023-10-27 ASSESSMENT — FIBROSIS 4 INDEX: FIB4 SCORE: 1.72

## 2023-10-27 NOTE — THERAPY
Occupational Therapy Contact Note    Patient Name: Perry Marrufo  Age:  83 y.o., Sex:  male  Medical Record #: [id number]  Today's Date: 10/27/2023    Discussed missed therapy with RN . RN informed of OT treatment attempt. Pt very/extremely  lethargic and not following commands, not able to participate per RN. RN requested OT tx be held today. Will hold OT tx today per RN request and will continue to follow.        10/27/23 1055   Treatment Variance   Reason For Missed Therapy Medical - Other (Please Comment)  (RN requested to hold therapy today. Pt extremely lethargic and not following any commands.)   Interdisciplinary Plan of Care Collaboration   IDT Collaboration with  Nursing   Collaboration Comments RN informed of OT attempt. RN requested OT tx be held today. Pt is extremely lethargic and not following commands. Not able to participate. OT tx held.   Session Information   Date / Session Number  10/24 #1 (1/3, 10/30) Attempted OT tx 10/27. OT tx held per RN request.

## 2023-10-27 NOTE — PROGRESS NOTES
Hospital Medicine Daily Progress Note    Date of Service  10/27/2023    Chief Complaint  Found down    Hospital Course  Perry Marrufo is an 83 y.o. male w/ hx of DMII, prostate disease, bipolar disorder.  He presented 10/9/2023 with transfer from [facility] after being found down and last known at mental baseline 2 days prior.  He was found to have hypernatremia, atrial fibrillation with a rapid ventricular rate, rhabdomyolysis.  He was intubated, cardioversion attempted and transferred to ICU 10/11 he was extubated 10/17.     Interval Problem Update  10/27 Stable on 5 L NC satting 94-97%, titrate as tolerated. Sodium 158 despite increase free water flushes, started D5 with repeat BMP sodium 151.stopped IV fluids to avoid overcorrection, will continue every 6 hour BMP to check on the sodium level.  Glucose had improved after insulin changes yesterday, has gone up with the D5 however with that stopped I expected to improve.  Patient was extremely somnolent this morning, ABG was within normal limits.  Reevaluated the patient this afternoon and he is much more awake reports that he is hungry, denies any acute complaints.  Called and discussed with patient's daughter, updated that speech has cleared the patient for an oral diet if he is able to tolerate this and start increasing his food intake and hopefully we can take out the NG tube over the coming days.  Also discussed that restraints are still a barrier to him going to rehab, hopefully this can also be discontinued soon as well.    10/26 Continues on 4 L NC.  Glucose continues to be uncontrolled despite increasing glargine last night, increase glargine 25 U QHS, start 5 units of lispro with meals in addition to sliding scale.  Sodium continues at 151, increase free water flushes. Patient still confused and requiring restraints.  No aggressive behavior.  SLP evaluated the patient this morning recommending MBS to possibly start diet.  10/25 On 4 L NC satting 99%  this morning.  WBC still within normal limits. Sodium 151, mildly improving increase free water flushes.  Glucose still uncontrolled in the 200s, increase glargine 20 units.  Phosphorus 2.2, replacement ordered.  Patient still confused and agitated currently in soft restraints.  Started Seroquel 25 mg nightly.  Discussed with pulmonary they have changed patient's IV steroid to oral prednisone 50 mg 3 times a day, recommending Anc end date 10/28. Telemetry reviewed patient in sinus rhythm. PT recommended post acute placement. SLP recommended to continue NPO. Spoke to patient's daughter at length, hx of bipolar disorder. She would not want a permanent feeding tube as she knows this would not align with her father's wishes. Discussed that if his mentation/swallowing status do not improve in the next few days could consider comfort focused care instead.   10/24 per neurosurgery report the patient with great fluctuation of mentation, at times hallucinating,  agitated, my evaluation today the patient appears calm, he is redirectable, he knows where he is currently, discussed about his family situation, he is apparently estranged from family per his report, he admits that he is unable to live by himself anymore for the future.  Wet cough, denies currently pain, vital signs currently afebrile, heart in the 70s and 80s, respiration in mid 20s, unlabored, with cough, saturating in the mid to high 90s on 4 L nasal can oxygen, blood pressure fluctuating in the 130s to 150s over 60s and 70s,  Sodium at 152, glucose elevated, restarting long-acting insulin, chest x-ray with right-sided rib fractures, hiatal hernia, bibasilar subsegmental atelectatic opacities.  Much improved from previous imaging  10/23: Na:150. Still quite confused. Nurse states he coughed up much mucous yesterday.  Now down to 6L via NC.    10/22: Iris feeding tube placed last night. Feeding started.  Worse left lung opacification on CXR.  I have ordered and  "discussed pulmonary consult.  Check ABG for possible bipap.  I discussed with daughter and updated her last night, she states he is a FULL code.  Na:147.  He is more alert today.  On HFNC:30LPM 50% FiO2. States he is very thirsty and has sponge swabs that nursing has been giving and watch for aspiration.  10/21: On HFNC 30LPM and 90% FiO2 with improved SpO2:100%. Still confused to place, year. Per RN no contact with his children and we are trying to get in touch with family.  Only neighbors arrived yesterday per RN. Patient willing to have NG tube placed for nutrition and medications per RN.  10/20: He has failed FEEs study.  He does not know he is in [place], \"I'm in [facility]\" and doesn't know he is in a hospital.  He was made DNR/DNI previously.  He state he is \"done\" and keeps pulling off his HFNC.  States being  and has several children.  I have asked case management to reach out find his children.  Nurse left a message on the one jessica phone.    I have discussed this patient's plan of care and discharge plan at IDT rounds today with Case Management, Nursing, Nursing leadership, and other members of the IDT team.    Consultants/Specialty  cardiology, nephrology, and palliative care  Pulmonary medicine  Code Status  DNAR/DNI    Disposition  The patient is not medically cleared for discharge to home or a post-acute facility.  Anticipate discharge to: skilled nursing facility    I have placed the appropriate orders for post-discharge needs.    Review of Systems  Review of Systems   Unable to perform ROS: Dementia   Constitutional:  Positive for malaise/fatigue. Negative for chills and fever.   Respiratory:  Negative for shortness of breath.    Cardiovascular:  Negative for chest pain.   Gastrointestinal:  Negative for abdominal pain.        Physical Exam  Temp:  [36.4 °C (97.5 °F)-36.8 °C (98.2 °F)] 36.7 °C (98.1 °F)  Pulse:  [64-74] 73  Resp:  [17-20] 20  BP: (124-139)/(55-66) 126/56  SpO2:  [94 %-97 %] 97 " %    Physical Exam  Vitals reviewed.   Constitutional:       General: He is awake. He is not in acute distress.     Appearance: He is obese.      Comments: Pleasant and calm   HENT:      Head: Normocephalic and atraumatic.   Eyes:      Conjunctiva/sclera: Conjunctivae normal.   Cardiovascular:      Rate and Rhythm: Normal rate and regular rhythm.      Heart sounds: No murmur heard.  Pulmonary:      Effort: Pulmonary effort is normal. No respiratory distress.      Breath sounds: Rales present. No wheezing.      Comments: On nasal cannula  Abdominal:      General: There is no distension.      Palpations: Abdomen is soft.      Tenderness: There is no abdominal tenderness.   Musculoskeletal:         General: No swelling or tenderness.      Cervical back: Neck supple. No muscular tenderness.      Right lower leg: No edema.      Left lower leg: No edema.   Skin:     Coloration: Skin is pale. Skin is not jaundiced.   Neurological:      Mental Status: He is alert. He is disoriented.      Cranial Nerves: No cranial nerve deficit.      Motor: Weakness present.   Psychiatric:         Mood and Affect: Mood normal.         Behavior: Behavior is cooperative.         Cognition and Memory: Cognition is impaired.         Fluids    Intake/Output Summary (Last 24 hours) at 10/27/2023 1647  Last data filed at 10/27/2023 1500  Gross per 24 hour   Intake 1360 ml   Output 1950 ml   Net -590 ml       Laboratory  Recent Labs     10/25/23  0101 10/26/23  0904   WBC 6.3 7.6   RBC 3.12* 3.17*   HEMOGLOBIN 9.3* 9.3*   HEMATOCRIT 30.4* 31.1*   MCV 97.4 98.1*   MCH 29.8 29.3   MCHC 30.6* 29.9*   RDW 51.0* 53.0*   PLATELETCT 246 292   MPV 11.0 10.2     Recent Labs     10/26/23  0904 10/27/23  0650 10/27/23  1206   SODIUM 151* 158* 151*   POTASSIUM 3.9 3.7 4.0   CHLORIDE 117* 122* 116*   CO2 30 33 32   GLUCOSE 264* 189* 287*   BUN 41* 43* 42*   CREATININE 0.78 0.89 0.84   CALCIUM 7.7* 8.3* 7.9*                   Imaging  DX-ESOPHAGUS -  [id number]   Final Result      DX-CHEST-PORTABLE (1 VIEW)   Final Result      1.  Right-sided rib fractures.   2.  Suspect hiatus hernia.   3.  Bibasilar subsegmental atelectatic opacities.      DX-ABDOMEN FOR TUBE PLACEMENT   Final Result         1.  Nonspecific bowel gas pattern in the upper abdomen.   2.  Dobbhoff tube tip overlying the expected location of the pylorus or first duodenal segment.   3.  Hazy bilateral lower lobe infiltrates   4.  Small layering left pleural effusion.      DX-CHEST-PORTABLE (1 VIEW)   Final Result         1.  Decreased opacification the left thorax, likely decreased effusion.   2.  Hazy bilateral pulmonary infiltrates   3.  Cardiomegaly   4.  Right rib fractures      DX-ABDOMEN FOR TUBE PLACEMENT   Final Result         Feeding tube with tip projecting over the expected area of the third portion duodenum.      DX-CHEST-LIMITED (1 VIEW)   Final Result      Interval replacement of gastric drainage tube with feeding tube   Complete opacification of the left lung, similar to prior      DX-ABDOMEN FOR TUBE PLACEMENT   Final Result      Enteric tube has been placed and the tip projects over the gastroduodenal junction      DX-ABDOMEN FOR TUBE PLACEMENT   Final Result         1.  Pulmonary infiltrates, increased on the left vertebral study.   2.  Moderate layering left pleural effusion, increased since prior study.   3.  Atherosclerosis   4.  Right rib fractures      DX-ABDOMEN FOR TUBE PLACEMENT   Final Result         1.  Nonspecific bowel gas pattern in the upper abdomen.   2.  Nasogastric tube terminating just distal to the gastroesophageal junction, recommend advancement.   3.  Hazy left pulmonary infiltrates, stable since prior study.   4.  Moderate layering left pleural effusion, overall stable      DX-ABDOMEN FOR TUBE PLACEMENT   Final Result      1. Appropriate position of the esophagogastric tube.   2. Worsening left lower lobe parenchymal consolidation and interval increase in  size of the left pleural effusion.   3. The remainder is stable.      DX-CHEST-PORTABLE (1 VIEW)   Final Result         1.  Bilateral lower lobe infiltrates, stable since prior study.   2.  Trace bilateral pleural effusions   3.  Atherosclerosis   4.  Right rib fractures      DX-ABDOMEN FOR TUBE PLACEMENT   Final Result      1.  Enteric tube tip overlies the gastric antrum.      [id number] VIEW   Final Result      Nonobstructive bowel gas pattern.      DX-ABDOMEN FOR TUBE PLACEMENT   Final Result         1.  Nonspecific bowel gas pattern in the upper abdomen.   2.  Nasogastric tube tip terminates overlying the expected location of the pylorus or first duodenal segment.   3.  Bilateral lower lobe opacities concerning for infiltrate.      DX-CHEST-PORTABLE (1 VIEW)   Final Result         1.  Bilateral lower lobe infiltrates, stable since prior study.   2.  Atherosclerosis      DX-CHEST-PORTABLE (1 VIEW)   Final Result         1.  Bilateral lower lobe infiltrates, decreased since prior study.      DX-CHEST-PORTABLE (1 VIEW)   Final Result      1.  Supportive tubing as described above.   2.  Slight increased inflation.   3.  No other significant change from prior exam.         DX-CHEST-LIMITED (1 VIEW)   Final Result      1.  Ill-defined bibasilar pulmonary opacities, slightly worse on the left compared to prior study. Pneumonia is in the differential.   2.  Small left pleural effusion, new since prior.      DX-ABDOMEN FOR TUBE PLACEMENT   Final Result      Nasogastric tube tip overlies the mid stomach      DX-ABDOMEN FOR TUBE PLACEMENT   Final Result      NG tube turns in the gastric body with tip at the level of the gastric fundus.      EC-ECHOCARDIOGRAM LTD W/O CONT   Final Result      DX-CHEST-PORTABLE (1 VIEW)   Final Result      1.  Mild interstitial pulmonary edema, similar to prior   2.  Bibasilar and perihilar underinflation atelectasis which could obscure an additional process. This is unchanged.       DX-CHEST-PORTABLE (1 VIEW)   Final Result      1.  Mild interstitial pulmonary edema   2.  Bibasilar and perihilar underinflation atelectasis which could obscure an additional process.   3.  Small LEFT pleural effusion      DX-CHEST-LIMITED (1 VIEW)   Final Result      1.  Bilateral basilar atelectasis and/or consolidation. Underlying infection is possible.   2.  Stable enlargement of the cardiomediastinal silhouette.   3.  Interval insertion of a central venous catheter which terminates with the tip projecting over the expected region of the mid superior vena cava.   4.  Interval placement of an endotracheal tube which terminates in satisfactory position at the level of the aortic arch.   5.  Interval placement of an enteric feeding tube which terminates in the left upper quadrant projecting over the expected location of the stomach.      DX-CHEST-PORTABLE (1 VIEW)   Final Result      No significant change from prior exam.      DX-CHEST-PORTABLE (1 VIEW)   Final Result      Bibasilar underinflation atelectasis. Superimposed pneumonia not excluded.      US-RUQ   Final Result      1.  Gallbladder sludge   2.  RIGHT pleural effusion      EC-ECHOCARDIOGRAM COMPLETE W/ CONT   Final Result      CT-CSPINE WITHOUT PLUS RECONS   Final Result         1. No acute fracture from C1 through T1 is visualized.         CT-HEAD W/O   Final Result         1. No acute intracranial abnormality. No evidence of acute intracranial hemorrhage or mass lesion.                     DX-CHEST-PORTABLE (1 VIEW)   Final Result      1.  Low lung volumes without definite acute cardiopulmonary abnormality.           Assessment/Plan  * Acute respiratory failure with hypoxia (HCC)- (present on admission)  Assessment & Plan  Question of aspiration  Extubated 10/17  Pulmonary hygiene  Aspiration precautions  Pulmonary following   -change to prednisone x3 days  -abx end date 10/28   Chest x-ray has cleared significantly, continue current regimen  Wean  O2 as able  SLP following, continue npo   Ongoing case management support, /palliative care    Advance care planning- (present on admission)  Assessment & Plan  DNR/DNI  Oldest daughter is primary contact, does not want permanent feeding tube  Palliative care following    Decubitus skin ulcer  Assessment & Plan  Wound care  Frequent turning    Anemia  Assessment & Plan  No overt bleeding, monitor  Monitor cbc    Shock ([facility])  Assessment & Plan  Undifferentiated - cardiac due to arrhythmia vs hypovolemia  Shock resolved  Monitor vitals.    Aspiration precautions  Assessment & Plan  Increasing oxygen requirement and chest x-ray showed infiltration possible pneumonia  Failed 10/20 FEEs  Aspiration precautions.  Continue monitor closely.    Rhabdomyolysis- (present on admission)  Assessment & Plan  Continue IVF resuscitation, monitor CPK  CPK significant trended down.  S/p IVF    Atrial fibrillation with RVR ([facility])- (present on admission)  Assessment & Plan  No known history of Afib, RVR with hypotension attempted electrical cardioversion 10/11 without success, put on amio GTT  10/22 on oral amiodarone  Echo EF 65%, no valvular abnormalities  Rate Controlled  Continuous tele monitoring  Anticoagulation with Eliquis  Rhythm control with amiodarone, continue loading  Optimize electrolytes     Dehydration- (present on admission)  Assessment & Plan  Continue fluid resuscitation intravenously and enterally for now  Monitor UOP closely    Hypernatremia- (present on admission)  Assessment & Plan  Hypovolemic hypernatremia  Increase Free water via NG  Acutely worsened 158, started D5W  Repeat 151 will hold off on further D5W to avoid overcorrection  Every 6 hour BMP    ADELAIDE (acute kidney injury) ([facility])- (present on admission)  Assessment & Plan  Improved with fluids  Monitoring bmp, I/O's    Altered mental status- (present on admission)  Assessment & Plan  Fluctuating mental status  Avoid offending agents  The  patient apparently with significant insomnia  Daughter reports hx bipolar     Start seroquel    DM2 (diabetes mellitus, type 2) ([facility])- (present on admission)  Assessment & Plan  Glargine 25 units qHS, 5 units lispro with meals  Monitor accuchecks and cover with SSI  Hypoglycemic protocol  A1c 8 in past week        Sepsis ([facility])- (present on admission)  Assessment & Plan  Possibly related to pneumonia    BPH (benign prostatic hyperplasia)- (present on admission)  Assessment & Plan  Restart Flomax          VTE prophylaxis: Eliquis     I have performed a physical exam and reviewed and updated ROS and Plan today (10/27/2023). In review of yesterday's note (10/26/2023), there are no changes except as documented above.

## 2023-10-27 NOTE — THERAPY
Physical Therapy Contact Note    Patient Name: Perry Marrufo  Age:  83 y.o., Sex:  male  Medical Record #: [id number]  Today's Date: 10/27/2023       10/27/23 0931   Interdisciplinary Plan of Care Collaboration   Collaboration Comments PT tx attempted, per RN pt lethargic & difficult to arouse.  Will re-attempt as able/appropriate.

## 2023-10-27 NOTE — CARE PLAN
The patient is Stable - Low risk of patient condition declining or worsening    Shift Goals  Clinical Goals: Monitor O2, Neuro Checks  Patient Goals: BA  Family Goals: BA    Progress made toward(s) clinical / shift goals:      Problem: Knowledge Deficit - Standard  Goal: Patient and family/care givers will demonstrate understanding of plan of care, disease process/condition, diagnostic tests and medications  Description: Target End Date:  1-3 days or as soon as patient condition allows    Document in Patient Education    1.  Patient and family/caregiver oriented to unit, equipment, visitation policy and means for communicating concern  2.  Complete/review Learning Assessment  3.  Assess knowledge level of disease process/condition, treatment plan, diagnostic tests and medications  4.  Explain disease process/condition, treatment plan, diagnostic tests and medications  Outcome: Progressing     Problem: Pain - Standard  Goal: Alleviation of pain or a reduction in pain to the patient’s comfort goal  Description: Target End Date:  Prior to discharge or change in level of care    Document on Vitals flowsheet    1.  Document pain using the appropriate pain scale per order or unit policy  2.  Educate and implement non-pharmacologic comfort measures (i.e. relaxation, distraction, massage, cold/heat therapy, etc.)  3.  Pain management medications as ordered  4.  Reassess pain after pain med administration per policy  5.  If opiods administered assess patient's response to pain medication is appropriate per POSS sedation scale  6.  Follow pain management plan developed in collaboration with patient and interdisciplinary team (including palliative care or pain specialists if applicable)  Outcome: Progressing     Problem: Hemodynamics  Goal: Patient's hemodynamics, fluid balance and neurologic status will be stable or improve  Description: Target End Date:  Prior to discharge or change in level of care    Document on  Assessment and I/O flowsheet templates    1.  Monitor vital signs, pulse oximetry and cardiac monitor per provider order and/or policy  2.  Maintain blood pressure per provider order  3.  Hemodynamic monitoring per provider order  4.  Manage IV fluids and IV infusions  5.  Monitor intake and output  6.  Daily weights per unit policy or provider order  7.  Assess peripheral pulses and capillary refill  8.  Assess color and body temperature  9.  Position patient for maximum circulation/cardiac output  10. Monitor for signs/symptoms of excessive bleeding  11. Assess mental status, restlessness and changes in level of consciousness  12. Monitor temperature and report fever or hypothermia to provider immediately. Consideration of targeted temperature management.  Outcome: Progressing     Problem: Fluid Volume  Goal: Fluid volume balance will be maintained  Description: Target End Date:  Prior to discharge or change in level of care    Document on I/O flowsheet    1.  Monitor intake and output as ordered  2.  Promote oral intake as appropriate  3.  Report inadequate intake or output to physician  4.  Administer IV therapy as ordered  5.  Weights per provider order  6.  Assess for signs and symptoms of bleeding  7.  Monitor for signs of fluid overload (respiratory changes, edema, weight gain, increased abdominal girth)  8.  Monitor of signs for inadequate fluid volume (poor skin turgor, dry mucous membranes)  9.  Instruct patient on adherence to fluid restrictions  Outcome: Progressing     Problem: Urinary - Renal Perfusion  Goal: Ability to achieve and maintain adequate renal perfusion and functioning will improve  Description: Target End Date:  Prior to discharge or change in level of care    Document on I/O and Assessment flowsheet    1.  Urine output will remain greater than 0.5ml/Kg/HR  2.  Monitor amount and/or characteristics of urine per order/policy. Specific gravity per order/policy  3.  Assess signs and symptoms  of renal dysfunction  Outcome: Progressing     Problem: Respiratory  Goal: Patient will achieve/maintain optimum respiratory ventilation and gas exchange  Description: Target End Date:  Prior to discharge or change in level of care    Document on Assessment flowsheet    1.  Assess and monitor rate, rhythm, depth and effort of respiration  2.  Breath sounds assessed qshift and/or as needed  3.  Assess O2 saturation, administer/titrate oxygen as ordered  4.  Position patient for maximum ventilatory efficiency  5.  Turn, cough, and deep breath with splinting to improve effectiveness  6.  Collaborate with RT to administer medication/treatments per order  7.  Encourage use of incentive spirometer and encourage patient to cough after use and utilize splinting techniques if applicable  8.  Airway suctioning  9.  Monitor sputum production for changes in color, consistency and frequency  10. Perform frequent oral hygiene  11. Alternate physical activity with rest periods  Outcome: Progressing     Problem: Physical Regulation  Goal: Diagnostic test results will improve  Description: Target End Date:  Prior to discharge or change in level of care    1.  Monitor lactic acid levels  2.  Monitor ABG's  3.  Monitor diagnostic test results  Outcome: Progressing  Goal: Signs and symptoms of infection will decrease  Description: Target End Date:  Prior to discharge or change in level of care    1.  Remove potential routes of infection, such as central lines and urinary catheter  2.  Follow facility protocol for changing IV tubing and sites  3.  Collaborate with Infectious Disease  4.  Antibiotic therapy per provider order  5.  Note drug effects and monitor for antibiotic toxicity  Outcome: Progressing     Problem: Skin Integrity  Goal: Skin integrity is maintained or improved  Description: Target End Date:  Prior to discharge or change in level of care    Document interventions on Skin Risk/Steven flowsheet groups and corresponding  LDA    1.  Assess and monitor skin integrity, appearance and/or temperature  2.  Assess risk factors for impaired skin integrity and/or pressures ulcers  3.  Implement precautions to protect skin integrity in collaboration with interdisciplinary team  4.  Implement pressure ulcer prevention protocol if at risk for skin breakdown  5.  Confirm wound care consult if at risk for skin breakdown  6.  Ensure patient use of pressure relieving devices  (Low air loss bed, waffle overlay, heel protectors, ROHO cushion, etc)  Outcome: Progressing     Problem: Fall Risk  Goal: Patient will remain free from falls  Description: Target End Date:  Prior to discharge or change in level of care    Document interventions on the [facility] Fall Risk Assessment    1.  Assess for fall risk factors  2.  Implement fall precautions  Outcome: Progressing     Problem: Safety - Medical Restraint  Goal: Remains free of injury from restraints (Restraint for Interference with Medical Device)  Description: INTERVENTIONS:  1. Determine that other, less restrictive measures have been tried or would not be effective before applying the restraint  2. Evaluate the patient's condition at the time of restraint application  3. Educate patient/family regarding the reason for restraint  4. Q2H: Monitor safety, psychosocial status, comfort, circulation, respiratory status, LOC, nutrition and hydration  Outcome: Progressing  Goal: Free from restraint(s) (Restraint for Interference with Medical Device)  Description: INTERVENTIONS:  1.  ONCE/SHIFT or MINIMUM Q12H: Assess and document the continuing need for restraints  2.  Q24H: Continued use of restraint requires LIP to perform face to face examination and written order  3.  Identify and implement measures to help patient regain control  4.  Educate patient/family on discontinuation criteria   5.  Assess patient's understanding and retention of education provided  6.  Assess readiness for release & initiate  progressive release per protocol  7.  Identify and document criteria for restraints  Outcome: Progressing

## 2023-10-27 NOTE — DISCHARGE PLANNING
Case Management Discharge Planning    Admission Date: 10/9/2023  GMLOS: 5.1  ALOS: 18    6-Clicks ADL Score: 12  6-Clicks Mobility Score: 11  PT and/or OT Eval ordered: Yes  Post-acute Referrals Ordered: Yes  Post-acute Choice Obtained:No  Has referral(s) been sent to post-acute provider:  Yes      Anticipated Discharge Dispo: Discharge Disposition: D/T to SNF with Medicare cert in anticipation of skilled care (03)    DME Needed: No    Action(s) Taken: Updated Provider/Nurse on Discharge Plan    Escalations Completed: None    Medically Clear: No    Next Steps: Per IDT rounds; pt is pending medical clearance, pt is in restraints, pending SNF choice.     Barriers to Discharge: Medical clearance    Is the patient up for discharge tomorrow: No

## 2023-10-27 NOTE — CARE PLAN
The patient is Stable - Low risk of patient condition declining or worsening    Shift Goals  Clinical Goals: improve/ maintiang O2 status  Patient Goals: sleep  Family Goals: layla    Progress made toward(s) clinical / shift goals:  Progressing      Problem: Knowledge Deficit - Standard  Goal: Patient and family/care givers will demonstrate understanding of plan of care, disease process/condition, diagnostic tests and medications  Outcome: Progressing  Note: Pt intermittently oriented, verbalizes understanding of plan of care when oriented     Problem: Safety - Medical Restraint  Goal: Remains free of injury from restraints (Restraint for Interference with Medical Device)  Outcome: Progressing  Note: Restraints checked, range of motion performed, no injury appreciated under restraints

## 2023-10-27 NOTE — THERAPY
"Speech Language Pathology   Daily Treatment     Patient Name: Perry Marrufo  AGE:  83 y.o., SEX:  male  Medical Record #: [id number]  Date of Service: 10/27/2023      Precautions:  Precautions: Fall Risk, Swallow Precautions         Subjective  RN cleared patient for dysphagia management. Pt asleep upon arrival, easily arousable. Did not immediately recall MBSS previous date, however recalled study once reminded. Agreeable to PO trials. Of note, pt seen over frequency this date to ensure pt utilizing chin tuck with swallowing snacks/mildly thick liquids.       Assessment  Reviewed results of MBSS and swallow strategies of chin tuck and swallowing 3 times per bolus, pt stated understanding. Instructed pt through effortful swallow exercies. PO trials of mildly thick liquid and liquidized presented. Pt demo'd adequate oral bolus acceptance and containment. Required moderate-max cues to implement chin tuck and effortful swallow with each trial, however good accuracy noted with cues. Intermittent cough throughout session, pt cued to re=swallow following cough with good accuracy. Vocal quality remained stable. Pt endorsed \"man that is good,\" throughout oral intake.       Clinical Impressions  Patient presents with moderate oropharyngeal dysphagia with suspect component of esophageal dysphagia, per MBSS completed 10/26. Pt's risk for ascending and descending aspiration and related sequelae is elevated. Dysphagia is likely acute related to endotracheal intubation, prolonged NPO status and generalized weakness. Recommend continued NGT as primary source of nutrition/hydration/meds with cautious initiation of Liquidised and mildly thick liquid PO snacks. Service to follow for continued swallow rehabilitation.       Recommendations  Treatment Completed: Dysphagia Treatment    Dysphagia Treatment  Diet Consistency: LQ3/MT2 snacks - NGT primary source of nutrition/hydration/meds  Instrumentation: Instrumental swallow study pending " "clinical progress  Medication: Non Oral, Crush with applesauce, as appropriate  Supervision: 1:1 feeding with constant supervision, Encourage self-feeding  Positioning: Fully upright and midline during oral intake  Risk Management :  (Chin tuck, 3 swallows per bolus)  Oral Care: Q4h      SLP Treatment Plan  Treatment Plan: Dysphagia Treatment  SLP Frequency: 3x Per Week  Estimated Duration: Until Therapy Goals Met      Anticipated Discharge Needs  Discharge Recommendations: Recommend post-acute placement for additional speech therapy services prior to discharge home  Therapy Recommendations Upon DC: Dysphagia Training, Patient / Family / Caregiver Education      Patient / Family Goals  Patient / Family Goal #1: \"water\"  Goal #1 Outcome: Progressing slower than expected  Short Term Goals  Short Term Goal # 1: Pt will consume prefeeding trials with SLP with no overt s/sx of aspiration  Goal Outcome # 1: Goal met, new goal added  Short Term Goal # 1 B : Patient will consume PU/MT snacks with strict adherence to swallow precautions.  Goal Outcome  # 1 B: Progressing as expected  Short Term Goal # 2: Pt will complete instrumental swallow assessment to determine physiology of swallow.  Goal Outcome # 2 : Goal met  Short Term Goal # 2 B : Pt will complete 20 reps each of swallow exercises targeting pharyngeal shortening, laryngeal vestibule closure and base of tongue retraction with \"good\" acuracy  Goal Outcome  # 2 B: Progressing as expected      YESENIA Hurst  "

## 2023-10-27 NOTE — PROGRESS NOTES
Bedside report given by Jesus CHILDERS, assumed pt care. Updated on POC. Notified by lab that there was a critical sodium level of 158, notified Elia PHELAN. Pt is sleeping, on 5LNC, NGT feedings infusing. Bed in low and locked position, call light within reach. Will continue to monitor.

## 2023-10-28 LAB
ANION GAP SERPL CALC-SCNC: 2 MMOL/L (ref 7–16)
ANION GAP SERPL CALC-SCNC: 4 MMOL/L (ref 7–16)
ANION GAP SERPL CALC-SCNC: 5 MMOL/L (ref 7–16)
ANION GAP SERPL CALC-SCNC: 6 MMOL/L (ref 7–16)
BUN SERPL-MCNC: 38 MG/DL (ref 8–22)
BUN SERPL-MCNC: 38 MG/DL (ref 8–22)
BUN SERPL-MCNC: 41 MG/DL (ref 8–22)
BUN SERPL-MCNC: 41 MG/DL (ref 8–22)
CALCIUM SERPL-MCNC: 7.8 MG/DL (ref 8.5–10.5)
CALCIUM SERPL-MCNC: 7.8 MG/DL (ref 8.5–10.5)
CALCIUM SERPL-MCNC: 7.9 MG/DL (ref 8.5–10.5)
CALCIUM SERPL-MCNC: 8 MG/DL (ref 8.5–10.5)
CHLORIDE SERPL-SCNC: 113 MMOL/L (ref 96–112)
CHLORIDE SERPL-SCNC: 114 MMOL/L (ref 96–112)
CHLORIDE SERPL-SCNC: 117 MMOL/L (ref 96–112)
CHLORIDE SERPL-SCNC: 118 MMOL/L (ref 96–112)
CO2 SERPL-SCNC: 27 MMOL/L (ref 20–33)
CO2 SERPL-SCNC: 29 MMOL/L (ref 20–33)
CO2 SERPL-SCNC: 30 MMOL/L (ref 20–33)
CO2 SERPL-SCNC: 31 MMOL/L (ref 20–33)
CREAT SERPL-MCNC: 0.71 MG/DL (ref 0.5–1.4)
CREAT SERPL-MCNC: 0.78 MG/DL (ref 0.5–1.4)
CREAT SERPL-MCNC: 0.79 MG/DL (ref 0.5–1.4)
CREAT SERPL-MCNC: 0.79 MG/DL (ref 0.5–1.4)
ERYTHROCYTE [DISTWIDTH] IN BLOOD BY AUTOMATED COUNT: 55.1 FL (ref 35.9–50)
GFR SERPLBLD CREATININE-BSD FMLA CKD-EPI: 89 ML/MIN/1.73 M 2
GFR SERPLBLD CREATININE-BSD FMLA CKD-EPI: 91 ML/MIN/1.73 M 2
GLUCOSE BLD STRIP.AUTO-MCNC: 185 MG/DL (ref 65–99)
GLUCOSE BLD STRIP.AUTO-MCNC: 238 MG/DL (ref 65–99)
GLUCOSE BLD STRIP.AUTO-MCNC: 252 MG/DL (ref 65–99)
GLUCOSE BLD STRIP.AUTO-MCNC: 258 MG/DL (ref 65–99)
GLUCOSE SERPL-MCNC: 196 MG/DL (ref 65–99)
GLUCOSE SERPL-MCNC: 232 MG/DL (ref 65–99)
GLUCOSE SERPL-MCNC: 281 MG/DL (ref 65–99)
GLUCOSE SERPL-MCNC: 281 MG/DL (ref 65–99)
HCT VFR BLD AUTO: 28.6 % (ref 42–52)
HGB BLD-MCNC: 8.7 G/DL (ref 14–18)
MCH RBC QN AUTO: 30.3 PG (ref 27–33)
MCHC RBC AUTO-ENTMCNC: 30.4 G/DL (ref 32.3–36.5)
MCV RBC AUTO: 99.7 FL (ref 81.4–97.8)
PLATELET # BLD AUTO: 247 K/UL (ref 164–446)
PMV BLD AUTO: 10.2 FL (ref 9–12.9)
POTASSIUM SERPL-SCNC: 3.5 MMOL/L (ref 3.6–5.5)
POTASSIUM SERPL-SCNC: 3.6 MMOL/L (ref 3.6–5.5)
POTASSIUM SERPL-SCNC: 4.1 MMOL/L (ref 3.6–5.5)
POTASSIUM SERPL-SCNC: 4.1 MMOL/L (ref 3.6–5.5)
RBC # BLD AUTO: 2.87 M/UL (ref 4.7–6.1)
SODIUM SERPL-SCNC: 146 MMOL/L (ref 135–145)
SODIUM SERPL-SCNC: 147 MMOL/L (ref 135–145)
SODIUM SERPL-SCNC: 151 MMOL/L (ref 135–145)
SODIUM SERPL-SCNC: 152 MMOL/L (ref 135–145)
WBC # BLD AUTO: 7.4 K/UL (ref 4.8–10.8)

## 2023-10-28 PROCEDURE — 80048 BASIC METABOLIC PNL TOTAL CA: CPT

## 2023-10-28 PROCEDURE — 700111 HCHG RX REV CODE 636 W/ 250 OVERRIDE (IP): Performed by: INTERNAL MEDICINE

## 2023-10-28 PROCEDURE — 700102 HCHG RX REV CODE 250 W/ 637 OVERRIDE(OP): Performed by: HOSPITALIST

## 2023-10-28 PROCEDURE — 700105 HCHG RX REV CODE 258: Performed by: INTERNAL MEDICINE

## 2023-10-28 PROCEDURE — 700102 HCHG RX REV CODE 250 W/ 637 OVERRIDE(OP): Performed by: INTERNAL MEDICINE

## 2023-10-28 PROCEDURE — A9270 NON-COVERED ITEM OR SERVICE: HCPCS | Performed by: NURSE PRACTITIONER

## 2023-10-28 PROCEDURE — A9270 NON-COVERED ITEM OR SERVICE: HCPCS | Performed by: HOSPITALIST

## 2023-10-28 PROCEDURE — 99233 SBSQ HOSP IP/OBS HIGH 50: CPT | Performed by: INTERNAL MEDICINE

## 2023-10-28 PROCEDURE — 700102 HCHG RX REV CODE 250 W/ 637 OVERRIDE(OP): Performed by: NURSE PRACTITIONER

## 2023-10-28 PROCEDURE — A9270 NON-COVERED ITEM OR SERVICE: HCPCS | Mod: JZ | Performed by: INTERNAL MEDICINE

## 2023-10-28 PROCEDURE — 700102 HCHG RX REV CODE 250 W/ 637 OVERRIDE(OP): Mod: JZ | Performed by: INTERNAL MEDICINE

## 2023-10-28 PROCEDURE — 770001 HCHG ROOM/CARE - MED/SURG/GYN PRIV*

## 2023-10-28 PROCEDURE — 82962 GLUCOSE BLOOD TEST: CPT | Mod: 91

## 2023-10-28 PROCEDURE — 36415 COLL VENOUS BLD VENIPUNCTURE: CPT

## 2023-10-28 RX ORDER — DEXTROSE MONOHYDRATE 50 MG/ML
INJECTION, SOLUTION INTRAVENOUS CONTINUOUS
Status: DISCONTINUED | OUTPATIENT
Start: 2023-10-28 | End: 2023-10-28

## 2023-10-28 RX ORDER — POTASSIUM CHLORIDE 20 MEQ/1
40 TABLET, EXTENDED RELEASE ORAL ONCE
Status: COMPLETED | OUTPATIENT
Start: 2023-10-28 | End: 2023-10-28

## 2023-10-28 RX ADMIN — POTASSIUM CHLORIDE 40 MEQ: 1500 TABLET, EXTENDED RELEASE ORAL at 09:06

## 2023-10-28 RX ADMIN — CEFAZOLIN 2 G: 2 INJECTION, POWDER, FOR SOLUTION INTRAMUSCULAR; INTRAVENOUS at 06:10

## 2023-10-28 RX ADMIN — TERAZOSIN 5 MG: 5 CAPSULE ORAL at 18:17

## 2023-10-28 RX ADMIN — LANSOPRAZOLE 30 MG: 30 TABLET, ORALLY DISINTEGRATING, DELAYED RELEASE ORAL at 06:06

## 2023-10-28 RX ADMIN — TRAZODONE HYDROCHLORIDE 50 MG: 100 TABLET ORAL at 21:09

## 2023-10-28 RX ADMIN — CEFAZOLIN 2 G: 2 INJECTION, POWDER, FOR SOLUTION INTRAMUSCULAR; INTRAVENOUS at 15:04

## 2023-10-28 RX ADMIN — INSULIN LISPRO 7 UNITS: 100 INJECTION, SOLUTION INTRAVENOUS; SUBCUTANEOUS at 18:17

## 2023-10-28 RX ADMIN — INSULIN LISPRO 5 UNITS: 100 INJECTION, SOLUTION INTRAVENOUS; SUBCUTANEOUS at 06:16

## 2023-10-28 RX ADMIN — MICONAZOLE NITRATE: 20 CREAM TOPICAL at 18:00

## 2023-10-28 RX ADMIN — AMIODARONE HYDROCHLORIDE 200 MG: 200 TABLET ORAL at 06:06

## 2023-10-28 RX ADMIN — CEFAZOLIN 2 G: 2 INJECTION, POWDER, FOR SOLUTION INTRAMUSCULAR; INTRAVENOUS at 22:07

## 2023-10-28 RX ADMIN — INSULIN LISPRO 5 UNITS: 100 INJECTION, SOLUTION INTRAVENOUS; SUBCUTANEOUS at 18:18

## 2023-10-28 RX ADMIN — APIXABAN 5 MG: 5 TABLET, FILM COATED ORAL at 18:17

## 2023-10-28 RX ADMIN — DEXTROSE MONOHYDRATE: 50 INJECTION, SOLUTION INTRAVENOUS at 09:05

## 2023-10-28 RX ADMIN — INSULIN LISPRO 3 UNITS: 100 INJECTION, SOLUTION INTRAVENOUS; SUBCUTANEOUS at 01:00

## 2023-10-28 RX ADMIN — CYANOCOBALAMIN TAB 500 MCG 500 MCG: 500 TAB at 06:06

## 2023-10-28 RX ADMIN — INSULIN LISPRO 4 UNITS: 100 INJECTION, SOLUTION INTRAVENOUS; SUBCUTANEOUS at 06:16

## 2023-10-28 RX ADMIN — INSULIN LISPRO 5 UNITS: 100 INJECTION, SOLUTION INTRAVENOUS; SUBCUTANEOUS at 12:41

## 2023-10-28 RX ADMIN — MICONAZOLE NITRATE: 20 CREAM TOPICAL at 06:15

## 2023-10-28 RX ADMIN — INSULIN LISPRO 7 UNITS: 100 INJECTION, SOLUTION INTRAVENOUS; SUBCUTANEOUS at 12:41

## 2023-10-28 RX ADMIN — APIXABAN 5 MG: 5 TABLET, FILM COATED ORAL at 06:06

## 2023-10-28 RX ADMIN — QUETIAPINE FUMARATE 25 MG: 25 TABLET ORAL at 21:09

## 2023-10-28 ASSESSMENT — ENCOUNTER SYMPTOMS
FEVER: 0
CHILLS: 0
MEMORY LOSS: 1
ABDOMINAL PAIN: 0
SHORTNESS OF BREATH: 0

## 2023-10-28 ASSESSMENT — PAIN DESCRIPTION - PAIN TYPE
TYPE: ACUTE PAIN
TYPE: ACUTE PAIN

## 2023-10-28 ASSESSMENT — FIBROSIS 4 INDEX: FIB4 SCORE: 2.03

## 2023-10-28 NOTE — PROGRESS NOTES
Hospital Medicine Daily Progress Note    Date of Service  10/28/2023    Chief Complaint  Found down    Hospital Course  Perry Marrufo is an 83 y.o. male w/ hx of DMII, prostate disease, bipolar disorder.  He presented 10/9/2023 with transfer from [place] after being found down and last known at mental baseline 2 days prior.  He was found to have hypernatremia, atrial fibrillation with a rapid ventricular rate, rhabdomyolysis.  He was intubated, cardioversion attempted and transferred to ICU 10/11 he was extubated 10/17.     Interval Problem Update  10/28 oxygenation improving down to 2.5 L nasal cannula satting 95%.  Sodium 152 restart D5W.  Repeat 146 will DC fluids again.  Glucose uncontrolled in the 200s, worsened by D5 however patient is also eating more I will increase glargine 30 units nightly.  Potassium 3.5, replacement ordered.  Patient placed back on telemetry yesterday due to concern for his somnolence.  Patient has improved and his telemetry is stable, will discontinue.  Patient has no complaints this morning just that he is hungry.  He was eating soup with assistance during my evaluation did have some throat clearing.    10/27 Stable on 5 L NC satting 94-97%, titrate as tolerated. Sodium 158 despite increase free water flushes, started D5 with repeat BMP sodium 151.stopped IV fluids to avoid overcorrection, will continue every 6 hour BMP to check on the sodium level.  Glucose had improved after insulin changes yesterday, has gone up with the D5 however with that stopped I expected to improve.  Patient was extremely somnolent this morning, ABG was within normal limits.  Reevaluated the patient this afternoon and he is much more awake reports that he is hungry, denies any acute complaints.  Called and discussed with patient's daughter, updated that speech has cleared the patient for an oral diet if he is able to tolerate this and start increasing his food intake and hopefully we can take out the NG  tube over the coming days.  Also discussed that restraints are still a barrier to him going to rehab, hopefully this can also be discontinued soon as well.  10/26 Continues on 4 L NC.  Glucose continues to be uncontrolled despite increasing glargine last night, increase glargine 25 U QHS, start 5 units of lispro with meals in addition to sliding scale.  Sodium continues at 151, increase free water flushes. Patient still confused and requiring restraints.  No aggressive behavior.  SLP evaluated the patient this morning recommending MBS to possibly start diet.  10/25 On 4 L NC satting 99% this morning.  WBC still within normal limits. Sodium 151, mildly improving increase free water flushes.  Glucose still uncontrolled in the 200s, increase glargine 20 units.  Phosphorus 2.2, replacement ordered.  Patient still confused and agitated currently in soft restraints.  Started Seroquel 25 mg nightly.  Discussed with pulmonary they have changed patient's IV steroid to oral prednisone 50 mg 3 times a day, recommending Ancef end date 10/28. Telemetry reviewed patient in sinus rhythm. PT recommended post acute placement. SLP recommended to continue NPO. Spoke to patient's daughter at length, hx of bipolar disorder. She would not want a permanent feeding tube as she knows this would not align with her father's wishes. Discussed that if his mentation/swallowing status do not improve in the next few days could consider comfort focused care instead.   10/24 per neurosurgery report the patient with great fluctuation of mentation, at times hallucinating,  agitated, my evaluation today the patient appears calm, he is redirectable, he knows where he is currently, discussed about his family situation, he is apparently estranged from family per his report, he admits that he is unable to live by himself anymore for the future.  Wet cough, denies currently pain, vital signs currently afebrile, heart in the 70s and 80s, respiration in mid  "20s, unlabored, with cough, saturating in the mid to high 90s on 4 L nasal can oxygen, blood pressure fluctuating in the 130s to 150s over 60s and 70s,  Sodium at 152, glucose elevated, restarting long-acting insulin, chest x-ray with right-sided rib fractures, hiatal hernia, bibasilar subsegmental atelectatic opacities.  Much improved from previous imaging  10/23: Na:150. Still quite confused. Nurse states he coughed up much mucous yesterday.  Now down to 6L via NC.    10/22: Iris feeding tube placed last night. Feeding started.  Worse left lung opacification on CXR.  I have ordered and discussed pulmonary consult.  Check ABG for possible bipap.  I discussed with daughter and updated her last night, she states he is a FULL code.  Na:147.  He is more alert today.  On HFNC:30LPM 50% FiO2. States he is very thirsty and has sponge swabs that nursing has been giving and watch for aspiration.  10/21: On HFNC 30LPM and 90% FiO2 with improved SpO2:100%. Still confused to place, year. Per RN no contact with his children and we are trying to get in touch with family.  Only neighbors arrived yesterday per RN. Patient willing to have NG tube placed for nutrition and medications per RN.  10/20: He has failed FEEs study.  He does not know he is in [place], \"I'm in [facility]\" and doesn't know he is in a hospital.  He was made DNR/DNI previously.  He state he is \"done\" and keeps pulling off his HFNC.  States being  and has several children.  I have asked case management to reach out find his children.  Nurse left a message on the one jessica phone.    I have discussed this patient's plan of care and discharge plan at IDT rounds today with Case Management, Nursing, Nursing leadership, and other members of the IDT team.    Consultants/Specialty  cardiology, nephrology, and palliative care  Pulmonary medicine  Code Status  DNAR/DNI    Disposition  Medically Cleared  I have placed the appropriate orders for post-discharge " needs.    Review of Systems  Review of Systems   Unable to perform ROS: Dementia   Constitutional:  Positive for malaise/fatigue. Negative for chills and fever.   Respiratory:  Negative for shortness of breath.    Cardiovascular:  Negative for chest pain.   Gastrointestinal:  Negative for abdominal pain.   Psychiatric/Behavioral:  Positive for memory loss.         Physical Exam  Temp:  [36.6 °C (97.9 °F)-36.8 °C (98.2 °F)] 36.7 °C (98.1 °F)  Pulse:  [66-72] 69  Resp:  [18-20] 18  BP: (104-120)/(51-56) 120/56  SpO2:  [92 %-97 %] 95 %    Physical Exam  Vitals reviewed.   Constitutional:       General: He is awake. He is not in acute distress.     Appearance: He is obese. He is ill-appearing.      Comments: Pleasant and calm  In soft restraints   HENT:      Head: Normocephalic and atraumatic.   Eyes:      Conjunctiva/sclera: Conjunctivae normal.   Cardiovascular:      Rate and Rhythm: Normal rate and regular rhythm.      Heart sounds: No murmur heard.  Pulmonary:      Effort: Pulmonary effort is normal. No respiratory distress.      Breath sounds: No wheezing.      Comments: On nasal cannula  Diminished breath sounds in the bases  Abdominal:      General: There is no distension.      Palpations: Abdomen is soft.      Tenderness: There is no abdominal tenderness.   Musculoskeletal:         General: No swelling or tenderness.      Cervical back: Neck supple. No muscular tenderness.      Right lower leg: No edema.      Left lower leg: No edema.   Skin:     Coloration: Skin is pale. Skin is not jaundiced.   Neurological:      Mental Status: He is alert. He is disoriented.      Cranial Nerves: No cranial nerve deficit.      Motor: Weakness present.   Psychiatric:         Mood and Affect: Mood normal.         Behavior: Behavior is cooperative.         Cognition and Memory: Cognition is impaired.         Fluids    Intake/Output Summary (Last 24 hours) at 10/28/2023 1623  Last data filed at 10/28/2023 1600  Gross per 24 hour    Intake 3899.69 ml   Output 1950 ml   Net 1949.69 ml       Laboratory  Recent Labs     10/26/23  0904 10/27/23  2351   WBC 7.6 7.4   RBC 3.17* 2.87*   HEMOGLOBIN 9.3* 8.7*   HEMATOCRIT 31.1* 28.6*   MCV 98.1* 99.7*   MCH 29.3 30.3   MCHC 29.9* 30.4*   RDW 53.0* 55.1*   PLATELETCT 292 247   MPV 10.2 10.2     Recent Labs     10/27/23  2351 10/28/23  0640 10/28/23  1142   SODIUM 151* 152* 146*   POTASSIUM 3.6 3.5* 4.1   CHLORIDE 118* 117* 113*   CO2 31 30 27   GLUCOSE 196* 232* 281*   BUN 38* 41* 41*   CREATININE 0.78 0.79 0.71   CALCIUM 8.0* 7.9* 7.8*                   Imaging  DX-ESOPHAGUS - [id number]   Final Result      DX-CHEST-PORTABLE (1 VIEW)   Final Result      1.  Right-sided rib fractures.   2.  Suspect hiatus hernia.   3.  Bibasilar subsegmental atelectatic opacities.      DX-ABDOMEN FOR TUBE PLACEMENT   Final Result         1.  Nonspecific bowel gas pattern in the upper abdomen.   2.  Dobbhoff tube tip overlying the expected location of the pylorus or first duodenal segment.   3.  Hazy bilateral lower lobe infiltrates   4.  Small layering left pleural effusion.      DX-CHEST-PORTABLE (1 VIEW)   Final Result         1.  Decreased opacification the left thorax, likely decreased effusion.   2.  Hazy bilateral pulmonary infiltrates   3.  Cardiomegaly   4.  Right rib fractures      DX-ABDOMEN FOR TUBE PLACEMENT   Final Result         Feeding tube with tip projecting over the expected area of the third portion duodenum.      DX-CHEST-LIMITED (1 VIEW)   Final Result      Interval replacement of gastric drainage tube with feeding tube   Complete opacification of the left lung, similar to prior      DX-ABDOMEN FOR TUBE PLACEMENT   Final Result      Enteric tube has been placed and the tip projects over the gastroduodenal junction      DX-ABDOMEN FOR TUBE PLACEMENT   Final Result         1.  Pulmonary infiltrates, increased on the left vertebral study.   2.  Moderate layering left pleural effusion, increased  since prior study.   3.  Atherosclerosis   4.  Right rib fractures      DX-ABDOMEN FOR TUBE PLACEMENT   Final Result         1.  Nonspecific bowel gas pattern in the upper abdomen.   2.  Nasogastric tube terminating just distal to the gastroesophageal junction, recommend advancement.   3.  Hazy left pulmonary infiltrates, stable since prior study.   4.  Moderate layering left pleural effusion, overall stable      DX-ABDOMEN FOR TUBE PLACEMENT   Final Result      1. Appropriate position of the esophagogastric tube.   2. Worsening left lower lobe parenchymal consolidation and interval increase in size of the left pleural effusion.   3. The remainder is stable.      DX-CHEST-PORTABLE (1 VIEW)   Final Result         1.  Bilateral lower lobe infiltrates, stable since prior study.   2.  Trace bilateral pleural effusions   3.  Atherosclerosis   4.  Right rib fractures      DX-ABDOMEN FOR TUBE PLACEMENT   Final Result      1.  Enteric tube tip overlies the gastric antrum.      [id number] VIEW   Final Result      Nonobstructive bowel gas pattern.      DX-ABDOMEN FOR TUBE PLACEMENT   Final Result         1.  Nonspecific bowel gas pattern in the upper abdomen.   2.  Nasogastric tube tip terminates overlying the expected location of the pylorus or first duodenal segment.   3.  Bilateral lower lobe opacities concerning for infiltrate.      DX-CHEST-PORTABLE (1 VIEW)   Final Result         1.  Bilateral lower lobe infiltrates, stable since prior study.   2.  Atherosclerosis      DX-CHEST-PORTABLE (1 VIEW)   Final Result         1.  Bilateral lower lobe infiltrates, decreased since prior study.      DX-CHEST-PORTABLE (1 VIEW)   Final Result      1.  Supportive tubing as described above.   2.  Slight increased inflation.   3.  No other significant change from prior exam.         DX-CHEST-LIMITED (1 VIEW)   Final Result      1.  Ill-defined bibasilar pulmonary opacities, slightly worse on the left compared to prior study.  Pneumonia is in the differential.   2.  Small left pleural effusion, new since prior.      DX-ABDOMEN FOR TUBE PLACEMENT   Final Result      Nasogastric tube tip overlies the mid stomach      DX-ABDOMEN FOR TUBE PLACEMENT   Final Result      NG tube turns in the gastric body with tip at the level of the gastric fundus.      EC-ECHOCARDIOGRAM LTD W/O CONT   Final Result      DX-CHEST-PORTABLE (1 VIEW)   Final Result      1.  Mild interstitial pulmonary edema, similar to prior   2.  Bibasilar and perihilar underinflation atelectasis which could obscure an additional process. This is unchanged.      DX-CHEST-PORTABLE (1 VIEW)   Final Result      1.  Mild interstitial pulmonary edema   2.  Bibasilar and perihilar underinflation atelectasis which could obscure an additional process.   3.  Small LEFT pleural effusion      DX-CHEST-LIMITED (1 VIEW)   Final Result      1.  Bilateral basilar atelectasis and/or consolidation. Underlying infection is possible.   2.  Stable enlargement of the cardiomediastinal silhouette.   3.  Interval insertion of a central venous catheter which terminates with the tip projecting over the expected region of the mid superior vena cava.   4.  Interval placement of an endotracheal tube which terminates in satisfactory position at the level of the aortic arch.   5.  Interval placement of an enteric feeding tube which terminates in the left upper quadrant projecting over the expected location of the stomach.      DX-CHEST-PORTABLE (1 VIEW)   Final Result      No significant change from prior exam.      DX-CHEST-PORTABLE (1 VIEW)   Final Result      Bibasilar underinflation atelectasis. Superimposed pneumonia not excluded.      US-RUQ   Final Result      1.  Gallbladder sludge   2.  RIGHT pleural effusion      EC-ECHOCARDIOGRAM COMPLETE W/ CONT   Final Result      CT-CSPINE WITHOUT PLUS RECONS   Final Result         1. No acute fracture from C1 through T1 is visualized.         CT-HEAD W/O   Final  Result         1. No acute intracranial abnormality. No evidence of acute intracranial hemorrhage or mass lesion.                     DX-CHEST-PORTABLE (1 VIEW)   Final Result      1.  Low lung volumes without definite acute cardiopulmonary abnormality.           Assessment/Plan  * Acute respiratory failure with hypoxia (HCC)- (present on admission)  Assessment & Plan  Question of aspiration  Extubated 10/17  Pulmonary hygiene  Aspiration precautions  Pulmonary following   -change to prednisone x3 days  -abx end date 10/28   Chest x-ray has cleared significantly, continue current regimen  Wean O2 as able  SLP following, continue npo   Ongoing case management support, /palliative care    Advance care planning- (present on admission)  Assessment & Plan  DNR/DNI  Oldest daughter is primary contact, does not want permanent feeding tube  Palliative care following    Decubitus skin ulcer  Assessment & Plan  Wound care  Frequent turning    Anemia  Assessment & Plan  No overt bleeding, monitor  Monitor cbc    Shock (HCC)  Assessment & Plan  Undifferentiated - cardiac due to arrhythmia vs hypovolemia  Shock resolved  Monitor vitals.    Aspiration precautions  Assessment & Plan  Increasing oxygen requirement and chest x-ray showed infiltration possible pneumonia  Failed 10/20 FEEs  Aspiration precautions.  Continue monitor closely.    Rhabdomyolysis- (present on admission)  Assessment & Plan  Continue IVF resuscitation, monitor CPK  CPK significant trended down.  S/p IVF    Atrial fibrillation with RVR (HCC)- (present on admission)  Assessment & Plan  No known history of Afib, RVR with hypotension attempted electrical cardioversion 10/11 without success, put on amio GTT  10/22 on oral amiodarone  Echo EF 65%, no valvular abnormalities  Rate Controlled  Continuous tele monitoring  Anticoagulation with Eliquis  Rhythm control with amiodarone, continue loading  Optimize electrolytes     Dehydration- (present on  admission)  Assessment & Plan  Continue fluid resuscitation intravenously and enterally for now  Monitor UOP closely    Hypernatremia- (present on admission)  Assessment & Plan  Hypovolemic hypernatremia  Free water via NG  Intermittent D5W, avoid overcorrection  Most recent Na 146, hold fluids for now     ADELAIDE (acute kidney injury) ([facility])- (present on admission)  Assessment & Plan  Improved with fluids  Monitoring bmp, I/O's    Altered mental status- (present on admission)  Assessment & Plan  Fluctuating mental status  Avoid offending agents  The patient apparently with significant insomnia  Daughter reports hx bipolar     Start seroquel    DM2 (diabetes mellitus, type 2) ([facility])- (present on admission)  Assessment & Plan  Glargine 30 units qHS, 5 units lispro with meals  Monitor accuchecks and cover with SSI  Hypoglycemic protocol  A1c 8 in past week        Sepsis ([facility])- (present on admission)  Assessment & Plan  Possibly related to pneumonia    BPH (benign prostatic hyperplasia)- (present on admission)  Assessment & Plan  Restart Flomax          VTE prophylaxis: Eliquis     I have performed a physical exam and reviewed and updated ROS and Plan today (10/28/2023). In review of yesterday's note (10/27/2023), there are no changes except as documented above.

## 2023-10-28 NOTE — PROGRESS NOTES
Bedside report received from night RN, pt care assumed, assessment completed. Pt is A&O2, pain 0, nsr on the monitor. Updated on POC, questions answered. Bed in lowest, locked position, treaded socks on, call light and belongings within reach.

## 2023-10-28 NOTE — CARE PLAN
"  Problem: Knowledge Deficit - Standard  Goal: Patient and family/care givers will demonstrate understanding of plan of care, disease process/condition, diagnostic tests and medications  Outcome: Progressing  Note: Discussed plan of care but need to be re oriented at times      Problem: Pain - Standard  Goal: Alleviation of pain or a reduction in pain to the patient’s comfort goal  Outcome: Progressing  Note: Repositioned couple of times during shift     Problem: Skin Integrity  Goal: Skin integrity is maintained or improved  Outcome: Progressing  Note: Performed complete bed bath to promote comfort and skin integrity including dressing changes on wounds    The patient is Stable - Low risk of patient condition declining or worsening    Shift Goals  Clinical Goals: Monitor VS; Neuro check; q2 turn  Patient Goals: pain control  Family Goals: BA    Progress made toward(s) clinical / shift goals:  Patient remains free from fall, restraints still applied, call light within reach and bed alarm kept on, receptive to care,/56   Pulse 68   Temp 37 °C (98.6 °F) (Temporal)   Resp 18   Ht 1.803 m (5' 11\")   Wt 107 kg (236 lb 12.4 oz)   SpO2 95%       Patient is not progressing towards the following goals:      Problem: Safety - Medical Restraint  Goal: Remains free of injury from restraints (Restraint for Interference with Medical Device)  Outcome: Not Progressing  Goal: Free from restraint(s) (Restraint for Interference with Medical Device)  Outcome: Not Progressing     "

## 2023-10-28 NOTE — CARE PLAN
The patient is Stable - Low risk of patient condition declining or worsening    Shift Goals  Clinical Goals: Monitor O2, Neuro Checks  Patient Goals: BA  Family Goals: BA    Progress made toward(s) clinical / shift goals:  Progressing      Problem: Pain - Standard  Goal: Alleviation of pain or a reduction in pain to the patient’s comfort goal  Outcome: Progressing  Note: Pt reports no pain at this time     Problem: Skin Integrity  Goal: Skin integrity is maintained or improved  Outcome: Progressing  Note: Q2 hour turns performed. Range of motion perfomed for wrists

## 2023-10-29 ENCOUNTER — APPOINTMENT (OUTPATIENT)
Dept: RADIOLOGY | Facility: MEDICAL CENTER | Age: 83
DRG: 871 | End: 2023-10-29
Attending: STUDENT IN AN ORGANIZED HEALTH CARE EDUCATION/TRAINING PROGRAM
Payer: COMMERCIAL

## 2023-10-29 LAB
ANION GAP SERPL CALC-SCNC: 1 MMOL/L (ref 7–16)
ANION GAP SERPL CALC-SCNC: 5 MMOL/L (ref 7–16)
ANION GAP SERPL CALC-SCNC: 5 MMOL/L (ref 7–16)
BUN SERPL-MCNC: 37 MG/DL (ref 8–22)
BUN SERPL-MCNC: 41 MG/DL (ref 8–22)
BUN SERPL-MCNC: 41 MG/DL (ref 8–22)
CALCIUM SERPL-MCNC: 7.9 MG/DL (ref 8.5–10.5)
CALCIUM SERPL-MCNC: 8 MG/DL (ref 8.5–10.5)
CALCIUM SERPL-MCNC: 8 MG/DL (ref 8.5–10.5)
CHLORIDE SERPL-SCNC: 112 MMOL/L (ref 96–112)
CHLORIDE SERPL-SCNC: 113 MMOL/L (ref 96–112)
CHLORIDE SERPL-SCNC: 113 MMOL/L (ref 96–112)
CO2 SERPL-SCNC: 27 MMOL/L (ref 20–33)
CO2 SERPL-SCNC: 29 MMOL/L (ref 20–33)
CO2 SERPL-SCNC: 32 MMOL/L (ref 20–33)
CREAT SERPL-MCNC: 0.72 MG/DL (ref 0.5–1.4)
CREAT SERPL-MCNC: 0.73 MG/DL (ref 0.5–1.4)
CREAT SERPL-MCNC: 0.79 MG/DL (ref 0.5–1.4)
ERYTHROCYTE [DISTWIDTH] IN BLOOD BY AUTOMATED COUNT: 56.5 FL (ref 35.9–50)
GFR SERPLBLD CREATININE-BSD FMLA CKD-EPI: 89 ML/MIN/1.73 M 2
GFR SERPLBLD CREATININE-BSD FMLA CKD-EPI: 91 ML/MIN/1.73 M 2
GFR SERPLBLD CREATININE-BSD FMLA CKD-EPI: 91 ML/MIN/1.73 M 2
GLUCOSE BLD STRIP.AUTO-MCNC: 131 MG/DL (ref 65–99)
GLUCOSE BLD STRIP.AUTO-MCNC: 144 MG/DL (ref 65–99)
GLUCOSE BLD STRIP.AUTO-MCNC: 154 MG/DL (ref 65–99)
GLUCOSE BLD STRIP.AUTO-MCNC: 183 MG/DL (ref 65–99)
GLUCOSE SERPL-MCNC: 141 MG/DL (ref 65–99)
GLUCOSE SERPL-MCNC: 146 MG/DL (ref 65–99)
GLUCOSE SERPL-MCNC: 183 MG/DL (ref 65–99)
HCT VFR BLD AUTO: 30.6 % (ref 42–52)
HGB BLD-MCNC: 9.6 G/DL (ref 14–18)
MAGNESIUM SERPL-MCNC: 2.1 MG/DL (ref 1.5–2.5)
MCH RBC QN AUTO: 30.4 PG (ref 27–33)
MCHC RBC AUTO-ENTMCNC: 31.4 G/DL (ref 32.3–36.5)
MCV RBC AUTO: 96.8 FL (ref 81.4–97.8)
PLATELET # BLD AUTO: 210 K/UL (ref 164–446)
PMV BLD AUTO: 10.5 FL (ref 9–12.9)
POTASSIUM SERPL-SCNC: 3.7 MMOL/L (ref 3.6–5.5)
POTASSIUM SERPL-SCNC: 3.8 MMOL/L (ref 3.6–5.5)
POTASSIUM SERPL-SCNC: 3.9 MMOL/L (ref 3.6–5.5)
RBC # BLD AUTO: 3.16 M/UL (ref 4.7–6.1)
SODIUM SERPL-SCNC: 144 MMOL/L (ref 135–145)
SODIUM SERPL-SCNC: 145 MMOL/L (ref 135–145)
SODIUM SERPL-SCNC: 145 MMOL/L (ref 135–145)
SODIUM SERPL-SCNC: 147 MMOL/L (ref 135–145)
WBC # BLD AUTO: 7.1 K/UL (ref 4.8–10.8)

## 2023-10-29 PROCEDURE — 84295 ASSAY OF SERUM SODIUM: CPT

## 2023-10-29 PROCEDURE — 99232 SBSQ HOSP IP/OBS MODERATE 35: CPT | Mod: GC | Performed by: INTERNAL MEDICINE

## 2023-10-29 PROCEDURE — 83735 ASSAY OF MAGNESIUM: CPT

## 2023-10-29 PROCEDURE — 85027 COMPLETE CBC AUTOMATED: CPT

## 2023-10-29 PROCEDURE — 94640 AIRWAY INHALATION TREATMENT: CPT

## 2023-10-29 PROCEDURE — 80048 BASIC METABOLIC PNL TOTAL CA: CPT | Mod: 91

## 2023-10-29 PROCEDURE — 36415 COLL VENOUS BLD VENIPUNCTURE: CPT

## 2023-10-29 PROCEDURE — 700102 HCHG RX REV CODE 250 W/ 637 OVERRIDE(OP): Performed by: STUDENT IN AN ORGANIZED HEALTH CARE EDUCATION/TRAINING PROGRAM

## 2023-10-29 PROCEDURE — 82962 GLUCOSE BLOOD TEST: CPT | Mod: 91

## 2023-10-29 PROCEDURE — 700102 HCHG RX REV CODE 250 W/ 637 OVERRIDE(OP): Performed by: HOSPITALIST

## 2023-10-29 PROCEDURE — 71045 X-RAY EXAM CHEST 1 VIEW: CPT

## 2023-10-29 PROCEDURE — A9270 NON-COVERED ITEM OR SERVICE: HCPCS | Performed by: STUDENT IN AN ORGANIZED HEALTH CARE EDUCATION/TRAINING PROGRAM

## 2023-10-29 PROCEDURE — A9270 NON-COVERED ITEM OR SERVICE: HCPCS | Performed by: HOSPITALIST

## 2023-10-29 PROCEDURE — 770001 HCHG ROOM/CARE - MED/SURG/GYN PRIV*

## 2023-10-29 PROCEDURE — 700102 HCHG RX REV CODE 250 W/ 637 OVERRIDE(OP): Performed by: INTERNAL MEDICINE

## 2023-10-29 PROCEDURE — A9270 NON-COVERED ITEM OR SERVICE: HCPCS | Performed by: INTERNAL MEDICINE

## 2023-10-29 PROCEDURE — 700101 HCHG RX REV CODE 250: Performed by: INTERNAL MEDICINE

## 2023-10-29 RX ORDER — TRAZODONE HYDROCHLORIDE 50 MG/1
25 TABLET ORAL
Status: DISCONTINUED | OUTPATIENT
Start: 2023-10-29 | End: 2023-10-30

## 2023-10-29 RX ADMIN — LANSOPRAZOLE 30 MG: 30 TABLET, ORALLY DISINTEGRATING, DELAYED RELEASE ORAL at 06:18

## 2023-10-29 RX ADMIN — TRAZODONE HYDROCHLORIDE 25 MG: 50 TABLET ORAL at 23:04

## 2023-10-29 RX ADMIN — TERAZOSIN 5 MG: 5 CAPSULE ORAL at 16:56

## 2023-10-29 RX ADMIN — INSULIN LISPRO 3 UNITS: 100 INJECTION, SOLUTION INTRAVENOUS; SUBCUTANEOUS at 12:55

## 2023-10-29 RX ADMIN — AMIODARONE HYDROCHLORIDE 200 MG: 200 TABLET ORAL at 06:18

## 2023-10-29 RX ADMIN — APIXABAN 5 MG: 5 TABLET, FILM COATED ORAL at 16:56

## 2023-10-29 RX ADMIN — INSULIN LISPRO 5 UNITS: 100 INJECTION, SOLUTION INTRAVENOUS; SUBCUTANEOUS at 06:28

## 2023-10-29 RX ADMIN — CYANOCOBALAMIN TAB 500 MCG 500 MCG: 500 TAB at 06:19

## 2023-10-29 RX ADMIN — QUETIAPINE FUMARATE 25 MG: 25 TABLET ORAL at 23:04

## 2023-10-29 RX ADMIN — MICONAZOLE NITRATE: 20 CREAM TOPICAL at 07:42

## 2023-10-29 RX ADMIN — INSULIN LISPRO 3 UNITS: 100 INJECTION, SOLUTION INTRAVENOUS; SUBCUTANEOUS at 00:36

## 2023-10-29 RX ADMIN — INSULIN LISPRO 5 UNITS: 100 INJECTION, SOLUTION INTRAVENOUS; SUBCUTANEOUS at 12:55

## 2023-10-29 RX ADMIN — APIXABAN 5 MG: 5 TABLET, FILM COATED ORAL at 06:19

## 2023-10-29 RX ADMIN — MICONAZOLE NITRATE: 20 CREAM TOPICAL at 16:56

## 2023-10-29 RX ADMIN — INSULIN LISPRO 5 UNITS: 100 INJECTION, SOLUTION INTRAVENOUS; SUBCUTANEOUS at 16:46

## 2023-10-29 RX ADMIN — IPRATROPIUM BROMIDE AND ALBUTEROL SULFATE 3 ML: 2.5; .5 SOLUTION RESPIRATORY (INHALATION) at 19:49

## 2023-10-29 ASSESSMENT — ENCOUNTER SYMPTOMS
INSOMNIA: 1
DIAPHORESIS: 0
VOMITING: 0
WEIGHT LOSS: 0
PALPITATIONS: 0
ABDOMINAL PAIN: 1
LOSS OF CONSCIOUSNESS: 0
SORE THROAT: 1
WEAKNESS: 0
DIARRHEA: 0
HEARTBURN: 0
FEVER: 0
HEADACHES: 0
EYES NEGATIVE: 1
PND: 0
NERVOUS/ANXIOUS: 1
MEMORY LOSS: 1
SHORTNESS OF BREATH: 0
CONSTIPATION: 0
NAUSEA: 0
BLOOD IN STOOL: 0
SPUTUM PRODUCTION: 0
DIZZINESS: 0
COUGH: 1
HEMOPTYSIS: 0
CHILLS: 0
MUSCULOSKELETAL NEGATIVE: 1
ORTHOPNEA: 0

## 2023-10-29 ASSESSMENT — PATIENT HEALTH QUESTIONNAIRE - PHQ9
1. LITTLE INTEREST OR PLEASURE IN DOING THINGS: NOT AT ALL
SUM OF ALL RESPONSES TO PHQ9 QUESTIONS 1 AND 2: 0
2. FEELING DOWN, DEPRESSED, IRRITABLE, OR HOPELESS: NOT AT ALL

## 2023-10-29 NOTE — ASSESSMENT & PLAN NOTE
Patient alert and oriented x4 mental status has been improving since ICU admission.  - Delirium protocol in place.  - Continue Seroquel due to somnolence.  - Continue to monitor.

## 2023-10-29 NOTE — PROGRESS NOTES
4 Eyes Skin Assessment Completed by ALVARADO Brown and ALVARADO Ng.    Head Scab and Scar  Ears WDL  Nose WDL  Mouth Scabs  Neck WDL  Breast/Chest WDL  Shoulder Blades WDL  Spine WDL  (R) Arm/Elbow/Hand Redness and Blanching  (L) Arm/Elbow/Hand Redness and Blanching  Abdomen WDL  Groin Redness and Excoriation  Scrotum/Coccyx/Buttocks Redness, Non-Blanching, and Excoriation  (R) Leg Redness, Scar, and Edema  (L) Leg Scab and Swelling  (R) Heel/Foot/Toe Edema  (L) Heel/Foot/Toe Edema          Devices In Places Cortrak and Nasal Cannula      Interventions In Place Waffle Overlay, Optifoam, and Barrier Cream    Possible Skin Injury Yes    Pictures Uploaded Into Epic N/A  Wound Consult Placed Yes  RN Wound Prevention Protocol Ordered Yes

## 2023-10-29 NOTE — ASSESSMENT & PLAN NOTE
DNR/DNI. Patient does not want heroic or invasive measures and is focused on quality of life vs. Quantity.  Oldest daughter is primary contact, does not want permanent feeding tube  Palliative care following  - On 10/30 following discussions with palliative care and family plan is for his to transition to comfort care.  - Hospice referral sent  - NG tube removal and allow for regular, liberalized diet.  Discussed severe risk of aspiration resulting in death to patient and family is aware per palliative conversations.  -Discussion with Connie on 11/3.  Potentially patient to continue care at home with family or SNF.  Patient likely to be placed in SNF and then moved to home health.

## 2023-10-29 NOTE — ASSESSMENT & PLAN NOTE
Intubated 10/11 for mental status and hypoxia on max HFNC, eventually extubated on 10/17.  - RT protocol in place.  - Currently on 2L with nasal cannula.  - Titrate oxygen to greater than 90%.

## 2023-10-29 NOTE — PROGRESS NOTES
Daily Progress Note:     Date of Service: 10/29/2023  Primary Team: [facility] Purple Team   Attending: Brian Gomes M.D.   Senior Resident: Germán Pope D.O.  Contact:  [phone]    Patient Identifier:   Mr. Marrufo is an 83-year-old male with past medical history of NIDDM 2, BPH, and previous orthopedic surgeries who was found down in his home on 10/8, admitted to the hospital on 10/9.  Patient was found dehydrated and altered notably uremic with new onset A-fib with RVR which was treated with diltiazem in the ED.  Of note, patient's hospitalization was complicated by acute hypoxic respiratory failure requiring intubation as well as Triplett catheter insertion.  Patient was also found to have an ADELAIDE with rhabdomyolysis s/p fluid resuscitation.  Now doing significantly better with ongoing medical management.    Subjective:  Patient is now alert and oriented x4, notes that he has been able to eat though is now getting majority of his food through NGT.  Speaking good urine via his Triplett catheter, notes that he has not had any bowel movements for several days.  No other nursing concerns at this time.    Interval Update:  - No acute events overnight.  - Hypernatremia with notable increase in sodium.  Increasing free water flushes.  - Titrate oxygen and NG feeds.    Consultants/Specialty:  Critical care  Cardiology  Nephrology  Palliative medicine  Pulmonology    Review of Systems:  Review of Systems   Constitutional:  Negative for chills, diaphoresis, fever and malaise/fatigue.   HENT:  Positive for sore throat. Negative for hearing loss.    Eyes: Negative.    Respiratory:  Positive for cough. Negative for hemoptysis, sputum production and shortness of breath.    Cardiovascular:  Negative for chest pain, palpitations, orthopnea, leg swelling and PND.   Gastrointestinal:  Positive for abdominal pain. Negative for blood in stool, constipation, diarrhea, heartburn, melena, nausea and vomiting.   Genitourinary: Negative.   Negative for dysuria and frequency.   Musculoskeletal: Negative.    Skin: Negative.    Neurological:  Negative for dizziness, loss of consciousness, weakness and headaches.   Endo/Heme/Allergies: Negative.    Psychiatric/Behavioral:  Negative for suicidal ideas. The patient is nervous/anxious.      Objective:   Vitals:   Temp:  [36.2 °C (97.2 °F)-37 °C (98.6 °F)] 36.3 °C (97.3 °F)  Pulse:  [64-70] 66  Resp:  [18] 18  BP: (104-122)/(49-56) 116/49  SpO2:  [92 %-98 %] 95 %    Physical Exam  Vitals and nursing note reviewed.   Constitutional:       General: He is not in acute distress.     Appearance: He is obese. He is ill-appearing. He is not diaphoretic.   HENT:      Head: Normocephalic and atraumatic.      Right Ear: External ear normal.      Left Ear: External ear normal.      Nose:      Comments: NG tube in place     Mouth/Throat:      Mouth: Mucous membranes are dry.      Pharynx: Oropharynx is clear.   Eyes:      General: No scleral icterus.     Extraocular Movements: Extraocular movements intact.      Pupils: Pupils are equal, round, and reactive to light.   Cardiovascular:      Rate and Rhythm: Normal rate. Rhythm irregular.      Pulses: Normal pulses.      Heart sounds: Normal heart sounds. No murmur heard.  Pulmonary:      Effort: Pulmonary effort is normal. No respiratory distress.   Chest:      Chest wall: No tenderness.   Abdominal:      General: Abdomen is flat. Bowel sounds are normal. There is no distension.      Palpations: Abdomen is soft.      Tenderness: There is no right CVA tenderness or left CVA tenderness.   Genitourinary:     Comments: Triplett catheter placed  Musculoskeletal:         General: No swelling. Normal range of motion.      Cervical back: Normal range of motion. No rigidity.      Right lower leg: Edema present.      Left lower leg: Edema present.   Skin:     General: Skin is warm and dry.      Capillary Refill: Capillary refill takes less than 2 seconds.      Coloration: Skin is not  jaundiced.      Findings: No rash.   Neurological:      General: No focal deficit present.      Mental Status: He is alert and oriented to person, place, and time.      Deep Tendon Reflexes: Reflexes normal.     Labs:   Lab Results   Component Value Date/Time    WBC 7.1 10/29/2023 12:45 AM    RBC 3.16 (L) 10/29/2023 12:45 AM    HEMOGLOBIN 9.6 (L) 10/29/2023 12:45 AM    HEMATOCRIT 30.6 (L) 10/29/2023 12:45 AM    MCV 96.8 10/29/2023 12:45 AM    MCH 30.4 10/29/2023 12:45 AM    MCHC 31.4 (L) 10/29/2023 12:45 AM    MPV 10.5 10/29/2023 12:45 AM    NEUTSPOLYS 81.90 (H) 10/21/2023 03:17 AM    LYMPHOCYTES 9.50 (L) 10/21/2023 03:17 AM    MONOCYTES 5.90 10/21/2023 03:17 AM    EOSINOPHILS 0.90 10/21/2023 03:17 AM    BASOPHILS 0.40 10/21/2023 03:17 AM    ANISOCYTOSIS 1+ 10/17/2023 04:05 AM        Lab Results   Component Value Date/Time    SODIUM 145 10/29/2023 12:45 AM    POTASSIUM 3.7 10/29/2023 12:45 AM    CHLORIDE 113 (H) 10/29/2023 12:45 AM    CO2 27 10/29/2023 12:45 AM    GLUCOSE 146 (H) 10/29/2023 12:45 AM    BUN 41 (H) 10/29/2023 12:45 AM    CREATININE 0.72 10/29/2023 12:45 AM        Lab Results   Component Value Date/Time    FXOLG68R 7.47 10/27/2023 01:13 PM    KDOGEB927V 41.9 (H) 10/27/2023 01:13 PM    [id number] 77.2 10/27/2023 01:13 PM    ARTHCO3 30 (H) 10/27/2023 01:13 PM    ARTBE 5 (H) 10/27/2023 01:13 PM       Lab Results   Component Value Date/Time    PROTHROMBTM 21.0 (H) 10/12/2023 12:00 AM    INR 1.79 (H) 10/12/2023 12:00 AM        Imaging:   10/29, chest x-ray, stable left basilar opacification.    Assessment and Plan:    * Acute respiratory failure with hypoxia (HCC)- (present on admission)  Assessment & Plan  Intubated 10/11 for mental status and hypoxia on max HFNC, eventually extubated on 10/17.  - RT protocol in place.  - Currently on 2.5 L with nasal cannula.  - Titrate oxygen to greater than 90%.  - Data monitor.    Advance care planning- (present on admission)  Assessment & Plan  DNR/DNI  Oldest daughter  is primary contact, does not want permanent feeding tube  Palliative care following    Anemia  Assessment & Plan  No obvious signs of bleeding at this time.  - Continue to monitor CBC daily.    Rhabdomyolysis- (present on admission)  Assessment & Plan  Patient now s/p fluid resuscitation.  - Continue to monitor.      Atrial fibrillation with RVR ([facility])- (present on admission)  Assessment & Plan  No known history of Afib, RVR with hypotension attempted electrical cardioversion 10/11.  - Continue PO amiodarone  - Cardiology consulted, appreciate recommendations.  - Was previously on heparin drip now on anticoagulation with apixaban.  - Continue to monitor electrolytes, K goal of 4, Mg goal of 2.    Dehydration- (present on admission)  Assessment & Plan  Continue fluid resuscitation intravenously and enterally for now  Monitor UOP closely    Hypernatremia- (present on admission)  Assessment & Plan  Ongoing issues with sodium control.  - Most recent sodium of 147.  - Continue free water flushes.  - We will avoid D5W given difficult glucose control.    ADELAIDE (acute kidney injury) ([facility])- (present on admission)  Assessment & Plan  Secondary to ATN, pre-renal and rhabdo. Now resolved.  - Nephrology consulted, appreciate recommendations.  - Avoid nephrotoxins  - Monitor creatinine, UOP, electrolytes closely      Altered mental status- (present on admission)  Assessment & Plan  Patient with fluctuating mental status, likely in the setting of metabolic encephalopathy.  Per daughter, patient with history of bipolar disorder.  - Delirium protocol in place.  - Patient now on Seroquel, with improved mentation.  - Continue to monitor.    DM2 (diabetes mellitus, type 2) ([facility])- (present on admission)  Assessment & Plan  Most recent A1c of 8%.  - Continue basal bolus insulin dosing with glargine at 30 units nightly and 5 units Premeal insulin with SSI correction.  - Hypoglycemia protocol in place.  - Continue to monitor Accu-Cheks 4  times daily.       Sepsis (HCC)- (present on admission)  Assessment & Plan  Patient treated for undifferentiated shock.  Now resolved.  - Continue to monitor.    BPH (benign prostatic hyperplasia)- (present on admission)  Assessment & Plan  Patient with known history of BPH.  - Terazosin utilized as this can be crushed into the NG tube.  - Can restart home medications after NG tube removed.  - Triplett catheter in place, care per nursing protocol.    Code Status: DNR/DNI  DVT prophylaxis: Anticoagulation with apixaban.  Diet: Receiving more than 50% of nutrition via NGT, uptitrate p.o. intake as tolerated.  GI: Bowel protocol in place.  Disposition: Pending medical management with titration of oxygen and NG feeds.    Germán Pope DO, MPH  PGY-3 Internal Medicine

## 2023-10-29 NOTE — ASSESSMENT & PLAN NOTE
No known history of Afib, RVR with hypotension attempted electrical cardioversion 10/11.  - Continue PO amiodarone  - Discontinued apixaban with hematuria.  Discussed with patient and he would not like to continue anticoagulation given bleed.  Patient understands increased risk of DVT and stroke and would not like to continue.  - No longer monitoring electrolytes

## 2023-10-29 NOTE — ASSESSMENT & PLAN NOTE
Patient with known history of BPH.  - Continue home tamsulosin  - Triplett catheter currently in place since ICU.  We will continue as patient had bleed on attempted removal.

## 2023-10-29 NOTE — PROGRESS NOTES
This note is intended for the purposes of medical student education and feedback only.   Please refer to the documentation by this patient's assigned medical practitioner for details of care and plans.      Daily Progress Note    Date of Service  10/29/2023    Chief Complaint  Perry Marrufo is a 83 y.o. male admitted 10/9/2023 with hx of diabetes, BPH, dementia and bipolar disorder who was brought to the ED by friends/family with altered mental status and diarrhea.    Hospital Course  Perry Marrufo is an 83 y.o. male w/ hx of DMII, prostate disease, bipolar disorder.  He presented 10/9/2023 with transfer from [place] after being found down and last known at mental baseline 2 days prior.  He was found to have hypernatremia, atrial fibrillation with a rapid ventricular rate, rhabdomyolysis, leukocytosis. On 10/11 he was intubated, cardioversion attempted for unstable afib RVR and transferred to ICU; he was extubated 10/17. NG tube placed on 10/11 and has remained in place for enteral feedings. Hypernatremia treated with free water flushes. Glucose treated with insulin glargine 30U and lispro 5U TID. Oxygenation previously managed via nasal cannula however his O2 sat >90% on RA so no addtl O2 needed. Continued on 200mg amiodarone for afib rate control with Eliquis 5mg BID for anticoagulation.     Interval Problem Update  -Oxygenation continuing to improve post-extubation. Satting at 91% on room air with no SOB. D/C nasal cannula.  -Rhonchi and sputum still noted on physical exam, will repeat CXR   -Patient still hypernatremic with sodium of 147. Free water flushes to deplete.  -SLP cleared to start oral diet as tolerated  -NG tube remains in place until registered dietician agrees it can be removed and patient can tolerate oral diet.  -Continue with bilateral soft wrist restraints due to prior aggression during admission. Re-evaluating mental status daily. Plan is to D/C restraints when mood has improved.    I  have discussed this patient's plan of care and discharge plan at IDT rounds today with Case Management, Nursing, Nursing leadership, and other members of the IDT team.    Consultants/Specialty  nephrology and palliative care, pulmonology and registered dietician    Code Status  DNAR/DNI    Disposition  The patient is not medically cleared for discharge to home or a post-acute facility.      I have placed the appropriate orders for post-discharge needs.    Review of Systems  Review of Systems   Constitutional:  Negative for chills, fever, malaise/fatigue and weight loss.   HENT: Negative.     Eyes: Negative.    Respiratory:  Positive for cough. Negative for hemoptysis, sputum production and shortness of breath.    Cardiovascular:  Negative for chest pain, palpitations, orthopnea, leg swelling and PND.   Gastrointestinal:  Positive for abdominal pain. Negative for heartburn, nausea and vomiting.   Genitourinary: Negative.    Musculoskeletal: Negative.    Skin: Negative.    Neurological:  Negative for dizziness, loss of consciousness, weakness and headaches.   Endo/Heme/Allergies: Negative.    Psychiatric/Behavioral:  Positive for memory loss. The patient has insomnia.         Hx of dementia and insomnia          Physical Exam  Temp:  [36.2 °C (97.2 °F)-37 °C (98.6 °F)] 36.7 °C (98 °F)  Pulse:  [64-70] 67  Resp:  [18] 18  BP: (104-122)/(51-56) 121/56  SpO2:  [92 %-98 %] 98 %    Physical Exam  Constitutional:       Appearance: Normal appearance. He is obese.   HENT:      Head: Normocephalic.   Cardiovascular:      Rate and Rhythm: Normal rate. Rhythm irregular.      Pulses: Normal pulses.      Heart sounds: Normal heart sounds.   Pulmonary:      Effort: Pulmonary effort is normal.      Breath sounds: Rhonchi present.   Abdominal:      General: Abdomen is flat. There is no distension.      Palpations: Abdomen is soft. There is no mass.      Tenderness: There is abdominal tenderness. There is no guarding or rebound.       Hernia: A hernia is present.      Comments: Hiatal hernia   Musculoskeletal:         General: Normal range of motion.   Skin:     General: Skin is warm and dry.      Capillary Refill: Capillary refill takes less than 2 seconds.   Neurological:      General: No focal deficit present.      Mental Status: He is alert and oriented to person, place, and time.      Motor: No weakness.   Psychiatric:         Mood and Affect: Mood normal.         Fluids    Intake/Output Summary (Last 24 hours) at 10/29/2023 0707  Last data filed at 10/29/2023 0127  Gross per 24 hour   Intake 2880 ml   Output 1050 ml   Net 1830 ml       Laboratory  Recent Labs     10/26/23  0904 10/27/23  2351 10/29/23  0045   WBC 7.6 7.4 7.1   RBC 3.17* 2.87* 3.16*   HEMOGLOBIN 9.3* 8.7* 9.6*   HEMATOCRIT 31.1* 28.6* 30.6*   MCV 98.1* 99.7* 96.8   MCH 29.3 30.3 30.4   MCHC 29.9* 30.4* 31.4*   RDW 53.0* 55.1* 56.5*   PLATELETCT 292 247 210   MPV 10.2 10.2 10.5     Recent Labs     10/28/23  1142 10/28/23  1744 10/29/23  0045   SODIUM 146* 147* 145   POTASSIUM 4.1 4.1 3.7   CHLORIDE 113* 114* 113*   CO2 27 29 27   GLUCOSE 281* 281* 146*   BUN 41* 38* 41*   CREATININE 0.71 0.79 0.72   CALCIUM 7.8* 7.8* 7.9*                   Imaging  DX-ESOPHAGUS - [id number]   Final Result      DX-CHEST-PORTABLE (1 VIEW)   Final Result      1.  Right-sided rib fractures.   2.  Suspect hiatus hernia.   3.  Bibasilar subsegmental atelectatic opacities.      DX-ABDOMEN FOR TUBE PLACEMENT   Final Result         1.  Nonspecific bowel gas pattern in the upper abdomen.   2.  Dobbhoff tube tip overlying the expected location of the pylorus or first duodenal segment.   3.  Hazy bilateral lower lobe infiltrates   4.  Small layering left pleural effusion.      DX-CHEST-PORTABLE (1 VIEW)   Final Result         1.  Decreased opacification the left thorax, likely decreased effusion.   2.  Hazy bilateral pulmonary infiltrates   3.  Cardiomegaly   4.  Right rib fractures      DX-ABDOMEN  FOR TUBE PLACEMENT   Final Result         Feeding tube with tip projecting over the expected area of the third portion duodenum.      DX-CHEST-LIMITED (1 VIEW)   Final Result      Interval replacement of gastric drainage tube with feeding tube   Complete opacification of the left lung, similar to prior      DX-ABDOMEN FOR TUBE PLACEMENT   Final Result      Enteric tube has been placed and the tip projects over the gastroduodenal junction      DX-ABDOMEN FOR TUBE PLACEMENT   Final Result         1.  Pulmonary infiltrates, increased on the left vertebral study.   2.  Moderate layering left pleural effusion, increased since prior study.   3.  Atherosclerosis   4.  Right rib fractures      DX-ABDOMEN FOR TUBE PLACEMENT   Final Result         1.  Nonspecific bowel gas pattern in the upper abdomen.   2.  Nasogastric tube terminating just distal to the gastroesophageal junction, recommend advancement.   3.  Hazy left pulmonary infiltrates, stable since prior study.   4.  Moderate layering left pleural effusion, overall stable      DX-ABDOMEN FOR TUBE PLACEMENT   Final Result      1. Appropriate position of the esophagogastric tube.   2. Worsening left lower lobe parenchymal consolidation and interval increase in size of the left pleural effusion.   3. The remainder is stable.      DX-CHEST-PORTABLE (1 VIEW)   Final Result         1.  Bilateral lower lobe infiltrates, stable since prior study.   2.  Trace bilateral pleural effusions   3.  Atherosclerosis   4.  Right rib fractures      DX-ABDOMEN FOR TUBE PLACEMENT   Final Result      1.  Enteric tube tip overlies the gastric antrum.      [id number] VIEW   Final Result      Nonobstructive bowel gas pattern.      DX-ABDOMEN FOR TUBE PLACEMENT   Final Result         1.  Nonspecific bowel gas pattern in the upper abdomen.   2.  Nasogastric tube tip terminates overlying the expected location of the pylorus or first duodenal segment.   3.  Bilateral lower lobe opacities  concerning for infiltrate.      DX-CHEST-PORTABLE (1 VIEW)   Final Result         1.  Bilateral lower lobe infiltrates, stable since prior study.   2.  Atherosclerosis      DX-CHEST-PORTABLE (1 VIEW)   Final Result         1.  Bilateral lower lobe infiltrates, decreased since prior study.      DX-CHEST-PORTABLE (1 VIEW)   Final Result      1.  Supportive tubing as described above.   2.  Slight increased inflation.   3.  No other significant change from prior exam.         DX-CHEST-LIMITED (1 VIEW)   Final Result      1.  Ill-defined bibasilar pulmonary opacities, slightly worse on the left compared to prior study. Pneumonia is in the differential.   2.  Small left pleural effusion, new since prior.      DX-ABDOMEN FOR TUBE PLACEMENT   Final Result      Nasogastric tube tip overlies the mid stomach      DX-ABDOMEN FOR TUBE PLACEMENT   Final Result      NG tube turns in the gastric body with tip at the level of the gastric fundus.      EC-ECHOCARDIOGRAM LTD W/O CONT   Final Result      DX-CHEST-PORTABLE (1 VIEW)   Final Result      1.  Mild interstitial pulmonary edema, similar to prior   2.  Bibasilar and perihilar underinflation atelectasis which could obscure an additional process. This is unchanged.      DX-CHEST-PORTABLE (1 VIEW)   Final Result      1.  Mild interstitial pulmonary edema   2.  Bibasilar and perihilar underinflation atelectasis which could obscure an additional process.   3.  Small LEFT pleural effusion      DX-CHEST-LIMITED (1 VIEW)   Final Result      1.  Bilateral basilar atelectasis and/or consolidation. Underlying infection is possible.   2.  Stable enlargement of the cardiomediastinal silhouette.   3.  Interval insertion of a central venous catheter which terminates with the tip projecting over the expected region of the mid superior vena cava.   4.  Interval placement of an endotracheal tube which terminates in satisfactory position at the level of the aortic arch.   5.  Interval placement  of an enteric feeding tube which terminates in the left upper quadrant projecting over the expected location of the stomach.      DX-CHEST-PORTABLE (1 VIEW)   Final Result      No significant change from prior exam.      DX-CHEST-PORTABLE (1 VIEW)   Final Result      Bibasilar underinflation atelectasis. Superimposed pneumonia not excluded.      US-RUQ   Final Result      1.  Gallbladder sludge   2.  RIGHT pleural effusion      EC-ECHOCARDIOGRAM COMPLETE W/ CONT   Final Result      CT-CSPINE WITHOUT PLUS RECONS   Final Result         1. No acute fracture from C1 through T1 is visualized.         CT-HEAD W/O   Final Result         1. No acute intracranial abnormality. No evidence of acute intracranial hemorrhage or mass lesion.                     DX-CHEST-PORTABLE (1 VIEW)   Final Result      1.  Low lung volumes without definite acute cardiopulmonary abnormality.           Assessment/Plan   * Acute respiratory failure with hypoxia (HCC)- (present on admission)  Assessment & Plan  Increasing oxygen requirement and chest x-ray showed infiltration possible aspiration pneumonia  Extubated 10/17  Aspiration precautions  Pulmonary following   -abx end date 10/28   WBC initially 18.3 now 7.1 after course of abx  Chest x-ray has cleared significantly  O2 no longer required as SPO2 remains > 90%  SLP following and cleared for oral diet as tolerated  Lansoprazole 30mg daily as PPI  Ongoing case management support, /palliative care    Hypernatremia- (present on admission)  Assessment & Plan       Hypovolemic hypernatremia       Free water via NG to keep Na <146       Most recent Na 147     Atrial fibrillation with RVR (HCC)- (present on admission)  Assessment & Plan  No known history of Afib, RVR with hypotension attempted electrical cardioversion 10/11 without success, put on amio GTT  10/22 on oral amiodarone 200mg  Echo EF 65%, no valvular abnormalities  Rate Controlled  Continuous tele  monitoring  Anticoagulation with Eliquis 5mg BID  Rhythm control with amiodarone, continue loading  Optimize electrolytes      Dehydration- (present on admission)  Assessment & Plan  Continue fluid resuscitation intravenously and enterally for now  Monitor UOP closely     ADELAIDE (acute kidney injury) ([facility])- (present on admission)  Assessment & Plan  Improved with fluids  Monitoring bmp, I/O's     Altered mental status- (present on admission)  Assessment & Plan  Fluctuating mental status due to metabolic encephalopathy  Avoid offending agents  The patient apparently with significant insomnia  Daughter reports hx bipolar   Seroquel and Trazodone to help with mood and sleep     DM2 (diabetes mellitus, type 2) ([facility])- (present on admission)  Assessment & Plan  Glargine 30 units qHS, 5 units lispro with meals  Monitor accuchecks and cover with SSI  Hypoglycemic protocol  A1c 8 in past week    Advance care planning- (present on admission)  Assessment & Plan  DNR/DNI  Oldest daughter is primary contact, does not want permanent feeding tube  Palliative care following     Decubitus skin ulcer  Assessment & Plan  Wound care  Miconazole 2% cream  Frequent turning     BPH (benign prostatic hyperplasia)- (present on admission)  Assessment & Plan  Terazosin 5mg daily    VTE prophylaxis:    therapeutic anticoagulation with eliquis 5 mg BID      I have performed a physical exam and reviewed and updated ROS and Plan today (10/29/2023). In review of yesterday's note (10/28/2023), there are no changes except as documented above.

## 2023-10-29 NOTE — ASSESSMENT & PLAN NOTE
Most recent A1c of 8%.  - Discontinued SSI and glargine to prioritize comfort and decrease invasive measures  - Discussed with patient, to decrease risk of DKA or hyperglycemic episodes initiating metformin 500mg BID

## 2023-10-29 NOTE — ASSESSMENT & PLAN NOTE
Ongoing issues with sodium control.  - Most recent sodium of 146.  - The patient has transition to comfort care.  No further free water flushes and daily labs.

## 2023-10-29 NOTE — ASSESSMENT & PLAN NOTE
No signs of overt bleeding.  Likely secondary to anemia of chronic disease, anemia trending towards macrocytic.  No iron panel and ferritin on record.  Will not draw iron panel as patient would not like invasive measures.  - No daily labs as patient would not like invasive measures.

## 2023-10-30 PROBLEM — E86.0 DEHYDRATION: Status: RESOLVED | Noted: 2023-10-09 | Resolved: 2023-10-30

## 2023-10-30 LAB
ALBUMIN SERPL BCP-MCNC: 1.9 G/DL (ref 3.2–4.9)
ALBUMIN/GLOB SERPL: 0.7 G/DL
ALP SERPL-CCNC: 69 U/L (ref 30–99)
ALT SERPL-CCNC: <5 U/L (ref 2–50)
ANION GAP SERPL CALC-SCNC: 4 MMOL/L (ref 7–16)
ANISOCYTOSIS BLD QL SMEAR: ABNORMAL
AST SERPL-CCNC: 18 U/L (ref 12–45)
BASOPHILS # BLD AUTO: 0 % (ref 0–1.8)
BASOPHILS # BLD: 0 K/UL (ref 0–0.12)
BILIRUB SERPL-MCNC: 0.7 MG/DL (ref 0.1–1.5)
BUN SERPL-MCNC: 37 MG/DL (ref 8–22)
CALCIUM ALBUM COR SERPL-MCNC: 9.7 MG/DL (ref 8.5–10.5)
CALCIUM SERPL-MCNC: 8 MG/DL (ref 8.5–10.5)
CHLORIDE SERPL-SCNC: 112 MMOL/L (ref 96–112)
CO2 SERPL-SCNC: 30 MMOL/L (ref 20–33)
CREAT SERPL-MCNC: 0.72 MG/DL (ref 0.5–1.4)
CRP SERPL HS-MCNC: 1.23 MG/DL (ref 0–0.75)
EOSINOPHIL # BLD AUTO: 0 K/UL (ref 0–0.51)
EOSINOPHIL NFR BLD: 0 % (ref 0–6.9)
ERYTHROCYTE [DISTWIDTH] IN BLOOD BY AUTOMATED COUNT: 59.7 FL (ref 35.9–50)
GFR SERPLBLD CREATININE-BSD FMLA CKD-EPI: 91 ML/MIN/1.73 M 2
GLOBULIN SER CALC-MCNC: 2.7 G/DL (ref 1.9–3.5)
GLUCOSE BLD STRIP.AUTO-MCNC: 124 MG/DL (ref 65–99)
GLUCOSE BLD STRIP.AUTO-MCNC: 136 MG/DL (ref 65–99)
GLUCOSE BLD STRIP.AUTO-MCNC: 190 MG/DL (ref 65–99)
GLUCOSE BLD STRIP.AUTO-MCNC: 193 MG/DL (ref 65–99)
GLUCOSE SERPL-MCNC: 173 MG/DL (ref 65–99)
HCT VFR BLD AUTO: 30.7 % (ref 42–52)
HGB BLD-MCNC: 9.4 G/DL (ref 14–18)
LYMPHOCYTES # BLD AUTO: 0.91 K/UL (ref 1–4.8)
LYMPHOCYTES NFR BLD: 11.4 % (ref 22–41)
MAGNESIUM SERPL-MCNC: 2 MG/DL (ref 1.5–2.5)
MANUAL DIFF BLD: NORMAL
MCH RBC QN AUTO: 30.3 PG (ref 27–33)
MCHC RBC AUTO-ENTMCNC: 30.6 G/DL (ref 32.3–36.5)
MCV RBC AUTO: 99 FL (ref 81.4–97.8)
METAMYELOCYTES NFR BLD MANUAL: 0.9 %
MICROCYTES BLD QL SMEAR: ABNORMAL
MONOCYTES # BLD AUTO: 0.21 K/UL (ref 0–0.85)
MONOCYTES NFR BLD AUTO: 2.6 % (ref 0–13.4)
MORPHOLOGY BLD-IMP: NORMAL
MYELOCYTES NFR BLD MANUAL: 0.9 %
NEUTROPHILS # BLD AUTO: 6.74 K/UL (ref 1.82–7.42)
NEUTROPHILS NFR BLD: 84.2 % (ref 44–72)
NRBC # BLD AUTO: 0 K/UL
NRBC BLD-RTO: 0 /100 WBC (ref 0–0.2)
PHOSPHATE SERPL-MCNC: 3.1 MG/DL (ref 2.5–4.5)
PLATELET # BLD AUTO: 178 K/UL (ref 164–446)
PLATELET BLD QL SMEAR: NORMAL
PMV BLD AUTO: 10.2 FL (ref 9–12.9)
POLYCHROMASIA BLD QL SMEAR: NORMAL
POTASSIUM SERPL-SCNC: 3.9 MMOL/L (ref 3.6–5.5)
PREALB SERPL-MCNC: 18.1 MG/DL (ref 18–38)
PROT SERPL-MCNC: 4.6 G/DL (ref 6–8.2)
RBC # BLD AUTO: 3.1 M/UL (ref 4.7–6.1)
RBC BLD AUTO: PRESENT
SODIUM SERPL-SCNC: 146 MMOL/L (ref 135–145)
WBC # BLD AUTO: 8 K/UL (ref 4.8–10.8)

## 2023-10-30 PROCEDURE — 700102 HCHG RX REV CODE 250 W/ 637 OVERRIDE(OP): Performed by: HOSPITALIST

## 2023-10-30 PROCEDURE — 700102 HCHG RX REV CODE 250 W/ 637 OVERRIDE(OP)

## 2023-10-30 PROCEDURE — A9270 NON-COVERED ITEM OR SERVICE: HCPCS

## 2023-10-30 PROCEDURE — 36415 COLL VENOUS BLD VENIPUNCTURE: CPT

## 2023-10-30 PROCEDURE — 99233 SBSQ HOSP IP/OBS HIGH 50: CPT | Performed by: NURSE PRACTITIONER

## 2023-10-30 PROCEDURE — 85007 BL SMEAR W/DIFF WBC COUNT: CPT

## 2023-10-30 PROCEDURE — 99232 SBSQ HOSP IP/OBS MODERATE 35: CPT | Mod: GC | Performed by: INTERNAL MEDICINE

## 2023-10-30 PROCEDURE — 84100 ASSAY OF PHOSPHORUS: CPT

## 2023-10-30 PROCEDURE — 86140 C-REACTIVE PROTEIN: CPT

## 2023-10-30 PROCEDURE — 82962 GLUCOSE BLOOD TEST: CPT | Mod: 91

## 2023-10-30 PROCEDURE — 92526 ORAL FUNCTION THERAPY: CPT

## 2023-10-30 PROCEDURE — A9270 NON-COVERED ITEM OR SERVICE: HCPCS | Performed by: HOSPITALIST

## 2023-10-30 PROCEDURE — 97530 THERAPEUTIC ACTIVITIES: CPT

## 2023-10-30 PROCEDURE — 83735 ASSAY OF MAGNESIUM: CPT

## 2023-10-30 PROCEDURE — 85027 COMPLETE CBC AUTOMATED: CPT

## 2023-10-30 PROCEDURE — 770001 HCHG ROOM/CARE - MED/SURG/GYN PRIV*

## 2023-10-30 PROCEDURE — 80053 COMPREHEN METABOLIC PANEL: CPT

## 2023-10-30 PROCEDURE — 84134 ASSAY OF PREALBUMIN: CPT

## 2023-10-30 RX ORDER — CHOLECALCIFEROL (VITAMIN D3) 125 MCG
500 CAPSULE ORAL DAILY
Status: DISCONTINUED | OUTPATIENT
Start: 2023-10-31 | End: 2023-11-01

## 2023-10-30 RX ORDER — AMIODARONE HYDROCHLORIDE 200 MG/1
200 TABLET ORAL DAILY
Status: DISCONTINUED | OUTPATIENT
Start: 2023-10-31 | End: 2023-11-01

## 2023-10-30 RX ORDER — ONDANSETRON 2 MG/ML
8 INJECTION INTRAMUSCULAR; INTRAVENOUS EVERY 8 HOURS PRN
Status: DISCONTINUED | OUTPATIENT
Start: 2023-10-30 | End: 2023-11-06 | Stop reason: HOSPADM

## 2023-10-30 RX ORDER — ATROPINE SULFATE 10 MG/ML
2 SOLUTION/ DROPS OPHTHALMIC EVERY 4 HOURS PRN
Status: DISCONTINUED | OUTPATIENT
Start: 2023-10-30 | End: 2023-11-06 | Stop reason: HOSPADM

## 2023-10-30 RX ORDER — TAMSULOSIN HYDROCHLORIDE 0.4 MG/1
0.4 CAPSULE ORAL
Status: DISCONTINUED | OUTPATIENT
Start: 2023-10-30 | End: 2023-11-06 | Stop reason: HOSPADM

## 2023-10-30 RX ORDER — LIDOCAINE HYDROCHLORIDE 20 MG/ML
5 SOLUTION OROPHARYNGEAL EVERY 4 HOURS PRN
Status: DISCONTINUED | OUTPATIENT
Start: 2023-10-30 | End: 2023-11-06 | Stop reason: HOSPADM

## 2023-10-30 RX ORDER — QUETIAPINE FUMARATE 25 MG/1
25 TABLET, FILM COATED ORAL NIGHTLY
Status: DISCONTINUED | OUTPATIENT
Start: 2023-10-30 | End: 2023-11-04

## 2023-10-30 RX ORDER — ONDANSETRON 4 MG/1
8 TABLET, ORALLY DISINTEGRATING ORAL EVERY 8 HOURS PRN
Status: DISCONTINUED | OUTPATIENT
Start: 2023-10-30 | End: 2023-11-06 | Stop reason: HOSPADM

## 2023-10-30 RX ORDER — TRAZODONE HYDROCHLORIDE 50 MG/1
25 TABLET ORAL
Status: DISCONTINUED | OUTPATIENT
Start: 2023-10-30 | End: 2023-11-06 | Stop reason: HOSPADM

## 2023-10-30 RX ADMIN — QUETIAPINE FUMARATE 25 MG: 25 TABLET ORAL at 20:17

## 2023-10-30 RX ADMIN — APIXABAN 5 MG: 5 TABLET, FILM COATED ORAL at 17:13

## 2023-10-30 RX ADMIN — INSULIN LISPRO 3 UNITS: 100 INJECTION, SOLUTION INTRAVENOUS; SUBCUTANEOUS at 04:58

## 2023-10-30 RX ADMIN — TERAZOSIN 5 MG: 5 CAPSULE ORAL at 17:13

## 2023-10-30 RX ADMIN — INSULIN LISPRO 5 UNITS: 100 INJECTION, SOLUTION INTRAVENOUS; SUBCUTANEOUS at 12:19

## 2023-10-30 RX ADMIN — INSULIN LISPRO 3 UNITS: 100 INJECTION, SOLUTION INTRAVENOUS; SUBCUTANEOUS at 12:19

## 2023-10-30 RX ADMIN — AMIODARONE HYDROCHLORIDE 200 MG: 200 TABLET ORAL at 04:39

## 2023-10-30 RX ADMIN — INSULIN LISPRO 5 UNITS: 100 INJECTION, SOLUTION INTRAVENOUS; SUBCUTANEOUS at 17:46

## 2023-10-30 RX ADMIN — LANSOPRAZOLE 30 MG: 30 TABLET, ORALLY DISINTEGRATING, DELAYED RELEASE ORAL at 04:39

## 2023-10-30 RX ADMIN — INSULIN LISPRO 5 UNITS: 100 INJECTION, SOLUTION INTRAVENOUS; SUBCUTANEOUS at 04:55

## 2023-10-30 RX ADMIN — TRAZODONE HYDROCHLORIDE 25 MG: 50 TABLET ORAL at 20:17

## 2023-10-30 RX ADMIN — APIXABAN 5 MG: 5 TABLET, FILM COATED ORAL at 04:39

## 2023-10-30 RX ADMIN — CYANOCOBALAMIN TAB 500 MCG 500 MCG: 500 TAB at 04:39

## 2023-10-30 ASSESSMENT — COGNITIVE AND FUNCTIONAL STATUS - GENERAL
MOVING FROM LYING ON BACK TO SITTING ON SIDE OF FLAT BED: A LOT
SUGGESTED CMS G CODE MODIFIER MOBILITY: CL
STANDING UP FROM CHAIR USING ARMS: A LOT
TURNING FROM BACK TO SIDE WHILE IN FLAT BAD: A LITTLE
SUGGESTED CMS G CODE MODIFIER MOBILITY: CM
CLIMB 3 TO 5 STEPS WITH RAILING: TOTAL
MOVING TO AND FROM BED TO CHAIR: A LOT
EATING MEALS: A LITTLE
HELP NEEDED FOR BATHING: A LOT
WALKING IN HOSPITAL ROOM: A LOT
PERSONAL GROOMING: A LOT
CLIMB 3 TO 5 STEPS WITH RAILING: TOTAL
STANDING UP FROM CHAIR USING ARMS: A LOT
SUGGESTED CMS G CODE MODIFIER DAILY ACTIVITY: CL
MOBILITY SCORE: 12
WALKING IN HOSPITAL ROOM: TOTAL
TOILETING: A LOT
MOBILITY SCORE: 7
MOVING TO AND FROM BED TO CHAIR: UNABLE
DAILY ACTIVITIY SCORE: 13
DRESSING REGULAR LOWER BODY CLOTHING: A LOT
DRESSING REGULAR UPPER BODY CLOTHING: A LOT
TURNING FROM BACK TO SIDE WHILE IN FLAT BAD: UNABLE
MOVING FROM LYING ON BACK TO SITTING ON SIDE OF FLAT BED: UNABLE

## 2023-10-30 ASSESSMENT — ENCOUNTER SYMPTOMS
CARDIOVASCULAR NEGATIVE: 1
NERVOUS/ANXIOUS: 1
FEVER: 0
WHEEZING: 0
CONSTIPATION: 0
WEAKNESS: 0
CHILLS: 0
PND: 0
HEADACHES: 0
EYES NEGATIVE: 1
COUGH: 1
DIARRHEA: 0
INSOMNIA: 1
LOSS OF CONSCIOUSNESS: 0
NAUSEA: 0
GASTROINTESTINAL NEGATIVE: 1
HEARTBURN: 0
DIAPHORESIS: 0
DEPRESSION: 0
PALPITATIONS: 0
SORE THROAT: 1
BLOOD IN STOOL: 0
SHORTNESS OF BREATH: 1
DIZZINESS: 0
VOMITING: 0
SHORTNESS OF BREATH: 0
ORTHOPNEA: 0
MUSCULOSKELETAL NEGATIVE: 1
ABDOMINAL PAIN: 1
SPUTUM PRODUCTION: 0
HALLUCINATIONS: 0
HEMOPTYSIS: 0
CONSTITUTIONAL NEGATIVE: 1
NEUROLOGICAL NEGATIVE: 1

## 2023-10-30 ASSESSMENT — FIBROSIS 4 INDEX: FIB4 SCORE: 3.91

## 2023-10-30 ASSESSMENT — GAIT ASSESSMENTS: GAIT LEVEL OF ASSIST: UNABLE TO PARTICIPATE

## 2023-10-30 NOTE — PROGRESS NOTES
This note is intended for the purposes of medical student education and feedback only.   Please refer to the documentation by this patient's assigned medical practitioner for details of care and plans.       Daily Progress Note    Date of Service  10/30/2023    Chief Complaint  Perry Marrufo is a 83 y.o. male admitted 10/9/2023 with hx of diabetes, BPH, dementia and bipolar disorder who was brought to the ED by friends/family with altered mental status and diarrhea.    Hospital Course  Perry Marrufo is an 83 y.o. male w/ hx of DMII, prostate disease, bipolar disorder.  He presented 10/9/2023 with transfer from [facility] after being found down and last known at mental baseline 2 days prior.  He was found to have hypernatremia, atrial fibrillation with a rapid ventricular rate, rhabdomyolysis, leukocytosis. On 10/11 he was intubated, cardioversion attempted for unstable afib RVR and transferred to ICU; he was extubated 10/17. NG tube placed on 10/11 and has remained in place for enteral feedings. Hypernatremia treated with free water flushes. Glucose treated with insulin glargine 30U and lispro 5U TID. Oxygenation sat 94% on 1L NC, will continue to trend. Continued on 200mg amiodarone for afib rate control with Eliquis 5mg BID for anticoagulation. CXR has been improving after abx course finished on 10/28. Soft wrist restraints have been removed without incident. Once NG tube removed and on oral nutrition will consider placement in SNF; internal rehab may be an option if NG tube can not be removed due. Mental status continues to remain at baseline axox4.    Interval Problem Update  -No acute events overnight.  -NG tube remains in place per SLP and registered dietician. Continue enteral feedings. Salt restriction in feedings.  -Soft wrist restraints have been removed as of yesterday; will no longer need them as long as pt behavior and mood remains stable  -Continuing to monitor urine output; today urine output is  good; patient had arceo placement in ICU; ordering voiding trial to determine if arceo still necessary; want to decrease infection risk  -Sodium still 146 so free fluid flushes ordered 500mg q4hrs; daily BMPs    I have discussed this patient's plan of care and discharge plan at IDT rounds today with Case Management, Nursing, Nursing leadership, and other members of the IDT team.    Consultants/Specialty  cardiology, critical care, nephrology, palliative care, and pulmonary    Code Status  DNAR/DNI    Disposition  The patient is not medically cleared for discharge to home or a post-acute facility.  Anticipate discharge to: skilled nursing facility    I have placed the appropriate orders for post-discharge needs.    Review of Systems  Review of Systems   Constitutional: Negative.    HENT: Negative.     Eyes: Negative.    Respiratory:  Positive for shortness of breath. Negative for hemoptysis, sputum production and wheezing.    Cardiovascular: Negative.    Gastrointestinal: Negative.    Genitourinary: Negative.    Musculoskeletal: Negative.    Skin: Negative.    Neurological: Negative.    Endo/Heme/Allergies: Negative.    Psychiatric/Behavioral:  Negative for depression, hallucinations and suicidal ideas. The patient is nervous/anxious and has insomnia.         Physical Exam  Temp:  [36.1 °C (97 °F)-36.3 °C (97.3 °F)] 36.1 °C (97 °F)  Pulse:  [62-71] 69  Resp:  [17-18] 17  BP: (112-135)/(49-65) 121/65  SpO2:  [95 %-96 %] 95 %    Physical Exam  Constitutional:       Appearance: He is obese. He is ill-appearing.   HENT:      Head: Normocephalic.      Nose:      Comments: Has NG tube placed.  Eyes:      Pupils: Pupils are equal, round, and reactive to light.   Cardiovascular:      Rate and Rhythm: Normal rate. Rhythm irregular.      Pulses: Normal pulses.      Heart sounds: Normal heart sounds.   Pulmonary:      Effort: Pulmonary effort is normal. No respiratory distress.      Breath sounds: Rhonchi present.   Chest:       Chest wall: No tenderness.   Abdominal:      General: Abdomen is flat. Bowel sounds are normal.      Palpations: Abdomen is soft.   Genitourinary:     Comments: Has arceo placed.  Musculoskeletal:         General: Normal range of motion.      Cervical back: Normal range of motion.   Skin:     General: Skin is warm and dry.      Capillary Refill: Capillary refill takes less than 2 seconds.   Neurological:      Mental Status: He is oriented to person, place, and time.   Psychiatric:         Mood and Affect: Mood normal.         Behavior: Behavior normal.         Fluids    Intake/Output Summary (Last 24 hours) at 10/30/2023 0657  Last data filed at 10/30/2023 0023  Gross per 24 hour   Intake 1600 ml   Output 1500 ml   Net 100 ml       Laboratory  Recent Labs     10/27/23  2351 10/29/23  0045 10/30/23  0203   WBC 7.4 7.1 8.0   RBC 2.87* 3.16* 3.10*   HEMOGLOBIN 8.7* 9.6* 9.4*   HEMATOCRIT 28.6* 30.6* 30.7*   MCV 99.7* 96.8 99.0*   MCH 30.3 30.4 30.3   MCHC 30.4* 31.4* 30.6*   RDW 55.1* 56.5* 59.7*   PLATELETCT 247 210 178   MPV 10.2 10.5 10.2     Recent Labs     10/29/23  0629 10/29/23  1219 10/29/23  1635 10/30/23  0203   SODIUM 147* 145 144 146*   POTASSIUM 3.9 3.8  --  3.9   CHLORIDE 113* 112  --  112   CO2 29 32  --  30   GLUCOSE 141* 183*  --  173*   BUN 41* 37*  --  37*   CREATININE 0.73 0.79  --  0.72   CALCIUM 8.0* 8.0*  --  8.0*                   Imaging  DX-CHEST-PORTABLE (1 VIEW)   Final Result      Stable left basilar pleural-parenchymal opacification      DX-ESOPHAGUS - [id number]   Final Result      DX-CHEST-PORTABLE (1 VIEW)   Final Result      1.  Right-sided rib fractures.   2.  Suspect hiatus hernia.   3.  Bibasilar subsegmental atelectatic opacities.      DX-ABDOMEN FOR TUBE PLACEMENT   Final Result         1.  Nonspecific bowel gas pattern in the upper abdomen.   2.  Dobbhoff tube tip overlying the expected location of the pylorus or first duodenal segment.   3.  Hazy bilateral lower lobe  infiltrates   4.  Small layering left pleural effusion.      DX-CHEST-PORTABLE (1 VIEW)   Final Result         1.  Decreased opacification the left thorax, likely decreased effusion.   2.  Hazy bilateral pulmonary infiltrates   3.  Cardiomegaly   4.  Right rib fractures      DX-ABDOMEN FOR TUBE PLACEMENT   Final Result         Feeding tube with tip projecting over the expected area of the third portion duodenum.      DX-CHEST-LIMITED (1 VIEW)   Final Result      Interval replacement of gastric drainage tube with feeding tube   Complete opacification of the left lung, similar to prior      DX-ABDOMEN FOR TUBE PLACEMENT   Final Result      Enteric tube has been placed and the tip projects over the gastroduodenal junction      DX-ABDOMEN FOR TUBE PLACEMENT   Final Result         1.  Pulmonary infiltrates, increased on the left vertebral study.   2.  Moderate layering left pleural effusion, increased since prior study.   3.  Atherosclerosis   4.  Right rib fractures      DX-ABDOMEN FOR TUBE PLACEMENT   Final Result         1.  Nonspecific bowel gas pattern in the upper abdomen.   2.  Nasogastric tube terminating just distal to the gastroesophageal junction, recommend advancement.   3.  Hazy left pulmonary infiltrates, stable since prior study.   4.  Moderate layering left pleural effusion, overall stable      DX-ABDOMEN FOR TUBE PLACEMENT   Final Result      1. Appropriate position of the esophagogastric tube.   2. Worsening left lower lobe parenchymal consolidation and interval increase in size of the left pleural effusion.   3. The remainder is stable.      DX-CHEST-PORTABLE (1 VIEW)   Final Result         1.  Bilateral lower lobe infiltrates, stable since prior study.   2.  Trace bilateral pleural effusions   3.  Atherosclerosis   4.  Right rib fractures      DX-ABDOMEN FOR TUBE PLACEMENT   Final Result      1.  Enteric tube tip overlies the gastric antrum.      [id number] VIEW   Final Result      Nonobstructive  bowel gas pattern.      DX-ABDOMEN FOR TUBE PLACEMENT   Final Result         1.  Nonspecific bowel gas pattern in the upper abdomen.   2.  Nasogastric tube tip terminates overlying the expected location of the pylorus or first duodenal segment.   3.  Bilateral lower lobe opacities concerning for infiltrate.      DX-CHEST-PORTABLE (1 VIEW)   Final Result         1.  Bilateral lower lobe infiltrates, stable since prior study.   2.  Atherosclerosis      DX-CHEST-PORTABLE (1 VIEW)   Final Result         1.  Bilateral lower lobe infiltrates, decreased since prior study.      DX-CHEST-PORTABLE (1 VIEW)   Final Result      1.  Supportive tubing as described above.   2.  Slight increased inflation.   3.  No other significant change from prior exam.         DX-CHEST-LIMITED (1 VIEW)   Final Result      1.  Ill-defined bibasilar pulmonary opacities, slightly worse on the left compared to prior study. Pneumonia is in the differential.   2.  Small left pleural effusion, new since prior.      DX-ABDOMEN FOR TUBE PLACEMENT   Final Result      Nasogastric tube tip overlies the mid stomach      DX-ABDOMEN FOR TUBE PLACEMENT   Final Result      NG tube turns in the gastric body with tip at the level of the gastric fundus.      EC-ECHOCARDIOGRAM LTD W/O CONT   Final Result      DX-CHEST-PORTABLE (1 VIEW)   Final Result      1.  Mild interstitial pulmonary edema, similar to prior   2.  Bibasilar and perihilar underinflation atelectasis which could obscure an additional process. This is unchanged.      DX-CHEST-PORTABLE (1 VIEW)   Final Result      1.  Mild interstitial pulmonary edema   2.  Bibasilar and perihilar underinflation atelectasis which could obscure an additional process.   3.  Small LEFT pleural effusion      DX-CHEST-LIMITED (1 VIEW)   Final Result      1.  Bilateral basilar atelectasis and/or consolidation. Underlying infection is possible.   2.  Stable enlargement of the cardiomediastinal silhouette.   3.  Interval  insertion of a central venous catheter which terminates with the tip projecting over the expected region of the mid superior vena cava.   4.  Interval placement of an endotracheal tube which terminates in satisfactory position at the level of the aortic arch.   5.  Interval placement of an enteric feeding tube which terminates in the left upper quadrant projecting over the expected location of the stomach.      DX-CHEST-PORTABLE (1 VIEW)   Final Result      No significant change from prior exam.      DX-CHEST-PORTABLE (1 VIEW)   Final Result      Bibasilar underinflation atelectasis. Superimposed pneumonia not excluded.      US-RUQ   Final Result      1.  Gallbladder sludge   2.  RIGHT pleural effusion      EC-ECHOCARDIOGRAM COMPLETE W/ CONT   Final Result      CT-CSPINE WITHOUT PLUS RECONS   Final Result         1. No acute fracture from C1 through T1 is visualized.         CT-HEAD W/O   Final Result         1. No acute intracranial abnormality. No evidence of acute intracranial hemorrhage or mass lesion.                     DX-CHEST-PORTABLE (1 VIEW)   Final Result      1.  Low lung volumes without definite acute cardiopulmonary abnormality.           Assessment/Plan  Acute respiratory failure with hypoxia (HCC)- (present on admission)  Assessment & Plan  Increasing oxygen requirement and chest x-ray showed infiltration possible aspiration pneumonia  Extubated 10/17  Aspiration precautions  Pulmonary following   Abx end date 10/28   WBC initially 18.3 now 8.0 after course of abx  Chest x-ray has cleared significantly  SPO2 at 94% on 1L O2  SLP following and to remain with NG tube  Total protein 4.6, albumin 1.9, macrocytic hypochromic anemia with + microcytosis; likely malnourished; assess iron/dloobysA34/folate levels and will replete as needed.  Lansoprazole 30mg daily as PPI  Ongoing case management support, /palliative care     Hypernatremia- (present on admission)  Assessment & Plan        Hypovolemic hypernatremia       Free water via NG to keep Na <147       Most recent Na 146     Atrial fibrillation with RVR ([facility])- (present on admission)  Assessment & Plan  No known history of Afib, RVR with hypotension attempted electrical cardioversion 10/11 without success, put on amio GTT  10/22 on oral amiodarone 200mg  Echo EF 65%, no valvular abnormalities  Rate Controlled  Continuous tele monitoring  Anticoagulation with Eliquis 5mg BID  Rhythm control with amiodarone, continue loading  Optimize electrolytes      Dehydration- (present on admission)  Assessment & Plan  Continue fluid resuscitation intravenously and enterally for now  Monitor UOP closely     ADELAIDE (acute kidney injury) ([facility])- (present on admission)  Assessment & Plan  Improved with fluids  Monitoring bmp, I/O's     Altered mental status- (present on admission)  Assessment & Plan  Fluctuating mental status due to metabolic encephalopathy  Avoid offending agents  The patient apparently with significant insomnia  Daughter reports hx bipolar   Seroquel and Trazodone to help with mood and sleep     DM2 (diabetes mellitus, type 2) ([facility])- (present on admission)  Assessment & Plan  Glargine 30 units qHS, 5 units lispro with meals  Monitor accuchecks and cover with SSI  Hypoglycemic protocol  A1c 8 in past week     Advance care planning- (present on admission)  Assessment & Plan  DNR/DNI  Oldest daughter is primary contact, does not want permanent feeding tube  Palliative care following     Decubitus skin ulcer  Assessment & Plan  Wound care  Miconazole 2% cream  Frequent turning     BPH (benign prostatic hyperplasia)- (present on admission)  Assessment & Plan  Terazosin 5mg daily    VTE prophylaxis:    therapeutic anticoagulation with eliquis 5 mg BID      I have performed a physical exam and reviewed and updated ROS and Plan today (10/30/2023). In review of yesterday's note (10/29/2023), there are no changes except as documented above.

## 2023-10-30 NOTE — THERAPY
Physical Therapy   Daily Treatment     Patient Name: Perry Marrufo  Age:  83 y.o., Sex:  male  Medical Record #: [id number]  Today's Date: 10/30/2023     Precautions  Precautions: Fall Risk;Swallow Precautions;Nasogastric Tube  Comments: seizure precautions    Assessment    Patient seen for PT treatment session. In bed, agreeable to participate. Participated in functional mobility tasks as detailed below. Requiring increased assistance as compared to previous sessions. Will continue to benefit from PT services and recommend post acute placement.     Plan    Treatment Plan Status: Continue Current Treatment Plan  Type of Treatment: Bed Mobility, Gait Training, Neuro Re-Education / Balance, Therapeutic Activities, Therapeutic Exercise  Treatment Frequency: 4 Times per Week  Treatment Duration: Until Therapy Goals Met    DC Equipment Recommendations: Unable to determine at this time  Discharge Recommendations: Recommend post-acute placement for additional physical therapy services prior to discharge home    Objective       10/30/23 1010   Charge Group   Charges  Yes   PT Therapeutic Activities (Units) 2   Total Time Spent   PT Total Time Yes   PT Therapeutic Activities Time Spent (Mins) 24   PT Total Time Spent (Calculated) 24   Precautions   Precautions Fall Risk;Nasogastric Tube;Swallow Precautions;Other (See Comments)   Comments Seizure precautions   Vitals   Pulse 71   Patient BP Position Sitting  (Edge of bed)   Blood Pressure  128/57   Pulse Oximetry 95 %   O2 (LPM) 1   O2 Delivery Device Nasal Cannula   Pain   Pain Scales 0 to 10 Scale    Intervention Declines   Cognition    Speech/ Communication Hard of Hearing   Orientation Level Not Oriented to Time;Not Oriented to Reason   Level of Consciousness Alert   Ability To Follow Commands 1 Step   Neuro-Muscular Treatments   Neuro-Muscular Treatments Anterior weight shift;Postural Facilitation;Sequencing;Tactile Cuing;Verbal Cuing;Weight Shift Right;Weight Shift Left    Comments Seated EOB   Other Treatments   Other Treatments Provided Patient educated about importance of daily mobility with nursing, appropriate position in bed to alleviate pressure from bony prominences to reduce pressure sores. Discussed DC recommendations, patient made aware of need for placement. Patient was assisted with appropriate positioning in bed-R sidelying with use of wedges.   Balance   Sitting Balance (Static) Poor +   Sitting Balance (Dynamic) Poor +   Standing Balance (Static) Poor -   Standing Balance (Dynamic) Trace +   Skilled Intervention Verbal Cuing;Postural Facilitation;Tactile Cuing;Sequencing   Comments Seated EOB: Posterior lean; Standing EOB with FWW: Posterior nemo   Bed Mobility    Supine to Sit Maximal Assist   Sit to Supine Maximal Assist   Scooting Maximal Assist   Rolling Maximal Assist to Rt.;Maximum Assist to Lt.   Skilled Intervention Verbal Cuing;Sequencing   Comments HOB raised, use of bed rail; increased time & effort   Gait Analysis   Gait Level Of Assist Unable to Participate   Comments Due to LE weakness   Functional Mobility   Sit to Stand Maximal Assist  (x2 person assist)   Bed, Chair, Wheelchair Transfer Refused   Mobility Sit-stand at EOB; x 3 times; able to lift his pelvis off the bed, tolerated standing for few seconds each time   Skilled Intervention Postural Facilitation;Sequencing;Verbal Cuing   Comments Cues for appropriate trunk posture, hand placement, LE placement; assistance with pelvic position.   How much difficulty does the patient currently have...   Turning over in bed (including adjusting bedclothes, sheets and blankets)? 1   Sitting down on and standing up from a chair with arms (e.g., wheelchair, bedside commode, etc.) 1   Moving from lying on back to sitting on the side of the bed? 1   How much help from another person does the patient currently need...   Moving to and from a bed to a chair (including a wheelchair)? 2   Need to walk in a hospital  room? 1   Climbing 3-5 steps with a railing? 1   6 clicks Mobility Score 7   Activity Tolerance   Sitting Edge of Bed < 15 minutes   Patient / Family Goals    Patient / Family Goal #1 To get better   Short Term Goals    Short Term Goal # 1 Patient will perform supine-sit with supervision in 6 visits   Goal Outcome # 1 goal not met   Short Term Goal # 2 Patient will perform sit-stand & chair transfers with FWW with supervision in 6 visits   Goal Outcome # 2 Goal not met   Short Term Goal # 3 Patient will ambulate > 25 feet with FWW with supervision in 6 visits   Goal Outcome # 3 Goal not met   Physical Therapy Treatment Plan   Physical Therapy Treatment Plan Continue Current Treatment Plan   Treatment Plan  Bed Mobility;Gait Training;Neuro Re-Education / Balance;Therapeutic Activities;Therapeutic Exercise   Treatment Frequency 4 Times per Week   Duration Until Therapy Goals Met   Anticipated Discharge Equipment and Recommendations   DC Equipment Recommendations Unable to determine at this time   Discharge Recommendations Recommend post-acute placement for additional physical therapy services prior to discharge home   Interdisciplinary Plan of Care Collaboration   IDT Collaboration with  Nursing   Patient Position at End of Therapy In Bed;Bed Alarm On;Call Light within Reach;Tray Table within Reach   Session Information   Date / Session Number  10/30-4(2/4, 10/31)     Patient was transitioned to Comfort Care later in the day.

## 2023-10-30 NOTE — DIETARY
Nutrition Services:  Brief Update    Pt previously on tube feedings.  Per chart review, pt has transitioned to comfort care.  NGT has been removed and tube feedings are off.  Diet advanced to Regular. RD will re-screen pt weekly.  Consult RD as needed.    RD available PRN.

## 2023-10-30 NOTE — THERAPY
Speech Language Pathology   Daily Treatment     Patient Name: Perry Marrufo  AGE:  83 y.o., SEX:  male  Medical Record #: [id number]  Date of Service: 10/30/2023    Precautions: Fall Risk, Swallow Precautions, Nasogastric Tube  Comments: Seizure precautions      Subjective  Pt cleared by RN for dysphagia management. Pt was received awake/alert sitting up in bed watching Tv. Pt saturating at 94 on 1L via NC. Pt w/ recall of previous MBSS w/ cues from SLP.     Assessment  SLP reviewed results of MBSS including diet recommendations and swallow strategies including: chin tuck, swallowing 3x per bolus, and need for fully upright positioning for PO intake. Pt verbalized understanding. It is important to note congested cough appreciated prior to, during, and following PO trials. Pt cues to re-swallow w/cough.  SLP suctioned x3 removing minimal secretions. Oral care completed prior to PO trials. MT2 via tsp utilized to train strategies and for effortful swallow. Pt requiring min-mod cues to implement chin tuck and effortful swallow (x3 per bolus). Effortful swallow completed x47 with fair effort and accuracy.     Clinical Impressions  Patient presents with moderate oropharyngeal dysphagia with suspect component of esophageal dysphagia, per MBSS completed 10/26. Pt's risk for ascending and descending aspiration and related sequelae is elevated. Dysphagia is likely acute related to endotracheal intubation, prolonged NPO status and generalized weakness. Recommend continued NGT as primary source of nutrition/hydration/meds with cautious initiation of liquidized and mildly thick liquid PO snacks. Service to follow for continued swallow rehabilitation.     Recommendations  Treatment Completed: Dysphagia Treatment     Dysphagia Treatment  Diet Consistency: LQ3/MT2 snacks - NGT primary source of nutrition/hydration/meds  Instrumentation: Instrumental swallow study pending clinical progress  Medication: Non Oral, Crush with  "applesauce, as appropriate  Supervision: 1:1 feeding with constant supervision, Encourage self-feeding  Positioning: Fully upright and midline during oral intake  Risk Management :  (Chin tuck, 3 swallows per bolus)  Oral Care: Q4h    SLP Treatment Plan  Treatment Plan: Dysphagia Treatment  SLP Frequency: 4x Per Week  Estimated Duration: Until Therapy Goals Met    Anticipated Discharge Needs  Discharge Recommendations: Recommend post-acute placement for additional speech therapy services prior to discharge home  Therapy Recommendations Upon DC: Dysphagia Training, Patient / Family / Caregiver Education    Patient / Family Goals  Patient / Family Goal #1: \"water\"  Goal #1 Outcome: Progressing slower than expected  Short Term Goals  Short Term Goal # 1: Pt will consume prefeeding trials with SLP with no overt s/sx of aspiration  Goal Outcome # 1: Goal met, new goal added  Short Term Goal # 1 B : Patient will consume PU/MT snacks with strict adherence to swallow precautions.  Goal Outcome  # 1 B: Progressing as expected  Short Term Goal # 2: Pt will complete instrumental swallow assessment to determine physiology of swallow.  Goal Outcome # 2 : Goal met  Short Term Goal # 2 B : Pt will complete 20 reps each of swallow exercises targeting pharyngeal shortening, laryngeal vestibule closure and base of tongue retraction with \"good\" acuracy  Goal Outcome  # 2 B: Progressing as expected    Marleny Betts, SLP  "

## 2023-10-30 NOTE — PROGRESS NOTES
Rec'd notification via voalte from Palliative NP (Iglesia), that per conversation with patients daughter, would like to move forward with comfort care measures, DC tube feedings, & allow patient to eat & drink for pleasures. Rec'd order from team (MD Lombardo) to remove NGT/ stop feeds, & to start patient on regular diet. This RN emphasized w/ Dr Lombardo that I was hesitant in starting regular diet d/t aspiration risks, as patient was previously on a Level 3 liquidised (All PO w/ chin tuck) Per SLP. MD at bedside, & reemphasized with patient about risks, and patient wishes to continue with a regular diet.

## 2023-10-30 NOTE — PROGRESS NOTES
Daily Progress Note:     Date of Service: 10/30/2023  Primary Team: [facility] Purple Team   Attending: Brian Gomes M.D.   Senior Resident: Germán Pope D.O.  Contact:  [phone]    Patient Identifier:   Mr. Marrufo is an 83-year-old male with past medical history of NIDDM 2, BPH, and previous orthopedic surgeries who was found down in his home on 10/8, admitted to the hospital on 10/9.  Patient was found dehydrated and altered notably uremic with new onset A-fib with RVR which was treated with diltiazem in the ED.  Of note, patient's hospitalization was complicated by acute hypoxic respiratory failure requiring intubation as well as Triplett catheter insertion.  Patient was also found to have an ADELAIDE with rhabdomyolysis s/p fluid resuscitation.  Now doing significantly better with ongoing medical management.  Palliative following patient and with family discussion will proceed with comfort care on October 30, 2023.    Subjective:  Patient remains AO x4.  No acute concerns.  Palliative discussed with oldest daughter who is spokesperson for family and plan is for transition to comfort care.  I discussed this plan with patient himself and this is the route that he would like to pursue.  Discussed at length with him regarding diet status as he is currently receiving feeds through NG tube and SLP recommending liquefied diet.  Discussed with patient and he would like liberalized, regular diet without restrictions.  I informed the patient that this would place him at high risk of aspiration that can lead to his death.  He seemed of capable mind and stated that he would like a regular diet and to focus on comfort rather than invasive interventions.    Interval Update:  - No acute events overnight.  - Discontinued free water flushes as patient is pursuing comfort care.  -Does have indwelling Triplett secondary to sacral decubitus ulcers placed in ICU.  -Per palliative discussion transition to comfort cares today.  Discussed  with patient and this is in line with his wishes. Discontinuing NG tube.  Discontinuing free water flushes.  Allow for regular, liberalize diet.  Patient and family aware of risks of aspiration and death regarding diet.  Meds transition to oral.      Consultants/Specialty:  Critical care  Cardiology  Nephrology  Palliative medicine  Pulmonology    Review of Systems:  Review of Systems   Constitutional:  Negative for chills, diaphoresis, fever and malaise/fatigue.   HENT:  Positive for sore throat. Negative for hearing loss.    Eyes: Negative.    Respiratory:  Positive for cough. Negative for hemoptysis, sputum production and shortness of breath.    Cardiovascular:  Negative for chest pain, palpitations, orthopnea, leg swelling and PND.   Gastrointestinal:  Positive for abdominal pain. Negative for blood in stool, constipation, diarrhea, heartburn, melena, nausea and vomiting.   Genitourinary: Negative.  Negative for dysuria and frequency.   Musculoskeletal: Negative.    Skin: Negative.    Neurological:  Negative for dizziness, loss of consciousness, weakness and headaches.   Endo/Heme/Allergies: Negative.    Psychiatric/Behavioral:  Negative for suicidal ideas. The patient is nervous/anxious.      Objective:   Vitals:   Temp:  [36.1 °C (97 °F)-36.2 °C (97.2 °F)] 36.1 °C (97 °F)  Pulse:  [64-71] 71  Resp:  [17-18] 18  BP: (120-135)/(57-65) 128/57  SpO2:  [95 %-96 %] 95 %    Physical Exam  Vitals and nursing note reviewed.   Constitutional:       General: He is not in acute distress.     Appearance: He is obese. He is ill-appearing. He is not diaphoretic.   HENT:      Head: Normocephalic and atraumatic.      Right Ear: External ear normal.      Left Ear: External ear normal.      Nose:      Comments: NG tube in place     Mouth/Throat:      Mouth: Mucous membranes are dry.      Pharynx: Oropharynx is clear.   Eyes:      General: No scleral icterus.     Extraocular Movements: Extraocular movements intact.      Pupils:  Pupils are equal, round, and reactive to light.   Cardiovascular:      Rate and Rhythm: Normal rate. Rhythm irregular.      Pulses: Normal pulses.      Heart sounds: Normal heart sounds. No murmur heard.  Pulmonary:      Effort: Pulmonary effort is normal. No respiratory distress.   Chest:      Chest wall: No tenderness.   Abdominal:      General: Abdomen is flat. Bowel sounds are normal. There is no distension.      Palpations: Abdomen is soft.      Tenderness: There is no right CVA tenderness or left CVA tenderness.   Genitourinary:     Comments: Triplett catheter placed  Musculoskeletal:         General: No swelling. Normal range of motion.      Cervical back: Normal range of motion. No rigidity.      Right lower leg: Edema present.      Left lower leg: Edema present.   Skin:     General: Skin is warm and dry.      Capillary Refill: Capillary refill takes less than 2 seconds.      Coloration: Skin is not jaundiced.      Findings: No rash.   Neurological:      General: No focal deficit present.      Mental Status: He is alert and oriented to person, place, and time.      Deep Tendon Reflexes: Reflexes normal.     Labs:   Lab Results   Component Value Date/Time    WBC 8.0 10/30/2023 02:03 AM    RBC 3.10 (L) 10/30/2023 02:03 AM    HEMOGLOBIN 9.4 (L) 10/30/2023 02:03 AM    HEMATOCRIT 30.7 (L) 10/30/2023 02:03 AM    MCV 99.0 (H) 10/30/2023 02:03 AM    MCH 30.3 10/30/2023 02:03 AM    MCHC 30.6 (L) 10/30/2023 02:03 AM    MPV 10.2 10/30/2023 02:03 AM    NEUTSPOLYS 84.20 (H) 10/30/2023 02:03 AM    LYMPHOCYTES 11.40 (L) 10/30/2023 02:03 AM    MONOCYTES 2.60 10/30/2023 02:03 AM    EOSINOPHILS 0.00 10/30/2023 02:03 AM    BASOPHILS 0.00 10/30/2023 02:03 AM    ANISOCYTOSIS 1+ 10/30/2023 02:03 AM        Lab Results   Component Value Date/Time    SODIUM 146 (H) 10/30/2023 02:03 AM    POTASSIUM 3.9 10/30/2023 02:03 AM    CHLORIDE 112 10/30/2023 02:03 AM    CO2 30 10/30/2023 02:03 AM    GLUCOSE 173 (H) 10/30/2023 02:03 AM    BUN 37  (H) 10/30/2023 02:03 AM    CREATININE 0.72 10/30/2023 02:03 AM        Lab Results   Component Value Date/Time    THTHM02L 7.47 10/27/2023 01:13 PM    QDVFCD450Y 41.9 (H) 10/27/2023 01:13 PM    GCXID665N 77.2 10/27/2023 01:13 PM    ARTHCO3 30 (H) 10/27/2023 01:13 PM    ARTBE 5 (H) 10/27/2023 01:13 PM       Lab Results   Component Value Date/Time    PROTHROMBTM 21.0 (H) 10/12/2023 12:00 AM    INR 1.79 (H) 10/12/2023 12:00 AM        Imaging:   10/29, chest x-ray, stable left basilar opacification.    Assessment and Plan:    * Acute respiratory failure with hypoxia (HCC)- (present on admission)  Assessment & Plan  Intubated 10/11 for mental status and hypoxia on max HFNC, eventually extubated on 10/17.  - RT protocol in place.  - Currently on 2.5 L with nasal cannula.  - Titrate oxygen to greater than 90%.  - Data monitor.    Advance care planning- (present on admission)  Assessment & Plan  DNR/DNI  Oldest daughter is primary contact, does not want permanent feeding tube  Palliative care following  - On 10/30 following discussions with palliative care and family plan is for his to transition to comfort care.  - Hospice referral sent  - NG tube removal and allow for regular, liberalized diet.  Discussed severe risk of aspiration resulting in death to patient and family is aware per palliative conversations.    Anemia  Assessment & Plan  No obvious signs of bleeding at this time.  - No daily labs as patient is transitioning to comfort care.    Rhabdomyolysis- (present on admission)  Assessment & Plan  Patient now s/p fluid resuscitation.  - Continue to monitor.      Atrial fibrillation with RVR (HCC)- (present on admission)  Assessment & Plan  No known history of Afib, RVR with hypotension attempted electrical cardioversion 10/11.  - Continue PO amiodarone  - Cardiology consulted, appreciate recommendations.  - Was previously on heparin drip now on anticoagulation with apixaban.  - Continue to monitor electrolytes, K goal  of 4, Mg goal of 2.    Hypernatremia- (present on admission)  Assessment & Plan  Ongoing issues with sodium control.  - Most recent sodium of 146.  - The patient has transition to comfort care.  No further free water flushes and daily labs.    ADELAIDE (acute kidney injury) (HCC)- (present on admission)  Assessment & Plan  Secondary to ATN, pre-renal and rhabdo. Now resolved.  - Nephrology consulted, appreciate recommendations.  - Avoid nephrotoxins  - Monitor creatinine, UOP, electrolytes closely      Altered mental status- (present on admission)  Assessment & Plan  Patient with fluctuating mental status, likely in the setting of metabolic encephalopathy.  Per daughter, patient with history of bipolar disorder.  Patient is alert and oriented x4 and mental status has been improving.  - Delirium protocol in place.  - Patient now on Seroquel, with improved mentation.  - Continue to monitor.    DM2 (diabetes mellitus, type 2) (HCC)- (present on admission)  Assessment & Plan  Most recent A1c of 8%.  - Continue basal bolus insulin dosing with glargine at 10 units nightly and 5 units Premeal insulin with SSI correction.  Decreased glargine dose as patient removed from tube feeds.  - Hypoglycemia protocol in place.  - Continue to monitor Accu-Cheks 4 times daily.       Sepsis (HCC)- (present on admission)  Assessment & Plan  Patient treated for undifferentiated shock.  Now resolved.  - Continue to monitor.    BPH (benign prostatic hyperplasia)- (present on admission)  Assessment & Plan  Patient with known history of BPH.  - I like he is taking oral medications restarted home tamsulosin  - Triplett catheter in place, care per nursing protocol.  Triplett placed in ICU due to sacral decubitus ulcers per RN.  Will attempt to pursue voiding trial and removal of Triplett.    Code Status: DNR/DNI  DVT prophylaxis: Anticoagulation with apixaban.  Diet: Comfort care.  Liberalize diet to regular diet.  Patient and family aware of risk of  aspiration and death.  GI: Bowel protocol in place.  Disposition: Hospice care

## 2023-10-30 NOTE — CARE PLAN
The patient is Stable - Low risk of patient condition declining or worsening    Shift Goals  Clinical Goals: Tube feeding, Rest, Monitor Electrolytes  Patient Goals: Comfort  Family Goals: BA    Progress made toward(s) clinical / shift goals:    Problem: Respiratory  Goal: Patient will achieve/maintain optimum respiratory ventilation and gas exchange  Outcome: Progressing  Flowsheets (Taken 10/30/2023 0309)  O2 Delivery Device: Nasal Cannula  Note: Patient obtained adequate oxygenation throughout shift.      Problem: Skin Integrity  Goal: Skin integrity is maintained or improved  Outcome: Progressing  Note: Appropriate interventions in place to protect skin. Pt on q2 turns, with arceo for urinary elimination. Bed changes PRN. Heels floated, extra pillows for positioning.

## 2023-10-30 NOTE — RESPIRATORY CARE
Called bedside to assess ability to move secretions out of airway. Pt was encouraged to cough and he cleared his own secretions. No deep suction necessary. Encourage coughing with patient.

## 2023-10-30 NOTE — CARE PLAN
The patient is Stable - Low risk of patient condition declining or worsening    Shift Goals  Clinical Goals: Discontinue restraints, Safety  Patient Goals: Sleep  Family Goals: BA    Progress made toward(s) clinical / shift goals:    Problem: Hemodynamics  Goal: Patient's hemodynamics, fluid balance and neurologic status will be stable or improve  Outcome: Progressing  Note: Pt remained hemodynamically stable evidenced by stable vital sings, proper urine output and adequate fluid intake.       Problem: Urinary - Renal Perfusion  Goal: Ability to achieve and maintain adequate renal perfusion and functioning will improve  Outcome: Progressing  Note: Patient had adequate urine output via arceo and was kept well hydrated.      Problem: Safety - Medical Restraint  Goal: Free from restraint(s) (Restraint for Interference with Medical Device)  Outcome: Progressing  Note: Removed patient's restraints while assessing patient this morning, patient remained out of restraints for entirety of shift and was cooperative with all staff.

## 2023-10-30 NOTE — DISCHARGE PLANNING
Case Management Discharge Planning    Admission Date: 10/9/2023  GMLOS: 5.1  ALOS: 21    6-Clicks ADL Score: 12  6-Clicks Mobility Score: 7    Anticipated Discharge Dispo: Discharge Disposition: D/T to SNF with Medicare cert in anticipation of skilled care (03)    DME Needed: No    Action(s) Taken: Updated Provider/Nurse on Discharge Plan  Patient discussed in IDT rounds. Sw informed by medical team patient have transitioned to comfort care. ARLINE called and spoke with family spokespoerson daughter Connie [phone]. Connie marsh patient was living alone prior to hospitalization but he cannot return home as his home was condemned by the [facility] as it is unlivable. The closest [facility] is [facility] in [place] a 7 hour drive away. Patient is a . [facility]. Connie prefer for patient not to be transferred to [place] as she is worried he may not make the drive there. [email or web address] if needed.     Escalations Completed: None    Medically Clear: Yes    Next Steps: VA placement     Barriers to Discharge: Pending Placement

## 2023-10-30 NOTE — PROGRESS NOTES
Inpatient Palliative Care     Location: [place] [address]     HPI:   Vick is seen lying in bed, he has feeding tube in each.  He is alert and oriented x1-2.  He remains on continuous feedings with some supplemental one-to-one feeding per SLP.  Conferred with primary team, appreciate updates.     Summary:  Patient remains debilitated with ongoing issues managing secretions.  Therapy notes reviewed, continuing to struggle with oral pharyngeal and esophageal dysphagia.  Telephone call placed to patient's daughter Connie who is spokesperson for family.  Revisited previous conversations regarding quality of life or care measures versus ongoing treatment possible long-term feeding options.  Connie has previously stated her father would not want long-term feeding tube options as well as life sustaining options.  She reports he has verbalized this to at least 3 of her siblings.  We discussed transitioning patient to comfort focused care shift and discharge plan to discharge with hospice.  She would like patient to be transition to comfort focused care with discontinuation of feeding tube and liberalization of diet for pleasure feeding and drinking.  She has requested POLST be redone for her signature to reflect comfort care.    Updated primary team via volt.     Active listening, reflection, reminiscing, validation & normalization, and empathic support utilized throughout this encounter.  All questions answered and contact information provided, encouraged to call with any questions or concerns.      Plan:     1) transition to comfort focused care plan to discharge with hospice.  2) POLST to be recompleted to reflect comfort care measures.  3) palliative care will continue to follow on the periphery.     Thank you for allowing me the opportunity to participate in the care of Vick.     I spent a total of 55 minutes reviewing medical records, direct face-to-face time with the patient and/or family, coordination of care, and  documentation. This is separate from the time spent on advance care planning, which is documented above.     Mackenzie Parekh, MSN, APRN, ACNPC-AG.  Palliative Care Nurse Practitioner  [phone]

## 2023-10-31 LAB
GLUCOSE BLD STRIP.AUTO-MCNC: 105 MG/DL (ref 65–99)
GLUCOSE BLD STRIP.AUTO-MCNC: 107 MG/DL (ref 65–99)
GLUCOSE BLD STRIP.AUTO-MCNC: 99 MG/DL (ref 65–99)

## 2023-10-31 PROCEDURE — 82962 GLUCOSE BLOOD TEST: CPT

## 2023-10-31 PROCEDURE — 770001 HCHG ROOM/CARE - MED/SURG/GYN PRIV*

## 2023-10-31 PROCEDURE — 99232 SBSQ HOSP IP/OBS MODERATE 35: CPT | Mod: GC | Performed by: INTERNAL MEDICINE

## 2023-10-31 PROCEDURE — 700102 HCHG RX REV CODE 250 W/ 637 OVERRIDE(OP)

## 2023-10-31 PROCEDURE — 99232 SBSQ HOSP IP/OBS MODERATE 35: CPT | Performed by: NURSE PRACTITIONER

## 2023-10-31 PROCEDURE — A9270 NON-COVERED ITEM OR SERVICE: HCPCS

## 2023-10-31 RX ORDER — ACETAMINOPHEN 325 MG/1
650 TABLET ORAL EVERY 6 HOURS PRN
Status: DISCONTINUED | OUTPATIENT
Start: 2023-10-31 | End: 2023-11-06 | Stop reason: HOSPADM

## 2023-10-31 RX ADMIN — CYANOCOBALAMIN TAB 500 MCG 500 MCG: 500 TAB at 05:51

## 2023-10-31 RX ADMIN — TAMSULOSIN HYDROCHLORIDE 0.4 MG: 0.4 CAPSULE ORAL at 08:02

## 2023-10-31 RX ADMIN — TRAZODONE HYDROCHLORIDE 25 MG: 50 TABLET ORAL at 20:19

## 2023-10-31 RX ADMIN — AMIODARONE HYDROCHLORIDE 200 MG: 200 TABLET ORAL at 05:56

## 2023-10-31 RX ADMIN — APIXABAN 5 MG: 5 TABLET, FILM COATED ORAL at 05:50

## 2023-10-31 RX ADMIN — QUETIAPINE FUMARATE 25 MG: 25 TABLET ORAL at 20:19

## 2023-10-31 ASSESSMENT — ENCOUNTER SYMPTOMS
EYES NEGATIVE: 1
WEIGHT LOSS: 0
DIZZINESS: 0
LOSS OF CONSCIOUSNESS: 0
SHORTNESS OF BREATH: 0
DEPRESSION: 0
WEAKNESS: 0
MUSCULOSKELETAL NEGATIVE: 1
INSOMNIA: 1
FEVER: 0
DIAPHORESIS: 0
HALLUCINATIONS: 0
SPUTUM PRODUCTION: 0
CHILLS: 0
HEADACHES: 0
HEARTBURN: 0
NEUROLOGICAL NEGATIVE: 1
SORE THROAT: 0
GASTROINTESTINAL NEGATIVE: 1
NAUSEA: 0
DIARRHEA: 0
CONSTIPATION: 0
HEMOPTYSIS: 0
SHORTNESS OF BREATH: 1
BLOOD IN STOOL: 0
ORTHOPNEA: 0
VOMITING: 0
ABDOMINAL PAIN: 1
COUGH: 1
PND: 0
WHEEZING: 0
PALPITATIONS: 0
NERVOUS/ANXIOUS: 1

## 2023-10-31 NOTE — PROGRESS NOTES
Orders to remove current arceo catheter. Upon deflating balloon, this RN noticed bright red blood draining into tube, along w/ small blood clots. Immediately called (MD Dr Lombardo), Received verbal orders from MD to re inflate balloon to maintain current arceo in place.

## 2023-10-31 NOTE — DISCHARGE PLANNING
Case Management Discharge Planning    Admission Date: 10/9/2023  GMLOS: 5.1  ALOS: 22    6-Clicks ADL Score: 13  6-Clicks Mobility Score: 12      Anticipated Discharge Dispo: Discharge Disposition: D/T to hospice home (50)    DME Needed: No     Action(s) Taken: Updated Provider/Nurse on Discharge Plan  ARLINE called [facility]up[facility] [phone] to inquire about VA benefits for hospice placement. Rupa informed the only available placement is at the [facility]. Per request, ARLINE hard faxed referral to Rupa for review for placement consideration fax [phone]. Rupa informed the only two hospice companies that service the area are [facility] and [facility].     ARLINE called and spoke with family spokesperson daughter Connie [phone]. Connie requested for ARLINE to email her details as she is currently at work. ARLINE emailed Connie information regarding placement, hospice choice and requested information regarding burial benefits. [email or web address]     [id number] - ARLINE attempted to call Rupa for update, no answer, ARLINE left callback voicemail      Escalations Completed: None     Medically Clear: Yes     Next Steps: [facility] placement      Barriers to Discharge: Pending Placement

## 2023-10-31 NOTE — PROGRESS NOTES
Inpatient Palliative Care     Location: [place] [address]     HPI:   Vick is seen lying in bed he is falling into the side rail.  Assisted RN with repositioning.  He is alert and oriented today.  He affirms desire for comfort care measures.  Nursing reports he is eating without difficulty.  Feeding tube is out.     Summary:  Current with the patient today as he has capacity that he wishes to remain comfort measures DNR/DNI status.  Recompleted POLST and emailed to patient's daughter Connie which he okayed for me to do.     Active listening, reflection, reminiscing, validation & normalization, and empathic support utilized throughout this encounter.  All questions answered and contact information provided, encouraged to call with any questions or concerns.      Plan:     1) palliative care to sign off for now please reconsult for further needs  2) POLST recompleted and emailed to daughter for signature will upload to chart once available.  3)        Thank you for allowing me the opportunity to participate in the care of Vick.     I spent a total of 37 minutes reviewing medical records, direct face-to-face time with the patient and/or family, coordination of care, and documentation. This is separate from the time spent on advance care planning, which is documented above.     Mackenzie Parekh, MSN, APRN, ACNPC-AG.  Palliative Care Nurse Practitioner  [phone]

## 2023-10-31 NOTE — PROGRESS NOTES
This note is intended for the purposes of medical student education and feedback only.   Please refer to the documentation by this patient's assigned medical practitioner for details of care and plans.     Daily Progress Note    Date of Service  10/31/2023    Chief Complaint  Perry Marrufo is a 83 y.o. male admitted 10/9/2023 with hx of diabetes, BPH, dementia and bipolar disorder who was brought to the ED by friends/family with altered mental status.    Hospital Course  Perry Marrufo is an 83 y.o. male w/ hx of DMII, prostate disease, bipolar disorder.  He presented 10/9/2023 with transfer from [place] after being found down and last known at mental baseline 2 days prior. He was admitted 10/9 for acute respiratory failure with hypoxia and new onset afib. On 10/11 he was intubated, cardioversion attempted for unstable afib RVR and transferred to ICU; he was extubated 10/17. NG tube placed on 10/11 due to sacral decubitus ulcers and removed 10/30 after being placed on comfort care measures; tolerating oral diet well. CXR has been improving after abx course finished on 10/28. Now doing significantly better with ongoing medical management. Palliative following patient and with family discussion the patient was placed in comfort care 10/30. [facility]    Interval Problem Update  - No acute events overnight.  - Does have indwelling Arceo secondary to sacral decubitus ulcers placed in ICU. Urine void test and plan to d/c arceo catheter.  - Per palliative discussion transition to comfort care 10/30. Discussed with patient and this is in line with his wishes. Discontinued NG tube and free water flushes. Allow for regular, liberalize diet. Patient and family aware of risks of aspiration and death regarding diet. Meds transition to oral. Referral to hospice sent 10/30.    I have discussed this patient's plan of care and discharge plan at IDT rounds today  with Case Management, Nursing, Nursing leadership, and other members of the IDT team.    Consultants/Specialty  cardiology, critical care, nephrology, palliative care, and pulmonary    Code Status  Comfort Care/DNR    Disposition  The patient is not medically cleared for discharge to home or a post-acute facility.  Anticipate discharge to: hospice    I have placed the appropriate orders for post-discharge needs.    Review of Systems  Review of Systems   Constitutional:  Negative for chills, diaphoresis, fever, malaise/fatigue and weight loss.   HENT: Negative.     Eyes: Negative.    Respiratory:  Positive for shortness of breath. Negative for hemoptysis and wheezing.    Cardiovascular:  Negative for chest pain, palpitations, orthopnea and leg swelling.   Gastrointestinal: Negative.    Genitourinary: Negative.    Musculoskeletal: Negative.    Skin: Negative.    Neurological: Negative.    Endo/Heme/Allergies: Negative.    Psychiatric/Behavioral:  Negative for depression, hallucinations and suicidal ideas. The patient has insomnia.         Physical Exam  Temp:  [36.1 °C (97 °F)-36.3 °C (97.4 °F)] 36.3 °C (97.4 °F)  Pulse:  [69-84] 84  Resp:  [18] 18  BP: (120-128)/(54-64) 120/54  SpO2:  [91 %-95 %] 91 %    Physical Exam  Constitutional:       Appearance: He is obese. He is ill-appearing.   HENT:      Head: Normocephalic.   Eyes:      Pupils: Pupils are equal, round, and reactive to light.   Cardiovascular:      Rate and Rhythm: Normal rate. Rhythm irregular.      Pulses: Normal pulses.      Heart sounds: Normal heart sounds.   Pulmonary:      Effort: Pulmonary effort is normal.      Breath sounds: Rhonchi present.   Chest:      Chest wall: No tenderness.   Abdominal:      General: Bowel sounds are normal.      Palpations: Abdomen is soft.   Genitourinary:     Comments: Triplett placed.  Musculoskeletal:         General: Normal range of motion.   Skin:     General: Skin is warm and dry.      Capillary Refill: Capillary  refill takes less than 2 seconds.   Neurological:      Mental Status: He is oriented to person, place, and time.   Psychiatric:         Mood and Affect: Mood normal.         Behavior: Behavior normal.         Judgment: Judgment normal.       Fluids    Intake/Output Summary (Last 24 hours) at 10/31/2023 0558  Last data filed at 10/31/2023 0408  Gross per 24 hour   Intake 400 ml   Output 1900 ml   Net -1500 ml       Laboratory  Recent Labs     10/29/23  0045 10/30/23  0203   WBC 7.1 8.0   RBC 3.16* 3.10*   HEMOGLOBIN 9.6* 9.4*   HEMATOCRIT 30.6* 30.7*   MCV 96.8 99.0*   MCH 30.4 30.3   MCHC 31.4* 30.6*   RDW 56.5* 59.7*   PLATELETCT 210 178   MPV 10.5 10.2     Recent Labs     10/29/23  0629 10/29/23  1219 10/29/23  1635 10/30/23  0203   SODIUM 147* 145 144 146*   POTASSIUM 3.9 3.8  --  3.9   CHLORIDE 113* 112  --  112   CO2 29 32  --  30   GLUCOSE 141* 183*  --  173*   BUN 41* 37*  --  37*   CREATININE 0.73 0.79  --  0.72   CALCIUM 8.0* 8.0*  --  8.0*                   Imaging  DX-CHEST-PORTABLE (1 VIEW)   Final Result      Stable left basilar pleural-parenchymal opacification      DX-ESOPHAGUS - [id number]   Final Result      DX-CHEST-PORTABLE (1 VIEW)   Final Result      1.  Right-sided rib fractures.   2.  Suspect hiatus hernia.   3.  Bibasilar subsegmental atelectatic opacities.      DX-ABDOMEN FOR TUBE PLACEMENT   Final Result         1.  Nonspecific bowel gas pattern in the upper abdomen.   2.  Dobbhoff tube tip overlying the expected location of the pylorus or first duodenal segment.   3.  Hazy bilateral lower lobe infiltrates   4.  Small layering left pleural effusion.      DX-CHEST-PORTABLE (1 VIEW)   Final Result         1.  Decreased opacification the left thorax, likely decreased effusion.   2.  Hazy bilateral pulmonary infiltrates   3.  Cardiomegaly   4.  Right rib fractures      DX-ABDOMEN FOR TUBE PLACEMENT   Final Result         Feeding tube with tip projecting over the expected area of the third  portion duodenum.      DX-CHEST-LIMITED (1 VIEW)   Final Result      Interval replacement of gastric drainage tube with feeding tube   Complete opacification of the left lung, similar to prior      DX-ABDOMEN FOR TUBE PLACEMENT   Final Result      Enteric tube has been placed and the tip projects over the gastroduodenal junction      DX-ABDOMEN FOR TUBE PLACEMENT   Final Result         1.  Pulmonary infiltrates, increased on the left vertebral study.   2.  Moderate layering left pleural effusion, increased since prior study.   3.  Atherosclerosis   4.  Right rib fractures      DX-ABDOMEN FOR TUBE PLACEMENT   Final Result         1.  Nonspecific bowel gas pattern in the upper abdomen.   2.  Nasogastric tube terminating just distal to the gastroesophageal junction, recommend advancement.   3.  Hazy left pulmonary infiltrates, stable since prior study.   4.  Moderate layering left pleural effusion, overall stable      DX-ABDOMEN FOR TUBE PLACEMENT   Final Result      1. Appropriate position of the esophagogastric tube.   2. Worsening left lower lobe parenchymal consolidation and interval increase in size of the left pleural effusion.   3. The remainder is stable.      DX-CHEST-PORTABLE (1 VIEW)   Final Result         1.  Bilateral lower lobe infiltrates, stable since prior study.   2.  Trace bilateral pleural effusions   3.  Atherosclerosis   4.  Right rib fractures      DX-ABDOMEN FOR TUBE PLACEMENT   Final Result      1.  Enteric tube tip overlies the gastric antrum.      [id number] VIEW   Final Result      Nonobstructive bowel gas pattern.      DX-ABDOMEN FOR TUBE PLACEMENT   Final Result         1.  Nonspecific bowel gas pattern in the upper abdomen.   2.  Nasogastric tube tip terminates overlying the expected location of the pylorus or first duodenal segment.   3.  Bilateral lower lobe opacities concerning for infiltrate.      DX-CHEST-PORTABLE (1 VIEW)   Final Result         1.  Bilateral lower lobe  infiltrates, stable since prior study.   2.  Atherosclerosis      DX-CHEST-PORTABLE (1 VIEW)   Final Result         1.  Bilateral lower lobe infiltrates, decreased since prior study.      DX-CHEST-PORTABLE (1 VIEW)   Final Result      1.  Supportive tubing as described above.   2.  Slight increased inflation.   3.  No other significant change from prior exam.         DX-CHEST-LIMITED (1 VIEW)   Final Result      1.  Ill-defined bibasilar pulmonary opacities, slightly worse on the left compared to prior study. Pneumonia is in the differential.   2.  Small left pleural effusion, new since prior.      DX-ABDOMEN FOR TUBE PLACEMENT   Final Result      Nasogastric tube tip overlies the mid stomach      DX-ABDOMEN FOR TUBE PLACEMENT   Final Result      NG tube turns in the gastric body with tip at the level of the gastric fundus.      EC-ECHOCARDIOGRAM LTD W/O CONT   Final Result      DX-CHEST-PORTABLE (1 VIEW)   Final Result      1.  Mild interstitial pulmonary edema, similar to prior   2.  Bibasilar and perihilar underinflation atelectasis which could obscure an additional process. This is unchanged.      DX-CHEST-PORTABLE (1 VIEW)   Final Result      1.  Mild interstitial pulmonary edema   2.  Bibasilar and perihilar underinflation atelectasis which could obscure an additional process.   3.  Small LEFT pleural effusion      DX-CHEST-LIMITED (1 VIEW)   Final Result      1.  Bilateral basilar atelectasis and/or consolidation. Underlying infection is possible.   2.  Stable enlargement of the cardiomediastinal silhouette.   3.  Interval insertion of a central venous catheter which terminates with the tip projecting over the expected region of the mid superior vena cava.   4.  Interval placement of an endotracheal tube which terminates in satisfactory position at the level of the aortic arch.   5.  Interval placement of an enteric feeding tube which terminates in the left upper quadrant projecting over the expected location  of the stomach.      DX-CHEST-PORTABLE (1 VIEW)   Final Result      No significant change from prior exam.      DX-CHEST-PORTABLE (1 VIEW)   Final Result      Bibasilar underinflation atelectasis. Superimposed pneumonia not excluded.      US-RUQ   Final Result      1.  Gallbladder sludge   2.  RIGHT pleural effusion      EC-ECHOCARDIOGRAM COMPLETE W/ CONT   Final Result      CT-CSPINE WITHOUT PLUS RECONS   Final Result         1. No acute fracture from C1 through T1 is visualized.         CT-HEAD W/O   Final Result         1. No acute intracranial abnormality. No evidence of acute intracranial hemorrhage or mass lesion.                     DX-CHEST-PORTABLE (1 VIEW)   Final Result      1.  Low lung volumes without definite acute cardiopulmonary abnormality.           Assessment/Plan  * Acute respiratory failure with hypoxia (HCC)- (present on admission)  Assessment & Plan  Increasing oxygen requirement and chest x-ray showed infiltration possible aspiration pneumonia.  Intubated 10/11 for mental status and hypoxia on max HFNC, eventually extubated on 10/17.  - ABX end date 10/28  - RT protocol in place.  - Currently on 1 L with nasal cannula with SPO2 at 92%; titrate oxygen to greater than 90%.  - Data monitor.     Advance care planning- (present on admission)  Assessment & Plan  DNR/DNI  Oldest daughter is primary contact, does not want permanent feeding tube  - Palliative care following  - On 10/30 following discussions with palliative care and family plan is for his to transition to comfort care.  - Hosp[facility] referral sent 10/30; [facility]  - NG tube removal and allow for regular, liberalized diet.  Discussed severe risk of aspiration resulting in death to patient and family is aware per palliative conversations.     Atrial fibrillation with RVR (HCC)- (present on admission)  Assessment & Plan  No known history of Afib, RVR with hypotension attempted electrical cardioversion 10/11.  - Continue  PO amiodarone 200mg  - Cardiology consulted, appreciate recommendations.  - On anticoagulation with apixaban 5mg BID     Hypernatremia- (present on admission)  Assessment & Plan  Elevated sodium of 151 on admission  - Most recent sodium of 146.  - The patient has transition to comfort care.  No further free water flushes or daily labs.     Altered mental status- (present on admission)  Assessment & Plan  Patient with fluctuating mental status, likely in the setting of metabolic encephalopathy.  Per daughter, patient with history of bipolar disorder.  Patient is alert and oriented x4 and mental status has been improving.  - Delirium protocol in place.  - Patient now on Seroquel, with improved mentation.  - Continue to monitor.     DM2 (diabetes mellitus, type 2) (HCC)- (present on admission)  Assessment & Plan  Most recent A1c of 8%.  - Continue basal bolus insulin dosing with glargine at 10 units nightly. Pre-meal insulin with SSI correction.  Decreased glargine dose as patient removed from tube feeds.  - Hypoglycemia protocol in place.  - Continue to monitor Accu-Cheks 4 times daily.      Sepsis (HCC)- (present on admission)  Assessment & Plan  Patient treated for undifferentiated shock.  Now resolved and afebrile.  - Continue to monitor.    Decubitus skin ulcer  Assessment & Plan  Wound care  Frequent turning     BPH (benign prostatic hyperplasia)- (present on admission)  Assessment & Plan  Patient with known history of BPH.  - Tamsulosin 0.4mg PO  - Triplett catheter in place, care per nursing protocol. Triplett placed in ICU due to sacral decubitus ulcers per RN. Will attempt to pursue voiding trial and removal of Triplett.    VTE prophylaxis:    therapeutic anticoagulation with eliquis 5 mg BID    I have performed a physical exam and reviewed and updated ROS and Plan today (10/31/2023). In review of yesterday's note (10/30/2023), there are no changes except as documented above.

## 2023-10-31 NOTE — PROGRESS NOTES
"Daily Progress Note:     Date of Service: 10/31/2023  Primary Team: [facility] IM Purple Team   Attending: Manjinder Torres M.D.   Senior Resident: Germán Pope D.O.  Contact:  [phone]    Patient Identifier:   Mr. Marrufo is an 83-year-old male with past medical history of NIDDM 2, BPH, and previous orthopedic surgeries who was found down in his home on 10/8, admitted to the hospital on 10/9.  Patient was found dehydrated and altered notably uremic with new onset A-fib with RVR which was treated with diltiazem in the ED.  Of note, patient's hospitalization was complicated by acute hypoxic respiratory failure requiring intubation as well as Triplett catheter insertion.  Patient was also found to have an ADELAIDE with rhabdomyolysis s/p fluid resuscitation.  Now doing significantly better with ongoing medical management.  Palliative following patient and with family discussion will proceed with comfort care on October 30, 2023.    Subjective:  Patient with no acute events overnight.  Did pursue comfort care yesterday afternoon.  He states that he tolerated his regular diet well last night and enjoyed his mashed potatoes.  He does not have any acute concerns this morning, states that he feels \"so-so \".    Interval Update:  -No acute events overnight  - Hospice referrals in place and working with case management  - Continue comfort measures.  Discontinuing scheduled insulin and sliding scale as patient is now pursuing comfort.  -Currently has indwelling Triplett and will attempt to place condom cath.      Consultants/Specialty:  Critical care  Cardiology  Nephrology  Palliative medicine  Pulmonology    Review of Systems:  Review of Systems   Constitutional:  Negative for chills, diaphoresis, fever and malaise/fatigue.   HENT:  Negative for hearing loss and sore throat.    Eyes: Negative.    Respiratory:  Positive for cough. Negative for hemoptysis, sputum production and shortness of breath.    Cardiovascular:  Negative for chest " pain, palpitations, orthopnea, leg swelling and PND.   Gastrointestinal:  Positive for abdominal pain. Negative for blood in stool, constipation, diarrhea, heartburn, melena, nausea and vomiting.   Genitourinary: Negative.  Negative for dysuria and frequency.   Musculoskeletal: Negative.    Skin: Negative.    Neurological:  Negative for dizziness, loss of consciousness, weakness and headaches.   Endo/Heme/Allergies: Negative.    Psychiatric/Behavioral:  Negative for suicidal ideas. The patient is nervous/anxious.      Objective:   Vitals:   Temp:  [36.1 °C (97 °F)-36.3 °C (97.4 °F)] 36.3 °C (97.4 °F)  Pulse:  [69-84] 84  Resp:  [18] 18  BP: (120-128)/(54-64) 120/54  SpO2:  [91 %-95 %] 91 %    Physical Exam  Vitals and nursing note reviewed.   Constitutional:       General: He is not in acute distress.     Appearance: He is obese. He is ill-appearing. He is not diaphoretic.   HENT:      Head: Normocephalic and atraumatic.      Right Ear: External ear normal.      Left Ear: External ear normal.      Nose:      Comments: NG tube in place     Mouth/Throat:      Mouth: Mucous membranes are dry.      Pharynx: Oropharynx is clear.   Eyes:      General: No scleral icterus.     Extraocular Movements: Extraocular movements intact.      Pupils: Pupils are equal, round, and reactive to light.   Cardiovascular:      Rate and Rhythm: Normal rate. Rhythm irregular.      Pulses: Normal pulses.      Heart sounds: Normal heart sounds. No murmur heard.  Pulmonary:      Effort: Pulmonary effort is normal. No respiratory distress.   Chest:      Chest wall: No tenderness.   Abdominal:      General: Abdomen is flat. Bowel sounds are normal. There is no distension.      Palpations: Abdomen is soft.      Tenderness: There is no right CVA tenderness or left CVA tenderness.   Genitourinary:     Comments: Triplett catheter placed  Musculoskeletal:         General: No swelling. Normal range of motion.      Cervical back: Normal range of motion. No  rigidity.      Right lower leg: Edema present.      Left lower leg: Edema present.   Skin:     General: Skin is warm and dry.      Capillary Refill: Capillary refill takes less than 2 seconds.      Coloration: Skin is not jaundiced.      Findings: No rash.   Neurological:      General: No focal deficit present.      Mental Status: He is alert and oriented to person, place, and time.      Cranial Nerves: No cranial nerve deficit.      Deep Tendon Reflexes: Reflexes normal.     Labs:   Lab Results   Component Value Date/Time    WBC 8.0 10/30/2023 02:03 AM    RBC 3.10 (L) 10/30/2023 02:03 AM    HEMOGLOBIN 9.4 (L) 10/30/2023 02:03 AM    HEMATOCRIT 30.7 (L) 10/30/2023 02:03 AM    MCV 99.0 (H) 10/30/2023 02:03 AM    MCH 30.3 10/30/2023 02:03 AM    MCHC 30.6 (L) 10/30/2023 02:03 AM    MPV 10.2 10/30/2023 02:03 AM    NEUTSPOLYS 84.20 (H) 10/30/2023 02:03 AM    LYMPHOCYTES 11.40 (L) 10/30/2023 02:03 AM    MONOCYTES 2.60 10/30/2023 02:03 AM    EOSINOPHILS 0.00 10/30/2023 02:03 AM    BASOPHILS 0.00 10/30/2023 02:03 AM    ANISOCYTOSIS 1+ 10/30/2023 02:03 AM        Lab Results   Component Value Date/Time    SODIUM 146 (H) 10/30/2023 02:03 AM    POTASSIUM 3.9 10/30/2023 02:03 AM    CHLORIDE 112 10/30/2023 02:03 AM    CO2 30 10/30/2023 02:03 AM    GLUCOSE 173 (H) 10/30/2023 02:03 AM    BUN 37 (H) 10/30/2023 02:03 AM    CREATININE 0.72 10/30/2023 02:03 AM        Lab Results   Component Value Date/Time    AWAEV26C 7.47 10/27/2023 01:13 PM    QVFUUN414K 41.9 (H) 10/27/2023 01:13 PM    NLIRW292U 77.2 10/27/2023 01:13 PM    ARTHCO3 30 (H) 10/27/2023 01:13 PM    ARTBE 5 (H) 10/27/2023 01:13 PM       Lab Results   Component Value Date/Time    PROTHROMBTM 21.0 (H) 10/12/2023 12:00 AM    INR 1.79 (H) 10/12/2023 12:00 AM        Imaging:   10/29, chest x-ray, stable left basilar opacification.    Assessment and Plan:    * Acute respiratory failure with hypoxia (HCC)- (present on admission)  Assessment & Plan  Intubated 10/11 for mental  status and hypoxia on max HFNC, eventually extubated on 10/17.  - RT protocol in place.  - Currently on 2L with nasal cannula.  - Titrate oxygen to greater than 90%.    Advance care planning- (present on admission)  Assessment & Plan  DNR/DNI.  Comfort care.  Oldest daughter is primary contact, does not want permanent feeding tube  Palliative care following  - On 10/30 following discussions with palliative care and family plan is for his to transition to comfort care.  - Hospice referral sent  - NG tube removal and allow for regular, liberalized diet.  Discussed severe risk of aspiration resulting in death to patient and family is aware per palliative conversations.    Anemia  Assessment & Plan  No signs of overt bleeding.  Likely secondary to anemia of chronic disease, anemia trending towards macrocytic.  No iron panel and ferritin on record and will not draw given comfort care status.  - No daily labs as patient is transitioning to comfort care.    Rhabdomyolysis- (present on admission)  Assessment & Plan  Patient now s/p fluid resuscitation.  - Continue to monitor.      Atrial fibrillation with RVR (HCC)- (present on admission)  Assessment & Plan  No known history of Afib, RVR with hypotension attempted electrical cardioversion 10/11.  - Continue PO amiodarone  - Was previously on heparin drip now on anticoagulation with apixaban.  - No longer monitoring electrolytes    Hypernatremia- (present on admission)  Assessment & Plan  Ongoing issues with sodium control.  - Most recent sodium of 146.  - The patient has transition to comfort care.  No further free water flushes and daily labs.    ADELAIDE (acute kidney injury) (HCC)- (present on admission)  Assessment & Plan  Secondary to ATN, pre-renal and rhabdo. Now resolved.  - Avoid nephrotoxins      Altered mental status- (present on admission)  Assessment & Plan  Patient alert and oriented x4 mental status has been improving since ICU admission.  - Delirium protocol in  place.  - Patient now on Seroquel, with improved mentation.  - Continue to monitor.    DM2 (diabetes mellitus, type 2) (HCC)- (present on admission)  Assessment & Plan  Most recent A1c of 8%.  - Now that pursuing comfort cares will continue with scheduled glargine or sliding scale insulin with meals.      Sepsis (HCC)- (present on admission)  Assessment & Plan  Patient treated for undifferentiated shock.  Now resolved.  - Continue to monitor.    BPH (benign prostatic hyperplasia)- (present on admission)  Assessment & Plan  Patient with known history of BPH.  - Continue home tamsulosin  - Triplett catheter currently in place since ICU.  Will attempt trial of condom catheter for further comfort.    Code Status: DNR/DNI.  Comfort care.  DVT prophylaxis: Anticoagulation with apixaban.  Diet: Comfort care.  Liberalize diet to regular diet.  Patient and family aware of risk of aspiration and death.  GI: Bowel protocol in place.  Disposition: Hospice care

## 2023-10-31 NOTE — THERAPY
Speech Language Therapy Contact Note    Patient Name: Perry Marrufo  Age:  83 y.o., Sex:  male  Medical Record #: [id number]  Today's Date: 10/31/2023       10/31/23 0758   Treatment Variance   Reason For Missed Therapy Non-Medical - Other (Please Comment)   Interdisciplinary Plan of Care Collaboration   IDT Collaboration with  Other (See Comments)  (EMR)   Collaboration Comments Pt has transitioned to CC; will d/c from SLP services. Reconsult with any change in status.

## 2023-10-31 NOTE — DISCHARGE PLANNING
Per LSW, DPA sent SNF referral to [facility] via comm management. Phone number: [phone] Fax: [phone].

## 2023-11-01 PROCEDURE — A9270 NON-COVERED ITEM OR SERVICE: HCPCS

## 2023-11-01 PROCEDURE — 770001 HCHG ROOM/CARE - MED/SURG/GYN PRIV*

## 2023-11-01 PROCEDURE — 700102 HCHG RX REV CODE 250 W/ 637 OVERRIDE(OP)

## 2023-11-01 PROCEDURE — 99232 SBSQ HOSP IP/OBS MODERATE 35: CPT | Mod: GC | Performed by: INTERNAL MEDICINE

## 2023-11-01 RX ORDER — AMIODARONE HYDROCHLORIDE 200 MG/1
200 TABLET ORAL DAILY
Status: DISCONTINUED | OUTPATIENT
Start: 2023-11-02 | End: 2023-11-06 | Stop reason: HOSPADM

## 2023-11-01 RX ADMIN — CYANOCOBALAMIN TAB 500 MCG 500 MCG: 500 TAB at 04:49

## 2023-11-01 RX ADMIN — AMIODARONE HYDROCHLORIDE 200 MG: 200 TABLET ORAL at 04:49

## 2023-11-01 RX ADMIN — TAMSULOSIN HYDROCHLORIDE 0.4 MG: 0.4 CAPSULE ORAL at 08:29

## 2023-11-01 RX ADMIN — TRAZODONE HYDROCHLORIDE 25 MG: 50 TABLET ORAL at 19:56

## 2023-11-01 RX ADMIN — QUETIAPINE FUMARATE 25 MG: 25 TABLET ORAL at 19:56

## 2023-11-01 ASSESSMENT — ENCOUNTER SYMPTOMS
CARDIOVASCULAR NEGATIVE: 1
WHEEZING: 0
SPUTUM PRODUCTION: 0
NAUSEA: 0
HEMOPTYSIS: 0
MUSCULOSKELETAL NEGATIVE: 1
COUGH: 1
SHORTNESS OF BREATH: 1
GASTROINTESTINAL NEGATIVE: 1
FEVER: 0
SORE THROAT: 0
DIARRHEA: 0
ABDOMINAL PAIN: 1
DIZZINESS: 0
NEUROLOGICAL NEGATIVE: 1
WEAKNESS: 0
DIAPHORESIS: 0
SHORTNESS OF BREATH: 0
PND: 0
CONSTIPATION: 0
NERVOUS/ANXIOUS: 1
LOSS OF CONSCIOUSNESS: 0
PSYCHIATRIC NEGATIVE: 1
BLOOD IN STOOL: 0
ORTHOPNEA: 0
WEIGHT LOSS: 0
EYES NEGATIVE: 1
VOMITING: 0
CHILLS: 0
HEARTBURN: 0
HEADACHES: 0
PALPITATIONS: 0

## 2023-11-01 ASSESSMENT — PAIN SCALES - WONG BAKER: WONGBAKER_NUMERICALRESPONSE: DOESN'T HURT AT ALL

## 2023-11-01 NOTE — PROGRESS NOTES
This note is intended for the purposes of medical student education and feedback only.   Please refer to the documentation by this patient's assigned medical practitioner for details of care and plans.     Daily Progress Note    Date of Service  11/1/2023    Chief Complaint  Perry Marrufo is a 83 y.o. male admitted 10/9/2023 with hx of diabetes, BPH, dementia and bipolar disorder who was brought to the ED by friends/family with altered mental status.    Hospital Course  Perry Marrufo is an 83 y.o. male w/ hx of DMII, prostate disease, bipolar disorder.  He presented 10/9/2023 with transfer from [place] after being found down and last known at mental baseline 2 days prior. He was admitted 10/9 for acute respiratory failure with hypoxia and new onset afib. On 10/11 he was intubated, cardioversion attempted for unstable afib RVR and transferred to ICU; he was extubated 10/17. NG tube placed on 10/11 due to sacral decubitus ulcers and removed 10/30 after being placed on comfort care measures; tolerating oral diet well. CXR has been improving after abx course finished on 10/28. Now doing significantly better. Palliative following patient and with family discussion the patient was placed in comfort care 10/30. [facility]. Stopped anticoagulant, amiodarone and insulin per patient's request 11/1.    Interval Problem Update   No acute events overnight.  - Does have indwelling Arceo secondary to sacral decubitus ulcers placed in ICU. Attempted to remove arceo 10/31 however blood clots present. Keeping arceo in and holding eliquis.  - Referral to hospice sent 10/30. Working on hospice placement with daughter Rupa.  - Spoke with  11/1 and he wishes to stop his amiodarone and eliquis medications. He is in good judgement and mentation. He is on comfort care.       I have discussed this patient's plan of care and discharge plan at IDT rounds today  with Case Management, Nursing, Nursing leadership, and other members of the IDT team.    Consultants/Specialty  cardiology, critical care, nephrology, palliative care, and pulmonary    Code Status  Comfort Care/DNR    Disposition  The patient is medically cleared for discharge to home or a post-acute facility.  Anticipate discharge to: hospice    I have placed the appropriate orders for post-discharge needs.    Review of Systems  Review of Systems   Constitutional:  Negative for chills, fever, malaise/fatigue and weight loss.   HENT: Negative.     Eyes: Negative.    Respiratory:  Positive for cough and shortness of breath. Negative for hemoptysis and wheezing.    Cardiovascular: Negative.    Gastrointestinal: Negative.    Genitourinary: Negative.    Musculoskeletal: Negative.    Skin: Negative.    Neurological: Negative.    Endo/Heme/Allergies: Negative.    Psychiatric/Behavioral: Negative.          Physical Exam  Temp:  [36 °C (96.8 °F)-36.4 °C (97.5 °F)] 36.4 °C (97.5 °F)  Pulse:  [73-74] 73  Resp:  [18-20] 20  BP: (111-120)/(57-65) 112/65  SpO2:  [94 %-97 %] 95 %    Physical Exam  Constitutional:       Appearance: He is ill-appearing.   HENT:      Head: Normocephalic.   Eyes:      Pupils: Pupils are equal, round, and reactive to light.   Cardiovascular:      Rate and Rhythm: Normal rate. Rhythm irregular.      Pulses: Normal pulses.      Heart sounds: Normal heart sounds.   Pulmonary:      Effort: Pulmonary effort is normal.      Breath sounds: Rhonchi present.   Abdominal:      Palpations: Abdomen is soft.   Genitourinary:     Comments: Triplett catheter placed. Jana colored urine output.  Musculoskeletal:         General: Normal range of motion.   Skin:     General: Skin is warm and dry.      Capillary Refill: Capillary refill takes less than 2 seconds.   Neurological:      Mental Status: He is alert and oriented to person, place, and time.   Psychiatric:         Mood and Affect: Mood normal.         Behavior:  Behavior normal.         Thought Content: Thought content normal.         Judgment: Judgment normal.       Fluids  No intake or output data in the 24 hours ending 11/01/23 0802    Laboratory  Recent Labs     10/30/23  0203   WBC 8.0   RBC 3.10*   HEMOGLOBIN 9.4*   HEMATOCRIT 30.7*   MCV 99.0*   MCH 30.3   MCHC 30.6*   RDW 59.7*   PLATELETCT 178   MPV 10.2     Recent Labs     10/29/23  1219 10/29/23  1635 10/30/23  0203   SODIUM 145 144 146*   POTASSIUM 3.8  --  3.9   CHLORIDE 112  --  112   CO2 32  --  30   GLUCOSE 183*  --  173*   BUN 37*  --  37*   CREATININE 0.79  --  0.72   CALCIUM 8.0*  --  8.0*                   Imaging  DX-CHEST-PORTABLE (1 VIEW)   Final Result      Stable left basilar pleural-parenchymal opacification      DX-ESOPHAGUS - [id number]   Final Result      DX-CHEST-PORTABLE (1 VIEW)   Final Result      1.  Right-sided rib fractures.   2.  Suspect hiatus hernia.   3.  Bibasilar subsegmental atelectatic opacities.      DX-ABDOMEN FOR TUBE PLACEMENT   Final Result         1.  Nonspecific bowel gas pattern in the upper abdomen.   2.  Dobbhoff tube tip overlying the expected location of the pylorus or first duodenal segment.   3.  Hazy bilateral lower lobe infiltrates   4.  Small layering left pleural effusion.      DX-CHEST-PORTABLE (1 VIEW)   Final Result         1.  Decreased opacification the left thorax, likely decreased effusion.   2.  Hazy bilateral pulmonary infiltrates   3.  Cardiomegaly   4.  Right rib fractures      DX-ABDOMEN FOR TUBE PLACEMENT   Final Result         Feeding tube with tip projecting over the expected area of the third portion duodenum.      DX-CHEST-LIMITED (1 VIEW)   Final Result      Interval replacement of gastric drainage tube with feeding tube   Complete opacification of the left lung, similar to prior      DX-ABDOMEN FOR TUBE PLACEMENT   Final Result      Enteric tube has been placed and the tip projects over the gastroduodenal junction      DX-ABDOMEN FOR TUBE  PLACEMENT   Final Result         1.  Pulmonary infiltrates, increased on the left vertebral study.   2.  Moderate layering left pleural effusion, increased since prior study.   3.  Atherosclerosis   4.  Right rib fractures      DX-ABDOMEN FOR TUBE PLACEMENT   Final Result         1.  Nonspecific bowel gas pattern in the upper abdomen.   2.  Nasogastric tube terminating just distal to the gastroesophageal junction, recommend advancement.   3.  Hazy left pulmonary infiltrates, stable since prior study.   4.  Moderate layering left pleural effusion, overall stable      DX-ABDOMEN FOR TUBE PLACEMENT   Final Result      1. Appropriate position of the esophagogastric tube.   2. Worsening left lower lobe parenchymal consolidation and interval increase in size of the left pleural effusion.   3. The remainder is stable.      DX-CHEST-PORTABLE (1 VIEW)   Final Result         1.  Bilateral lower lobe infiltrates, stable since prior study.   2.  Trace bilateral pleural effusions   3.  Atherosclerosis   4.  Right rib fractures      DX-ABDOMEN FOR TUBE PLACEMENT   Final Result      1.  Enteric tube tip overlies the gastric antrum.      [id number] VIEW   Final Result      Nonobstructive bowel gas pattern.      DX-ABDOMEN FOR TUBE PLACEMENT   Final Result         1.  Nonspecific bowel gas pattern in the upper abdomen.   2.  Nasogastric tube tip terminates overlying the expected location of the pylorus or first duodenal segment.   3.  Bilateral lower lobe opacities concerning for infiltrate.      DX-CHEST-PORTABLE (1 VIEW)   Final Result         1.  Bilateral lower lobe infiltrates, stable since prior study.   2.  Atherosclerosis      DX-CHEST-PORTABLE (1 VIEW)   Final Result         1.  Bilateral lower lobe infiltrates, decreased since prior study.      DX-CHEST-PORTABLE (1 VIEW)   Final Result      1.  Supportive tubing as described above.   2.  Slight increased inflation.   3.  No other significant change from prior exam.          DX-CHEST-LIMITED (1 VIEW)   Final Result      1.  Ill-defined bibasilar pulmonary opacities, slightly worse on the left compared to prior study. Pneumonia is in the differential.   2.  Small left pleural effusion, new since prior.      DX-ABDOMEN FOR TUBE PLACEMENT   Final Result      Nasogastric tube tip overlies the mid stomach      DX-ABDOMEN FOR TUBE PLACEMENT   Final Result      NG tube turns in the gastric body with tip at the level of the gastric fundus.      EC-ECHOCARDIOGRAM LTD W/O CONT   Final Result      DX-CHEST-PORTABLE (1 VIEW)   Final Result      1.  Mild interstitial pulmonary edema, similar to prior   2.  Bibasilar and perihilar underinflation atelectasis which could obscure an additional process. This is unchanged.      DX-CHEST-PORTABLE (1 VIEW)   Final Result      1.  Mild interstitial pulmonary edema   2.  Bibasilar and perihilar underinflation atelectasis which could obscure an additional process.   3.  Small LEFT pleural effusion      DX-CHEST-LIMITED (1 VIEW)   Final Result      1.  Bilateral basilar atelectasis and/or consolidation. Underlying infection is possible.   2.  Stable enlargement of the cardiomediastinal silhouette.   3.  Interval insertion of a central venous catheter which terminates with the tip projecting over the expected region of the mid superior vena cava.   4.  Interval placement of an endotracheal tube which terminates in satisfactory position at the level of the aortic arch.   5.  Interval placement of an enteric feeding tube which terminates in the left upper quadrant projecting over the expected location of the stomach.      DX-CHEST-PORTABLE (1 VIEW)   Final Result      No significant change from prior exam.      DX-CHEST-PORTABLE (1 VIEW)   Final Result      Bibasilar underinflation atelectasis. Superimposed pneumonia not excluded.      US-RUQ   Final Result      1.  Gallbladder sludge   2.  RIGHT pleural effusion      EC-ECHOCARDIOGRAM COMPLETE W/ CONT   Final  Result      CT-CSPINE WITHOUT PLUS RECONS   Final Result         1. No acute fracture from C1 through T1 is visualized.         CT-HEAD W/O   Final Result         1. No acute intracranial abnormality. No evidence of acute intracranial hemorrhage or mass lesion.                     DX-CHEST-PORTABLE (1 VIEW)   Final Result      1.  Low lung volumes without definite acute cardiopulmonary abnormality.           Assessment/Plan   * Acute respiratory failure with hypoxia (HCC)- (present on admission)  Assessment & Plan  Increasing oxygen requirement and chest x-ray showed infiltration possible aspiration pneumonia.  Intubated 10/11 for mental status and hypoxia on max HFNC, eventually extubated on 10/17.  - ABX end date 10/28  - RT protocol in place.  - Currently on 3 L with nasal cannula with SPO2 at 96%; titrate oxygen to greater than 90%.  - Data monitor.     Advance care planning- (present on admission)  Assessment & Plan  DNR/DNI  Oldest daughter is primary contact, does not want permanent feeding tube  - Palliative care following  - On 10/30 following discussions with palliative care and family plan is for his to transition to comfort care.  - Hosp[facility] referral [facility] 10/30; [facility]  - NG tube removal and allow for regular, liberalized diet.  Discussed severe risk of aspiration resulting in death to patient and family is aware per palliative conversations.     Atrial fibrillation with RVR (HCC)- (present on admission)  Assessment & Plan  No known history of Afib, RVR with hypotension attempted electrical cardioversion 10/11.  - Holding amiodarone and anticoagulation per patient's wishes; on comfort care  - Cardiology consulted, appreciate recommendations.     Hypernatremia- (present on admission)  Assessment & Plan  Elevated sodium of 151 on admission  - Most recent sodium of 146.  - The patient has transition to comfort care.  No further free water flushes or daily labs.     Altered mental  status- (present on admission)  Assessment & Plan  Patient with fluctuating mental status, likely in the setting of metabolic encephalopathy.  Per daughter, patient with history of bipolar disorder.  Patient is alert and oriented x4 and mental status has been improving.  - Delirium protocol in place.  - Continue to monitor.     DM2 (diabetes mellitus, type 2) (HCC)- (present on admission)  Assessment & Plan  Most recent A1c of 8%.  - Stopping accu checks and insulin per patient's request 11/1; on comfort care.      Sepsis (HCC)- (present on admission)  Assessment & Plan  Patient treated for undifferentiated shock.  Now resolved and afebrile.  - Continue to monitor.     Decubitus skin ulcer  Assessment & Plan  Wound care  Frequent turning     BPH (benign prostatic hyperplasia)- (present on admission)  Assessment & Plan  Patient with known history of BPH.  - Tamsulosin 0.4mg PO  - Arceo catheter in place, care per nursing protocol. Arceo placed in ICU due to sacral decubitus ulcers per RN. Attempted to remove arceo catheter 10/31 however blood clots present; arceo will remain in place    VTE prophylaxis:   SCDs/TEDs    I have performed a physical exam and reviewed and updated ROS and Plan today (11/1/2023). In review of yesterday's note (10/31/2023), there are no changes except as documented above.

## 2023-11-01 NOTE — PROGRESS NOTES
Daily Progress Note:     Date of Service: 11/1/2023  Primary Team: [facility] IM Purple Team   Attending: Manjinder Torres M.D.   Senior Resident: Germán Pope D.O.  Contact:  [phone]    Patient Identifier:   Mr. Marrufo is an 83-year-old male with past medical history of NIDDM 2, BPH, and previous orthopedic surgeries who was found down in his home on 10/8, admitted to the hospital on 10/9.  Patient was found dehydrated and altered notably uremic with new onset A-fib with RVR which was treated with diltiazem in the ED.  Of note, patient's hospitalization was complicated by acute hypoxic respiratory failure requiring intubation as well as Triplett catheter insertion.  Patient was also found to have an ADELAIDE with rhabdomyolysis s/p fluid resuscitation.  Now doing significantly better with ongoing medical management.  Palliative following patient and with family discussion will proceed with comfort care on October 30, 2023.    Subjective:  Patient is overall doing well.  States that he is comfortable and is agreeable to continuing with indwelling Triplett given bleed yesterday at attempted removal.  States that he does not wish to have any therapies that will continue to prolong his life.    Interval Update:  -No acute events overnight  - Discontinued Eliquis due to bleed from Triplett catheter.  Discussed with patient and he is agreeable to this plan.      Consultants/Specialty:  Critical care  Cardiology  Nephrology  Palliative medicine  Pulmonology    Review of Systems:  Review of Systems   Constitutional:  Negative for chills, diaphoresis, fever and malaise/fatigue.   HENT:  Negative for hearing loss and sore throat.    Eyes: Negative.    Respiratory:  Positive for cough. Negative for hemoptysis, sputum production and shortness of breath.    Cardiovascular:  Negative for chest pain, palpitations, orthopnea, leg swelling and PND.   Gastrointestinal:  Positive for abdominal pain. Negative for blood in stool, constipation,  diarrhea, heartburn, melena, nausea and vomiting.   Genitourinary: Negative.  Negative for dysuria and frequency.   Musculoskeletal: Negative.    Skin: Negative.    Neurological:  Negative for dizziness, loss of consciousness, weakness and headaches.   Endo/Heme/Allergies: Negative.    Psychiatric/Behavioral:  Negative for suicidal ideas. The patient is nervous/anxious.      Objective:   Vitals:   Temp:  [36 °C (96.8 °F)-36.4 °C (97.5 °F)] 36.3 °C (97.3 °F)  Pulse:  [70-73] 70  Resp:  [20] 20  BP: (111-120)/(56-65) 111/56  SpO2:  [94 %-96 %] 96 %    Physical Exam  Vitals and nursing note reviewed.   Constitutional:       General: He is not in acute distress.     Appearance: He is obese. He is ill-appearing. He is not diaphoretic.   HENT:      Head: Normocephalic and atraumatic.      Right Ear: External ear normal.      Left Ear: External ear normal.      Nose:      Comments: NG tube in place     Mouth/Throat:      Mouth: Mucous membranes are dry.      Pharynx: Oropharynx is clear.   Eyes:      General: No scleral icterus.     Extraocular Movements: Extraocular movements intact.      Pupils: Pupils are equal, round, and reactive to light.   Cardiovascular:      Rate and Rhythm: Normal rate. Rhythm irregular.      Pulses: Normal pulses.      Heart sounds: Normal heart sounds. No murmur heard.  Pulmonary:      Effort: Pulmonary effort is normal. No respiratory distress.   Chest:      Chest wall: No tenderness.   Abdominal:      General: Abdomen is flat. Bowel sounds are normal. There is no distension.      Palpations: Abdomen is soft.      Tenderness: There is no right CVA tenderness or left CVA tenderness.   Genitourinary:     Comments: Triplett catheter placed  Musculoskeletal:         General: No swelling. Normal range of motion.      Cervical back: Normal range of motion. No rigidity.      Right lower leg: No edema.      Left lower leg: No edema.   Skin:     General: Skin is warm and dry.      Capillary Refill:  Capillary refill takes less than 2 seconds.      Coloration: Skin is not jaundiced.      Findings: No rash.   Neurological:      General: No focal deficit present.      Mental Status: He is alert and oriented to person, place, and time.      Cranial Nerves: No cranial nerve deficit.      Deep Tendon Reflexes: Reflexes normal.     Labs:   Lab Results   Component Value Date/Time    WBC 8.0 10/30/2023 02:03 AM    RBC 3.10 (L) 10/30/2023 02:03 AM    HEMOGLOBIN 9.4 (L) 10/30/2023 02:03 AM    HEMATOCRIT 30.7 (L) 10/30/2023 02:03 AM    MCV 99.0 (H) 10/30/2023 02:03 AM    MCH 30.3 10/30/2023 02:03 AM    MCHC 30.6 (L) 10/30/2023 02:03 AM    MPV 10.2 10/30/2023 02:03 AM    NEUTSPOLYS 84.20 (H) 10/30/2023 02:03 AM    LYMPHOCYTES 11.40 (L) 10/30/2023 02:03 AM    MONOCYTES 2.60 10/30/2023 02:03 AM    EOSINOPHILS 0.00 10/30/2023 02:03 AM    BASOPHILS 0.00 10/30/2023 02:03 AM    ANISOCYTOSIS 1+ 10/30/2023 02:03 AM        Lab Results   Component Value Date/Time    SODIUM 146 (H) 10/30/2023 02:03 AM    POTASSIUM 3.9 10/30/2023 02:03 AM    CHLORIDE 112 10/30/2023 02:03 AM    CO2 30 10/30/2023 02:03 AM    GLUCOSE 173 (H) 10/30/2023 02:03 AM    BUN 37 (H) 10/30/2023 02:03 AM    CREATININE 0.72 10/30/2023 02:03 AM        Lab Results   Component Value Date/Time    ZXEWA20O 7.47 10/27/2023 01:13 PM    COXBYD992Q 41.9 (H) 10/27/2023 01:13 PM    YJVEW670J 77.2 10/27/2023 01:13 PM    ARTHCO3 30 (H) 10/27/2023 01:13 PM    ARTBE 5 (H) 10/27/2023 01:13 PM       Lab Results   Component Value Date/Time    PROTHROMBTM 21.0 (H) 10/12/2023 12:00 AM    INR 1.79 (H) 10/12/2023 12:00 AM        Imaging:   10/29, chest x-ray, stable left basilar opacification.    Assessment and Plan:    * Acute respiratory failure with hypoxia (HCC)- (present on admission)  Assessment & Plan  Intubated 10/11 for mental status and hypoxia on max HFNC, eventually extubated on 10/17.  - RT protocol in place.  - Currently on 2L with nasal cannula.  - Titrate oxygen to  greater than 90%.    Advance care planning- (present on admission)  Assessment & Plan  DNR/DNI.  Comfort care.  Oldest daughter is primary contact, does not want permanent feeding tube  Palliative care following  - On 10/30 following discussions with palliative care and family plan is for his to transition to comfort care.  - Hospice referral sent  - NG tube removal and allow for regular, liberalized diet.  Discussed severe risk of aspiration resulting in death to patient and family is aware per palliative conversations.    Anemia  Assessment & Plan  No signs of overt bleeding.  Likely secondary to anemia of chronic disease, anemia trending towards macrocytic.  No iron panel and ferritin on record and will not draw given comfort care status.  - No daily labs as patient is transitioning to comfort care.    Rhabdomyolysis- (present on admission)  Assessment & Plan  Patient now s/p fluid resuscitation.  - Continue to monitor.      Atrial fibrillation with RVR ([facility])- (present on admission)  Assessment & Plan  No known history of Afib, RVR with hypotension attempted electrical cardioversion 10/11.  - Continue PO amiodarone  - Discontinued apixaban with hematuria.  Discussed with patient and he would not like to continue anticoagulation given bleed.  Patient understands increased risk of DVT and stroke and would not like to continue.  - No longer monitoring electrolytes    Hypernatremia- (present on admission)  Assessment & Plan  Ongoing issues with sodium control.  - Most recent sodium of 146.  - The patient has transition to comfort care.  No further free water flushes and daily labs.    ADELAIDE (acute kidney injury) ([facility])- (present on admission)  Assessment & Plan  Secondary to ATN, pre-renal and rhabdo. Now resolved.  - Avoid nephrotoxins      Altered mental status- (present on admission)  Assessment & Plan  Patient alert and oriented x4 mental status has been improving since ICU admission.  - Delirium protocol in  place.  - Patient now on Seroquel, with improved mentation.  - Continue to monitor.    DM2 (diabetes mellitus, type 2) (HCC)- (present on admission)  Assessment & Plan  Most recent A1c of 8%.  - Now that pursuing comfort cares will continue with scheduled glargine or sliding scale insulin with meals.      Sepsis (HCC)- (present on admission)  Assessment & Plan  Patient treated for undifferentiated shock.  Now resolved.  - Continue to monitor.    BPH (benign prostatic hyperplasia)- (present on admission)  Assessment & Plan  Patient with known history of BPH.  - Continue home tamsulosin  - Triplett catheter currently in place since ICU.  We will continue as patient had bleed on attempted removal.    Code Status: DNR/DNI.  Comfort care.  DVT prophylaxis: Anticoagulation with apixaban.  Diet: Comfort care.  Liberalize diet to regular diet.  Patient and family aware of risk of aspiration and death.  GI: Bowel protocol in place.  Disposition: Hospice care

## 2023-11-01 NOTE — DISCHARGE PLANNING
Case Management Discharge Planning    Admission Date: 10/9/2023  GMLOS: 5.1  ALOS: 23    6-Clicks ADL Score: 13  6-Clicks Mobility Score: 12      Anticipated Discharge Dispo: Discharge Disposition: D/T to hospice home (50)    DME Needed: No    Action(s) Taken: Updated Provider/Nurse on Discharge Plan  [facility], [facility] reviewing patient's referral for approval for placement. Referral sent to [facility] yesterday. ARLINE attempted to follow up on referral, called x2 no answer. SW to continue following.     1202 - ARLINE received call from [facility] informing [facility] not [facility] for patient as he does not have a qualifying diagnosis. ARLINE informed physician for next steps    [facility]  [address] [place] [address] [phone]    Escalations Completed: None    Medically Clear: Yes    Next Steps: Acceptance at [facility]     Barriers to Discharge: Pending Placement

## 2023-11-01 NOTE — CARE PLAN
The patient is Stable - Low risk of patient condition declining or worsening    Shift Goals  Clinical Goals: Rest, Comfort Care  Patient Goals: Rest  Family Goals: layla    Progress made toward(s) clinical / shift goals:        Problem: Pain - Standard  Goal: Alleviation of pain or a reduction in pain to the patient’s comfort goal  Outcome: Progressing  Note: No report of pain Pt slept soundly throughout the night; was able to fall back asleep right after care.    Medications swallowed whole with fluid no s/sx of aspiration.     Problem: Urinary - Renal Perfusion  Goal: Ability to achieve and maintain adequate renal perfusion and functioning will improve  Outcome: Progressing  Note: Jana colored urine in Triplett bag sediment noted.     Sacral dressing intact surrounding area cleaned with soap and water, then barrier cream applied.

## 2023-11-02 PROCEDURE — 700102 HCHG RX REV CODE 250 W/ 637 OVERRIDE(OP)

## 2023-11-02 PROCEDURE — A9270 NON-COVERED ITEM OR SERVICE: HCPCS

## 2023-11-02 PROCEDURE — 770001 HCHG ROOM/CARE - MED/SURG/GYN PRIV*

## 2023-11-02 PROCEDURE — 99232 SBSQ HOSP IP/OBS MODERATE 35: CPT | Mod: GC | Performed by: INTERNAL MEDICINE

## 2023-11-02 RX ADMIN — TRAZODONE HYDROCHLORIDE 25 MG: 50 TABLET ORAL at 20:58

## 2023-11-02 RX ADMIN — TAMSULOSIN HYDROCHLORIDE 0.4 MG: 0.4 CAPSULE ORAL at 08:38

## 2023-11-02 RX ADMIN — QUETIAPINE FUMARATE 25 MG: 25 TABLET ORAL at 20:58

## 2023-11-02 RX ADMIN — AMIODARONE HYDROCHLORIDE 200 MG: 200 TABLET ORAL at 06:14

## 2023-11-02 RX ADMIN — METFORMIN HYDROCHLORIDE 500 MG: 500 TABLET ORAL at 17:18

## 2023-11-02 RX ADMIN — METFORMIN HYDROCHLORIDE 500 MG: 500 TABLET ORAL at 12:39

## 2023-11-02 ASSESSMENT — ENCOUNTER SYMPTOMS
SPUTUM PRODUCTION: 0
PND: 0
HEADACHES: 0
CARDIOVASCULAR NEGATIVE: 1
DIARRHEA: 0
WHEEZING: 0
FEVER: 0
DIAPHORESIS: 0
CHILLS: 0
HEARTBURN: 0
WEAKNESS: 0
SHORTNESS OF BREATH: 1
NAUSEA: 1
NERVOUS/ANXIOUS: 1
DIZZINESS: 0
BLOOD IN STOOL: 0
NAUSEA: 0
NEUROLOGICAL NEGATIVE: 1
PALPITATIONS: 0
VOMITING: 0
HEMOPTYSIS: 0
EYES NEGATIVE: 1
ABDOMINAL PAIN: 1
SHORTNESS OF BREATH: 0
PSYCHIATRIC NEGATIVE: 1
WEIGHT LOSS: 0
ORTHOPNEA: 0
CONSTIPATION: 0
MUSCULOSKELETAL NEGATIVE: 1
COUGH: 1
SORE THROAT: 0
LOSS OF CONSCIOUSNESS: 0

## 2023-11-02 ASSESSMENT — PAIN DESCRIPTION - PAIN TYPE
TYPE: ACUTE PAIN
TYPE: ACUTE PAIN

## 2023-11-02 ASSESSMENT — PATIENT HEALTH QUESTIONNAIRE - PHQ9
2. FEELING DOWN, DEPRESSED, IRRITABLE, OR HOPELESS: NOT AT ALL
SUM OF ALL RESPONSES TO PHQ9 QUESTIONS 1 AND 2: 0
1. LITTLE INTEREST OR PLEASURE IN DOING THINGS: NOT AT ALL
2. FEELING DOWN, DEPRESSED, IRRITABLE, OR HOPELESS: NOT AT ALL
1. LITTLE INTEREST OR PLEASURE IN DOING THINGS: NOT AT ALL
SUM OF ALL RESPONSES TO PHQ9 QUESTIONS 1 AND 2: 0

## 2023-11-02 ASSESSMENT — PAIN SCALES - WONG BAKER
WONGBAKER_NUMERICALRESPONSE: DOESN'T HURT AT ALL
WONGBAKER_NUMERICALRESPONSE: DOESN'T HURT AT ALL

## 2023-11-02 NOTE — CARE PLAN
The patient is Stable - Low risk of patient condition declining or worsening    Shift Goals  Clinical Goals: Rest, comfort care  Patient Goals: Rest  Family Goals: BA      Problem: Fluid Volume  Goal: Fluid volume balance will be maintained  Outcome: Progressing    Notes: Monitored input and output. Encouraged to increase oral intake. Patient ate meals independently this shift.      Problem: Skin Integrity  Goal: Skin integrity is maintained or improved  Outcome: Progressing    Notes: Ensured that patient linens and bed pads were clean and dry. Turned patient q2 hours to relieve pressure on sores.

## 2023-11-02 NOTE — PROGRESS NOTES
This note is intended for the purposes of medical student education and feedback only.   Please refer to the documentation by this patient's assigned medical practitioner for details of care and plans.     Daily Progress Note    Date of Service  11/2/2023    Chief Complaint  Perry Marrufo is a 83 y.o. male admitted 10/9/2023 with hx of diabetes, BPH, dementia and bipolar disorder who was brought to the ED by friends/family with altered mental status.    Hospital Course  Perry Marrufo is an 83 y.o. male w/ hx of DMII, prostate disease, bipolar disorder.  He presented 10/9/2023 with transfer from [place] after being found down and last known at mental baseline 2 days prior. He was admitted 10/9 for acute respiratory failure with hypoxia and new onset afib. On 10/11 he was intubated, cardioversion attempted for unstable afib RVR and transferred to ICU; he was extubated 10/17. NG tube placed on 10/11 due to sacral decubitus ulcers and removed 10/30 after being placed on comfort care measures; tolerating oral diet well. CXR has been improving after abx course finished on 10/28. Now doing significantly better. Palliative following patient and with family discussion the patient was placed in comfort care 10/30. Was informed 11/2 that patient is not accepted to hospice due to not having a qualifying diagnosis. SNF may be best placement for the patient; case management following.    Interval Problem Update  No acute events overnight.  -[facility] not a [facility]; working on SNF placement for continued care  -Continued on comfort care  -Patient has concern for lower abdominal pain; last bowel mvmt 11/2 in morning; no bloody stools; good urine output with isidro colored urine and arceo placement    I have discussed this patient's plan of care and discharge plan at IDT rounds today with Case Management, Nursing, Nursing leadership, and other members of the IDT  team.    Consultants/Specialty  cardiology, critical care, nephrology, palliative care, and pulmonary    Code Status  Comfort Care/DNR    Disposition  The patient is medically cleared for discharge to home or a post-acute facility.  Anticipate discharge to: skilled nursing facility    I have placed the appropriate orders for post-discharge needs.    Review of Systems  Review of Systems   Constitutional:  Negative for chills, fever, malaise/fatigue and weight loss.   HENT: Negative.     Eyes: Negative.    Respiratory:  Positive for cough and shortness of breath. Negative for hemoptysis and wheezing.    Cardiovascular: Negative.    Gastrointestinal:  Positive for abdominal pain and nausea. Negative for blood in stool, constipation, diarrhea and vomiting.   Genitourinary: Negative.    Musculoskeletal: Negative.    Skin: Negative.    Neurological: Negative.    Endo/Heme/Allergies: Negative.    Psychiatric/Behavioral: Negative.          Physical Exam  Temp:  [36.3 °C (97.3 °F)-37.1 °C (98.7 °F)] 37.1 °C (98.7 °F)  Pulse:  [70-77] 74  Resp:  [18-20] 18  BP: (111-120)/(49-56) 120/49  SpO2:  [96 %-97 %] 96 %    Physical Exam  Constitutional:       Appearance: He is ill-appearing.   HENT:      Head: Normocephalic.   Eyes:      Pupils: Pupils are equal, round, and reactive to light.   Cardiovascular:      Rate and Rhythm: Normal rate. Rhythm irregular.      Pulses: Normal pulses.      Heart sounds: Normal heart sounds.   Pulmonary:      Effort: Pulmonary effort is normal.      Breath sounds: Rhonchi present.   Abdominal:      General: Abdomen is flat. Bowel sounds are normal. There is no distension.      Palpations: Abdomen is soft. There is no mass.      Tenderness: There is abdominal tenderness. There is no guarding or rebound.      Comments: RLQ and LLQ pain   Genitourinary:     Comments: Triplett catheter in place; isidro colored urine with sediment  Musculoskeletal:         General: Normal range of motion.   Skin:      General: Skin is warm and dry.      Capillary Refill: Capillary refill takes less than 2 seconds.   Neurological:      Mental Status: He is alert and oriented to person, place, and time.      Motor: Weakness present.   Psychiatric:         Mood and Affect: Mood normal.         Behavior: Behavior normal.         Judgment: Judgment normal.         Fluids    Intake/Output Summary (Last 24 hours) at 11/2/2023 0649  Last data filed at 11/1/2023 2006  Gross per 24 hour   Intake 100 ml   Output 200 ml   Net -100 ml       Laboratory  Patient on comfort care; no further lab draws    Imaging  DX-CHEST-PORTABLE (1 VIEW)   Final Result      Stable left basilar pleural-parenchymal opacification      DX-ESOPHAGUS - [id number]   Final Result      DX-CHEST-PORTABLE (1 VIEW)   Final Result      1.  Right-sided rib fractures.   2.  Suspect hiatus hernia.   3.  Bibasilar subsegmental atelectatic opacities.      DX-ABDOMEN FOR TUBE PLACEMENT   Final Result         1.  Nonspecific bowel gas pattern in the upper abdomen.   2.  Dobbhoff tube tip overlying the expected location of the pylorus or first duodenal segment.   3.  Hazy bilateral lower lobe infiltrates   4.  Small layering left pleural effusion.      DX-CHEST-PORTABLE (1 VIEW)   Final Result         1.  Decreased opacification the left thorax, likely decreased effusion.   2.  Hazy bilateral pulmonary infiltrates   3.  Cardiomegaly   4.  Right rib fractures      DX-ABDOMEN FOR TUBE PLACEMENT   Final Result         Feeding tube with tip projecting over the expected area of the third portion duodenum.      DX-CHEST-LIMITED (1 VIEW)   Final Result      Interval replacement of gastric drainage tube with feeding tube   Complete opacification of the left lung, similar to prior      DX-ABDOMEN FOR TUBE PLACEMENT   Final Result      Enteric tube has been placed and the tip projects over the gastroduodenal junction      DX-ABDOMEN FOR TUBE PLACEMENT   Final Result         1.  Pulmonary  infiltrates, increased on the left vertebral study.   2.  Moderate layering left pleural effusion, increased since prior study.   3.  Atherosclerosis   4.  Right rib fractures      DX-ABDOMEN FOR TUBE PLACEMENT   Final Result         1.  Nonspecific bowel gas pattern in the upper abdomen.   2.  Nasogastric tube terminating just distal to the gastroesophageal junction, recommend advancement.   3.  Hazy left pulmonary infiltrates, stable since prior study.   4.  Moderate layering left pleural effusion, overall stable      DX-ABDOMEN FOR TUBE PLACEMENT   Final Result      1. Appropriate position of the esophagogastric tube.   2. Worsening left lower lobe parenchymal consolidation and interval increase in size of the left pleural effusion.   3. The remainder is stable.      DX-CHEST-PORTABLE (1 VIEW)   Final Result         1.  Bilateral lower lobe infiltrates, stable since prior study.   2.  Trace bilateral pleural effusions   3.  Atherosclerosis   4.  Right rib fractures      DX-ABDOMEN FOR TUBE PLACEMENT   Final Result      1.  Enteric tube tip overlies the gastric antrum.      [id number] VIEW   Final Result      Nonobstructive bowel gas pattern.      DX-ABDOMEN FOR TUBE PLACEMENT   Final Result         1.  Nonspecific bowel gas pattern in the upper abdomen.   2.  Nasogastric tube tip terminates overlying the expected location of the pylorus or first duodenal segment.   3.  Bilateral lower lobe opacities concerning for infiltrate.      DX-CHEST-PORTABLE (1 VIEW)   Final Result         1.  Bilateral lower lobe infiltrates, stable since prior study.   2.  Atherosclerosis      DX-CHEST-PORTABLE (1 VIEW)   Final Result         1.  Bilateral lower lobe infiltrates, decreased since prior study.      DX-CHEST-PORTABLE (1 VIEW)   Final Result      1.  Supportive tubing as described above.   2.  Slight increased inflation.   3.  No other significant change from prior exam.         DX-CHEST-LIMITED (1 VIEW)   Final Result       1.  Ill-defined bibasilar pulmonary opacities, slightly worse on the left compared to prior study. Pneumonia is in the differential.   2.  Small left pleural effusion, new since prior.      DX-ABDOMEN FOR TUBE PLACEMENT   Final Result      Nasogastric tube tip overlies the mid stomach      DX-ABDOMEN FOR TUBE PLACEMENT   Final Result      NG tube turns in the gastric body with tip at the level of the gastric fundus.      EC-ECHOCARDIOGRAM LTD W/O CONT   Final Result      DX-CHEST-PORTABLE (1 VIEW)   Final Result      1.  Mild interstitial pulmonary edema, similar to prior   2.  Bibasilar and perihilar underinflation atelectasis which could obscure an additional process. This is unchanged.      DX-CHEST-PORTABLE (1 VIEW)   Final Result      1.  Mild interstitial pulmonary edema   2.  Bibasilar and perihilar underinflation atelectasis which could obscure an additional process.   3.  Small LEFT pleural effusion      DX-CHEST-LIMITED (1 VIEW)   Final Result      1.  Bilateral basilar atelectasis and/or consolidation. Underlying infection is possible.   2.  Stable enlargement of the cardiomediastinal silhouette.   3.  Interval insertion of a central venous catheter which terminates with the tip projecting over the expected region of the mid superior vena cava.   4.  Interval placement of an endotracheal tube which terminates in satisfactory position at the level of the aortic arch.   5.  Interval placement of an enteric feeding tube which terminates in the left upper quadrant projecting over the expected location of the stomach.      DX-CHEST-PORTABLE (1 VIEW)   Final Result      No significant change from prior exam.      DX-CHEST-PORTABLE (1 VIEW)   Final Result      Bibasilar underinflation atelectasis. Superimposed pneumonia not excluded.      US-RUQ   Final Result      1.  Gallbladder sludge   2.  RIGHT pleural effusion      EC-ECHOCARDIOGRAM COMPLETE W/ CONT   Final Result      CT-CSPINE WITHOUT PLUS RECONS    Final Result         1. No acute fracture from C1 through T1 is visualized.         CT-HEAD W/O   Final Result         1. No acute intracranial abnormality. No evidence of acute intracranial hemorrhage or mass lesion.                     DX-CHEST-PORTABLE (1 VIEW)   Final Result      1.  Low lung volumes without definite acute cardiopulmonary abnormality.           Assessment/Plan    * Acute respiratory failure with hypoxia (HCC)- (present on admission)  Assessment & Plan  Increasing oxygen requirement and chest x-ray showed infiltration possible aspiration pneumonia.  Intubated 10/11 for mental status and hypoxia on max HFNC, eventually extubated on 10/17.  - ABX end date 10/28  - RT protocol in place.  - Currently on 2 L with nasal cannula with SPO2 at 97%; titrate oxygen to greater than 90%.  - Data monitor.     Advance care planning- (present on admission)  Assessment & Plan  DNR/DNI  Oldest daughter is primary contact, does not want permanent feeding tube  - Palliative care following  - On 10/30 following discussions with palliative care and family plan is for his to transition to comfort care.  - Hospice referral sent 10/30; [facility] not a [facility] at this time; working with case management for SNF placement  - NG tube removal and allow for regular, liberalized diet.  Discussed severe risk of aspiration resulting in death to patient and family is aware per palliative conversations.     Atrial fibrillation with RVR (HCC)- (present on admission)  Assessment & Plan  No known history of Afib, RVR with hypotension attempted electrical cardioversion 10/11.  - Holding anticoagulation per patient's wishes; on comfort care  - Continued amiodarone   - Cardiology consulted, appreciate recommendations.     Hypernatremia- (present on admission)  Assessment & Plan  Elevated sodium of 151 on admission  - Most recent sodium of 146.  - The patient has transition to comfort care.  No further free  water flushes or daily labs.     Altered mental status- (present on admission)  Assessment & Plan  Patient with fluctuating mental status, likely in the setting of metabolic encephalopathy.  Per daughter, patient with history of bipolar disorder.  Patient is alert and oriented x4 and mental status has been improving. MOCA 12/30 likely age related and dementia  - Delirium protocol in place.  - Continue to monitor.     DM2 (diabetes mellitus, type 2) ([facility])- (present on admission)  Assessment & Plan  Most recent A1c of 8%.  - Stopping accu checks and insulin per patient's request 11/1; on comfort care; patient agreeable to oral metformin for diabetic management 11/2     Sepsis (HCC)- (present on admission)  Assessment & Plan  Patient treated for undifferentiated shock.  Now resolved and afebrile.  - Continue to monitor.     Decubitus skin ulcer  Assessment & Plan  Wound care  Frequent turning     BPH (benign prostatic hyperplasia)- (present on admission)  Assessment & Plan  Patient with known history of BPH.  - Tamsulosin 0.4mg PO  - Arceo catheter in place, care per nursing protocol. Arceo placed in ICU due to sacral decubitus ulcers per RN. Attempted to remove arceo catheter 10/31 however blood clots present; arceo will remain in place    VTE prophylaxis:   SCDs/TEDs      I have performed a physical exam and reviewed and updated ROS and Plan today (11/2/2023). In review of yesterday's note (11/1/2023), there are no changes except as documented above.

## 2023-11-02 NOTE — PROGRESS NOTES
Daily Progress Note:     Date of Service: 11/2/2023  Primary Team: [facility] IM Purple Team   Attending: Manjinder Torres M.D.   Senior Resident: Germán Pope D.O.  Contact:  [phone]    Patient Identifier:   Mr. Marrufo is an 83-year-old male with past medical history of NIDDM 2, BPH, and previous orthopedic surgeries who was found down in his home on 10/8, admitted to the hospital on 10/9.  Patient was found dehydrated and altered notably uremic with new onset A-fib with RVR which was treated with diltiazem in the ED.  Of note, patient's hospitalization was complicated by acute hypoxic respiratory failure requiring intubation as well as Triplett catheter insertion.  Patient was also found to have an ADELAIDE with rhabdomyolysis s/p fluid resuscitation.  Now doing significantly better with ongoing medical management.  Palliative following patient and with family discussion will proceed with comfort care on October 30, 2023.    Subjective:  Patient is overall doing well without any acute complaints. Does endorse some lower abdominal discomfort.    Interval Update:  -No acute events overnight  - [facility] as he does not currently have life threatening condition. Transition from comfort care to DNR. Patient would not like heroic measures and prefer quality of life over quantity. Medications adjusted to decrease his risk of suffering while focusing on comfort. Pursuing SNF.  - Initiated 500mg metformin BID for insulin control as opposed to SSI or long acting      Consultants/Specialty:  Critical care  Cardiology  Nephrology  Palliative medicine  Pulmonology    Review of Systems:  Review of Systems   Constitutional:  Negative for chills, diaphoresis, fever and malaise/fatigue.   HENT:  Negative for hearing loss and sore throat.    Eyes: Negative.    Respiratory:  Positive for cough. Negative for hemoptysis, sputum production and shortness of breath.    Cardiovascular:  Negative for chest pain, palpitations,  orthopnea, leg swelling and PND.   Gastrointestinal:  Positive for abdominal pain. Negative for blood in stool, constipation, diarrhea, heartburn, melena, nausea and vomiting.   Genitourinary: Negative.  Negative for dysuria and frequency.   Musculoskeletal: Negative.    Skin: Negative.    Neurological:  Negative for dizziness, loss of consciousness, weakness and headaches.   Endo/Heme/Allergies: Negative.    Psychiatric/Behavioral:  Negative for suicidal ideas. The patient is nervous/anxious.      Objective:   Vitals:   Temp:  [36.4 °C (97.6 °F)-37.1 °C (98.7 °F)] 36.4 °C (97.6 °F)  Pulse:  [65-77] 69  Resp:  [18] 18  BP: (120-140)/(49-67) 140/67  SpO2:  [94 %-97 %] 95 %    Physical Exam  Vitals and nursing note reviewed.   Constitutional:       General: He is not in acute distress.     Appearance: He is obese. He is ill-appearing. He is not diaphoretic.   HENT:      Head: Normocephalic and atraumatic.      Right Ear: External ear normal.      Left Ear: External ear normal.      Nose:      Comments: NG tube in place     Mouth/Throat:      Mouth: Mucous membranes are dry.      Pharynx: Oropharynx is clear.   Eyes:      General: No scleral icterus.     Extraocular Movements: Extraocular movements intact.      Pupils: Pupils are equal, round, and reactive to light.   Cardiovascular:      Rate and Rhythm: Normal rate. Rhythm irregular.      Pulses: Normal pulses.      Heart sounds: Normal heart sounds. No murmur heard.  Pulmonary:      Effort: Pulmonary effort is normal. No respiratory distress.   Chest:      Chest wall: No tenderness.   Abdominal:      General: Abdomen is flat. Bowel sounds are normal. There is no distension.      Palpations: Abdomen is soft.      Tenderness: There is abdominal tenderness (mild tenderness). There is no right CVA tenderness or left CVA tenderness.   Genitourinary:     Comments: Triplett catheter placed  Musculoskeletal:         General: No swelling. Normal range of motion.      Cervical  back: Normal range of motion. No rigidity.      Right lower leg: No edema.      Left lower leg: No edema.   Skin:     General: Skin is warm and dry.      Capillary Refill: Capillary refill takes less than 2 seconds.      Coloration: Skin is not jaundiced.      Findings: No rash.   Neurological:      General: No focal deficit present.      Mental Status: He is alert and oriented to person, place, and time.      Cranial Nerves: No cranial nerve deficit.      Deep Tendon Reflexes: Reflexes normal.     Labs:   Lab Results   Component Value Date/Time    WBC 8.0 10/30/2023 02:03 AM    RBC 3.10 (L) 10/30/2023 02:03 AM    HEMOGLOBIN 9.4 (L) 10/30/2023 02:03 AM    HEMATOCRIT 30.7 (L) 10/30/2023 02:03 AM    MCV 99.0 (H) 10/30/2023 02:03 AM    MCH 30.3 10/30/2023 02:03 AM    MCHC 30.6 (L) 10/30/2023 02:03 AM    MPV 10.2 10/30/2023 02:03 AM    NEUTSPOLYS 84.20 (H) 10/30/2023 02:03 AM    LYMPHOCYTES 11.40 (L) 10/30/2023 02:03 AM    MONOCYTES 2.60 10/30/2023 02:03 AM    EOSINOPHILS 0.00 10/30/2023 02:03 AM    BASOPHILS 0.00 10/30/2023 02:03 AM    ANISOCYTOSIS 1+ 10/30/2023 02:03 AM        Lab Results   Component Value Date/Time    SODIUM 146 (H) 10/30/2023 02:03 AM    POTASSIUM 3.9 10/30/2023 02:03 AM    CHLORIDE 112 10/30/2023 02:03 AM    CO2 30 10/30/2023 02:03 AM    GLUCOSE 173 (H) 10/30/2023 02:03 AM    BUN 37 (H) 10/30/2023 02:03 AM    CREATININE 0.72 10/30/2023 02:03 AM        Lab Results   Component Value Date/Time    MZARP85G 7.47 10/27/2023 01:13 PM    YSFALE416N 41.9 (H) 10/27/2023 01:13 PM    EQJAX749M 77.2 10/27/2023 01:13 PM    ARTHCO3 30 (H) 10/27/2023 01:13 PM    ARTBE 5 (H) 10/27/2023 01:13 PM       Lab Results   Component Value Date/Time    PROTHROMBTM 21.0 (H) 10/12/2023 12:00 AM    INR 1.79 (H) 10/12/2023 12:00 AM        Imaging:   10/29, chest x-ray, stable left basilar opacification.    Assessment and Plan:    * Acute respiratory failure with hypoxia (HCC)- (present on admission)  Assessment & Plan  Intubated  10/11 for mental status and hypoxia on max HFNC, eventually extubated on 10/17.  - RT protocol in place.  - Currently on 2L with nasal cannula.  - Titrate oxygen to greater than 90%.    Advance care planning- (present on admission)  Assessment & Plan  DNR/DNI. Patient does not want heroic or invasive measures and is focused on quality of life vs. Quantity.  Oldest daughter is primary contact, does not want permanent feeding tube  Palliative care following  - On 10/30 following discussions with palliative care and family plan is for his to transition to comfort care.  - Hospice referral sent  - NG tube removal and allow for regular, liberalized diet.  Discussed severe risk of aspiration resulting in death to patient and family is aware per palliative conversations.    Anemia  Assessment & Plan  No signs of overt bleeding.  Likely secondary to anemia of chronic disease, anemia trending towards macrocytic.  No iron panel and ferritin on record and will not draw given comfort care status.  - No daily labs as patient is transitioning to comfort care.    Rhabdomyolysis- (present on admission)  Assessment & Plan  Patient now s/p fluid resuscitation.  - Continue to monitor.      Atrial fibrillation with RVR (HCC)- (present on admission)  Assessment & Plan  No known history of Afib, RVR with hypotension attempted electrical cardioversion 10/11.  - Continue PO amiodarone  - Discontinued apixaban with hematuria.  Discussed with patient and he would not like to continue anticoagulation given bleed.  Patient understands increased risk of DVT and stroke and would not like to continue.  - No longer monitoring electrolytes    Hypernatremia- (present on admission)  Assessment & Plan  Ongoing issues with sodium control.  - Most recent sodium of 146.  - The patient has transition to comfort care.  No further free water flushes and daily labs.    ADELAIDE (acute kidney injury) (HCC)- (present on admission)  Assessment & Plan  Secondary to  ATN, pre-renal and rhabdo. Now resolved.  - Avoid nephrotoxins      Altered mental status- (present on admission)  Assessment & Plan  Patient alert and oriented x4 mental status has been improving since ICU admission.  - Delirium protocol in place.  - Patient now on Seroquel, with improved mentation.  - Continue to monitor.    DM2 (diabetes mellitus, type 2) ([facility])- (present on admission)  Assessment & Plan  Most recent A1c of 8%.  - Discontinued SSI and glargine to prioritize comfort and decrease invasive measures  - Discussed with patient, to decrease risk of DKA or hyperglycemic episodes initiating metformin 500mg BID      Sepsis (HCC)- (present on admission)  Assessment & Plan  Patient treated for undifferentiated shock.  Now resolved.  - Continue to monitor.    BPH (benign prostatic hyperplasia)- (present on admission)  Assessment & Plan  Patient with known history of BPH.  - Continue home tamsulosin  - Triplett catheter currently in place since ICU.  We will continue as patient had bleed on attempted removal.    Code Status: DNR/DNI.   DVT prophylaxis:Scds  Diet: Comfort care.  Liberalize diet to regular diet.  Patient and family aware of risk of aspiration and death.  GI: Bowel protocol in place.  Disposition: SNF

## 2023-11-02 NOTE — CARE PLAN
The patient is Watcher - Medium risk of patient condition declining or worsening    Shift Goals  Clinical Goals: Rest, Comfort Care  Patient Goals: Rest  Family Goals: layla    Progress made toward(s) clinical / shift goals:        Problem: Pain - Standard  Goal: Alleviation of pain or a reduction in pain to the patient’s comfort goal  Outcome: Progressing  Note: Pt sleeping peacefully, grimaces when repositioned, but falls back to sleep instantly when left alone.        Problem: Urinary - Renal Perfusion  Goal: Ability to achieve and maintain adequate renal perfusion and functioning will improve  Outcome: Progressing  Note: Triplett care provided. Pt voiding isidro colored urine, with sediment in line and bag. No blood in urine at this time.      Problem: Skin Integrity  Goal: Skin integrity is maintained or improved  Outcome: Progressing  Note: Pressure relieving devices in place. LE bilat elevated with boots. Coccyx/sacrum cleansed with warm soapy water, Mepilex over sacrum, other red, excoriated areas barrier cream applied.       Problem: Fall Risk  Goal: Patient will remain free from falls  Outcome: Progressing  Note: Pt remained safe with fall precautions in place, regular bed checks, call light within reach.      Pt fed himself dinner,  and consumed 50% of it, he does need help with set up.

## 2023-11-03 ENCOUNTER — APPOINTMENT (OUTPATIENT)
Dept: RADIOLOGY | Facility: MEDICAL CENTER | Age: 83
DRG: 871 | End: 2023-11-03
Payer: COMMERCIAL

## 2023-11-03 PROBLEM — R10.30 LOWER ABDOMINAL PAIN: Status: ACTIVE | Noted: 2023-11-03

## 2023-11-03 PROCEDURE — A9270 NON-COVERED ITEM OR SERVICE: HCPCS

## 2023-11-03 PROCEDURE — 74018 RADEX ABDOMEN 1 VIEW: CPT

## 2023-11-03 PROCEDURE — 770001 HCHG ROOM/CARE - MED/SURG/GYN PRIV*

## 2023-11-03 PROCEDURE — 99231 SBSQ HOSP IP/OBS SF/LOW 25: CPT | Mod: GC | Performed by: INTERNAL MEDICINE

## 2023-11-03 PROCEDURE — 700102 HCHG RX REV CODE 250 W/ 637 OVERRIDE(OP)

## 2023-11-03 PROCEDURE — 700111 HCHG RX REV CODE 636 W/ 250 OVERRIDE (IP)

## 2023-11-03 PROCEDURE — 700101 HCHG RX REV CODE 250: Performed by: INTERNAL MEDICINE

## 2023-11-03 PROCEDURE — 94640 AIRWAY INHALATION TREATMENT: CPT

## 2023-11-03 RX ADMIN — TRAZODONE HYDROCHLORIDE 25 MG: 50 TABLET ORAL at 21:50

## 2023-11-03 RX ADMIN — METFORMIN HYDROCHLORIDE 500 MG: 500 TABLET ORAL at 17:08

## 2023-11-03 RX ADMIN — TAMSULOSIN HYDROCHLORIDE 0.4 MG: 0.4 CAPSULE ORAL at 08:24

## 2023-11-03 RX ADMIN — AMIODARONE HYDROCHLORIDE 200 MG: 200 TABLET ORAL at 05:41

## 2023-11-03 RX ADMIN — ONDANSETRON 8 MG: 4 TABLET, ORALLY DISINTEGRATING ORAL at 20:39

## 2023-11-03 RX ADMIN — QUETIAPINE FUMARATE 25 MG: 25 TABLET ORAL at 21:50

## 2023-11-03 RX ADMIN — IPRATROPIUM BROMIDE AND ALBUTEROL SULFATE 3 ML: 2.5; .5 SOLUTION RESPIRATORY (INHALATION) at 21:14

## 2023-11-03 RX ADMIN — METFORMIN HYDROCHLORIDE 500 MG: 500 TABLET ORAL at 07:30

## 2023-11-03 ASSESSMENT — ENCOUNTER SYMPTOMS
CHILLS: 0
PALPITATIONS: 0
LOSS OF CONSCIOUSNESS: 0
HEMOPTYSIS: 0
SHORTNESS OF BREATH: 0
CONSTIPATION: 0
FEVER: 0
ORTHOPNEA: 0
NERVOUS/ANXIOUS: 1
PND: 0
EYES NEGATIVE: 1
MUSCULOSKELETAL NEGATIVE: 1
SORE THROAT: 0
HEADACHES: 0
ABDOMINAL PAIN: 1
DIARRHEA: 0
VOMITING: 0
NAUSEA: 0
DIZZINESS: 0
DIAPHORESIS: 0
SPUTUM PRODUCTION: 0
BLOOD IN STOOL: 0
COUGH: 1
WEAKNESS: 0
HEARTBURN: 0

## 2023-11-03 ASSESSMENT — PAIN DESCRIPTION - PAIN TYPE
TYPE: ACUTE PAIN
TYPE: ACUTE PAIN

## 2023-11-03 ASSESSMENT — PAIN SCALES - WONG BAKER: WONGBAKER_NUMERICALRESPONSE: DOESN'T HURT AT ALL

## 2023-11-03 ASSESSMENT — PATIENT HEALTH QUESTIONNAIRE - PHQ9
2. FEELING DOWN, DEPRESSED, IRRITABLE, OR HOPELESS: NOT AT ALL
1. LITTLE INTEREST OR PLEASURE IN DOING THINGS: NOT AT ALL
SUM OF ALL RESPONSES TO PHQ9 QUESTIONS 1 AND 2: 0

## 2023-11-03 NOTE — DISCHARGE PLANNING
Per LSW, TREASURE resent [facility] SNF referrals.    @3315  Agency/Facility Name: [facility]   Spoke To: Marleny   Outcome: TREASURE received call, per Marleny she wanted to know if patient's family is still pursuing comfort care. Marleny mentioned she will go over referral and update TREASURE.    @8129  TREASURE resent SNF referral to [facility], Fax: [phone].

## 2023-11-03 NOTE — CARE PLAN
The patient is Unstable - High likelihood or risk of patient condition declining or worsening    Shift Goals  Clinical Goals: rest/comfort  Patient Goals: rest  Family Goals: BA    Progress made toward(s) clinical / shift goals:      Patient is not progressing towards the following goals:

## 2023-11-03 NOTE — CARE PLAN
The patient is Stable - Low risk of patient condition declining or worsening    Shift Goals  Clinical Goals: rest/comfort  Patient Goals: rest  Family Goals: BA      Problem: Pain - Standard  Goal: Alleviation of pain or a reduction in pain to the patient’s comfort goal  Outcome: Progressing    Noted: Patient complained of 6/10 back pain today. Repositioned patient and provided pillows for support. Patient verbalized relief. Offered PRN pain medicine, patient refused.     Problem: Skin Integrity  Goal: Skin integrity is maintained or improved  Outcome: Progressing    Noted: Repositioned and turned patient q2h. Ensured pads and sheets were clean and dry. Assessed pressure sores in the sacrum and knee. Mepilex in place and intact.

## 2023-11-03 NOTE — DISCHARGE PLANNING
Case Management Discharge Planning    Admission Date: 10/9/2023  GMLOS: 5.1  ALOS: 25    6-Clicks ADL Score: 13  6-Clicks Mobility Score: 12  PT and/or OT Eval ordered: Yes  Post-acute Referrals Ordered: Yes  Post-acute Choice Obtained: NA  Has referral(s) been sent to post-acute provider:  Yes      Anticipated Discharge Dispo: Discharge Disposition: D/T to SNF with Medicare cert in anticipation of skilled care (03)    DME Needed: No    Action(s) Taken: Updated Provider/Nurse on Discharge Plan  Patient discussed during IDT rounds, patient is cleared for discharge to SNF. Physician called daughter for medical update informing patient does not meet hospice criteria. SW called daughter Connie after to discuss discharge plan. Connie report she needs to discuss plan with her family but in the meantime we can attempt local SNFs.     Referrals resent to local SNFs indicating plan is for SNF and no longer hospice.     SW attempted to reach [facility] to inform patient is no longer on hospice and need SNF. Admissions person Svitlana not available, SW left message for Svitlana to call back.     1604 - SW noticed [facility] have accepted patient. DPA attempted to call but no answer. Weekend CM/SW to inquire if [facility] can accept patient over the weekend. Please update jero Rosales as well    Escalations Completed: None    Medically Clear: Yes    Next Steps: SNF acceptance     Barriers to Discharge: Pending Placement

## 2023-11-03 NOTE — PROGRESS NOTES
Daily Progress Note:     Date of Service: 11/3/2023  Primary Team: [employer] IM Purple Team   Attending: Manjinder Torres M.D.   Senior Resident: Germán Pope D.O.  Contact:  [phone]    Patient Identifier:   Mr. Marrufo is an 83-year-old male with past medical history of NIDDM 2, BPH, and previous orthopedic surgeries who was found down in his home on 10/8, admitted to the hospital on 10/9.  Patient was found dehydrated and altered notably uremic with new onset A-fib with RVR which was treated with diltiazem in the ED.  Of note, patient's hospitalization was complicated by acute hypoxic respiratory failure requiring intubation as well as Triplett catheter insertion.  Patient was also found to have an ADELAIDE with rhabdomyolysis s/p fluid resuscitation.  Now doing significantly better with ongoing medical management.  Palliative following patient and with family discussion will proceed with comfort care on October 30, 2023.    Subjective:  Patient is overall doing well states he is doing the same as yesterday.    Interval Update:  - No acute events overnight  - He is having some lower abdominal pain physical exam.  Last bowel movement this morning.  Will obtain KUB.  -Discussed plan of care with daughter on phone, Connie.  In[facility] as he does not currently have an imminent life-threatening condition.  States that potentially family in [place] could take care of patient.  Discussed with Jana with case management and she will follow-up with Connie.  Current options include SNF or home health.  Currently pending disposition.      Consultants/Specialty:  Critical care  Cardiology  Nephrology  Palliative medicine  Pulmonology    Review of Systems:  Review of Systems   Constitutional:  Negative for chills, diaphoresis, fever and malaise/fatigue.   HENT:  Negative for hearing loss and sore throat.    Eyes: Negative.    Respiratory:  Positive for cough. Negative for hemoptysis, sputum  production and shortness of breath.    Cardiovascular:  Negative for chest pain, palpitations, orthopnea, leg swelling and PND.   Gastrointestinal:  Positive for abdominal pain. Negative for blood in stool, constipation, diarrhea, heartburn, melena, nausea and vomiting.   Genitourinary: Negative.  Negative for dysuria and frequency.   Musculoskeletal: Negative.    Skin: Negative.    Neurological:  Negative for dizziness, loss of consciousness, weakness and headaches.   Endo/Heme/Allergies: Negative.    Psychiatric/Behavioral:  Negative for suicidal ideas. The patient is nervous/anxious.      Objective:   Vitals:   Temp:  [36.2 °C (97.2 °F)-36.5 °C (97.7 °F)] 36.2 °C (97.2 °F)  Pulse:  [69-72] 72  Resp:  [18] 18  BP: (119-140)/(56-67) 119/56  SpO2:  [95 %-98 %] 97 %    Physical Exam  Vitals and nursing note reviewed.   Constitutional:       General: He is not in acute distress.     Appearance: He is obese. He is ill-appearing. He is not diaphoretic.   HENT:      Head: Normocephalic and atraumatic.      Right Ear: External ear normal.      Left Ear: External ear normal.      Nose:      Comments: NG tube in place     Mouth/Throat:      Mouth: Mucous membranes are dry.      Pharynx: Oropharynx is clear.   Eyes:      General: No scleral icterus.     Extraocular Movements: Extraocular movements intact.      Pupils: Pupils are equal, round, and reactive to light.   Cardiovascular:      Rate and Rhythm: Normal rate. Rhythm irregular.      Pulses: Normal pulses.      Heart sounds: Normal heart sounds. No murmur heard.  Pulmonary:      Effort: Pulmonary effort is normal. No respiratory distress.   Chest:      Chest wall: No tenderness.   Abdominal:      General: Abdomen is flat. Bowel sounds are normal. There is no distension.      Palpations: Abdomen is soft.      Tenderness: There is abdominal tenderness (mild tenderness LLQ and RLQ). There is no right CVA tenderness or left CVA tenderness.   Genitourinary:     Comments:  Triplett catheter placed  Musculoskeletal:         General: No swelling. Normal range of motion.      Cervical back: Normal range of motion. No rigidity.      Right lower leg: No edema.      Left lower leg: No edema.   Skin:     General: Skin is warm and dry.      Capillary Refill: Capillary refill takes less than 2 seconds.      Coloration: Skin is not jaundiced.      Findings: No rash.   Neurological:      General: No focal deficit present.      Mental Status: He is alert and oriented to person, place, and time.      Cranial Nerves: No cranial nerve deficit.      Deep Tendon Reflexes: Reflexes normal.     Labs:   Lab Results   Component Value Date/Time    WBC 8.0 10/30/2023 02:03 AM    RBC 3.10 (L) 10/30/2023 02:03 AM    HEMOGLOBIN 9.4 (L) 10/30/2023 02:03 AM    HEMATOCRIT 30.7 (L) 10/30/2023 02:03 AM    MCV 99.0 (H) 10/30/2023 02:03 AM    MCH 30.3 10/30/2023 02:03 AM    MCHC 30.6 (L) 10/30/2023 02:03 AM    MPV 10.2 10/30/2023 02:03 AM    NEUTSPOLYS 84.20 (H) 10/30/2023 02:03 AM    LYMPHOCYTES 11.40 (L) 10/30/2023 02:03 AM    MONOCYTES 2.60 10/30/2023 02:03 AM    EOSINOPHILS 0.00 10/30/2023 02:03 AM    BASOPHILS 0.00 10/30/2023 02:03 AM    ANISOCYTOSIS 1+ 10/30/2023 02:03 AM        Lab Results   Component Value Date/Time    SODIUM 146 (H) 10/30/2023 02:03 AM    POTASSIUM 3.9 10/30/2023 02:03 AM    CHLORIDE 112 10/30/2023 02:03 AM    CO2 30 10/30/2023 02:03 AM    GLUCOSE 173 (H) 10/30/2023 02:03 AM    BUN 37 (H) 10/30/2023 02:03 AM    CREATININE 0.72 10/30/2023 02:03 AM        Lab Results   Component Value Date/Time    LZCBX88E 7.47 10/27/2023 01:13 PM    EHGLPT793I 41.9 (H) 10/27/2023 01:13 PM    KASWD281N 77.2 10/27/2023 01:13 PM    ARTHCO3 30 (H) 10/27/2023 01:13 PM    ARTBE 5 (H) 10/27/2023 01:13 PM       Lab Results   Component Value Date/Time    PROTHROMBTM 21.0 (H) 10/12/2023 12:00 AM    INR 1.79 (H) 10/12/2023 12:00 AM        Imaging:   10/29, chest x-ray, stable left basilar opacification.    Assessment and  Plan:    * Acute respiratory failure with hypoxia (HCC)- (present on admission)  Assessment & Plan  Intubated 10/11 for mental status and hypoxia on max HFNC, eventually extubated on 10/17.  - RT protocol in place.  - Currently on 2L with nasal cannula.  - Titrate oxygen to greater than 90%.    Lower abdominal pain  Assessment & Plan  Patient with lower abdominal pain beginning 11/3.  Having normal bowel movements.  - Pending KUB    Advance care planning- (present on admission)  Assessment & Plan  DNR/DNI. Patient does not want heroic or invasive measures and is focused on quality of life vs. Quantity.  Oldest daughter is primary contact, does not want permanent feeding tube  Palliative care following  - On 10/30 following discussions with palliative care and family plan is for his to transition to comfort care.  - Hospice referral sent  - NG tube removal and allow for regular, liberalized diet.  Discussed severe risk of aspiration resulting in death to patient and family is aware per palliative conversations.  -Discussion with Connie on 11/3.  Potentially patient to continue care at home with family or SNF.    Anemia  Assessment & Plan  No signs of overt bleeding.  Likely secondary to anemia of chronic disease, anemia trending towards macrocytic.  No iron panel and ferritin on record.  Will not draw iron panel as patient would not like invasive measures.  - No daily labs as patient would not like invasive measures.    Rhabdomyolysis- (present on admission)  Assessment & Plan  Patient now s/p fluid resuscitation.  - Continue to monitor.      Atrial fibrillation with RVR (HCC)- (present on admission)  Assessment & Plan  No known history of Afib, RVR with hypotension attempted electrical cardioversion 10/11.  - Continue PO amiodarone  - Discontinued apixaban with hematuria.  Discussed with patient and he would not like to continue anticoagulation given bleed.  Patient understands increased risk of DVT and stroke and  would not like to continue.  - No longer monitoring electrolytes    Hypernatremia- (present on admission)  Assessment & Plan  Ongoing issues with sodium control.  - Most recent sodium of 146.  - The patient has transition to comfort care.  No further free water flushes and daily labs.    ADELAIDE (acute kidney injury) (HCC)- (present on admission)  Assessment & Plan  Secondary to ATN, pre-renal and rhabdo. Now resolved.  - Avoid nephrotoxins      Altered mental status- (present on admission)  Assessment & Plan  Patient alert and oriented x4 mental status has been improving since ICU admission.  - Delirium protocol in place.  - Patient now on Seroquel, with improved mentation.  - Continue to monitor.    DM2 (diabetes mellitus, type 2) ([facility])- (present on admission)  Assessment & Plan  Most recent A1c of 8%.  - Discontinued SSI and glargine to prioritize comfort and decrease invasive measures  - Discussed with patient, to decrease risk of DKA or hyperglycemic episodes initiating metformin 500mg BID      Sepsis (HCC)- (present on admission)  Assessment & Plan  Patient treated for undifferentiated shock.  Now resolved.  - Continue to monitor.    BPH (benign prostatic hyperplasia)- (present on admission)  Assessment & Plan  Patient with known history of BPH.  - Continue home tamsulosin  - Triplett catheter currently in place since ICU.  We will continue as patient had bleed on attempted removal.    Code Status: DNR/DNI.   DVT prophylaxis:Scds  Diet: Regular diet.  Patient and family aware of risk of aspiration and death.  GI: Bowel protocol in place.  Disposition: SNF

## 2023-11-04 PROCEDURE — A9270 NON-COVERED ITEM OR SERVICE: HCPCS

## 2023-11-04 PROCEDURE — 770001 HCHG ROOM/CARE - MED/SURG/GYN PRIV*

## 2023-11-04 PROCEDURE — 700102 HCHG RX REV CODE 250 W/ 637 OVERRIDE(OP)

## 2023-11-04 PROCEDURE — 94640 AIRWAY INHALATION TREATMENT: CPT

## 2023-11-04 PROCEDURE — 700101 HCHG RX REV CODE 250: Performed by: INTERNAL MEDICINE

## 2023-11-04 PROCEDURE — 99232 SBSQ HOSP IP/OBS MODERATE 35: CPT | Mod: GC | Performed by: INTERNAL MEDICINE

## 2023-11-04 RX ADMIN — METFORMIN HYDROCHLORIDE 500 MG: 500 TABLET ORAL at 17:38

## 2023-11-04 RX ADMIN — IPRATROPIUM BROMIDE AND ALBUTEROL SULFATE 3 ML: 2.5; .5 SOLUTION RESPIRATORY (INHALATION) at 10:43

## 2023-11-04 RX ADMIN — METFORMIN HYDROCHLORIDE 500 MG: 500 TABLET ORAL at 08:02

## 2023-11-04 RX ADMIN — TAMSULOSIN HYDROCHLORIDE 0.4 MG: 0.4 CAPSULE ORAL at 08:02

## 2023-11-04 RX ADMIN — TRAZODONE HYDROCHLORIDE 25 MG: 50 TABLET ORAL at 20:44

## 2023-11-04 RX ADMIN — AMIODARONE HYDROCHLORIDE 200 MG: 200 TABLET ORAL at 05:52

## 2023-11-04 ASSESSMENT — ENCOUNTER SYMPTOMS
SPUTUM PRODUCTION: 0
HEARTBURN: 0
DIZZINESS: 0
PALPITATIONS: 0
FEVER: 0
CONSTIPATION: 0
HEADACHES: 0
NAUSEA: 0
DIARRHEA: 0
CHILLS: 0
DIAPHORESIS: 0
ABDOMINAL PAIN: 1
MUSCULOSKELETAL NEGATIVE: 1
COUGH: 1
ORTHOPNEA: 0
VOMITING: 0
LOSS OF CONSCIOUSNESS: 0
PND: 0
NERVOUS/ANXIOUS: 1
EYES NEGATIVE: 1
SORE THROAT: 0
SHORTNESS OF BREATH: 0
BLOOD IN STOOL: 0
WEAKNESS: 0
HEMOPTYSIS: 0

## 2023-11-04 ASSESSMENT — PATIENT HEALTH QUESTIONNAIRE - PHQ9
SUM OF ALL RESPONSES TO PHQ9 QUESTIONS 1 AND 2: 0
2. FEELING DOWN, DEPRESSED, IRRITABLE, OR HOPELESS: NOT AT ALL
1. LITTLE INTEREST OR PLEASURE IN DOING THINGS: NOT AT ALL

## 2023-11-04 ASSESSMENT — PAIN SCALES - WONG BAKER
WONGBAKER_NUMERICALRESPONSE: DOESN'T HURT AT ALL
WONGBAKER_NUMERICALRESPONSE: DOESN'T HURT AT ALL

## 2023-11-04 ASSESSMENT — PAIN DESCRIPTION - PAIN TYPE
TYPE: ACUTE PAIN
TYPE: ACUTE PAIN

## 2023-11-04 NOTE — PROGRESS NOTES
Daily Progress Note:     Date of Service: 11/4/2023  Primary Team: [employer] IM Purple Team   Attending: Brian Gomes M.D.   Senior Resident: Germán Pope D.O.  Contact:  [phone]    Patient Identifier:   Mr. Marrufo is an 83-year-old male with past medical history of NIDDM 2, BPH, and previous orthopedic surgeries who was found down in his home on 10/8, admitted to the hospital on 10/9.  Patient was found dehydrated and altered notably uremic with new onset A-fib with RVR which was treated with diltiazem in the ED.  Of note, patient's hospitalization was complicated by acute hypoxic respiratory failure requiring intubation as well as Triplett catheter insertion.  Patient was also found to have an ADELAIDE with rhabdomyolysis s/p fluid resuscitation.  Patient was evaluated by palliative care on 10/30 with the patient opting for less invasive measures.  Now pending PT and OT reevaluation for possible SNF placement.    Subjective:  Patient notes he is doing fine.  Has no major complaints or concerns.  Has been able to urinate and stool without issues.  No major nursing concerns at this time.    Interval Update:  - No acute events overnight.  - Somnolent on exam, will discontinue Seroquel.    Consultants/Specialty:  Critical care  Cardiology  Nephrology  Palliative medicine  Pulmonology    Review of Systems:  Review of Systems   Constitutional:  Negative for chills, diaphoresis, fever and malaise/fatigue.   HENT:  Negative for hearing loss and sore throat.    Eyes: Negative.    Respiratory:  Positive for cough. Negative for hemoptysis, sputum production and shortness of breath.    Cardiovascular:  Negative for chest pain, palpitations, orthopnea, leg swelling and PND.   Gastrointestinal:  Positive for abdominal pain. Negative for blood in stool, constipation, diarrhea, heartburn, melena, nausea and vomiting.   Genitourinary: Negative.  Negative for dysuria and frequency.   Musculoskeletal: Negative.    Skin: Negative.     Neurological:  Negative for dizziness, loss of consciousness, weakness and headaches.   Endo/Heme/Allergies: Negative.    Psychiatric/Behavioral:  Negative for suicidal ideas. The patient is nervous/anxious.      Objective:   Vitals:   Temp:  [36.7 °C (98.1 °F)-37 °C (98.6 °F)] 36.8 °C (98.2 °F)  Pulse:  [] 74  Resp:  [16-18] 16  BP: ()/(48-59) 102/49  SpO2:  [90 %-98 %] 98 %    Physical Exam  Vitals and nursing note reviewed.   Constitutional:       General: He is not in acute distress.     Appearance: He is obese. He is ill-appearing. He is not diaphoretic.   HENT:      Head: Normocephalic and atraumatic.      Right Ear: External ear normal.      Left Ear: External ear normal.      Nose:      Comments: NG tube in place     Mouth/Throat:      Mouth: Mucous membranes are dry.      Pharynx: Oropharynx is clear.   Eyes:      General: No scleral icterus.     Extraocular Movements: Extraocular movements intact.      Pupils: Pupils are equal, round, and reactive to light.   Cardiovascular:      Rate and Rhythm: Normal rate. Rhythm irregular.      Pulses: Normal pulses.      Heart sounds: Normal heart sounds. No murmur heard.  Pulmonary:      Effort: Pulmonary effort is normal. No respiratory distress.   Chest:      Chest wall: No tenderness.   Abdominal:      General: Abdomen is flat. Bowel sounds are normal. There is no distension.      Palpations: Abdomen is soft.      Tenderness: There is no right CVA tenderness or left CVA tenderness.   Genitourinary:     Comments: Triplett catheter placed  Musculoskeletal:         General: No swelling. Normal range of motion.      Cervical back: Normal range of motion. No rigidity.      Right lower leg: No edema.      Left lower leg: No edema.   Skin:     General: Skin is warm and dry.      Capillary Refill: Capillary refill takes less than 2 seconds.      Coloration: Skin is not jaundiced.      Findings: No rash.   Neurological:      General: No focal deficit present.       Mental Status: He is alert and oriented to person, place, and time.      Deep Tendon Reflexes: Reflexes normal.     Labs:   Lab Results   Component Value Date/Time    WBC 8.0 10/30/2023 02:03 AM    RBC 3.10 (L) 10/30/2023 02:03 AM    HEMOGLOBIN 9.4 (L) 10/30/2023 02:03 AM    HEMATOCRIT 30.7 (L) 10/30/2023 02:03 AM    MCV 99.0 (H) 10/30/2023 02:03 AM    MCH 30.3 10/30/2023 02:03 AM    MCHC 30.6 (L) 10/30/2023 02:03 AM    MPV 10.2 10/30/2023 02:03 AM    NEUTSPOLYS 84.20 (H) 10/30/2023 02:03 AM    LYMPHOCYTES 11.40 (L) 10/30/2023 02:03 AM    MONOCYTES 2.60 10/30/2023 02:03 AM    EOSINOPHILS 0.00 10/30/2023 02:03 AM    BASOPHILS 0.00 10/30/2023 02:03 AM    ANISOCYTOSIS 1+ 10/30/2023 02:03 AM        Lab Results   Component Value Date/Time    SODIUM 146 (H) 10/30/2023 02:03 AM    POTASSIUM 3.9 10/30/2023 02:03 AM    CHLORIDE 112 10/30/2023 02:03 AM    CO2 30 10/30/2023 02:03 AM    GLUCOSE 173 (H) 10/30/2023 02:03 AM    BUN 37 (H) 10/30/2023 02:03 AM    CREATININE 0.72 10/30/2023 02:03 AM        Lab Results   Component Value Date/Time    YYZGW61J 7.47 10/27/2023 01:13 PM    XIESWE715G 41.9 (H) 10/27/2023 01:13 PM    FVRRR326G 77.2 10/27/2023 01:13 PM    ARTHCO3 30 (H) 10/27/2023 01:13 PM    ARTBE 5 (H) 10/27/2023 01:13 PM       Lab Results   Component Value Date/Time    PROTHROMBTM 21.0 (H) 10/12/2023 12:00 AM    INR 1.79 (H) 10/12/2023 12:00 AM        Imaging:   No new imaging the past 24 hours    Assessment and Plan:    * Acute respiratory failure with hypoxia (HCC)- (present on admission)  Assessment & Plan  Intubated 10/11 for mental status and hypoxia on max HFNC, eventually extubated on 10/17.  - RT protocol in place.  - Currently on 2L with nasal cannula.  - Titrate oxygen to greater than 90%.    Lower abdominal pain  Assessment & Plan  Patient with lower abdominal pain beginning 11/3.  Having normal bowel movements.  - Pending KUB    Advance care planning- (present on admission)  Assessment & Plan  DNR/DNI.  Patient does not want heroic or invasive measures and is focused on quality of life vs. Quantity.  Oldest daughter is primary contact, does not want permanent feeding tube  Palliative care following  - On 10/30 following discussions with palliative care and family plan is for his to transition to comfort care.  - Hospice referral sent  - NG tube removal and allow for regular, liberalized diet.  Discussed severe risk of aspiration resulting in death to patient and family is aware per palliative conversations.  -Discussion with Connie on 11/3.  Potentially patient to continue care at home with family or SNF.    Anemia  Assessment & Plan  No signs of overt bleeding.  Likely secondary to anemia of chronic disease, anemia trending towards macrocytic.  No iron panel and ferritin on record.  Will not draw iron panel as patient would not like invasive measures.  - No daily labs as patient would not like invasive measures.    Rhabdomyolysis- (present on admission)  Assessment & Plan  Patient now s/p fluid resuscitation.  - Continue to monitor.      Atrial fibrillation with RVR ([facility])- (present on admission)  Assessment & Plan  No known history of Afib, RVR with hypotension attempted electrical cardioversion 10/11.  - Continue PO amiodarone  - Discontinued apixaban with hematuria.  Discussed with patient and he would not like to continue anticoagulation given bleed.  Patient understands increased risk of DVT and stroke and would not like to continue.  - No longer monitoring electrolytes    Hypernatremia- (present on admission)  Assessment & Plan  Ongoing issues with sodium control.  - Most recent sodium of 146.  - The patient has transition to comfort care.  No further free water flushes and daily labs.    ADELAIDE (acute kidney injury) ([facility])- (present on admission)  Assessment & Plan  Secondary to ATN, pre-renal and rhabdo. Now resolved.  - Avoid nephrotoxins      Altered mental status- (present on admission)  Assessment &  Plan  Patient alert and oriented x4 mental status has been improving since ICU admission.  - Delirium protocol in place.  - Patient now on Seroquel, with improved mentation.  - Continue to monitor.    DM2 (diabetes mellitus, type 2) (HCC)- (present on admission)  Assessment & Plan  Most recent A1c of 8%.  - Discontinued SSI and glargine to prioritize comfort and decrease invasive measures  - Discussed with patient, to decrease risk of DKA or hyperglycemic episodes initiating metformin 500mg BID      Sepsis (HCC)- (present on admission)  Assessment & Plan  Patient treated for undifferentiated shock.  Now resolved.  - Continue to monitor.    BPH (benign prostatic hyperplasia)- (present on admission)  Assessment & Plan  Patient with known history of BPH.  - Continue home tamsulosin  - Triplett catheter currently in place since ICU.  We will continue as patient had bleed on attempted removal.    Code Status: DNR/DNI  DVT prophylaxis: Anticoagulation with apixaban.  Diet: Receiving more than 50% of nutrition via NGT, uptitrate p.o. intake as tolerated.  GI: Bowel protocol in place.  Disposition: Pending SNF placement and further PT and OT evaluation.    Germán Pope DO, MPH  PGY-3 Internal Medicine

## 2023-11-04 NOTE — CARE PLAN
The patient is Stable - Low risk of patient condition declining or worsening    Shift Goals  Clinical Goals: decrease chest congestion SOB, rest comfort  Patient Goals: rest  Family Goals: BA    Problem: Respiratory  Goal: Patient will achieve/maintain optimum respiratory ventilation and gas exchange  Outcome: Progressing    Notes: Patient has some crackles and wheezing auscultated in bilateral upper lungs. Patient is on 2L oxygen with 98% oxygen saturation. Requested albuterol nebulization from RT but patient was combative and refused.  Elevated the head of the bed and monitored oxygen saturation.      Problem: Skin Integrity  Goal: Skin integrity is maintained or improved  Outcome: Progressing    Notes: Patient has a stage II pressure ulcer in the sacrum and bilateral lower buttocks. Mepilex changed and applied barrier cream. Ensured patients pads and linens are clean and dry. Repositioned and turned patient q2h.

## 2023-11-04 NOTE — FLOWSHEET NOTE
Patient ripped the mask off after a minute and refused to give the neb back - patient was placed back onto the NC but patient appeared as if he was going to hit me when I attempted to grab the neb. Left the room at this time with patient on 2L NC and updated nursing

## 2023-11-04 NOTE — CARE PLAN
The patient is Watcher - Medium risk of patient condition declining or worsening    Shift Goals  Clinical Goals: rest/comfort  Patient Goals: rest  Family Goals: BA    Progress made toward(s) clinical / shift goals:    Problem: Pain - Standard  Goal: Alleviation of pain or a reduction in pain to the patient’s comfort goal  Outcome: Progressing  Flowsheets (Taken 11/4/2023 0313)  Pain Rating Scale (NPRS): 0  Note: Perry was free of pain this shift.      Problem: Respiratory  Goal: Patient will achieve/maintain optimum respiratory ventilation and gas exchange  Outcome: Progressing  Flowsheets  Taken 11/4/2023 0313 by Mica Fenton R.N.  O2 Delivery Device: Nasal Cannula  Suction Frequency: Suctioned Once or Twice Per Encounter  Taken 10/28/2023 0800 by rTi Ramos RROBBY  Deep Breathe and Cough: Needs Coaching  Note: Perry was given a nebulizer and suction to help with his shortness of breath. Perry was put on 3L NC to help with work of breathing this shift.     Problem: Fall Risk  Goal: Patient will remain free from falls  Outcome: Progressing  Note: Perry was free from falls and injuries this shift.

## 2023-11-05 PROCEDURE — 700102 HCHG RX REV CODE 250 W/ 637 OVERRIDE(OP)

## 2023-11-05 PROCEDURE — 97168 OT RE-EVAL EST PLAN CARE: CPT

## 2023-11-05 PROCEDURE — A9270 NON-COVERED ITEM OR SERVICE: HCPCS

## 2023-11-05 PROCEDURE — 97164 PT RE-EVAL EST PLAN CARE: CPT

## 2023-11-05 PROCEDURE — 51798 US URINE CAPACITY MEASURE: CPT

## 2023-11-05 PROCEDURE — 770001 HCHG ROOM/CARE - MED/SURG/GYN PRIV*

## 2023-11-05 PROCEDURE — 99231 SBSQ HOSP IP/OBS SF/LOW 25: CPT | Mod: GC | Performed by: INTERNAL MEDICINE

## 2023-11-05 RX ADMIN — AMIODARONE HYDROCHLORIDE 200 MG: 200 TABLET ORAL at 05:28

## 2023-11-05 RX ADMIN — METFORMIN HYDROCHLORIDE 500 MG: 500 TABLET ORAL at 17:47

## 2023-11-05 RX ADMIN — TAMSULOSIN HYDROCHLORIDE 0.4 MG: 0.4 CAPSULE ORAL at 07:39

## 2023-11-05 RX ADMIN — METFORMIN HYDROCHLORIDE 500 MG: 500 TABLET ORAL at 07:39

## 2023-11-05 ASSESSMENT — ENCOUNTER SYMPTOMS
CHILLS: 0
DIARRHEA: 0
VOMITING: 0
NERVOUS/ANXIOUS: 1
MUSCULOSKELETAL NEGATIVE: 1
HEADACHES: 0
ORTHOPNEA: 0
ABDOMINAL PAIN: 1
HEMOPTYSIS: 0
WEAKNESS: 0
SPUTUM PRODUCTION: 0
FEVER: 0
PND: 0
HEARTBURN: 0
DIAPHORESIS: 0
BLOOD IN STOOL: 0
SHORTNESS OF BREATH: 0
CONSTIPATION: 0
NAUSEA: 0
LOSS OF CONSCIOUSNESS: 0
DIZZINESS: 0
SORE THROAT: 0
PALPITATIONS: 0
EYES NEGATIVE: 1
COUGH: 1

## 2023-11-05 ASSESSMENT — PAIN DESCRIPTION - PAIN TYPE
TYPE: ACUTE PAIN

## 2023-11-05 ASSESSMENT — COGNITIVE AND FUNCTIONAL STATUS - GENERAL
TURNING FROM BACK TO SIDE WHILE IN FLAT BAD: UNABLE
STANDING UP FROM CHAIR USING ARMS: A LOT
SUGGESTED CMS G CODE MODIFIER MOBILITY: CM
HELP NEEDED FOR BATHING: A LOT
EATING MEALS: A LITTLE
SUGGESTED CMS G CODE MODIFIER DAILY ACTIVITY: CL
CLIMB 3 TO 5 STEPS WITH RAILING: TOTAL
DAILY ACTIVITIY SCORE: 13
TOILETING: A LOT
MOBILITY SCORE: 7
MOVING TO AND FROM BED TO CHAIR: UNABLE
PERSONAL GROOMING: A LITTLE
WALKING IN HOSPITAL ROOM: TOTAL
DRESSING REGULAR UPPER BODY CLOTHING: A LOT
DRESSING REGULAR LOWER BODY CLOTHING: TOTAL
MOVING FROM LYING ON BACK TO SITTING ON SIDE OF FLAT BED: UNABLE

## 2023-11-05 ASSESSMENT — GAIT ASSESSMENTS: GAIT LEVEL OF ASSIST: UNABLE TO PARTICIPATE

## 2023-11-05 NOTE — CARE PLAN
The patient is Stable - Low risk of patient condition declining or worsening    Shift Goals  Clinical Goals: decrease chest congestion, SOB, rest  Patient Goals: rest  Family Goals: BA    Progress made toward(s) clinical / shift goals:    Problem: Skin Integrity  Goal: Skin integrity is maintained or improved  Outcome: Progressing  Note: Perry was turned every 2 hours this shift.      Problem: Fall Risk  Goal: Patient will remain free from falls  Outcome: Progressing  Note: Perry was free from falls this shift.

## 2023-11-05 NOTE — THERAPY
Physical Therapy   Daily Treatment     Patient Name: Perry Marrufo  Age:  83 y.o., Sex:  male  Medical Record #: [id number]  Today's Date: 11/5/2023     Precautions  Precautions: Fall Risk  Comments: Seizure precautions    Assessment    Pt not going on comfort care. Pt more alert and following commands, participatory. Required assistance with all mobility. Able to sit at EOB, attempted STS x2 with partial stands, pt fatigued and min SOB after 2nd stand. Anticipate pt will benefit from post acute placement upon DC from hospital. Patient with decreased activity tolerance, high fall risk, decreased standing balance, and decreased sitting balance and will continue to benefit from Acute Care Physical Therapy to assist towards established goals.         Plan    Treatment Plan Status: Continue Current Treatment Plan  Type of Treatment: Bed Mobility, Gait Training, Neuro Re-Education / Balance, Therapeutic Activities, Therapeutic Exercise  Treatment Frequency: 4 Times per Week  Treatment Duration: Until Therapy Goals Met    DC Equipment Recommendations: Unable to determine at this time  Discharge Recommendations: Recommend post-acute placement for additional physical therapy services prior to discharge home      Subjective    Pt resting in bed. Pt agreed to PT/OT.     Objective       11/05/23 0932   Precautions   Precautions Fall Risk   Comments SZ precautions   Cognition    Cognition / Consciousness WDL   Level of Consciousness Alert   Comments pt able to follow commands today, participatory   Balance   Sitting Balance (Static) Fair   Sitting Balance (Dynamic) Fair -   Standing Balance (Static) Poor +   Weight Shift Sitting Fair   Weight Shift Standing Poor   Skilled Intervention Verbal Cuing;Tactile Cuing;Facilitation   Comments stnading assessed with FWW   Bed Mobility    Supine to Sit Moderate Assist   Sit to Supine Moderate Assist   Scooting Moderate Assist   Rolling Moderate Assist to Rt.   Skilled Intervention Verbal  Cuing;Facilitation   Comments HOB elevated, use of bed rails   Gait Analysis   Gait Level Of Assist Unable to Participate   Functional Mobility   Sit to Stand Total Assist   Mobility with FWW, STS times 2   Skilled Intervention Verbal Cuing;Facilitation   How much difficulty does the patient currently have...   Turning over in bed (including adjusting bedclothes, sheets and blankets)? 1   Sitting down on and standing up from a chair with arms (e.g., wheelchair, bedside commode, etc.) 1   Moving from lying on back to sitting on the side of the bed? 1   How much help from another person does the patient currently need...   Moving to and from a bed to a chair (including a wheelchair)? 2   Need to walk in a hospital room? 1   Climbing 3-5 steps with a railing? 1   6 clicks Mobility Score 7   Activity Tolerance   Standing a few seconds with FWW   Comments limited mobility due to decreased activity tolerance, SOB   Short Term Goals    Short Term Goal # 1 Patient will perform supine-sit with supervision in 6 visits to progress bed mobility   Goal Outcome # 1 Progressing slower than expected   Short Term Goal # 2 Patient will perform sit-stand & chair transfers with FWW with supervision in 6 visits to progress transfers   Goal Outcome # 2 Progressing slower than expected   Short Term Goal # 3 Patient will ambulate > 25 feet with FWW with supervision in 6 visits for moblity to bathroom   Goal Outcome # 3 Goal not met   Education Group   Education Provided Role of Physical Therapist;Use of Assistive Device   Role of Physical Therapist Patient Response Patient;Acceptance;Explanation;Verbal Demonstration   Use of Assistive Device Patient Response Patient;Acceptance;Explanation;Action Demonstration   Physical Therapy Treatment Plan   Physical Therapy Treatment Plan Continue Current Treatment Plan   Treatment Plan  Bed Mobility;Gait Training;Neuro Re-Education / Balance;Therapeutic Activities;Therapeutic Exercise   Treatment  Frequency 4 Times per Week   Duration Until Therapy Goals Met   Anticipated Discharge Equipment and Recommendations   DC Equipment Recommendations Unable to determine at this time   Discharge Recommendations Recommend post-acute placement for additional physical therapy services prior to discharge home   Interdisciplinary Plan of Care Collaboration   IDT Collaboration with  Nursing;Occupational Therapist  Patient benefited from co-treatment with Occupational Therapist for the following reason(s):  Patient required 2 person assistance for safety and to provide effective interventions. Each discipline assisted patient with appropriate and separate goals.     Patient Position at End of Therapy In Bed;Bed Alarm On;Call Light within Reach;Tray Table within Reach;Phone within Reach

## 2023-11-05 NOTE — PROGRESS NOTES
Daily Progress Note:     Date of Service: 11/5/2023  Primary Team: [facility] Purple Team   Attending: Dr. Manjinder Torres M.D.   Senior Resident: Germán Pope D.O.  Contact:  [phone]    Patient Identifier:   Mr. Marrufo is an 83-year-old male with past medical history of NIDDM 2, BPH, and previous orthopedic surgeries who was found down in his home on 10/8, admitted to the hospital on 10/9.  Patient was found dehydrated and altered notably uremic with new onset A-fib with RVR which was treated with diltiazem in the ED.  Of note, patient's hospitalization was complicated by acute hypoxic respiratory failure requiring intubation as well as Triplett catheter insertion.  Patient was also found to have an ADELAIDE with rhabdomyolysis s/p fluid resuscitation.  Patient was evaluated by palliative care on 10/30 with the patient opting for less invasive measures.  Now pending PT and OT reevaluation for possible SNF placement.    Subjective:  Patient comfortably sleeping this morning.  Does not have any acute concerns or complaints.  Will trial removal of Triplett today.  Seroquel discontinued yesterday patient not somnolent today.    Interval Update:  - No acute events overnight.  - We will trial Triplett removal today with voiding trial.  Patient can utilize condom catheter if required.    Consultants/Specialty:  Critical care  Cardiology  Nephrology  Palliative medicine  Pulmonology    Review of Systems:  Review of Systems   Constitutional:  Negative for chills, diaphoresis, fever and malaise/fatigue.   HENT:  Negative for hearing loss and sore throat.    Eyes: Negative.    Respiratory:  Positive for cough. Negative for hemoptysis, sputum production and shortness of breath.    Cardiovascular:  Negative for chest pain, palpitations, orthopnea, leg swelling and PND.   Gastrointestinal:  Positive for abdominal pain. Negative for blood in stool, constipation, diarrhea, heartburn, melena, nausea and vomiting.   Genitourinary: Negative.   Negative for dysuria and frequency.   Musculoskeletal: Negative.    Skin: Negative.    Neurological:  Negative for dizziness, loss of consciousness, weakness and headaches.   Endo/Heme/Allergies: Negative.    Psychiatric/Behavioral:  Negative for suicidal ideas. The patient is nervous/anxious.      Objective:   Vitals:   Temp:  [36.1 °C (97 °F)-36.8 °C (98.3 °F)] 36.1 °C (97 °F)  Pulse:  [76-79] 77  Resp:  [18] 18  BP: ()/(41-65) 122/65  SpO2:  [91 %-98 %] 93 %    Physical Exam  Vitals and nursing note reviewed.   Constitutional:       General: He is not in acute distress.     Appearance: He is obese. He is ill-appearing. He is not diaphoretic.   HENT:      Head: Normocephalic and atraumatic.      Right Ear: External ear normal.      Left Ear: External ear normal.      Nose:      Comments: NG tube in place     Mouth/Throat:      Mouth: Mucous membranes are dry.      Pharynx: Oropharynx is clear.   Eyes:      General: No scleral icterus.     Extraocular Movements: Extraocular movements intact.      Pupils: Pupils are equal, round, and reactive to light.   Cardiovascular:      Rate and Rhythm: Normal rate. Rhythm irregular.      Pulses: Normal pulses.      Heart sounds: Normal heart sounds. No murmur heard.  Pulmonary:      Effort: Pulmonary effort is normal. No respiratory distress.   Chest:      Chest wall: No tenderness.   Abdominal:      General: Abdomen is flat. Bowel sounds are normal. There is no distension.      Palpations: Abdomen is soft.      Tenderness: There is no right CVA tenderness or left CVA tenderness.   Genitourinary:     Comments: Triplett catheter placed  Musculoskeletal:         General: No swelling. Normal range of motion.      Cervical back: Normal range of motion. No rigidity.      Right lower leg: No edema.      Left lower leg: No edema.   Skin:     General: Skin is warm and dry.      Capillary Refill: Capillary refill takes less than 2 seconds.      Coloration: Skin is not jaundiced.       Findings: No rash.   Neurological:      General: No focal deficit present.      Mental Status: He is alert and oriented to person, place, and time.      Deep Tendon Reflexes: Reflexes normal.     Labs:   Lab Results   Component Value Date/Time    WBC 8.0 10/30/2023 02:03 AM    RBC 3.10 (L) 10/30/2023 02:03 AM    HEMOGLOBIN 9.4 (L) 10/30/2023 02:03 AM    HEMATOCRIT 30.7 (L) 10/30/2023 02:03 AM    MCV 99.0 (H) 10/30/2023 02:03 AM    MCH 30.3 10/30/2023 02:03 AM    MCHC 30.6 (L) 10/30/2023 02:03 AM    MPV 10.2 10/30/2023 02:03 AM    NEUTSPOLYS 84.20 (H) 10/30/2023 02:03 AM    LYMPHOCYTES 11.40 (L) 10/30/2023 02:03 AM    MONOCYTES 2.60 10/30/2023 02:03 AM    EOSINOPHILS 0.00 10/30/2023 02:03 AM    BASOPHILS 0.00 10/30/2023 02:03 AM    ANISOCYTOSIS 1+ 10/30/2023 02:03 AM        Lab Results   Component Value Date/Time    SODIUM 146 (H) 10/30/2023 02:03 AM    POTASSIUM 3.9 10/30/2023 02:03 AM    CHLORIDE 112 10/30/2023 02:03 AM    CO2 30 10/30/2023 02:03 AM    GLUCOSE 173 (H) 10/30/2023 02:03 AM    BUN 37 (H) 10/30/2023 02:03 AM    CREATININE 0.72 10/30/2023 02:03 AM        Lab Results   Component Value Date/Time    IRXUR92O 7.47 10/27/2023 01:13 PM    IBHVNC533B 41.9 (H) 10/27/2023 01:13 PM    WNYDU318C 77.2 10/27/2023 01:13 PM    ARTHCO3 30 (H) 10/27/2023 01:13 PM    ARTBE 5 (H) 10/27/2023 01:13 PM       Lab Results   Component Value Date/Time    PROTHROMBTM 21.0 (H) 10/12/2023 12:00 AM    INR 1.79 (H) 10/12/2023 12:00 AM        Imaging:   No new imaging the past 24 hours    Assessment and Plan:    * Acute respiratory failure with hypoxia (HCC)- (present on admission)  Assessment & Plan  Intubated 10/11 for mental status and hypoxia on max HFNC, eventually extubated on 10/17.  - RT protocol in place.  - Currently on 2L with nasal cannula.  - Titrate oxygen to greater than 90%.    Advance care planning- (present on admission)  Assessment & Plan  DNR/DNI. Patient does not want heroic or invasive measures and is focused on  quality of life vs. Quantity.  Oldest daughter is primary contact, does not want permanent feeding tube  Palliative care following  - On 10/30 following discussions with palliative care and family plan is for his to transition to comfort care.  - Hospice referral sent  - NG tube removal and allow for regular, liberalized diet.  Discussed severe risk of aspiration resulting in death to patient and family is aware per palliative conversations.  -Discussion with Connie on 11/3.  Potentially patient to continue care at home with family or SNF.  Patient likely to be placed in SNF and then moved to home health.    Anemia  Assessment & Plan  No signs of overt bleeding.  Likely secondary to anemia of chronic disease, anemia trending towards macrocytic.  No iron panel and ferritin on record.  Will not draw iron panel as patient would not like invasive measures.  - No daily labs as patient would not like invasive measures.    Rhabdomyolysis- (present on admission)  Assessment & Plan  Patient now s/p fluid resuscitation.  - Continue to monitor.      Atrial fibrillation with RVR ([facility])- (present on admission)  Assessment & Plan  No known history of Afib, RVR with hypotension attempted electrical cardioversion 10/11.  - Continue PO amiodarone  - Discontinued apixaban with hematuria.  Discussed with patient and he would not like to continue anticoagulation given bleed.  Patient understands increased risk of DVT and stroke and would not like to continue.  - No longer monitoring electrolytes    Hypernatremia- (present on admission)  Assessment & Plan  Ongoing issues with sodium control.  - Most recent sodium of 146.  - The patient has transition to comfort care.  No further free water flushes and daily labs.    ADELAIDE (acute kidney injury) ([facility])- (present on admission)  Assessment & Plan  Secondary to ATN, pre-renal and rhabdo. Now resolved.  - Avoid nephrotoxins      Altered mental status- (present on admission)  Assessment &  Plan  Patient alert and oriented x4 mental status has been improving since ICU admission.  - Delirium protocol in place.  - Continue Seroquel due to somnolence.  - Continue to monitor.    DM2 (diabetes mellitus, type 2) (HCC)- (present on admission)  Assessment & Plan  Most recent A1c of 8%.  - Discontinued SSI and glargine to prioritize comfort and decrease invasive measures  - Discussed with patient, to decrease risk of DKA or hyperglycemic episodes initiating metformin 500mg BID      Sepsis (HCC)- (present on admission)  Assessment & Plan  Patient treated for undifferentiated shock.  Now resolved.  - Continue to monitor.    BPH (benign prostatic hyperplasia)- (present on admission)  Assessment & Plan  Patient with known history of BPH.  - Continue home tamsulosin  - Triplett catheter currently in place since ICU.  We will continue as patient had bleed on attempted removal.    Code Status: DNR/DNI  DVT prophylaxis: Anticoagulation with apixaban.  Diet: Regular diet.  Patient family aware of risks of aspiration and death with regular feeds.  GI: Bowel protocol in place.  Disposition: Pending SNF placement and further PT and OT evaluation.

## 2023-11-05 NOTE — CARE PLAN
Problem: Knowledge Deficit - Standard  Goal: Patient and family/care givers will demonstrate understanding of plan of care, disease process/condition, diagnostic tests and medications  Outcome: Progressing     Problem: Pain - Standard  Goal: Alleviation of pain or a reduction in pain to the patient’s comfort goal  Outcome: Progressing     Problem: Hemodynamics  Goal: Patient's hemodynamics, fluid balance and neurologic status will be stable or improve  Outcome: Progressing     Problem: Fluid Volume  Goal: Fluid volume balance will be maintained  Outcome: Progressing     Problem: Urinary - Renal Perfusion  Goal: Ability to achieve and maintain adequate renal perfusion and functioning will improve  Outcome: Progressing     Problem: Respiratory  Goal: Patient will achieve/maintain optimum respiratory ventilation and gas exchange  Outcome: Progressing     Problem: Physical Regulation  Goal: Diagnostic test results will improve  Outcome: Progressing  Goal: Signs and symptoms of infection will decrease  Outcome: Progressing     Problem: Skin Integrity  Goal: Skin integrity is maintained or improved  Outcome: Progressing     Problem: Fall Risk  Goal: Patient will remain free from falls  Outcome: Progressing   The patient is Stable - Low risk of patient condition declining or worsening    Shift Goals  Clinical Goals: Improve respiration.  Patient Goals: Rest  Family Goals: BA    Progress made toward(s) clinical / shift goals:    Pt's arceo was removed without complications. Pt has been repositioned throughout the day. Pt has not had any complaints of pain.    Patient is not progressing towards the following goals:

## 2023-11-05 NOTE — THERAPY
"Occupational Therapy  Re-Evaluation     Patient Name: Perry Marrufo  Age:  83 y.o., Sex:  male  Medical Record #: [id number]  Today's Date: 11/5/2023     Precautions  Precautions: Fall Risk  Comments: Seizure precautions    Assessment    Seen for re-evaluation as patient no longer on comfort care as pt previously discharged from services. Pt required modA for bed mobility, maxA for lower body ADLs and attempted STS transfer; pt limited by generalized weakness and poor activity tolerance. Pt motivated to participate and encouraged by mobility accomplished, will continue to see for skilled therapy while admitted as well as recommend post-acute placement.    Plan    Treatment Plan Status: Continue Current Treatment Plan  Type of Treatment: Self Care / Activities of Daily Living, Adaptive Equipment, Neuro Re-Education / Balance, Therapeutic Exercises, Cognitive Skill Development, Therapeutic Activity  Treatment Frequency: 3 Times per Week  Treatment Duration: Until Therapy Goals Met    DC Equipment Recommendations: (P) Unable to determine at this time  Discharge Recommendations: (P) Recommend post-acute placement for additional occupational therapy services prior to discharge home    Subjective    \"Well I feel better already\"     Objective       11/05/23 0934   Precautions   Precautions Fall Risk   Comments Seizure precautions   Cognition    Cognition / Consciousness X   Speech/ Communication Hard of Hearing   Orientation Level Not Oriented to Time;Not Oriented to Reason   Level of Consciousness Alert   Ability To Follow Commands 1 Step   Comments Pleasant, following commands, lethargic   Strength Upper Body   Upper Body Strength  X   Gross Strength Generalized Weakness, Equal Bilaterally.    Balance   Sitting Balance (Static) Fair   Sitting Balance (Dynamic) Fair -   Standing Balance (Static) Dependent   Weight Shift Sitting Poor   Skilled Intervention Verbal Cuing;Facilitation   Bed Mobility    Supine to Sit Moderate " Assist   Sit to Supine Moderate Assist   Scooting Moderate Assist   Rolling Moderate Assist to Rt.   Skilled Intervention Verbal Cuing;Facilitation   Comments HOB elevated   Activities of Daily Living   Eating Supervision   Grooming Contact Guard Assist;Seated   Upper Body Dressing Moderate Assist   Lower Body Dressing Total Assist   Skilled Intervention Verbal Cuing;Facilitation   How much help from another person does the patient currently need...   Putting on and taking off regular lower body clothing? 1   Bathing (including washing, rinsing, and drying)? 2   Toileting, which includes using a toilet, bedpan, or urinal? 2   Putting on and taking off regular upper body clothing? 2   Taking care of personal grooming such as brushing teeth? 3   Eating meals? 3   6 Clicks Daily Activity Score 13   Functional Mobility   Sit to Stand Total Assist   Mobility bed mobility, attempted STS x2, returned to supine   Skilled Intervention Verbal Cuing;Compensatory Strategies   Activity Tolerance   Sitting Edge of Bed 10 min   Standing ~10 seconds   Short Term Goals   Short Term Goal # 1 Pt will complete ADL transfers with SBA   Goal Outcome # 1 Progressing as expected   Short Term Goal # 2 Pt will complete seated grooming with set-up   Goal Outcome # 2 Progressing as expected   Short Term Goal # 3 Pt will complete toileting with SBA   Goal Outcome # 3 Progressing slower than expected   Education Group   Education Provided Role of Occupational Therapist   Role of Occupational Therapist Patient Response Patient;Explanation;Reinforcement Needed   Occupational Therapy Treatment Plan    O.T. Treatment Plan Continue Current Treatment Plan   Anticipated Discharge Equipment and Recommendations   DC Equipment Recommendations Unable to determine at this time   Discharge Recommendations Recommend post-acute placement for additional occupational therapy services prior to discharge home   Interdisciplinary Plan of Care Collaboration   IDT  Collaboration with  Nursing;Physical Therapist   Patient Position at End of Therapy In Bed;Bed Alarm On;Call Light within Reach;Tray Table within Reach;Phone within Reach   Collaboration Comments RN updated

## 2023-11-06 VITALS
DIASTOLIC BLOOD PRESSURE: 53 MMHG | TEMPERATURE: 96.8 F | HEART RATE: 74 BPM | WEIGHT: 240.3 LBS | OXYGEN SATURATION: 93 % | HEIGHT: 71 IN | BODY MASS INDEX: 33.64 KG/M2 | RESPIRATION RATE: 18 BRPM | SYSTOLIC BLOOD PRESSURE: 104 MMHG

## 2023-11-06 PROCEDURE — 51798 US URINE CAPACITY MEASURE: CPT

## 2023-11-06 PROCEDURE — 700102 HCHG RX REV CODE 250 W/ 637 OVERRIDE(OP)

## 2023-11-06 PROCEDURE — 99239 HOSP IP/OBS DSCHRG MGMT >30: CPT | Mod: GC | Performed by: INTERNAL MEDICINE

## 2023-11-06 PROCEDURE — A9270 NON-COVERED ITEM OR SERVICE: HCPCS

## 2023-11-06 RX ORDER — AMIODARONE HYDROCHLORIDE 200 MG/1
200 TABLET ORAL DAILY
Qty: 30 TABLET | Refills: 2 | Status: SHIPPED
Start: 2023-11-07

## 2023-11-06 RX ADMIN — TAMSULOSIN HYDROCHLORIDE 0.4 MG: 0.4 CAPSULE ORAL at 08:12

## 2023-11-06 RX ADMIN — METFORMIN HYDROCHLORIDE 500 MG: 500 TABLET ORAL at 08:12

## 2023-11-06 RX ADMIN — ACETAMINOPHEN 650 MG: 325 TABLET, FILM COATED ORAL at 02:57

## 2023-11-06 RX ADMIN — AMIODARONE HYDROCHLORIDE 200 MG: 200 TABLET ORAL at 04:35

## 2023-11-06 ASSESSMENT — PAIN DESCRIPTION - PAIN TYPE
TYPE: ACUTE PAIN
TYPE: ACUTE PAIN

## 2023-11-06 NOTE — PROGRESS NOTES
Report was given to KAVITA Arias who will resume care of pt once pt leave today for [facility].    1234- This RN spoke with Connie pt's daughter to let her know pt will be going to [facility] at 1330.    1349-Pt left to go to [facility] via [facility]

## 2023-11-06 NOTE — DISCHARGE PLANNING
DC Transport Scheduled    Received request at: 11/6/2023 at 0935    Transport Company Scheduled:  [facility]   Spoke with Marleny at [facility] to schedule transport.    Scheduled Date: 11/6/2023  Scheduled Time: 1330    Destination: [facility]  [facility]     Notified care team of scheduled transport via [employer].     If there are any changes needed to the DC transportation scheduled, please contact [facility] at ext. [phone] between the hours of 1960-7232 Mon-Fri. If outside those hours, contact the ED Case Manager at ext. [phone].

## 2023-11-06 NOTE — CARE PLAN
Problem: Knowledge Deficit - Standard  Goal: Patient and family/care givers will demonstrate understanding of plan of care, disease process/condition, diagnostic tests and medications  Note: Nurse provided teaching on medications and medicines      Problem: Pain - Standard  Goal: Alleviation of pain or a reduction in pain to the patient’s comfort goal  Note: Pt being monitored for pain and has medication avaliable   The patient is Stable - Low risk of patient condition declining or worsening    Shift Goals  Clinical Goals: Improve respiration.  Patient Goals: Rest  Family Goals: BA    Progress made toward(s) clinical / shift goals:      Patient is not progressing towards the following goals:

## 2023-11-06 NOTE — DISCHARGE SUMMARY
[facility] Discharge Summary    Attending: Manjinder Torres M.d.  Senior Resident: Dr. Dio Whitlock  Intern:  Dr. Prince Lamb  Contact Number: [phone]    CHIEF COMPLAINT ON ADMISSION  Chief Complaint   Patient presents with    T-5000 GLF     Pt found down.  LKW 2130 on 10/8       Reason for Admission  ems     Admission Date  10/9/2023    CODE STATUS  DNAR/DNI    HPI & HOSPITAL COURSE  This is a 83 y.o. male here with past medical history of noninsulin-dependent type 2 diabetes, BPH, and previous orthopedic surgeries who was found down in his home on 10/8/2023, and was admitted to the hospital on 10/9/2023.  In hospital, he was found to be dehydrated and altered notably uremic with new onset of atrial fibrillation with RVR which was initially treated with diltiazem in the ED.  Patient's hospitalization was complicated by acute hypoxic respiratory failure requiring intubation as well as a Triplett catheter insertion.  He was also found to have an ADELAIDE with rhabdomyolysis s/p fluid resuscitation which improved.  Patient was evaluated on 10/30/2023 by palliative care with the patient opting for less invasive measures. Patient was evaluated by PT and OT for SNF placement. He qualifies for transfer to SNF on 11/6/2023.    Patient has acute urinary retention, bladder scan x2 showed volume of 700 mL and 900 mL respectively, for which he was straight cathetered.  Eventually, prior to discharge, patient was placed a Triplett catheter and counseled on catheter use with leg bag.    Therefore, he is discharged in guarded and stable condition to skilled nursing facility.    The patient met 2-midnight criteria for an inpatient stay at the time of discharge.    Discharge Date  11/6/2023    Physical Exam on Day of Discharge  Vitals and nursing note reviewed.   Constitutional:       General: He is not in acute distress.     Appearance: He is obese. He is ill-appearing. He is not diaphoretic.   HENT:      Head: Normocephalic  and atraumatic.      Right Ear: External ear normal.      Left Ear: External ear normal.      Nose:      Comments: NG tube in place     Mouth/Throat:      Mouth: Mucous membranes are dry.      Pharynx: Oropharynx is clear.   Eyes:      General: No scleral icterus.     Extraocular Movements: Extraocular movements intact.      Pupils: Pupils are equal, round, and reactive to light.   Cardiovascular:      Rate and Rhythm: Normal rate. Rhythm irregular.      Pulses: Normal pulses.      Heart sounds: Normal heart sounds. No murmur heard.  Pulmonary:      Effort: Pulmonary effort is normal. No respiratory distress.   Chest:      Chest wall: No tenderness.   Abdominal:      General: Abdomen is flat. Bowel sounds are normal. There is no distension.      Palpations: Abdomen is soft.      Tenderness: There is no right CVA tenderness or left CVA tenderness.   Genitourinary:     Comments: Triplett catheter placed  Musculoskeletal:         General: No swelling. Normal range of motion.      Cervical back: Normal range of motion. No rigidity.      Right lower leg: No edema.      Left lower leg: No edema.   Skin:     General: Skin is warm and dry.      Capillary Refill: Capillary refill takes less than 2 seconds.      Coloration: Skin is not jaundiced.      Findings: No rash.   Neurological:      General: No focal deficit present.      Mental Status: He is alert and oriented to person, place, and time.      Deep Tendon Reflexes: Reflexes normal.     FOLLOW UP ITEMS POST DISCHARGE  - Triplett catheter inserted prior to discharge, counseled patient on cleaning catheter technique, and the use of leg bag  - Patient also started on tamsulosin for BPH.  SNF to consider discontinuation of Triplett catheter 2-3 weeks after continued use of tamsulosin to assess for continued need for Triplett.  - Patient is going home on palliative.     DISCHARGE DIAGNOSES  Principal Problem:    Acute respiratory failure with hypoxia (HCC) (POA: Yes)  Active  Problems:    BPH (benign prostatic hyperplasia) (POA: Yes)    Sepsis (HCC) (POA: Yes)    DM2 (diabetes mellitus, type 2) (HCC) (POA: Yes)    Altered mental status (POA: Yes)    ADELAIDE (acute kidney injury) (HCC) (POA: Yes)    Hypernatremia (POA: Yes)    Erythrocytosis (POA: Yes)    Atrial fibrillation with RVR (HCC) (POA: Yes)    Rhabdomyolysis (POA: Yes)    Aspiration precautions (POA: Unknown)    Shock (HCC) (POA: Unknown)    Anemia (POA: Unknown)    Decubitus skin ulcer (POA: Unknown)    Advance care planning (POA: Yes)    Lower abdominal pain (POA: Unknown)  Resolved Problems:    Diarrhea (POA: Yes)    Dental caries (POA: Yes)    Aspiration into airway (POA: Unknown)    Dehydration (POA: Yes)      FOLLOW UP  No future appointments.  [address] [address]  [phone]      MEDICATIONS ON DISCHARGE     Medication List        START taking these medications        Instructions   amiodarone 200 MG Tabs  Start taking on: November 7, 2023  Commonly known as: Cordarone   Take 1 Tablet by mouth every day.  Dose: 200 mg     metFORMIN 500 MG Tabs  Commonly known as: Glucophage   Take 1 Tablet by mouth 2 times a day with meals.  Dose: 500 mg            CONTINUE taking these medications        Instructions   aspirin 325 MG Tabs  Commonly known as: Asa   Take 1 Tab by mouth every day.  Dose: 325 mg     cyanocobalamin 500 MCG tablet  Commonly known as: Vitamin B12   Take 1 Tab by mouth every day.  Dose: 500 mcg     enoxaparin 40 MG/0.4ML Soln inj  Commonly known as: LOVENOX   Inject 40 mg as instructed every day.  Dose: 40 mg     famotidine 20 MG Tabs  Commonly known as: Pepcid   Take 20 mg by mouth every day.  Dose: 20 mg     fish oil 1000 MG Caps capsule   Take 1 Cap by mouth 3 times a day, with meals.  Dose: 1,000 mg     HYDROcodone-acetaminophen 5-325 MG Tabs per tablet  Commonly known as: Norco   Take 1-2 Tabs by mouth every 6 hours as needed. Indications: Moderate to  Moderately Severe Pain  Dose: 1-2 Tablet     insulin lispro 100 UNIT/ML  Commonly known as: HumaLOG   Inject 2-9 Units as instructed 4 Times a Day,Before Meals and at Bedtime. 151-200 -= 2 units  201-250 = 3 units  251-300 = 5 units  301-350 = 6 units  351-400 = 8 units  401> 9 units call md  Dose: 2-9 Units     lactobacillus rhamnosus (GG) Caps  Commonly known as: CULTURELLE   Take 1 Cap by mouth every day.  Dose: 1 Capsule     potassium chloride SA 20 MEQ Tbcr  Commonly known as: Kdur   Take 1 Tab by mouth every day.  Dose: 20 mEq     tamsulosin 0.4 MG capsule  Commonly known as: Flomax   Take 1 Cap by mouth ONE-HALF HOUR AFTER BREAKFAST.  Dose: 0.4 mg     vitamin D 1000 Unit (25 mcg) Tabs  Commonly known as: cholecalciferol   Take 2,000 Units by mouth every day.  Dose: 2,000 Units              Allergies  Allergies   Allergen Reactions    Nicotine        DIET  Orders Placed This Encounter   Procedures    Diet Order Diet: Regular     Standing Status:   Standing     Number of Occurrences:   1     Order Specific Question:   Diet:     Answer:   Regular [1]       ACTIVITY  As tolerated and directed by skilled nursing.  Weight bearing as tolerated    LINES, DRAINS, AND WOUNDS  This is an automated list. Peripheral IVs will be removed prior to discharge.  Peripheral IV 10/29/23 20 G Anterior;Distal;Left Wrist (Active)   Site Assessment Clean;Dry;Intact 11/06/23 0917   Dressing Type Transparent 11/06/23 0917   Line Status Flushed;Saline locked 11/06/23 0917   Dressing Status Clean;Dry;Intact 11/06/23 0917   Dressing Intervention N/A 11/06/23 0917   Infiltration Grading ([facility], [facility]) 0 11/06/23 0917   Phlebitis Scale ([facility] Only) 0 11/06/23 0917     External Urinary Catheter ([place]) (Active)      Wound 10/09/23 Pressure Injury Sacrum;Buttocks Bilateral POA evolving DTI (Active)   Wound Image    10/24/23 1700   Site Assessment Dry;Pink;Red 11/06/23 0800   Periwound Assessment Red 11/06/23 0800   Margins Undefined edges  11/05/23 1900   Closure Open to air 11/05/23 1900   Drainage Amount None 11/06/23 0800   Drainage Description Serous 11/05/23 1900   Treatments Cleansed;Offloading;Site care 11/06/23 0800   Offloading/DME Other (comment) 11/05/23 1900   Wound Cleansing Approved Wound Cleanser 11/06/23 0800   Periwound Protectant Barrier Paste 11/06/23 0800   Dressing Status Intact;Dry;Clean;Removed 11/06/23 0800   Dressing Changed Changed 11/06/23 0800   Dressing Cleansing/Solutions Not Applicable 11/05/23 1132   Dressing Options Other (Comments) 11/05/23 1900   Dressing Change/Treatment Frequency Daily, and As Needed 11/06/23 0800   NEXT Weekly Photo (Inpatient Only) 10/31/23 10/24/23 1700   Wound Team Following Weekly 11/05/23 1900   WOUND NURSE ONLY - Pressure Injury Stage DTPI 10/19/23 1500   Wound Length (cm) 20 cm 10/19/23 1500   Wound Width (cm) 20 cm 10/19/23 1500   Wound Depth (cm) 0.05 cm 10/19/23 1500   Wound Surface Area (cm^2) 400 cm^2 10/19/23 1500   Wound Volume (cm^3) 20 cm^3 10/19/23 1500   Shape partial thickness 10/24/23 1700   Wound Odor None 10/24/23 1700   WOUND NURSE ONLY - Time Spent with Patient (mins) 30 10/24/23 1700       Wound 10/09/23 Pressure Injury Knee Lateral Right POA unstageable (Active)   Wound Image   10/10/23 1400   Site Assessment BA 11/05/23 1900   Periwound Assessment BA 11/05/23 1900   Margins Defined edges 11/05/23 1900   Closure Open to air 11/05/23 1900   Drainage Amount None 11/05/23 1900   Drainage Description Serosanguineous 10/21/23 2000   Treatments Site care 11/05/23 1900   Offloading/DME Other (comment) 10/17/23 0800   Wound Cleansing Soap and Water 11/05/23 1900   Periwound Protectant Not Applicable 11/05/23 1900   Dressing Status Clean;Dry;Intact 11/05/23 1900   Dressing Changed Observed 11/05/23 1900   Dressing Options Open to Air 11/05/23 1900   Dressing Change/Treatment Frequency Every 72 hrs, and As Needed 11/06/23 0800   Wound Team Following Not following 11/05/23 1900    WOUND NURSE ONLY - Pressure Injury Stage U 10/10/23 1400   Wound Length (cm) 3 cm 10/10/23 1400   Wound Width (cm) 3 cm 10/10/23 1400   Wound Surface Area (cm^2) 9 cm^2 10/10/23 1400   Shape circular 10/10/23 1400   Wound Odor None 10/10/23 1400       Wound 10/09/23 Pressure Injury Foot;MTH 1 Medial Left POA DTI (Active)   Wound Image   10/10/23 1400   Site Assessment [place] 11/05/23 1900   Periwound Assessment Clean;Dry;[place] 11/05/23 1900   Margins Defined edges 11/05/23 1900   Closure Open to air 11/05/23 1900   Drainage Amount None 11/05/23 1900   Treatments Site care 11/05/23 1900   Offloading/DME Heel float boot 11/05/23 1900   Wound Cleansing Soap and Water 11/05/23 1900   Periwound Protectant Skin Moisturizer 11/05/23 1900   Dressing Status Open to Air 11/05/23 1900   Dressing Options Open to Air 11/05/23 1900   NEXT Weekly Photo (Inpatient Only) 10/18/23 10/10/23 1400   Wound Team Following Not following 11/05/23 1900   WOUND NURSE ONLY - Pressure Injury Stage DTPI 10/10/23 1400   Wound Length (cm) 0.3 cm 10/10/23 1400   Wound Width (cm) 0.3 cm 10/10/23 1400   Wound Surface Area (cm^2) 0.09 cm^2 10/10/23 1400   Shape circular 10/10/23 1400       Wound 10/09/23 Pressure Injury Hip;Pelvis Right POA DTI (Active)   Wound Image   10/10/23 1400   Site Assessment Dry;Clean;Intact;[place] 11/05/23 1900   Periwound Assessment Clean;Dry;Intact;Pink 11/05/23 1900   Margins Defined edges 11/05/23 1900   Closure Secondary intention 11/05/23 1900   Drainage Amount None 11/05/23 1900   Drainage Description Serous 11/05/23 1900   Treatments Site care 11/05/23 1900   Wound Cleansing Approved Wound Cleanser 11/05/23 1900   Periwound Protectant No-sting Skin Prep 10/19/23 1500   Dressing Status Clean;Intact;Dry 11/05/23 1900   Dressing Changed Observed 11/05/23 1900   Dressing Options Hydrocolloid Thin 11/05/23 1900   Dressing Change/Treatment Frequency Every 72 hrs, and As Needed 11/05/23 1900   NEXT Dressing Change/Treatment  Date 10/22/23 10/19/23 1500   NEXT Weekly Photo (Inpatient Only) 10/25/23 10/19/23 1500   Wound Team Following Weekly 11/05/23 1900   WOUND NURSE ONLY - Pressure Injury Stage DTPI 10/19/23 1500   Wound Length (cm) 1 cm 10/19/23 1500   Wound Width (cm) 2.5 cm 10/19/23 1500   Wound Depth (cm) 0.1 cm 10/19/23 1500   Wound Surface Area (cm^2) 2.5 cm^2 10/19/23 1500   Wound Volume (cm^3) 0.25 cm^3 10/19/23 1500   Wound Bed Slough (%) 100 % 10/19/23 1500   Shape oval 10/19/23 1500   Wound Odor None 10/19/23 1500   Exposed Structures None 10/19/23 1500   WOUND NURSE ONLY - Time Spent with Patient (mins) 30 10/19/23 1500       Wound 10/11/23 Full Thickness Wound Lip;Mouth Outer Right is black scab like (Active)   Site Assessment Clean;Dry;Intact 11/05/23 1900   Periwound Assessment Clean;Dry;Intact 11/05/23 1900   Margins Attached edges 11/05/23 1900   Closure Open to air 11/05/23 1900   Drainage Amount None 11/05/23 1900   Treatments Cleansed;Site care 11/05/23 0800   Wound Cleansing Soap and Water 11/05/23 1900   Dressing Status Open to Air 11/05/23 1900       Wound 10/11/23 Skin Tear Elbow Right redness on the elbows with skin tears (Active)   Wound Image   10/11/23 2000   Site Assessment Clean;Dry;Intact 11/05/23 1900   Periwound Assessment Clean;Dry;Intact 11/05/23 1900   Margins Defined edges 11/05/23 1900   Drainage Amount Scant 11/05/23 1900   Drainage Description Serosanguineous 11/05/23 1900   Treatments Site care 11/05/23 1900   Offloading/DME Other (comment) 10/17/23 0800   Wound Cleansing Soap and Water 11/05/23 1900   Periwound Protectant Skin Moisturizer 11/05/23 1900   Dressing Status Clean;Dry;Intact 11/05/23 1900   Dressing Changed Changed 11/05/23 1900   Dressing Cleansing/Solutions Not Applicable 11/05/23 1900   Dressing Options Offloading Dressing - Heel 11/05/23 1900   Dressing Change/Treatment Frequency As Needed 11/05/23 1900       Wound 10/11/23 Skin Tear Elbow Left redness and skin tear (Active)    Wound Image   10/11/23 2000   Site Assessment Clean;Dry;Intact 11/05/23 1900   Periwound Assessment Dry;Clean;Intact 11/05/23 1900   Margins Defined edges 11/05/23 1900   Closure Open to air 11/05/23 1900   Drainage Amount Scant 11/05/23 1900   Drainage Description Serosanguineous 11/05/23 1900   Treatments Site care 11/05/23 1900   Offloading/DME Other (comment) 10/17/23 0800   Wound Cleansing Soap and Water 11/05/23 1900   Periwound Protectant Skin Moisturizer 10/17/23 2000   Dressing Status Clean;Dry;Intact 11/05/23 1900   Dressing Changed Observed 11/05/23 1900   Dressing Options Offloading Dressing - Heel 11/05/23 1900   Dressing Change/Treatment Frequency As Needed 11/05/23 1900   NEXT Weekly Photo (Inpatient Only) 10/27/23 10/20/23 0200       Wound 10/11/23 Skin Tear Knee Left open wound (Active)   Wound Image   10/11/23 2000   Site Assessment Clean;Dry;Intact 11/05/23 1900   Periwound Assessment Clean;Intact;Dry 11/05/23 1900   Margins Defined edges 11/05/23 1900   Closure Open to air 11/05/23 1900   Drainage Amount None 10/22/23 0800   Treatments Offloading 11/05/23 1900   Offloading/DME Other (comment) 10/17/23 0800   Wound Cleansing Soap and Water 10/17/23 2000   Periwound Protectant Skin Moisturizer 11/05/23 1900   Dressing Status Open to Air 11/05/23 1900   Dressing Options Open to Air 11/05/23 1900       Wound 10/11/23 Pressure Injury Heel Left nonblanching redness (Active)   Wound Image   10/11/23 2000   Site Assessment Dry;Clean;Intact 11/05/23 1900   Periwound Assessment Clean;Dry;Intact 11/05/23 1900   Margins Defined edges 11/05/23 1900   Closure Open to air 11/05/23 1900   Drainage Amount None 11/05/23 1900   Treatments Other (Comment) 10/28/23 2100   Offloading/DME Heel float boot 11/05/23 1900   Wound Cleansing Approved Wound Cleanser 11/05/23 1900   Periwound Protectant Skin Moisturizer 11/05/23 1900   Dressing Status Open to Air 11/05/23 1900   Dressing Changed Observed 11/05/23 1900    Dressing Options Offloading Dressing - Heel 11/05/23 1900   Dressing Change/Treatment Frequency As Needed 11/05/23 1900       Wound 10/11/23 Heel Right nonblanching redness (Active)   Wound Image   10/11/23 2000   Site Assessment Clean;Dry;Intact 11/05/23 1900   Periwound Assessment Clean;Dry;Intact 11/05/23 1900   Margins Defined edges 11/05/23 1900   Closure Open to air 11/05/23 1900   Drainage Amount None 11/05/23 1900   Treatments Other (Comment) 10/28/23 2100   Offloading/DME Heel float boot 11/05/23 1900   Wound Cleansing Soap and Water 10/19/23 0000   Periwound Protectant Skin Moisturizer 11/05/23 1900   Dressing Status Open to Air 11/05/23 1900   Dressing Changed Observed 11/05/23 1900   Dressing Options Offloading Dressing - Heel 10/24/23 0800   Dressing Change/Treatment Frequency As Needed 10/28/23 0800       Wound 10/19/23 Skin Tear Thigh Anterior Right (Active)   Wound Image   10/19/23 0000   Site Assessment [place] 11/05/23 1900   Periwound Assessment Clean;Dry;Intact 11/05/23 1900   Margins Unattached edges 11/05/23 1900   Closure Open to air 11/05/23 1900   Drainage Amount None 11/05/23 1900   Treatments Cleansed 11/05/23 0800       Peripheral IV 10/29/23 20 G Anterior;Distal;Left Wrist (Active)   Site Assessment Clean;Dry;Intact 11/06/23 0917   Dressing Type Transparent 11/06/23 0917   Line Status Flushed;Saline locked 11/06/23 0917   Dressing Status Clean;Dry;Intact 11/06/23 0917   Dressing Intervention N/A 11/06/23 0917   Infiltration Grading ([facility] 0 11/06/23 0917   Phlebitis Scale ([facility] Only) 0 11/06/23 0917               MENTAL STATUS ON TRANSFER  Mental status appropriate on transfer, patient is alert and oriented x4       CONSULTATIONS  Critical care  Cardiology    PROCEDURES  Intubation, 10/11/2023, 4 somnolence and acute hypoxia on max HFNC  Extubation, 10/17/2023    LABORATORY  Lab Results   Component Value Date    SODIUM 146 (H) 10/30/2023    POTASSIUM 3.9 10/30/2023    CHLORIDE  112 10/30/2023    CO2 30 10/30/2023    GLUCOSE 173 (H) 10/30/2023    BUN 37 (H) 10/30/2023    CREATININE 0.72 10/30/2023        Lab Results   Component Value Date    WBC 8.0 10/30/2023    HEMOGLOBIN 9.4 (L) 10/30/2023    HEMATOCRIT 30.7 (L) 10/30/2023    PLATELETCT 178 10/30/2023        Total time of the discharge process exceeds 50 minutes.

## 2023-11-06 NOTE — DISCHARGE PLANNING
Case Management Discharge Planning    Admission Date: 10/9/2023  GMLOS: 5.1  ALOS: 28    6-Clicks ADL Score: 13  6-Clicks Mobility Score: 7  PT and/or OT Eval ordered: Yes  Post-acute Referrals Ordered: Yes  Post-acute Choice Obtained: NA  Has referral(s) been sent to post-acute provider:  Yes     Anticipated Discharge Dispo: Discharge Disposition: D/T to SNF with Medicare cert in anticipation of skilled care (03)     DME Needed: No     Action(s) Taken: Updated Provider/Nurse on Discharge Plan  Patient discussed during IDT rounds, patient is cleared for discharge to SNF. DPA confirmed with [facility] they are able to accept patient today. ARLINE called patient's daughter Connie to inform. Connie agreeable to [facility]. No other concerns reported at this time. ARLINE emailed Connie [facility] information. [facility] transportation requested.    PASRR: [id number]    Escalations Completed: None     Medically Clear: Yes     Next Steps: Transportation at 1330 today     Barriers to Discharge: None

## 2023-11-06 NOTE — DISCHARGE PLANNING
Agency/Facility Name: [facility]  Spoke To: Kellie   Outcome: DPA called regarding any beds available for patient today, per Kellie they can take patient in today. Kellie requested transport to be set up between [id number]. DPA will call back with a confirmed transport time.

## 2023-11-06 NOTE — PROGRESS NOTES
This RN messaged Dr. Pope.  Pt's bladder scan was 521 ml. This RN was wondring if it's okay to straight cath pt.     1736-Per Dr. Pope it's okay to straight cath pt.

## 2023-11-06 NOTE — CARE PLAN
Problem: Knowledge Deficit - Standard  Goal: Patient and family/care givers will demonstrate understanding of plan of care, disease process/condition, diagnostic tests and medications  Outcome: Met     Problem: Pain - Standard  Goal: Alleviation of pain or a reduction in pain to the patient’s comfort goal  Outcome: Met     Problem: Hemodynamics  Goal: Patient's hemodynamics, fluid balance and neurologic status will be stable or improve  Outcome: Met     Problem: Fluid Volume  Goal: Fluid volume balance will be maintained  Outcome: Met     Problem: Urinary - Renal Perfusion  Goal: Ability to achieve and maintain adequate renal perfusion and functioning will improve  Outcome: Met     Problem: Respiratory  Goal: Patient will achieve/maintain optimum respiratory ventilation and gas exchange  Outcome: Met     Problem: Physical Regulation  Goal: Diagnostic test results will improve  Outcome: Met  Goal: Signs and symptoms of infection will decrease  Outcome: Met     Problem: Skin Integrity  Goal: Skin integrity is maintained or improved  Outcome: Met     Problem: Fall Risk  Goal: Patient will remain free from falls  Outcome: Met     Problem: Safety - Medical Restraint  Goal: Remains free of injury from restraints (Restraint for Interference with Medical Device)  Outcome: Met  Goal: Free from restraint(s) (Restraint for Interference with Medical Device)  Outcome: Met   The patient is Stable - Low risk of patient condition declining or worsening    Shift Goals  Clinical Goals: Reposition pt  Patient Goals: Rest  Family Goals: BA    Progress made toward(s) clinical / shift goals:    Pt is discharging.     Patient is not progressing towards the following goals:

## 2024-05-01 NOTE — CARE PLAN
The patient is Watcher - Medium risk of patient condition declining or worsening    Shift Goals  Clinical Goals: rest/comfort  Patient Goals: rest  Family Goals: BA    Progress made toward(s) clinical / shift goals:    Problem: Pain - Standard  Goal: Alleviation of pain or a reduction in pain to the patient’s comfort goal  Outcome: Progressing  Flowsheets (Taken 11/4/2023 0313)  Pain Rating Scale (NPRS): 0  Note: Perry was free of pain this shift.      Problem: Respiratory  Goal: Patient will achieve/maintain optimum respiratory ventilation and gas exchange  Outcome: Progressing  Flowsheets  Taken 11/4/2023 0313 by Mica Fenton RROBBY  O2 Delivery Device: Nasal Cannula  Suction Frequency: Suctioned Once or Twice Per Encounter  Taken 10/28/2023 0800 by Tri Ramos RROBBY  Deep Breathe and Cough: Needs Coaching  Note: Perry was on 2L NC tonight and was within parameters.  Problem: Fall Risk  Goal: Patient will remain free from falls  Outcome: Progressing  Note: Perry was free from falls and injuries this shift.             [Follow-Up Visit] : a follow-up [FreeTextEntry2] : pancreas carcinoma